# Patient Record
Sex: FEMALE | Race: WHITE | Employment: OTHER | URBAN - METROPOLITAN AREA
[De-identification: names, ages, dates, MRNs, and addresses within clinical notes are randomized per-mention and may not be internally consistent; named-entity substitution may affect disease eponyms.]

---

## 2017-01-01 ENCOUNTER — TELEPHONE (OUTPATIENT)
Dept: FAMILY MEDICINE CLINIC | Facility: CLINIC | Age: 67
End: 2017-01-01

## 2017-01-01 ENCOUNTER — APPOINTMENT (OUTPATIENT)
Dept: CT IMAGING | Facility: HOSPITAL | Age: 67
DRG: 271 | End: 2017-01-01
Attending: CLINICAL NURSE SPECIALIST
Payer: MEDICARE

## 2017-01-01 ENCOUNTER — OFFICE VISIT (OUTPATIENT)
Dept: PODIATRY CLINIC | Facility: CLINIC | Age: 67
End: 2017-01-01

## 2017-01-01 ENCOUNTER — ANTI-COAG VISIT (OUTPATIENT)
Dept: INTERNAL MEDICINE CLINIC | Facility: CLINIC | Age: 67
End: 2017-01-01

## 2017-01-01 ENCOUNTER — HOSPITAL ENCOUNTER (EMERGENCY)
Facility: HOSPITAL | Age: 67
Discharge: HOME | End: 2017-01-01
Attending: EMERGENCY MEDICINE
Payer: MEDICARE

## 2017-01-01 ENCOUNTER — PATIENT OUTREACH (OUTPATIENT)
Dept: FAMILY MEDICINE CLINIC | Facility: CLINIC | Age: 67
End: 2017-01-01

## 2017-01-01 ENCOUNTER — TELEPHONE (OUTPATIENT)
Dept: OTHER | Age: 67
End: 2017-01-01

## 2017-01-01 ENCOUNTER — APPOINTMENT (OUTPATIENT)
Dept: INTERVENTIONAL RADIOLOGY/VASCULAR | Facility: HOSPITAL | Age: 67
DRG: 271 | End: 2017-01-01
Attending: RADIOLOGY
Payer: MEDICARE

## 2017-01-01 ENCOUNTER — TELEPHONE (OUTPATIENT)
Dept: PODIATRY CLINIC | Facility: CLINIC | Age: 67
End: 2017-01-01

## 2017-01-01 ENCOUNTER — OPTICAL REFILL REQUEST (OUTPATIENT)
Dept: OPHTHALMOLOGY | Facility: CLINIC | Age: 67
End: 2017-01-01

## 2017-01-01 ENCOUNTER — TELEPHONE (OUTPATIENT)
Dept: CARDIOLOGY UNIT | Facility: HOSPITAL | Age: 67
End: 2017-01-01

## 2017-01-01 ENCOUNTER — NURSE ONLY (OUTPATIENT)
Dept: FAMILY MEDICINE CLINIC | Facility: CLINIC | Age: 67
End: 2017-01-01

## 2017-01-01 ENCOUNTER — TELEPHONE (OUTPATIENT)
Dept: SURGERY | Facility: CLINIC | Age: 67
End: 2017-01-01

## 2017-01-01 ENCOUNTER — APPOINTMENT (OUTPATIENT)
Dept: ULTRASOUND IMAGING | Facility: HOSPITAL | Age: 67
DRG: 271 | End: 2017-01-01
Attending: EMERGENCY MEDICINE
Payer: MEDICARE

## 2017-01-01 ENCOUNTER — OFFICE VISIT (OUTPATIENT)
Dept: OPHTHALMOLOGY | Facility: CLINIC | Age: 67
End: 2017-01-01

## 2017-01-01 ENCOUNTER — OFFICE VISIT (OUTPATIENT)
Dept: FAMILY MEDICINE CLINIC | Facility: CLINIC | Age: 67
End: 2017-01-01

## 2017-01-01 ENCOUNTER — HOSPITAL ENCOUNTER (INPATIENT)
Facility: HOSPITAL | Age: 67
LOS: 4 days | Discharge: HOME | DRG: 271 | End: 2017-01-01
Attending: EMERGENCY MEDICINE | Admitting: HOSPITALIST
Payer: MEDICARE

## 2017-01-01 ENCOUNTER — HOSPITAL ENCOUNTER (OUTPATIENT)
Dept: GENERAL RADIOLOGY | Age: 67
Discharge: HOME | End: 2017-01-01
Attending: FAMILY MEDICINE | Admitting: FAMILY MEDICINE
Payer: MEDICARE

## 2017-01-01 ENCOUNTER — PATIENT OUTREACH (OUTPATIENT)
Dept: CASE MANAGEMENT | Age: 67
End: 2017-01-01

## 2017-01-01 ENCOUNTER — TELEPHONE (OUTPATIENT)
Dept: OPHTHALMOLOGY | Facility: CLINIC | Age: 67
End: 2017-01-01

## 2017-01-01 ENCOUNTER — TELEPHONE (OUTPATIENT)
Dept: INTERNAL MEDICINE UNIT | Facility: HOSPITAL | Age: 67
End: 2017-01-01

## 2017-01-01 ENCOUNTER — APPOINTMENT (OUTPATIENT)
Dept: ULTRASOUND IMAGING | Facility: HOSPITAL | Age: 67
End: 2017-01-01
Attending: EMERGENCY MEDICINE
Payer: MEDICARE

## 2017-01-01 VITALS
BODY MASS INDEX: 31 KG/M2 | SYSTOLIC BLOOD PRESSURE: 130 MMHG | TEMPERATURE: 98 F | HEART RATE: 73 BPM | WEIGHT: 137 LBS | DIASTOLIC BLOOD PRESSURE: 79 MMHG

## 2017-01-01 VITALS
HEART RATE: 82 BPM | BODY MASS INDEX: 30.82 KG/M2 | SYSTOLIC BLOOD PRESSURE: 123 MMHG | WEIGHT: 137 LBS | DIASTOLIC BLOOD PRESSURE: 70 MMHG | OXYGEN SATURATION: 99 % | HEIGHT: 56 IN | TEMPERATURE: 97 F | RESPIRATION RATE: 18 BRPM

## 2017-01-01 VITALS
WEIGHT: 137.13 LBS | TEMPERATURE: 98 F | BODY MASS INDEX: 30 KG/M2 | RESPIRATION RATE: 18 BRPM | HEART RATE: 79 BPM | SYSTOLIC BLOOD PRESSURE: 119 MMHG | DIASTOLIC BLOOD PRESSURE: 64 MMHG | OXYGEN SATURATION: 97 %

## 2017-01-01 VITALS
DIASTOLIC BLOOD PRESSURE: 65 MMHG | BODY MASS INDEX: 29 KG/M2 | HEART RATE: 76 BPM | SYSTOLIC BLOOD PRESSURE: 129 MMHG | OXYGEN SATURATION: 98 % | WEIGHT: 132 LBS | RESPIRATION RATE: 18 BRPM | TEMPERATURE: 98 F

## 2017-01-01 VITALS
SYSTOLIC BLOOD PRESSURE: 97 MMHG | WEIGHT: 136 LBS | BODY MASS INDEX: 30 KG/M2 | DIASTOLIC BLOOD PRESSURE: 65 MMHG | HEART RATE: 76 BPM

## 2017-01-01 VITALS
DIASTOLIC BLOOD PRESSURE: 76 MMHG | HEIGHT: 60.5 IN | HEART RATE: 73 BPM | SYSTOLIC BLOOD PRESSURE: 119 MMHG | BODY MASS INDEX: 26.01 KG/M2 | WEIGHT: 136 LBS

## 2017-01-01 VITALS
SYSTOLIC BLOOD PRESSURE: 124 MMHG | BODY MASS INDEX: 26 KG/M2 | TEMPERATURE: 98 F | HEART RATE: 86 BPM | WEIGHT: 136 LBS | DIASTOLIC BLOOD PRESSURE: 77 MMHG

## 2017-01-01 VITALS
HEIGHT: 56.5 IN | DIASTOLIC BLOOD PRESSURE: 59 MMHG | RESPIRATION RATE: 16 BRPM | HEART RATE: 82 BPM | BODY MASS INDEX: 30.19 KG/M2 | TEMPERATURE: 97 F | SYSTOLIC BLOOD PRESSURE: 113 MMHG | OXYGEN SATURATION: 97 % | WEIGHT: 138 LBS

## 2017-01-01 VITALS
HEART RATE: 85 BPM | SYSTOLIC BLOOD PRESSURE: 143 MMHG | DIASTOLIC BLOOD PRESSURE: 73 MMHG | BODY MASS INDEX: 30 KG/M2 | WEIGHT: 136 LBS | TEMPERATURE: 98 F

## 2017-01-01 VITALS
WEIGHT: 169 LBS | HEART RATE: 90 BPM | TEMPERATURE: 98 F | DIASTOLIC BLOOD PRESSURE: 78 MMHG | SYSTOLIC BLOOD PRESSURE: 145 MMHG | OXYGEN SATURATION: 98 % | BODY MASS INDEX: 37 KG/M2

## 2017-01-01 DIAGNOSIS — Z79.4 TYPE 2 DIABETES MELLITUS WITH HYPERGLYCEMIA, WITH LONG-TERM CURRENT USE OF INSULIN (HCC): Primary | ICD-10-CM

## 2017-01-01 DIAGNOSIS — I73.9 PERIPHERAL VASCULAR DISEASE, UNSPECIFIED (HCC): ICD-10-CM

## 2017-01-01 DIAGNOSIS — Z86.711 HISTORY OF PULMONARY EMBOLISM: ICD-10-CM

## 2017-01-01 DIAGNOSIS — I73.9 PVD (PERIPHERAL VASCULAR DISEASE) (HCC): ICD-10-CM

## 2017-01-01 DIAGNOSIS — E55.9 VITAMIN D DEFICIENCY: ICD-10-CM

## 2017-01-01 DIAGNOSIS — M79.605 LEFT LEG PAIN: Primary | ICD-10-CM

## 2017-01-01 DIAGNOSIS — N39.0 RECURRENT UTI: Primary | ICD-10-CM

## 2017-01-01 DIAGNOSIS — I73.9 PVD (PERIPHERAL VASCULAR DISEASE) (HCC): Primary | ICD-10-CM

## 2017-01-01 DIAGNOSIS — Z79.01 LONG TERM (CURRENT) USE OF ANTICOAGULANTS: ICD-10-CM

## 2017-01-01 DIAGNOSIS — Z79.4 UNCONTROLLED TYPE 2 DIABETES MELLITUS WITH HYPERGLYCEMIA, WITH LONG-TERM CURRENT USE OF INSULIN (HCC): ICD-10-CM

## 2017-01-01 DIAGNOSIS — R05.9 COUGH: ICD-10-CM

## 2017-01-01 DIAGNOSIS — S90.112A CONTUSION OF LEFT GREAT TOE WITHOUT DAMAGE TO NAIL, INITIAL ENCOUNTER: ICD-10-CM

## 2017-01-01 DIAGNOSIS — S91.115A LACERATION OF LESSER TOE OF LEFT FOOT WITHOUT FOREIGN BODY PRESENT OR DAMAGE TO NAIL, INITIAL ENCOUNTER: Primary | ICD-10-CM

## 2017-01-01 DIAGNOSIS — I73.9 PERIPHERAL VASCULAR DISEASE (HCC): ICD-10-CM

## 2017-01-01 DIAGNOSIS — M62.81 POST-POLIO LIMB MUSCLE WEAKNESS: ICD-10-CM

## 2017-01-01 DIAGNOSIS — E83.42 HYPOMAGNESEMIA: ICD-10-CM

## 2017-01-01 DIAGNOSIS — Z86.718 HISTORY OF DVT (DEEP VEIN THROMBOSIS): ICD-10-CM

## 2017-01-01 DIAGNOSIS — E11.65 TYPE 2 DIABETES MELLITUS WITH HYPERGLYCEMIA, WITH LONG-TERM CURRENT USE OF INSULIN (HCC): Primary | ICD-10-CM

## 2017-01-01 DIAGNOSIS — M79.675 PAIN OF TOE OF LEFT FOOT: Primary | ICD-10-CM

## 2017-01-01 DIAGNOSIS — E11.621 TYPE 2 DIABETES MELLITUS WITH FOOT ULCER, WITH LONG-TERM CURRENT USE OF INSULIN (HCC): ICD-10-CM

## 2017-01-01 DIAGNOSIS — R05.9 COUGH: Primary | ICD-10-CM

## 2017-01-01 DIAGNOSIS — L03.032 CELLULITIS OF TOE OF LEFT FOOT: Primary | ICD-10-CM

## 2017-01-01 DIAGNOSIS — I73.9 ARTERIAL INSUFFICIENCY OF LOWER EXTREMITY (HCC): ICD-10-CM

## 2017-01-01 DIAGNOSIS — F33.41 RECURRENT MAJOR DEPRESSIVE DISORDER, IN PARTIAL REMISSION (HCC): ICD-10-CM

## 2017-01-01 DIAGNOSIS — F41.0 PANIC ATTACK: ICD-10-CM

## 2017-01-01 DIAGNOSIS — I73.9 ARTERIAL INSUFFICIENCY OF LOWER EXTREMITY (HCC): Primary | ICD-10-CM

## 2017-01-01 DIAGNOSIS — E78.2 MIXED HYPERLIPIDEMIA: ICD-10-CM

## 2017-01-01 DIAGNOSIS — L03.116 CELLULITIS OF LEFT FOOT: ICD-10-CM

## 2017-01-01 DIAGNOSIS — R13.12 OROPHARYNGEAL DYSPHAGIA: Primary | ICD-10-CM

## 2017-01-01 DIAGNOSIS — L97.509 TYPE 2 DIABETES MELLITUS WITH FOOT ULCER, WITH LONG-TERM CURRENT USE OF INSULIN (HCC): ICD-10-CM

## 2017-01-01 DIAGNOSIS — E11.3299 BDR (BACKGROUND DIABETIC RETINOPATHY) (HCC): ICD-10-CM

## 2017-01-01 DIAGNOSIS — Z96.1 PSEUDOPHAKIA OF BOTH EYES: ICD-10-CM

## 2017-01-01 DIAGNOSIS — J45.41 MODERATE PERSISTENT ASTHMA WITH ACUTE EXACERBATION: ICD-10-CM

## 2017-01-01 DIAGNOSIS — J45.21 MILD INTERMITTENT ASTHMA WITH ACUTE EXACERBATION: ICD-10-CM

## 2017-01-01 DIAGNOSIS — E10.3293 TYPE 1 DIABETES MELLITUS WITH MILD NONPROLIFERATIVE RETINOPATHY OF BOTH EYES WITHOUT MACULAR EDEMA (HCC): Primary | ICD-10-CM

## 2017-01-01 DIAGNOSIS — L03.032 CELLULITIS OF TOE OF LEFT FOOT: ICD-10-CM

## 2017-01-01 DIAGNOSIS — H43.392 FLOATER, VITREOUS, LEFT: ICD-10-CM

## 2017-01-01 DIAGNOSIS — Z79.01 LONG TERM (CURRENT) USE OF ANTICOAGULANTS: Primary | ICD-10-CM

## 2017-01-01 DIAGNOSIS — R35.0 FREQUENT URINATION: Primary | ICD-10-CM

## 2017-01-01 DIAGNOSIS — E11.65 UNCONTROLLED TYPE 2 DIABETES MELLITUS WITH HYPERGLYCEMIA, WITH LONG-TERM CURRENT USE OF INSULIN (HCC): ICD-10-CM

## 2017-01-01 DIAGNOSIS — I73.9 CLAUDICATION (HCC): ICD-10-CM

## 2017-01-01 DIAGNOSIS — I50.9 ACUTE ON CHRONIC CONGESTIVE HEART FAILURE, UNSPECIFIED CONGESTIVE HEART FAILURE TYPE: ICD-10-CM

## 2017-01-01 DIAGNOSIS — R13.10 DYSPHAGIA, UNSPECIFIED TYPE: ICD-10-CM

## 2017-01-01 DIAGNOSIS — R79.1 SUBTHERAPEUTIC INTERNATIONAL NORMALIZED RATIO (INR): ICD-10-CM

## 2017-01-01 DIAGNOSIS — L03.032 PARONYCHIA OF GREAT TOE OF LEFT FOOT: Primary | ICD-10-CM

## 2017-01-01 DIAGNOSIS — B91 POST-POLIO LIMB MUSCLE WEAKNESS: ICD-10-CM

## 2017-01-01 DIAGNOSIS — R73.9 HYPERGLYCEMIA: ICD-10-CM

## 2017-01-01 DIAGNOSIS — Z79.4 TYPE 2 DIABETES MELLITUS WITH FOOT ULCER, WITH LONG-TERM CURRENT USE OF INSULIN (HCC): ICD-10-CM

## 2017-01-01 PROCEDURE — 99232 SBSQ HOSP IP/OBS MODERATE 35: CPT | Performed by: HOSPITALIST

## 2017-01-01 PROCEDURE — 36415 COLL VENOUS BLD VENIPUNCTURE: CPT

## 2017-01-01 PROCEDURE — L3260 AMBULATORY SURGICAL BOOT EAC: HCPCS | Performed by: FAMILY MEDICINE

## 2017-01-01 PROCEDURE — 85610 PROTHROMBIN TIME: CPT | Performed by: EMERGENCY MEDICINE

## 2017-01-01 PROCEDURE — G0463 HOSPITAL OUTPT CLINIC VISIT: HCPCS | Performed by: FAMILY MEDICINE

## 2017-01-01 PROCEDURE — 85025 COMPLETE CBC W/AUTO DIFF WBC: CPT | Performed by: EMERGENCY MEDICINE

## 2017-01-01 PROCEDURE — 73706 CT ANGIO LWR EXTR W/O&W/DYE: CPT | Performed by: CLINICAL NURSE SPECIALIST

## 2017-01-01 PROCEDURE — 80048 BASIC METABOLIC PNL TOTAL CA: CPT | Performed by: EMERGENCY MEDICINE

## 2017-01-01 PROCEDURE — G0463 HOSPITAL OUTPT CLINIC VISIT: HCPCS | Performed by: PODIATRIST

## 2017-01-01 PROCEDURE — 93926 LOWER EXTREMITY STUDY: CPT | Performed by: EMERGENCY MEDICINE

## 2017-01-01 PROCEDURE — 99213 OFFICE O/P EST LOW 20 MIN: CPT | Performed by: FAMILY MEDICINE

## 2017-01-01 PROCEDURE — 99490 CHRNC CARE MGMT STAFF 1ST 20: CPT | Performed by: FAMILY MEDICINE

## 2017-01-01 PROCEDURE — 73660 X-RAY EXAM OF TOE(S): CPT | Performed by: FAMILY MEDICINE

## 2017-01-01 PROCEDURE — 04CL3ZZ EXTIRPATION OF MATTER FROM LEFT FEMORAL ARTERY, PERCUTANEOUS APPROACH: ICD-10-PCS | Performed by: RADIOLOGY

## 2017-01-01 PROCEDURE — 81002 URINALYSIS NONAUTO W/O SCOPE: CPT | Performed by: FAMILY MEDICINE

## 2017-01-01 PROCEDURE — 92014 COMPRE OPH EXAM EST PT 1/>: CPT | Performed by: OPHTHALMOLOGY

## 2017-01-01 PROCEDURE — 99212 OFFICE O/P EST SF 10 MIN: CPT | Performed by: PODIATRIST

## 2017-01-01 PROCEDURE — 99239 HOSP IP/OBS DSCHRG MGMT >30: CPT | Performed by: HOSPITALIST

## 2017-01-01 PROCEDURE — B41G1ZZ FLUOROSCOPY OF LEFT LOWER EXTREMITY ARTERIES USING LOW OSMOLAR CONTRAST: ICD-10-PCS | Performed by: RADIOLOGY

## 2017-01-01 PROCEDURE — 99212 OFFICE O/P EST SF 10 MIN: CPT | Performed by: FAMILY MEDICINE

## 2017-01-01 PROCEDURE — 99495 TRANSJ CARE MGMT MOD F2F 14D: CPT | Performed by: FAMILY MEDICINE

## 2017-01-01 PROCEDURE — 99233 SBSQ HOSP IP/OBS HIGH 50: CPT | Performed by: HOSPITALIST

## 2017-01-01 PROCEDURE — 047L3Z1 DILATION OF LEFT FEMORAL ARTERY USING DRUG-COATED BALLOON, PERCUTANEOUS APPROACH: ICD-10-PCS | Performed by: RADIOLOGY

## 2017-01-01 PROCEDURE — 99284 EMERGENCY DEPT VISIT MOD MDM: CPT

## 2017-01-01 PROCEDURE — 047Q3ZZ DILATION OF LEFT ANTERIOR TIBIAL ARTERY, PERCUTANEOUS APPROACH: ICD-10-PCS | Performed by: RADIOLOGY

## 2017-01-01 PROCEDURE — 99283 EMERGENCY DEPT VISIT LOW MDM: CPT

## 2017-01-01 PROCEDURE — 99223 1ST HOSP IP/OBS HIGH 75: CPT | Performed by: HOSPITALIST

## 2017-01-01 PROCEDURE — 92015 DETERMINE REFRACTIVE STATE: CPT | Performed by: OPHTHALMOLOGY

## 2017-01-01 PROCEDURE — 99214 OFFICE O/P EST MOD 30 MIN: CPT | Performed by: FAMILY MEDICINE

## 2017-01-01 PROCEDURE — 85730 THROMBOPLASTIN TIME PARTIAL: CPT | Performed by: EMERGENCY MEDICINE

## 2017-01-01 RX ORDER — PREDNISONE 20 MG/1
20 TABLET ORAL 2 TIMES DAILY
Qty: 10 TABLET | Refills: 0 | Status: SHIPPED | OUTPATIENT
Start: 2017-01-01 | End: 2017-01-01

## 2017-01-01 RX ORDER — B-COMPLEX WITH VITAMIN C
1 TABLET ORAL DAILY
Status: DISCONTINUED | OUTPATIENT
Start: 2017-01-01 | End: 2017-01-01 | Stop reason: RX

## 2017-01-01 RX ORDER — CLOPIDOGREL BISULFATE 75 MG/1
TABLET ORAL
Qty: 90 TABLET | Refills: 3 | Status: ON HOLD | OUTPATIENT
Start: 2017-01-01 | End: 2018-01-01

## 2017-01-01 RX ORDER — SACCHAROMYCES BOULARDII 250 MG
250 CAPSULE ORAL 2 TIMES DAILY
Status: DISCONTINUED | OUTPATIENT
Start: 2017-01-01 | End: 2017-01-01

## 2017-01-01 RX ORDER — CLOTRIMAZOLE AND BETAMETHASONE DIPROPIONATE 10; .64 MG/G; MG/G
1 CREAM TOPICAL 2 TIMES DAILY
Status: DISCONTINUED | OUTPATIENT
Start: 2017-01-01 | End: 2017-01-01

## 2017-01-01 RX ORDER — KETOTIFEN FUMARATE 0.35 MG/ML
1 SOLUTION/ DROPS OPHTHALMIC 2 TIMES DAILY
Status: DISCONTINUED | OUTPATIENT
Start: 2017-01-01 | End: 2017-01-01

## 2017-01-01 RX ORDER — MORPHINE SULFATE 2 MG/ML
1 INJECTION, SOLUTION INTRAMUSCULAR; INTRAVENOUS EVERY 2 HOUR PRN
Status: DISCONTINUED | OUTPATIENT
Start: 2017-01-01 | End: 2017-01-01

## 2017-01-01 RX ORDER — CIPROFLOXACIN 500 MG/1
500 TABLET, FILM COATED ORAL 2 TIMES DAILY
Qty: 20 TABLET | Refills: 0 | Status: SHIPPED | OUTPATIENT
Start: 2017-01-01 | End: 2018-01-01

## 2017-01-01 RX ORDER — ASCORBIC ACID 500 MG
1000 TABLET ORAL DAILY
Status: DISCONTINUED | OUTPATIENT
Start: 2017-01-01 | End: 2017-01-01

## 2017-01-01 RX ORDER — NITROGLYCERIN 20 MG/100ML
INJECTION INTRAVENOUS
Status: COMPLETED
Start: 2017-01-01 | End: 2017-01-01

## 2017-01-01 RX ORDER — BISACODYL 10 MG
10 SUPPOSITORY, RECTAL RECTAL
Status: DISCONTINUED | OUTPATIENT
Start: 2017-01-01 | End: 2017-01-01

## 2017-01-01 RX ORDER — DOCUSATE SODIUM 100 MG/1
100 CAPSULE, LIQUID FILLED ORAL 2 TIMES DAILY
Status: DISCONTINUED | OUTPATIENT
Start: 2017-01-01 | End: 2017-01-01

## 2017-01-01 RX ORDER — MIDAZOLAM HYDROCHLORIDE 1 MG/ML
INJECTION INTRAMUSCULAR; INTRAVENOUS
Status: COMPLETED
Start: 2017-01-01 | End: 2017-01-01

## 2017-01-01 RX ORDER — SODIUM CHLORIDE 0.9 % (FLUSH) 0.9 %
3 SYRINGE (ML) INJECTION AS NEEDED
Status: DISCONTINUED | OUTPATIENT
Start: 2017-01-01 | End: 2017-01-01

## 2017-01-01 RX ORDER — CETIRIZINE HYDROCHLORIDE 10 MG/1
10 TABLET ORAL DAILY
Status: DISCONTINUED | OUTPATIENT
Start: 2017-01-01 | End: 2017-01-01

## 2017-01-01 RX ORDER — ROSUVASTATIN CALCIUM 20 MG/1
20 TABLET, COATED ORAL NIGHTLY
Status: DISCONTINUED | OUTPATIENT
Start: 2017-01-01 | End: 2017-01-01

## 2017-01-01 RX ORDER — HYDROCODONE BITARTRATE AND ACETAMINOPHEN 5; 325 MG/1; MG/1
2 TABLET ORAL EVERY 4 HOURS PRN
Status: DISCONTINUED | OUTPATIENT
Start: 2017-01-01 | End: 2017-01-01

## 2017-01-01 RX ORDER — AMPICILLIN TRIHYDRATE 250 MG
1000 CAPSULE ORAL DAILY
Status: DISCONTINUED | OUTPATIENT
Start: 2017-01-01 | End: 2017-01-01 | Stop reason: RX

## 2017-01-01 RX ORDER — WARFARIN SODIUM 7.5 MG/1
7.5 TABLET ORAL
Status: DISCONTINUED | OUTPATIENT
Start: 2017-01-01 | End: 2017-01-01

## 2017-01-01 RX ORDER — ONDANSETRON 2 MG/ML
4 INJECTION INTRAMUSCULAR; INTRAVENOUS ONCE
Status: COMPLETED | OUTPATIENT
Start: 2017-01-01 | End: 2017-01-01

## 2017-01-01 RX ORDER — MAGNESIUM OXIDE 400 MG (241.3 MG MAGNESIUM) TABLET
400 TABLET ONCE
Status: COMPLETED | OUTPATIENT
Start: 2017-01-01 | End: 2017-01-01

## 2017-01-01 RX ORDER — ACETAMINOPHEN 325 MG/1
650 TABLET ORAL EVERY 6 HOURS PRN
Status: DISCONTINUED | OUTPATIENT
Start: 2017-01-01 | End: 2017-01-01

## 2017-01-01 RX ORDER — UBIDECARENONE/VITAMIN E MIXED 100 MG-100
1 CAPSULE ORAL DAILY
Status: DISCONTINUED | OUTPATIENT
Start: 2017-01-01 | End: 2017-01-01 | Stop reason: RX

## 2017-01-01 RX ORDER — METFORMIN HYDROCHLORIDE 500 MG/1
500 TABLET, EXTENDED RELEASE ORAL 2 TIMES DAILY WITH MEALS
Qty: 180 TABLET | Refills: 3 | Status: SHIPPED | OUTPATIENT
Start: 2017-01-01 | End: 2017-01-01

## 2017-01-01 RX ORDER — 0.9 % SODIUM CHLORIDE 0.9 %
VIAL (ML) INJECTION
Status: COMPLETED
Start: 2017-01-01 | End: 2017-01-01

## 2017-01-01 RX ORDER — LISINOPRIL 5 MG/1
2.5 TABLET ORAL DAILY
Qty: 45 TABLET | Refills: 3 | Status: SHIPPED | OUTPATIENT
Start: 2017-01-01 | End: 2018-01-01

## 2017-01-01 RX ORDER — POTASSIUM CHLORIDE 20 MEQ/1
40 TABLET, EXTENDED RELEASE ORAL ONCE
Status: COMPLETED | OUTPATIENT
Start: 2017-01-01 | End: 2017-01-01

## 2017-01-01 RX ORDER — SODIUM CHLORIDE 9 MG/ML
INJECTION, SOLUTION INTRAVENOUS CONTINUOUS
Status: DISCONTINUED | OUTPATIENT
Start: 2017-01-01 | End: 2017-01-01

## 2017-01-01 RX ORDER — MULTIVITAMIN WITH IRON
500 TABLET ORAL DAILY
Status: ON HOLD | COMMUNITY
End: 2018-01-01

## 2017-01-01 RX ORDER — PROMETHAZINE HYDROCHLORIDE 25 MG/1
3 TABLET ORAL AS NEEDED
Status: DISCONTINUED | OUTPATIENT
Start: 2017-01-01 | End: 2017-01-01

## 2017-01-01 RX ORDER — CEPHALEXIN 500 MG/1
500 CAPSULE ORAL 2 TIMES DAILY
Qty: 10 CAPSULE | Refills: 0 | Status: SHIPPED | OUTPATIENT
Start: 2017-01-01 | End: 2017-01-01

## 2017-01-01 RX ORDER — MAGNESIUM OXIDE 400 MG (241.3 MG MAGNESIUM) TABLET
400 TABLET ONCE
Status: DISCONTINUED | OUTPATIENT
Start: 2017-01-01 | End: 2017-01-01

## 2017-01-01 RX ORDER — 0.9 % SODIUM CHLORIDE 0.9 %
VIAL (ML) INJECTION
Status: DISPENSED
Start: 2017-01-01 | End: 2017-01-01

## 2017-01-01 RX ORDER — ALPRAZOLAM 1 MG/1
TABLET ORAL
Qty: 30 TABLET | Refills: 2 | OUTPATIENT
Start: 2017-01-01 | End: 2018-01-01 | Stop reason: ALTCHOICE

## 2017-01-01 RX ORDER — WARFARIN SODIUM 5 MG/1
TABLET ORAL
Qty: 90 TABLET | Refills: 3 | Status: SHIPPED | OUTPATIENT
Start: 2017-01-01 | End: 2018-01-01

## 2017-01-01 RX ORDER — ACETAMINOPHEN AND CODEINE PHOSPHATE 300; 30 MG/1; MG/1
1 TABLET ORAL EVERY 4 HOURS PRN
Status: DISCONTINUED | OUTPATIENT
Start: 2017-01-01 | End: 2017-01-01

## 2017-01-01 RX ORDER — PEN NEEDLE, DIABETIC 31 G X1/4"
NEEDLE, DISPOSABLE MISCELLANEOUS
Qty: 400 EACH | Refills: 3 | Status: SHIPPED | OUTPATIENT
Start: 2017-01-01

## 2017-01-01 RX ORDER — CARVEDILOL 3.12 MG/1
3.12 TABLET ORAL 2 TIMES DAILY WITH MEALS
Status: DISCONTINUED | OUTPATIENT
Start: 2017-01-01 | End: 2017-01-01

## 2017-01-01 RX ORDER — LISINOPRIL 2.5 MG/1
2.5 TABLET ORAL DAILY
Status: DISCONTINUED | OUTPATIENT
Start: 2017-01-01 | End: 2017-01-01

## 2017-01-01 RX ORDER — ECHINACEA PURPUREA EXTRACT 125 MG
3 TABLET ORAL NIGHTLY PRN
Status: DISCONTINUED | OUTPATIENT
Start: 2017-01-01 | End: 2017-01-01

## 2017-01-01 RX ORDER — MECLIZINE HYDROCHLORIDE 25 MG/1
25 TABLET ORAL DAILY PRN
Status: DISCONTINUED | OUTPATIENT
Start: 2017-01-01 | End: 2017-01-01

## 2017-01-01 RX ORDER — CLOPIDOGREL BISULFATE 75 MG/1
75 TABLET ORAL NIGHTLY
Status: DISCONTINUED | OUTPATIENT
Start: 2017-01-01 | End: 2017-01-01

## 2017-01-01 RX ORDER — CLOPIDOGREL BISULFATE 75 MG/1
TABLET ORAL
Qty: 90 TABLET | Refills: 0 | Status: CANCELLED | OUTPATIENT
Start: 2017-01-01

## 2017-01-01 RX ORDER — CLOPIDOGREL BISULFATE 75 MG/1
TABLET ORAL
Qty: 90 TABLET | Refills: 0 | OUTPATIENT
Start: 2017-01-01

## 2017-01-01 RX ORDER — MAGNESIUM GLUCONATE 30 MG(550)
50 TABLET ORAL DAILY
Status: DISCONTINUED | OUTPATIENT
Start: 2017-01-01 | End: 2017-01-01 | Stop reason: RX

## 2017-01-01 RX ORDER — ALPRAZOLAM 1 MG/1
1 TABLET ORAL DAILY PRN
Status: DISCONTINUED | OUTPATIENT
Start: 2017-01-01 | End: 2017-01-01

## 2017-01-01 RX ORDER — SODIUM PHOSPHATE, DIBASIC AND SODIUM PHOSPHATE, MONOBASIC 7; 19 G/133ML; G/133ML
1 ENEMA RECTAL ONCE AS NEEDED
Status: DISCONTINUED | OUTPATIENT
Start: 2017-01-01 | End: 2017-01-01

## 2017-01-01 RX ORDER — WARFARIN SODIUM 5 MG/1
5 TABLET ORAL
Status: DISCONTINUED | OUTPATIENT
Start: 2017-01-01 | End: 2017-01-01

## 2017-01-01 RX ORDER — NITROGLYCERIN 0.4 MG/1
0.4 TABLET SUBLINGUAL EVERY 5 MIN PRN
Status: DISCONTINUED | OUTPATIENT
Start: 2017-01-01 | End: 2017-01-01

## 2017-01-01 RX ORDER — POLYETHYLENE GLYCOL 3350 17 G/17G
17 POWDER, FOR SOLUTION ORAL DAILY PRN
Status: DISCONTINUED | OUTPATIENT
Start: 2017-01-01 | End: 2017-01-01

## 2017-01-01 RX ORDER — MAGNESIUM OXIDE 400 MG (241.3 MG MAGNESIUM) TABLET
800 TABLET ONCE
Status: COMPLETED | OUTPATIENT
Start: 2017-01-01 | End: 2017-01-01

## 2017-01-01 RX ORDER — CARVEDILOL 3.12 MG/1
3.12 TABLET ORAL 2 TIMES DAILY WITH MEALS
Qty: 180 TABLET | Refills: 3 | Status: ON HOLD | OUTPATIENT
Start: 2017-01-01 | End: 2018-01-01

## 2017-01-01 RX ORDER — HEPARIN SODIUM 1000 [USP'U]/ML
INJECTION, SOLUTION INTRAVENOUS; SUBCUTANEOUS
Status: COMPLETED
Start: 2017-01-01 | End: 2017-01-01

## 2017-01-01 RX ORDER — TRAZODONE HYDROCHLORIDE 50 MG/1
100 TABLET ORAL NIGHTLY
Qty: 180 TABLET | Refills: 3 | Status: ON HOLD | OUTPATIENT
Start: 2017-01-01 | End: 2018-01-01

## 2017-01-01 RX ORDER — ACETAMINOPHEN 325 MG/1
650 TABLET ORAL EVERY 4 HOURS PRN
Status: DISCONTINUED | OUTPATIENT
Start: 2017-01-01 | End: 2017-01-01

## 2017-01-01 RX ORDER — PROTAMINE SULFATE 10 MG/ML
INJECTION, SOLUTION INTRAVENOUS
Status: COMPLETED
Start: 2017-01-01 | End: 2017-01-01

## 2017-01-01 RX ORDER — CEPHALEXIN 500 MG/1
500 CAPSULE ORAL 4 TIMES DAILY
Qty: 12 CAPSULE | Refills: 0 | Status: SHIPPED | OUTPATIENT
Start: 2017-01-01 | End: 2017-01-01

## 2017-01-01 RX ORDER — ASCORBIC ACID/MULTIVIT-MIN 1000 MG
1 EFFERVESCENT POWDER IN PACKET ORAL DAILY
Status: DISCONTINUED | OUTPATIENT
Start: 2017-01-01 | End: 2017-01-01 | Stop reason: RX

## 2017-01-01 RX ORDER — ACETAMINOPHEN AND CODEINE PHOSPHATE 300; 30 MG/1; MG/1
1 TABLET ORAL EVERY 4 HOURS PRN
Qty: 12 TABLET | Refills: 0 | Status: SHIPPED | OUTPATIENT
Start: 2017-01-01 | End: 2018-01-01 | Stop reason: ALTCHOICE

## 2017-01-01 RX ORDER — MORPHINE SULFATE 2 MG/ML
2 INJECTION, SOLUTION INTRAMUSCULAR; INTRAVENOUS EVERY 2 HOUR PRN
Status: DISCONTINUED | OUTPATIENT
Start: 2017-01-01 | End: 2017-01-01

## 2017-01-01 RX ORDER — FUROSEMIDE 20 MG/1
20 TABLET ORAL DAILY
Qty: 90 TABLET | Refills: 3 | Status: SHIPPED | OUTPATIENT
Start: 2017-01-01 | End: 2018-01-01

## 2017-01-01 RX ORDER — SODIUM CHLORIDE 9 MG/ML
INJECTION, SOLUTION INTRAVENOUS
Status: COMPLETED
Start: 2017-01-01 | End: 2017-01-01

## 2017-01-01 RX ORDER — LIDOCAINE HYDROCHLORIDE 20 MG/ML
INJECTION, SOLUTION EPIDURAL; INFILTRATION; INTRACAUDAL; PERINEURAL
Status: COMPLETED
Start: 2017-01-01 | End: 2017-01-01

## 2017-01-01 RX ORDER — CETIRIZINE HYDROCHLORIDE 5 MG/1
5 TABLET ORAL DAILY
Status: DISCONTINUED | OUTPATIENT
Start: 2017-01-01 | End: 2017-01-01 | Stop reason: DRUGHIGH

## 2017-01-01 RX ORDER — VERAPAMIL HYDROCHLORIDE 2.5 MG/ML
INJECTION, SOLUTION INTRAVENOUS
Status: COMPLETED
Start: 2017-01-01 | End: 2017-01-01

## 2017-01-01 RX ORDER — CIPROFLOXACIN 500 MG/1
500 TABLET, FILM COATED ORAL 2 TIMES DAILY
Qty: 20 TABLET | Refills: 0 | Status: SHIPPED | OUTPATIENT
Start: 2017-01-01 | End: 2017-01-01 | Stop reason: ALTCHOICE

## 2017-01-01 RX ORDER — ECHINACEA PURPUREA EXTRACT 125 MG
3 TABLET ORAL NIGHTLY
Status: ON HOLD | COMMUNITY
End: 2018-01-01

## 2017-01-01 RX ORDER — ONDANSETRON 2 MG/ML
4 INJECTION INTRAMUSCULAR; INTRAVENOUS EVERY 6 HOURS PRN
Status: DISCONTINUED | OUTPATIENT
Start: 2017-01-01 | End: 2017-01-01

## 2017-01-01 RX ORDER — TRAZODONE HYDROCHLORIDE 50 MG/1
50 TABLET ORAL NIGHTLY
Qty: 90 TABLET | Refills: 1 | Status: SHIPPED | OUTPATIENT
Start: 2017-01-01 | End: 2017-01-01

## 2017-01-01 RX ORDER — ALBUTEROL SULFATE 90 UG/1
2 AEROSOL, METERED RESPIRATORY (INHALATION) 2 TIMES DAILY
Status: DISCONTINUED | OUTPATIENT
Start: 2017-01-01 | End: 2017-01-01

## 2017-01-01 RX ORDER — FUROSEMIDE 20 MG/1
TABLET ORAL
Qty: 90 TABLET | Refills: 0 | Status: CANCELLED | OUTPATIENT
Start: 2017-01-01

## 2017-01-01 RX ORDER — WARFARIN SODIUM 7.5 MG/1
TABLET ORAL
Qty: 36 TABLET | Refills: 3 | Status: SHIPPED | OUTPATIENT
Start: 2017-01-01 | End: 2017-01-01

## 2017-01-01 RX ORDER — DEXTROSE MONOHYDRATE 25 G/50ML
50 INJECTION, SOLUTION INTRAVENOUS AS NEEDED
Status: DISCONTINUED | OUTPATIENT
Start: 2017-01-01 | End: 2017-01-01

## 2017-01-01 RX ORDER — FUROSEMIDE 20 MG/1
20 TABLET ORAL DAILY
Status: DISCONTINUED | OUTPATIENT
Start: 2017-01-01 | End: 2017-01-01

## 2017-01-01 RX ORDER — RANOLAZINE 500 MG/1
500 TABLET, EXTENDED RELEASE ORAL 2 TIMES DAILY
Status: DISCONTINUED | OUTPATIENT
Start: 2017-01-01 | End: 2017-01-01

## 2017-01-01 RX ORDER — MORPHINE SULFATE 4 MG/ML
4 INJECTION, SOLUTION INTRAMUSCULAR; INTRAVENOUS EVERY 2 HOUR PRN
Status: DISCONTINUED | OUTPATIENT
Start: 2017-01-01 | End: 2017-01-01

## 2017-01-01 RX ORDER — MORPHINE SULFATE 4 MG/ML
4 INJECTION, SOLUTION INTRAMUSCULAR; INTRAVENOUS EVERY 30 MIN PRN
Status: DISCONTINUED | OUTPATIENT
Start: 2017-01-01 | End: 2017-01-01

## 2017-01-01 RX ORDER — ASPIRIN 81 MG/1
81 TABLET, CHEWABLE ORAL DAILY
Status: DISCONTINUED | OUTPATIENT
Start: 2017-01-01 | End: 2017-01-01

## 2017-01-01 RX ORDER — TRAZODONE HYDROCHLORIDE 50 MG/1
50 TABLET ORAL NIGHTLY
Status: DISCONTINUED | OUTPATIENT
Start: 2017-01-01 | End: 2017-01-01

## 2017-01-01 RX ORDER — VITAMIN E 268 MG
400 CAPSULE ORAL DAILY
Status: DISCONTINUED | OUTPATIENT
Start: 2017-01-01 | End: 2017-01-01

## 2017-01-01 RX ORDER — FUROSEMIDE 20 MG/1
TABLET ORAL
Qty: 90 TABLET | Refills: 0 | OUTPATIENT
Start: 2017-01-01

## 2017-01-01 RX ORDER — MAGNESIUM SULFATE HEPTAHYDRATE 40 MG/ML
2 INJECTION, SOLUTION INTRAVENOUS ONCE
Status: DISCONTINUED | OUTPATIENT
Start: 2017-01-01 | End: 2017-01-01

## 2017-01-01 RX ORDER — HYDROCODONE BITARTRATE AND ACETAMINOPHEN 5; 325 MG/1; MG/1
1 TABLET ORAL EVERY 4 HOURS PRN
Status: DISCONTINUED | OUTPATIENT
Start: 2017-01-01 | End: 2017-01-01

## 2017-01-01 RX ORDER — WARFARIN SODIUM 7.5 MG/1
7.5 TABLET ORAL NIGHTLY
Status: DISCONTINUED | OUTPATIENT
Start: 2017-01-01 | End: 2017-01-01

## 2017-01-05 ENCOUNTER — TELEPHONE (OUTPATIENT)
Dept: FAMILY MEDICINE CLINIC | Facility: CLINIC | Age: 67
End: 2017-01-05

## 2017-01-05 NOTE — TELEPHONE ENCOUNTER
[facility] MEDICARE RX FAX RECEIVED : NOTE . Chucky Willis THANK U FOR BEING A [facility] MEDICARE RX PLUS PDP MEMBER . WE ARE WRITING TO LET U KNOW THT YOUR PLAN HAS PROVIDED YOU WITH A TEMPORARY TRANSITION SUPPLY OF THE FOLLOWING DRUG ALPRAZOLAM  1 MG .   NOT INCLUDED IN DRUG LI

## 2017-01-05 NOTE — TELEPHONE ENCOUNTER
Spoke to pt, she states that Dr. Montez Hester had filled out a form that states she needs the alprazolam 1mg. A letter was sent by Halina Belle on November 30th with the statement below. Dr. [facility], do you want the pt to continue on this medication?  The pt says that sh

## 2017-01-09 RX ORDER — ALPRAZOLAM 1 MG/1
TABLET ORAL
Qty: 30 TABLET | Refills: 4 | OUTPATIENT
Start: 2017-01-09 | End: 2017-06-15

## 2017-01-11 ENCOUNTER — TELEPHONE (OUTPATIENT)
Dept: FAMILY MEDICINE CLINIC | Facility: CLINIC | Age: 67
End: 2017-01-11

## 2017-01-11 DIAGNOSIS — Z79.4 TYPE 2 DIABETES MELLITUS WITH FOOT ULCER, WITH LONG-TERM CURRENT USE OF INSULIN (HCC): Primary | ICD-10-CM

## 2017-01-11 DIAGNOSIS — L97.509 TYPE 2 DIABETES MELLITUS WITH FOOT ULCER, WITH LONG-TERM CURRENT USE OF INSULIN (HCC): Primary | ICD-10-CM

## 2017-01-11 DIAGNOSIS — E11.621 TYPE 2 DIABETES MELLITUS WITH FOOT ULCER, WITH LONG-TERM CURRENT USE OF INSULIN (HCC): Primary | ICD-10-CM

## 2017-01-13 ENCOUNTER — TELEPHONE (OUTPATIENT)
Dept: FAMILY MEDICINE CLINIC | Facility: CLINIC | Age: 67
End: 2017-01-13

## 2017-01-13 NOTE — TELEPHONE ENCOUNTER
Rolo Hirsch from [facility] has faxed over an attestation form and this is pending signature from the doctor and to be returned via fax. This was just refaxed to us at time call was made. Please call [phone].  If form received, please fax back to

## 2017-01-17 NOTE — TELEPHONE ENCOUNTER
Chart reviewed. Refills sent per Triage Dept protocol.      Diabetes Medications  Protocol Criteria:  · Appointment scheduled in the past 6 months or the next 3 months  · A1C < 7.5 in the past 6 months  · Creatinine in the past 12 months  · Creatinine resul

## 2017-01-23 ENCOUNTER — PRIOR ORIGINAL RECORDS (OUTPATIENT)
Dept: OTHER | Age: 67
End: 2017-01-23

## 2017-01-23 ENCOUNTER — OFFICE VISIT (OUTPATIENT)
Dept: FAMILY MEDICINE CLINIC | Facility: CLINIC | Age: 67
End: 2017-01-23

## 2017-01-23 VITALS — DIASTOLIC BLOOD PRESSURE: 70 MMHG | SYSTOLIC BLOOD PRESSURE: 114 MMHG | TEMPERATURE: 98 F | HEART RATE: 97 BPM

## 2017-01-23 DIAGNOSIS — E11.621 TYPE 2 DIABETES MELLITUS WITH FOOT ULCER, WITH LONG-TERM CURRENT USE OF INSULIN (HCC): Primary | ICD-10-CM

## 2017-01-23 DIAGNOSIS — I25.110 CORONARY ARTERY DISEASE INVOLVING NATIVE CORONARY ARTERY OF NATIVE HEART WITH UNSTABLE ANGINA PECTORIS (HCC): ICD-10-CM

## 2017-01-23 DIAGNOSIS — I34.1 MITRAL VALVE PROLAPSE: ICD-10-CM

## 2017-01-23 DIAGNOSIS — Z79.4 TYPE 2 DIABETES MELLITUS WITH FOOT ULCER, WITH LONG-TERM CURRENT USE OF INSULIN (HCC): Primary | ICD-10-CM

## 2017-01-23 DIAGNOSIS — L97.509 TYPE 2 DIABETES MELLITUS WITH FOOT ULCER, WITH LONG-TERM CURRENT USE OF INSULIN (HCC): Primary | ICD-10-CM

## 2017-01-23 PROCEDURE — G0463 HOSPITAL OUTPT CLINIC VISIT: HCPCS | Performed by: FAMILY MEDICINE

## 2017-01-23 PROCEDURE — 99214 OFFICE O/P EST MOD 30 MIN: CPT | Performed by: FAMILY MEDICINE

## 2017-01-23 RX ORDER — WARFARIN SODIUM 5 MG/1
TABLET ORAL
Refills: 3 | COMMUNITY
Start: 2016-12-31 | End: 2017-03-03

## 2017-01-23 NOTE — PROGRESS NOTES
Has pos nuc stress  41 % ej fracture  Has mitral valve issues  Has 2 vessel severe disease. Maybe kendall clip and stents   Order of procedure is still in question. Breathing has been ok. Pt feels fatigued. Here with her ex  Kellee .     Sugars a

## 2017-01-24 ENCOUNTER — ANTI-COAG VISIT (OUTPATIENT)
Dept: INTERNAL MEDICINE CLINIC | Facility: CLINIC | Age: 67
End: 2017-01-24

## 2017-01-24 ENCOUNTER — TELEPHONE (OUTPATIENT)
Dept: FAMILY MEDICINE CLINIC | Facility: CLINIC | Age: 67
End: 2017-01-24

## 2017-01-24 DIAGNOSIS — I73.9 PVD (PERIPHERAL VASCULAR DISEASE) (HCC): Primary | ICD-10-CM

## 2017-01-24 DIAGNOSIS — I73.9 PERIPHERAL VASCULAR DISEASE (HCC): ICD-10-CM

## 2017-01-24 LAB — INR: 2.1 (ref 2–3)

## 2017-01-24 NOTE — TELEPHONE ENCOUNTER
Per pt, her INR is 2.1 and pt took her Warfarin Sunday late, and would like to talk to Laurel/RN due to pt is going to have a procedure.

## 2017-01-25 ENCOUNTER — HOSPITAL ENCOUNTER (OUTPATIENT)
Dept: ULTRASOUND IMAGING | Facility: HOSPITAL | Age: 67
Discharge: HOME | End: 2017-01-25
Attending: INTERNAL MEDICINE
Payer: MEDICARE

## 2017-01-25 ENCOUNTER — HOSPITAL ENCOUNTER (OUTPATIENT)
Age: 67
Discharge: HOME | End: 2017-01-25
Attending: EMERGENCY MEDICINE
Payer: MEDICARE

## 2017-01-25 VITALS
SYSTOLIC BLOOD PRESSURE: 114 MMHG | HEART RATE: 75 BPM | WEIGHT: 130 LBS | RESPIRATION RATE: 16 BRPM | DIASTOLIC BLOOD PRESSURE: 57 MMHG | HEIGHT: 57 IN | TEMPERATURE: 97 F | BODY MASS INDEX: 28.05 KG/M2 | OXYGEN SATURATION: 100 %

## 2017-01-25 DIAGNOSIS — N30.01 ACUTE CYSTITIS WITH HEMATURIA: Primary | ICD-10-CM

## 2017-01-25 DIAGNOSIS — I70.0 CALCIFICATION OF AORTA (HCC): ICD-10-CM

## 2017-01-25 DIAGNOSIS — I70.0 AORTIC CALCIFICATION (HCC): ICD-10-CM

## 2017-01-25 PROCEDURE — 93925 LOWER EXTREMITY STUDY: CPT

## 2017-01-25 PROCEDURE — 87077 CULTURE AEROBIC IDENTIFY: CPT | Performed by: EMERGENCY MEDICINE

## 2017-01-25 PROCEDURE — 93978 VASCULAR STUDY: CPT

## 2017-01-25 PROCEDURE — 99214 OFFICE O/P EST MOD 30 MIN: CPT

## 2017-01-25 PROCEDURE — 87086 URINE CULTURE/COLONY COUNT: CPT | Performed by: EMERGENCY MEDICINE

## 2017-01-25 PROCEDURE — 87186 SC STD MICRODIL/AGAR DIL: CPT | Performed by: EMERGENCY MEDICINE

## 2017-01-25 RX ORDER — CEPHALEXIN 500 MG/1
500 CAPSULE ORAL 3 TIMES DAILY
Qty: 21 CAPSULE | Refills: 0 | Status: SHIPPED | OUTPATIENT
Start: 2017-01-25 | End: 2017-02-01

## 2017-01-25 NOTE — ED PROVIDER NOTES
Jessika Perry is a 73year old female who presents for evaluation of pain and burning with urination  HPI:   Pt complains of pain and burning with urination for the past day. Patient denies fever back pain abdominal pain or vomiting.   She had diarrhea 2 day 0.5 tablets (2.5 mg total) by mouth once daily.  Disp: 90 tablet Rfl: 0   Alcohol Swabs ([employer] PREP) 70 % Does not apply Pads  Disp:  Rfl:    GLUCAGON EMERGENCY 1 MG Injection Kit  Disp:  Rfl:    CLOPIDOGREL BISULFATE 75 MG Oral Tab TAKE 1 TABLET BY MOUTH LINDY Oral Tab TAKE 1 TABLET BY MOUTH EVERY DAY AS NEEDED Disp: 90 tablet Rfl: 11   Albuterol Sulfate HFA (VENTOLIN HFA) 108 (90 BASE) MCG/ACT Inhalation Aero Soln Inhale 2 puffs into the lungs every 4 (four) hours as needed.  For wheezing (Patient taking differe of right female breast ([facility] [address] 10/23/2010   • Blepharitis, both eyes 1/14/2016   • Type 1 diabetes mellitus with mild nonproliferative diabetic retinopathy and without macular edema 1/14/2016   • BDR (background diabetic retinopathy) 1/14/2016   • Myocardial not tender  EXTREMITIES: no cyanosis, clubbing or edema  NEURO: Oriented times three,cranial nerves are intact,motor exam of arms is  intact    ASSESSMENT AND PLAN:   Patient presents for evaluation of dysuria.   The patient brought a sample of her urine fr

## 2017-01-25 NOTE — ED INITIAL ASSESSMENT (HPI)
Pt reports urinary urgency frequency and dysuria. Symptoms started this morning. Denies nause/vomiting.  Denies abdominal pain or flank pain

## 2017-01-26 ENCOUNTER — PRIOR ORIGINAL RECORDS (OUTPATIENT)
Dept: OTHER | Age: 67
End: 2017-01-26

## 2017-01-30 ENCOUNTER — PRIOR ORIGINAL RECORDS (OUTPATIENT)
Dept: OTHER | Age: 67
End: 2017-01-30

## 2017-01-30 ENCOUNTER — LAB ENCOUNTER (OUTPATIENT)
Dept: LAB | Age: 67
End: 2017-01-30
Attending: INTERNAL MEDICINE
Payer: MEDICARE

## 2017-01-30 ENCOUNTER — HOSPITAL ENCOUNTER (OUTPATIENT)
Dept: GENERAL RADIOLOGY | Age: 67
Discharge: HOME | End: 2017-01-30
Attending: INTERNAL MEDICINE
Payer: MEDICARE

## 2017-01-30 DIAGNOSIS — I10 ESSENTIAL HYPERTENSION, MALIGNANT: ICD-10-CM

## 2017-01-30 DIAGNOSIS — E10.9 DIABETES MELLITUS TYPE I (HCC): ICD-10-CM

## 2017-01-30 DIAGNOSIS — I25.10 CORONARY ATHEROSCLEROSIS OF NATIVE CORONARY ARTERY: ICD-10-CM

## 2017-01-30 DIAGNOSIS — I20.0 INTERMEDIATE CORONARY SYNDROME (HCC): ICD-10-CM

## 2017-01-30 DIAGNOSIS — R06.02 BREATH SHORTNESS: ICD-10-CM

## 2017-01-30 DIAGNOSIS — I25.10 CORONARY ATHEROSCLEROSIS OF NATIVE CORONARY ARTERY: Primary | ICD-10-CM

## 2017-01-30 LAB
ANION GAP SERPL CALC-SCNC: 7 MMOL/L (ref 0–18)
BASOPHILS # BLD: 0 K/UL (ref 0–0.2)
BASOPHILS NFR BLD: 1 %
BUN SERPL-MCNC: 17 MG/DL (ref 8–20)
BUN/CREAT SERPL: 31.5 (ref 10–20)
CALCIUM SERPL-MCNC: 8.9 MG/DL (ref 8.5–10.5)
CHLORIDE SERPL-SCNC: 102 MMOL/L (ref 95–110)
CO2 SERPL-SCNC: 29 MMOL/L (ref 22–32)
CREAT SERPL-MCNC: 0.54 MG/DL (ref 0.5–1.5)
EOSINOPHIL # BLD: 0.2 K/UL (ref 0–0.7)
EOSINOPHIL NFR BLD: 4 %
ERYTHROCYTE [DISTWIDTH] IN BLOOD BY AUTOMATED COUNT: 12.8 % (ref 11–15)
GLUCOSE SERPL-MCNC: 128 MG/DL (ref 70–99)
HCT VFR BLD AUTO: 41.3 % (ref 35–48)
HGB BLD-MCNC: 13.7 G/DL (ref 12–16)
LYMPHOCYTES # BLD: 1.6 K/UL (ref 1–4)
LYMPHOCYTES NFR BLD: 30 %
MCH RBC QN AUTO: 30.6 PG (ref 27–32)
MCHC RBC AUTO-ENTMCNC: 33.3 G/DL (ref 32–37)
MCV RBC AUTO: 91.9 FL (ref 80–100)
MONOCYTES # BLD: 0.6 K/UL (ref 0–1)
MONOCYTES NFR BLD: 11 %
NEUTROPHILS # BLD AUTO: 2.9 K/UL (ref 1.8–7.7)
NEUTROPHILS NFR BLD: 55 %
OSMOLALITY UR CALC.SUM OF ELEC: 289 MOSM/KG (ref 275–295)
PLATELET # BLD AUTO: 178 K/UL (ref 140–400)
PMV BLD AUTO: 10.3 FL (ref 7.4–10.3)
POTASSIUM SERPL-SCNC: 3.8 MMOL/L (ref 3.3–5.1)
RBC # BLD AUTO: 4.49 M/UL (ref 3.7–5.4)
SODIUM SERPL-SCNC: 138 MMOL/L (ref 136–144)
WBC # BLD AUTO: 5.3 K/UL (ref 4–11)

## 2017-01-30 PROCEDURE — 85025 COMPLETE CBC W/AUTO DIFF WBC: CPT

## 2017-01-30 PROCEDURE — 36415 COLL VENOUS BLD VENIPUNCTURE: CPT

## 2017-01-30 PROCEDURE — 80048 BASIC METABOLIC PNL TOTAL CA: CPT

## 2017-01-30 PROCEDURE — 71020 XR CHEST PA + LAT CHEST (CPT=71020): CPT

## 2017-01-31 ENCOUNTER — ANTI-COAG VISIT (OUTPATIENT)
Dept: INTERNAL MEDICINE CLINIC | Facility: CLINIC | Age: 67
End: 2017-01-31

## 2017-01-31 ENCOUNTER — TELEPHONE (OUTPATIENT)
Dept: FAMILY MEDICINE CLINIC | Facility: CLINIC | Age: 67
End: 2017-01-31

## 2017-01-31 DIAGNOSIS — I73.9 PERIPHERAL VASCULAR DISEASE (HCC): ICD-10-CM

## 2017-01-31 DIAGNOSIS — I73.9 PVD (PERIPHERAL VASCULAR DISEASE) (HCC): Primary | ICD-10-CM

## 2017-01-31 LAB
BUN: 17 MG/DL
CALCIUM: 8.9 MG/DL
CHLORIDE: 102 MEQ/L
CREATININE, SERUM: 0.54 MG/DL
GLUCOSE: 128 MG/DL
HEMATOCRIT: 41.3 %
HEMOGLOBIN: 13.7 G/DL
INR: 1 (ref 2–3)
PLATELETS: 178 K/UL
POTASSIUM, SERUM: 3.8 MEQ/L
RED BLOOD COUNT: 4.49 X 10-6/U
SODIUM: 138 MEQ/L
WHITE BLOOD COUNT: 5.3 X 10-3/U

## 2017-01-31 NOTE — TELEPHONE ENCOUNTER
Pt is calling to report her Numbers   Pt stts it is very low as of today and she has been holding off her coumadin since Friday   Pt stts her INR is very low   Pt stts her INR=1

## 2017-02-01 ENCOUNTER — TELEPHONE (OUTPATIENT)
Dept: INTERVENTIONAL RADIOLOGY/VASCULAR | Facility: HOSPITAL | Age: 67
End: 2017-02-01

## 2017-02-03 ENCOUNTER — TELEPHONE (OUTPATIENT)
Dept: INTERNAL MEDICINE CLINIC | Facility: CLINIC | Age: 67
End: 2017-02-03

## 2017-02-03 NOTE — TELEPHONE ENCOUNTER
Dr. [facility] - see notes below from [facility] 1/25/17. Pt completed keflex x 7 days. Her urinary symptoms improved by 2/1/17. She states that today the symptoms are starting again. She has bladder pressure and urinary frequency.  Denies fever, flank pain, hematuria, n/

## 2017-02-03 NOTE — TELEPHONE ENCOUNTER
Pt was just seen at the [facility] for UTI   Pt was prescribed mediation and still has the UTI   Pt would like to have a call back   Please advise

## 2017-02-03 NOTE — TELEPHONE ENCOUNTER
Blank Galdamez [id number]  Upon chart review:  1/25/17  Notes Recorded by Jake Seals MD on 1/27/2017 at 9:03 AM  Treatment  (cephalexin)  appropriate  ------    Notes Recorded by Summer Pugh RN on 1/27/2017 at 8:23 AM  Please review result       Result I

## 2017-02-04 ENCOUNTER — OFFICE VISIT (OUTPATIENT)
Dept: FAMILY MEDICINE CLINIC | Facility: CLINIC | Age: 67
End: 2017-02-04

## 2017-02-04 VITALS
RESPIRATION RATE: 18 BRPM | SYSTOLIC BLOOD PRESSURE: 120 MMHG | DIASTOLIC BLOOD PRESSURE: 79 MMHG | TEMPERATURE: 98 F | HEART RATE: 92 BPM

## 2017-02-04 DIAGNOSIS — N30.00 ACUTE CYSTITIS WITHOUT HEMATURIA: Primary | ICD-10-CM

## 2017-02-04 LAB
APPEARANCE: CLEAR
MULTISTIX LOT#: NORMAL NUMERIC
OCCULT BLOOD: POSITIVE
PH, URINE: 6 (ref 4.5–8)
SPECIFIC GRAVITY: 1 (ref 1–1.03)
URINE-COLOR: YELLOW

## 2017-02-04 PROCEDURE — 99213 OFFICE O/P EST LOW 20 MIN: CPT | Performed by: FAMILY MEDICINE

## 2017-02-04 PROCEDURE — 81002 URINALYSIS NONAUTO W/O SCOPE: CPT | Performed by: FAMILY MEDICINE

## 2017-02-04 PROCEDURE — G0463 HOSPITAL OUTPT CLINIC VISIT: HCPCS | Performed by: FAMILY MEDICINE

## 2017-02-04 RX ORDER — AZITHROMYCIN 250 MG/1
TABLET, FILM COATED ORAL
Qty: 6 TABLET | Refills: 0 | Status: SHIPPED | OUTPATIENT
Start: 2017-02-04 | End: 2017-03-03

## 2017-02-04 NOTE — TELEPHONE ENCOUNTER
Patient reports that her symptoms are worse this morning and would like to drop off a urine specimen. ODILIA scheduled appt with [facility] at request of patient.

## 2017-02-04 NOTE — PROGRESS NOTES
Stopped abx Tuesday Thursday urgency  Friday pressure  Sat \"It was burning and frequent. \"  Was on keflex.     Going to do jenny and then possible mitraclip    Exam  Soft abd  Ht rrr no m  Lungs clear   Ext no edema    A/p  uti  zithromax  Watch inr   Has h

## 2017-02-08 ENCOUNTER — ANTI-COAG VISIT (OUTPATIENT)
Dept: INTERNAL MEDICINE CLINIC | Facility: CLINIC | Age: 67
End: 2017-02-08

## 2017-02-08 ENCOUNTER — TELEPHONE (OUTPATIENT)
Dept: INTERNAL MEDICINE CLINIC | Facility: CLINIC | Age: 67
End: 2017-02-08

## 2017-02-08 ENCOUNTER — TELEPHONE (OUTPATIENT)
Dept: FAMILY MEDICINE CLINIC | Facility: CLINIC | Age: 67
End: 2017-02-08

## 2017-02-08 DIAGNOSIS — I73.9 PERIPHERAL VASCULAR DISEASE (HCC): ICD-10-CM

## 2017-02-08 DIAGNOSIS — I73.9 PVD (PERIPHERAL VASCULAR DISEASE) (HCC): Primary | ICD-10-CM

## 2017-02-08 LAB — INR: 1.4 (ref 2–3)

## 2017-02-13 ENCOUNTER — PRIOR ORIGINAL RECORDS (OUTPATIENT)
Dept: OTHER | Age: 67
End: 2017-02-13

## 2017-02-15 ENCOUNTER — PRIOR ORIGINAL RECORDS (OUTPATIENT)
Dept: OTHER | Age: 67
End: 2017-02-15

## 2017-02-15 ENCOUNTER — APPOINTMENT (OUTPATIENT)
Dept: GENERAL RADIOLOGY | Facility: HOSPITAL | Age: 67
End: 2017-02-15
Attending: PHYSICIAN ASSISTANT
Payer: MEDICARE

## 2017-02-15 ENCOUNTER — HOSPITAL ENCOUNTER (EMERGENCY)
Facility: HOSPITAL | Age: 67
Discharge: HOME | End: 2017-02-15
Attending: EMERGENCY MEDICINE
Payer: MEDICARE

## 2017-02-15 VITALS
WEIGHT: 130 LBS | BODY MASS INDEX: 29.25 KG/M2 | OXYGEN SATURATION: 98 % | HEIGHT: 56 IN | SYSTOLIC BLOOD PRESSURE: 125 MMHG | DIASTOLIC BLOOD PRESSURE: 69 MMHG | HEART RATE: 64 BPM | TEMPERATURE: 99 F | RESPIRATION RATE: 18 BRPM

## 2017-02-15 DIAGNOSIS — M54.50 ACUTE MIDLINE LOW BACK PAIN WITHOUT SCIATICA: Primary | ICD-10-CM

## 2017-02-15 LAB
ALBUMIN SERPL BCP-MCNC: 3.8 G/DL (ref 3.5–4.8)
ALP SERPL-CCNC: 43 U/L (ref 32–100)
ALT SERPL-CCNC: 51 U/L (ref 14–54)
ANION GAP SERPL CALC-SCNC: 9 MMOL/L (ref 0–18)
AST SERPL-CCNC: 41 U/L (ref 15–41)
BACTERIA UR QL AUTO: NEGATIVE /HPF
BASOPHILS # BLD: 0 K/UL (ref 0–0.2)
BASOPHILS NFR BLD: 0 %
BILIRUB DIRECT SERPL-MCNC: 0.2 MG/DL (ref 0–0.2)
BILIRUB SERPL-MCNC: 0.7 MG/DL (ref 0.3–1.2)
BILIRUB UR QL: NEGATIVE
BNP SERPL-MCNC: 406 PG/ML (ref 0–100)
BUN SERPL-MCNC: 18 MG/DL (ref 8–20)
BUN/CREAT SERPL: 36.7 (ref 10–20)
CALCIUM SERPL-MCNC: 9.1 MG/DL (ref 8.5–10.5)
CHLORIDE SERPL-SCNC: 98 MMOL/L (ref 95–110)
CO2 SERPL-SCNC: 30 MMOL/L (ref 22–32)
COLOR UR: YELLOW
CREAT SERPL-MCNC: 0.49 MG/DL (ref 0.5–1.5)
EOSINOPHIL # BLD: 0.2 K/UL (ref 0–0.7)
EOSINOPHIL NFR BLD: 2 %
ERYTHROCYTE [DISTWIDTH] IN BLOOD BY AUTOMATED COUNT: 13 % (ref 11–15)
GLUCOSE SERPL-MCNC: 122 MG/DL (ref 70–99)
GLUCOSE UR-MCNC: NEGATIVE MG/DL
HCT VFR BLD AUTO: 43.9 % (ref 35–48)
HGB BLD-MCNC: 14.5 G/DL (ref 12–16)
HGB UR QL STRIP.AUTO: NEGATIVE
KETONES UR-MCNC: NEGATIVE MG/DL
LEUKOCYTE ESTERASE UR QL STRIP.AUTO: NEGATIVE
LYMPHOCYTES # BLD: 1.7 K/UL (ref 1–4)
LYMPHOCYTES NFR BLD: 23 %
MCH RBC QN AUTO: 30.3 PG (ref 27–32)
MCHC RBC AUTO-ENTMCNC: 33.1 G/DL (ref 32–37)
MCV RBC AUTO: 91.5 FL (ref 80–100)
MONOCYTES # BLD: 0.7 K/UL (ref 0–1)
MONOCYTES NFR BLD: 9 %
NEUTROPHILS # BLD AUTO: 4.6 K/UL (ref 1.8–7.7)
NEUTROPHILS NFR BLD: 65 %
NITRITE UR QL STRIP.AUTO: NEGATIVE
OSMOLALITY UR CALC.SUM OF ELEC: 287 MOSM/KG (ref 275–295)
PH UR: 7 [PH] (ref 5–8)
PLATELET # BLD AUTO: 191 K/UL (ref 140–400)
PMV BLD AUTO: 9.9 FL (ref 7.4–10.3)
POTASSIUM SERPL-SCNC: 4.1 MMOL/L (ref 3.3–5.1)
PROT SERPL-MCNC: 6.9 G/DL (ref 5.9–8.4)
PROT UR-MCNC: NEGATIVE MG/DL
RBC # BLD AUTO: 4.8 M/UL (ref 3.7–5.4)
RBC #/AREA URNS AUTO: <1 /HPF
SODIUM SERPL-SCNC: 137 MMOL/L (ref 136–144)
SP GR UR STRIP: 1 (ref 1–1.03)
UROBILINOGEN UR STRIP-ACNC: <2
VIT C UR-MCNC: 20 MG/DL
WBC # BLD AUTO: 7.1 K/UL (ref 4–11)
WBC #/AREA URNS AUTO: 1 /HPF

## 2017-02-15 PROCEDURE — 72100 X-RAY EXAM L-S SPINE 2/3 VWS: CPT

## 2017-02-15 PROCEDURE — 85025 COMPLETE CBC W/AUTO DIFF WBC: CPT | Performed by: EMERGENCY MEDICINE

## 2017-02-15 PROCEDURE — 36415 COLL VENOUS BLD VENIPUNCTURE: CPT

## 2017-02-15 PROCEDURE — 81003 URINALYSIS AUTO W/O SCOPE: CPT

## 2017-02-15 PROCEDURE — 83880 ASSAY OF NATRIURETIC PEPTIDE: CPT | Performed by: PHYSICIAN ASSISTANT

## 2017-02-15 PROCEDURE — 80076 HEPATIC FUNCTION PANEL: CPT | Performed by: PHYSICIAN ASSISTANT

## 2017-02-15 PROCEDURE — 99284 EMERGENCY DEPT VISIT MOD MDM: CPT

## 2017-02-15 PROCEDURE — 80048 BASIC METABOLIC PNL TOTAL CA: CPT | Performed by: EMERGENCY MEDICINE

## 2017-02-15 PROCEDURE — 71010 XR CHEST AP PORTABLE  (CPT=71010): CPT

## 2017-02-16 NOTE — ED PROVIDER NOTES
Patient Seen in: [facility] Emergency Department    History   Patient presents with:  Female  Problem    Stated Complaint:     The history is provided by the patient.      73 yof with pmhx of asymptomatic CHF, DM2, asthma, and UTI's c/o onset of heart disease ([facility] [address]    • Asthma    • High blood pressure    • High cholesterol            Past Surgical History    MASTECTOMY RIGHT  2004    OTHER SURGICAL HISTORY      Comment Orthopedic surgeries X8    COLONOSCOPY & POLYPECTOMY  2006    OTHER SURGICAL HI Monday AND FRIDAY   albuterol Sulfate (5 MG/ML) 0.5% Inhalation Nebu Soln,  TAKE 0.5 ML BY NEBULIZATION EVERY 6 (SIX) HOURS AS NEEDED FOR WHEEZING. Insulin Pen Needle ([employer] PEN NEEDLES) 31G X 8 MM Does not apply Misc,  Use as directed.    lisinopril 5 M Hypertension Mother    • Thyroid disease Mother    • Diabetes Father    • Other[other] [OTHER] Father    • Glaucoma Father    • Colon Cancer Brother 48   • Colonic Polyps[other] [OTHER] Brother    • Macular degeneration Neg    • Cancer Sister    • Breast C Normocephalic. Eyes: Conjunctivae are normal.   Neck: Normal range of motion. No spinous process tenderness and no muscular tenderness present. Cardiovascular: Normal rate and regular rhythm.     Pulmonary/Chest: Effort normal and breath sounds normal. 4. 0-11.0 K/UL   RBC 4.80 3.70-5.40 M/UL   HGB 14.5 12.0-16.0 g/dL   HCT 43.9 35.0-48.0 %   MCV 91.5 80.0-100.0 fL   MCH 30.3 27.0-32.0 pg   MCHC 33.1 32.0-37.0 g/dl   RDW 13.0 11.0-15.0 %    140-400 K/UL   MPV 9.9 7.4-10.3 fL   Neutrophil % 65 %   L evaluated by Dr. Morales Larson      Disposition and Plan     Clinical Impression:  Acute midline low back pain without sciatica  (primary encounter diagnosis)    Disposition:  Discharge    Follow-up:  Cathleen Balderas DO  [address]

## 2017-02-17 ENCOUNTER — PRIOR ORIGINAL RECORDS (OUTPATIENT)
Dept: OTHER | Age: 67
End: 2017-02-17

## 2017-02-20 ENCOUNTER — PRIOR ORIGINAL RECORDS (OUTPATIENT)
Dept: OTHER | Age: 67
End: 2017-02-20

## 2017-02-24 LAB
BNP: 406 PMOL/L
BUN: 18 MG/DL
CALCIUM: 9.1 MG/DL
CHLORIDE: 98 MEQ/L
CREATININE, SERUM: 0.49 MG/DL
GLUCOSE: 122 MG/DL
HEMATOCRIT: 43.9 %
HEMOGLOBIN: 14.5 G/DL
PLATELETS: 191 K/UL
POTASSIUM, SERUM: 4.1 MEQ/L
RED BLOOD COUNT: 4.8 X 10-6/U
SODIUM: 137 MEQ/L
WHITE BLOOD COUNT: 7.1 X 10-3/U

## 2017-02-26 ENCOUNTER — PATIENT MESSAGE (OUTPATIENT)
Dept: FAMILY MEDICINE CLINIC | Facility: CLINIC | Age: 67
End: 2017-02-26

## 2017-02-27 ENCOUNTER — TELEPHONE (OUTPATIENT)
Dept: FAMILY MEDICINE CLINIC | Facility: CLINIC | Age: 67
End: 2017-02-27

## 2017-02-27 DIAGNOSIS — R19.7 DIARRHEA, UNSPECIFIED TYPE: Primary | ICD-10-CM

## 2017-02-27 NOTE — TELEPHONE ENCOUNTER
Dr. [facility] - do you want to see pt sooner? See triage notes below. Pt refuses ER.

## 2017-02-27 NOTE — TELEPHONE ENCOUNTER
Reason for Call/Chief Complaint: diarrhea mixed with tar like stool  Onset: 1-2 weeks  Nursing Assessment/Associated Symptoms: Pt was taking keflex at the end of January and then she developed diarrhea.  She started taking probiotics and now she has diarrhe

## 2017-02-27 NOTE — TELEPHONE ENCOUNTER
Pt scheduled appointment via [employer], please advise. Appointment For: Pamela Adair ([id number])  Visit Type: MYCHART EXAM ([id number])    3/2/2017    12:40 PM  10 mins. Michelle Lowe, [facility] MED    Patient Comments:   Follow-up Visit  [facility]

## 2017-02-28 NOTE — TELEPHONE ENCOUNTER
From: Tate Hinojosa  To: Diana Celeste DO  Sent: 2/26/2017 3:06 PM CST  Subject: Visit Follow-up Question    I am scheduled for 3/2 but I would appreciate an suggestion on what to take in the meantime.  PLEASE    I was on a 2nd antibiotic for an UTI on

## 2017-02-28 NOTE — TELEPHONE ENCOUNTER
I'm concerned about the tar like stool. Refer gi  See stool testing orders. Start probiotic saccharomyces boulardii. Available at [employer].

## 2017-03-01 ENCOUNTER — TELEPHONE (OUTPATIENT)
Dept: FAMILY MEDICINE CLINIC | Facility: CLINIC | Age: 67
End: 2017-03-01

## 2017-03-01 ENCOUNTER — ANTI-COAG VISIT (OUTPATIENT)
Dept: INTERNAL MEDICINE CLINIC | Facility: CLINIC | Age: 67
End: 2017-03-01

## 2017-03-01 DIAGNOSIS — I73.9 PVD (PERIPHERAL VASCULAR DISEASE) (HCC): Primary | ICD-10-CM

## 2017-03-01 DIAGNOSIS — I73.9 PERIPHERAL VASCULAR DISEASE (HCC): ICD-10-CM

## 2017-03-01 LAB — INR: 2 (ref 2–3)

## 2017-03-01 NOTE — TELEPHONE ENCOUNTER
Pt calling to give INR results. Pt INR 2.0 please call pt back with instruction on coumadin. .. please advise

## 2017-03-02 ENCOUNTER — OFFICE VISIT (OUTPATIENT)
Dept: FAMILY MEDICINE CLINIC | Facility: CLINIC | Age: 67
End: 2017-03-02

## 2017-03-02 ENCOUNTER — LAB ENCOUNTER (OUTPATIENT)
Dept: LAB | Age: 67
End: 2017-03-02
Attending: FAMILY MEDICINE
Payer: MEDICARE

## 2017-03-02 VITALS
DIASTOLIC BLOOD PRESSURE: 68 MMHG | BODY MASS INDEX: 29 KG/M2 | TEMPERATURE: 98 F | WEIGHT: 130 LBS | HEART RATE: 73 BPM | SYSTOLIC BLOOD PRESSURE: 105 MMHG

## 2017-03-02 DIAGNOSIS — I34.1 MITRAL VALVE PROLAPSE: ICD-10-CM

## 2017-03-02 DIAGNOSIS — E11.621 TYPE 2 DIABETES MELLITUS WITH FOOT ULCER, WITH LONG-TERM CURRENT USE OF INSULIN (HCC): ICD-10-CM

## 2017-03-02 DIAGNOSIS — R19.7 DIARRHEA, UNSPECIFIED TYPE: ICD-10-CM

## 2017-03-02 DIAGNOSIS — R19.7 DIARRHEA, UNSPECIFIED TYPE: Primary | ICD-10-CM

## 2017-03-02 DIAGNOSIS — L97.509 TYPE 2 DIABETES MELLITUS WITH FOOT ULCER, WITH LONG-TERM CURRENT USE OF INSULIN (HCC): ICD-10-CM

## 2017-03-02 DIAGNOSIS — Z79.4 TYPE 2 DIABETES MELLITUS WITH FOOT ULCER, WITH LONG-TERM CURRENT USE OF INSULIN (HCC): ICD-10-CM

## 2017-03-02 LAB
C DIFF TOX B STL QL: NEGATIVE
HEMOCCULT STL QL: NEGATIVE

## 2017-03-02 PROCEDURE — 82274 ASSAY TEST FOR BLOOD FECAL: CPT

## 2017-03-02 PROCEDURE — 87335 E COLI 0157 AG IA: CPT

## 2017-03-02 PROCEDURE — 87493 C DIFF AMPLIFIED PROBE: CPT

## 2017-03-02 PROCEDURE — G0463 HOSPITAL OUTPT CLINIC VISIT: HCPCS | Performed by: FAMILY MEDICINE

## 2017-03-02 PROCEDURE — 89055 LEUKOCYTE ASSESSMENT FECAL: CPT

## 2017-03-02 PROCEDURE — 87045 FECES CULTURE AEROBIC BACT: CPT

## 2017-03-02 PROCEDURE — 87046 STOOL CULTR AEROBIC BACT EA: CPT

## 2017-03-02 PROCEDURE — 87077 CULTURE AEROBIC IDENTIFY: CPT

## 2017-03-02 PROCEDURE — 99214 OFFICE O/P EST MOD 30 MIN: CPT | Performed by: FAMILY MEDICINE

## 2017-03-02 NOTE — H&P
Erendira Antoine  : 10/13/1950  ACCOUNT:  [id number]  [phone]  PCP: Dr. Lennette Bosworth    TODAY'S DATE: 2017  DICTATED BY:  Lalit Maciel M.D.]    CHIEF COMPLAINT: [Followup of .  CAD, established.]    HPI:  [On 2017, Lauri Brittle, a 18-year-old fema AAA.  SOCIAL HISTORY: SMOKING: Never used tobacco. denies smoking. CAFFEINE: none. ALCOHOL: denies drinking. EXERCISE: no regular exercise. DIET: diabetic. MARITAL STATUS: . LIFESTYLE: moderate stress lifestyle and sedentary lifestyle.  OCCUPATION: established  2. Heart failure, systolic, chronic  3. Atherosclerosis, extremity  4. CVA  2005  5. History of Pulmonary embolus  6. Abnormal EKG  7. Abnormal stress test  8. Angina, unstable  9. Cardiomyopathy, ischemic  10. COPD  6. DM, Type I  12.  DM, Ty Patanol               0.1%      qtts  03/07/14 Plavix                75MG      1 daily  03/07/14 Ventolin HFA          108 (90   as directed  03/07/14 Warfarin Sodium       2.5MG     as directed per PCP        Herminia Alfonso M.D.    Heidy Regan - DD: 02/17/2017 -

## 2017-03-02 NOTE — PROGRESS NOTES
No stools yesterday. Diarrhea is less. Had diarrhea for 10 days. No fever no blood in stool. Stool is firming up had bm today \"It was a lot more formed. \"    Breathing has been ok.   Has trans esophageal     A1c 12/6/2016 5.7    Patient's past medical

## 2017-03-03 ENCOUNTER — TELEPHONE (OUTPATIENT)
Dept: FAMILY MEDICINE CLINIC | Facility: CLINIC | Age: 67
End: 2017-03-03

## 2017-03-03 RX ORDER — MECLIZINE HYDROCHLORIDE 25 MG/1
25 TABLET ORAL 3 TIMES DAILY PRN
COMMUNITY
End: 2017-05-22

## 2017-03-03 RX ORDER — MULTIVIT WITH MINERALS/LUTEIN
1000 TABLET ORAL DAILY
Status: ON HOLD | COMMUNITY
End: 2017-03-21

## 2017-03-03 RX ORDER — ASCORBIC ACID/MULTIVIT-MIN 1000 MG
1 EFFERVESCENT POWDER IN PACKET ORAL DAILY
COMMUNITY
End: 2017-01-01 | Stop reason: CLARIF

## 2017-03-03 RX ORDER — B-COMPLEX WITH VITAMIN C
1 TABLET ORAL DAILY
Status: ON HOLD | COMMUNITY
End: 2018-01-01

## 2017-03-03 RX ORDER — WARFARIN SODIUM 2.5 MG/1
2.5 TABLET ORAL NIGHTLY
Status: ON HOLD | COMMUNITY
End: 2017-03-21

## 2017-03-03 RX ORDER — WARFARIN SODIUM 5 MG/1
5 TABLET ORAL NIGHTLY
Status: ON HOLD | COMMUNITY
End: 2017-03-21

## 2017-03-03 RX ORDER — ACETAMINOPHEN AND CODEINE PHOSPHATE 300; 30 MG/1; MG/1
1 TABLET ORAL EVERY 4 HOURS PRN
COMMUNITY
End: 2017-05-22

## 2017-03-03 RX ORDER — CHOLECALCIFEROL (VITAMIN D3) 125 MCG
2000 CAPSULE ORAL DAILY
COMMUNITY
End: 2017-05-22 | Stop reason: ALTCHOICE

## 2017-03-03 RX ORDER — RANOLAZINE 500 MG/1
1000 TABLET, EXTENDED RELEASE ORAL EVERY EVENING
COMMUNITY
End: 2017-05-22 | Stop reason: ALTCHOICE

## 2017-03-03 RX ORDER — CHOLECALCIFEROL (VITAMIN D3) 125 MCG
4000 CAPSULE ORAL DAILY
Status: ON HOLD | COMMUNITY
End: 2018-01-01

## 2017-03-04 NOTE — TELEPHONE ENCOUNTER
Stool tests are negative so far. (still one in process)  But importantly the c. Diff test was negative. Continue on the new probiotic.

## 2017-03-07 ENCOUNTER — HOSPITAL ENCOUNTER (OUTPATIENT)
Dept: CV DIAGNOSTICS | Facility: HOSPITAL | Age: 67
Discharge: HOME | End: 2017-03-07
Attending: INTERNAL MEDICINE
Payer: MEDICARE

## 2017-03-07 ENCOUNTER — HOSPITAL ENCOUNTER (OUTPATIENT)
Dept: INTERVENTIONAL RADIOLOGY/VASCULAR | Facility: HOSPITAL | Age: 67
Discharge: HOME | End: 2017-03-07
Attending: INTERNAL MEDICINE | Admitting: INTERNAL MEDICINE
Payer: MEDICARE

## 2017-03-07 VITALS
DIASTOLIC BLOOD PRESSURE: 56 MMHG | WEIGHT: 130 LBS | SYSTOLIC BLOOD PRESSURE: 127 MMHG | HEIGHT: 56.5 IN | HEART RATE: 61 BPM | OXYGEN SATURATION: 100 % | BODY MASS INDEX: 28.44 KG/M2 | RESPIRATION RATE: 17 BRPM

## 2017-03-07 DIAGNOSIS — I48.91 ATRIAL FIBRILLATION (HCC): ICD-10-CM

## 2017-03-07 PROBLEM — I34.0 MITRAL REGURGITATION: Chronic | Status: ACTIVE | Noted: 2017-03-07

## 2017-03-07 PROBLEM — I25.10 CAD (CORONARY ARTERY DISEASE), NATIVE CORONARY ARTERY: Chronic | Status: ACTIVE | Noted: 2017-03-07

## 2017-03-07 LAB — GLUCOSE BLD-MCNC: 80 MG/DL (ref 65–99)

## 2017-03-07 PROCEDURE — 93325 DOPPLER ECHO COLOR FLOW MAPG: CPT

## 2017-03-07 PROCEDURE — 93320 DOPPLER ECHO COMPLETE: CPT

## 2017-03-07 PROCEDURE — B245ZZ4 ULTRASONOGRAPHY OF LEFT HEART, TRANSESOPHAGEAL: ICD-10-PCS | Performed by: INTERNAL MEDICINE

## 2017-03-07 PROCEDURE — 93312 ECHO TRANSESOPHAGEAL: CPT

## 2017-03-07 PROCEDURE — 82962 GLUCOSE BLOOD TEST: CPT

## 2017-03-07 RX ORDER — MIDAZOLAM HYDROCHLORIDE 1 MG/ML
1 INJECTION INTRAMUSCULAR; INTRAVENOUS ONCE AS NEEDED
Status: DISCONTINUED | OUTPATIENT
Start: 2017-03-07 | End: 2017-03-07

## 2017-03-07 RX ORDER — MIDAZOLAM HYDROCHLORIDE 1 MG/ML
INJECTION INTRAMUSCULAR; INTRAVENOUS
Status: COMPLETED
Start: 2017-03-07 | End: 2017-03-07

## 2017-03-07 RX ORDER — SODIUM CHLORIDE 9 MG/ML
INJECTION, SOLUTION INTRAVENOUS CONTINUOUS
Status: DISCONTINUED | OUTPATIENT
Start: 2017-03-07 | End: 2017-03-07

## 2017-03-07 NOTE — PROGRESS NOTES
Harshal is scheduled for Wednesday, March 15, 2017  · Arrive Tuesday, March 14, 2017 at 11:00 am  · Enter through the [address] [place] [address] Avenue by the Emergency Room  · Go to Outpatient Registration --> tell them you are a direct admission  · Bring the following it

## 2017-03-07 NOTE — PROGRESS NOTES
[facility] C[facility]  PENELOPE Note    Stuart Zhang Location:      MRN [id number]   Admission Date 3/7/2017 Operation Date 3/7/2017   Attending Physician Arnulfo Valdez MD Operating Physician Wilfredo Avendaño MD     Pre-Operative Diagnosis: Mitral regurgitation.

## 2017-03-13 NOTE — H&P
Lynn Patient Status:  Inpatient    10/13/1950 MRN [id number]   [facility] 8NE-A Attending Ferny Calles MD   Hosp Day # 0 PCP Khushi Espino. DO Chris     History of Present Illness:  Patricia Never is a pleasant 73 yea 1/14/2016   • BDR (background diabetic retinopathy) 1/14/2016   • Myocardial infarction [facility]) 2015     x2   • Diabetes ([facility] [address]    • Congestive heart disease (HCC)    • Asthma    • High blood pressure    • High cholesterol    • COPD (chronic obstructive pulmo Brother 48   • Colonic Polyps[other] [OTHER] Brother    • Macular degeneration Neg    • Cancer Sister    • Breast Cancer Sister    • Skin cancer Brother    • Crohn's Disease Brother           Allergies:  Augmentin; Clindamycin; Dilaudid; Levofloxacin;  Pota anterolateral     myocardium; moderate hypokinesis of the basal-mid inferolateral and apical     lateral myocardium. 3. Mitral valve: Moderate to severe regurgitation. 4. Left atrium: The atrium was mildly to moderately dilated.   Compared to the prior st Mitraclip    - Replete lytes per protocol    GIN Esparza  3/13/2017    ==============================================================  History reviewed and up to date. No changes to H&P.  The patient is scheduled for [id number] mitral clip proc

## 2017-03-13 NOTE — HISTORICAL OFFICE NOTE
Gustavo Rosalina  : 10/13/1950  ACCOUNT:  [id number]  [phone]  PCP: Dr. Alicia Zamudio     TODAY'S DATE: 2017  DICTATED BY:  Diego Cali M.D. ]    HPI:    [employer] 2017, Claudine Ríos, a 73year old female, presented with no interim cardiac complaints. well nourished. WEIGHT: BMI parameters reviewed and discussed. HEAD/FACE: no trauma and normocephalic. EYES: conjunctivae not injected and no xanthelasma. ENT: mucosa pink and moist. NECK: jugular venous pressure not elevated.  RESP: respirations with benja

## 2017-03-14 ENCOUNTER — ANESTHESIA EVENT (OUTPATIENT)
Dept: CARDIAC SURGERY | Facility: HOSPITAL | Age: 67
End: 2017-03-14

## 2017-03-14 ENCOUNTER — APPOINTMENT (OUTPATIENT)
Dept: GENERAL RADIOLOGY | Facility: HOSPITAL | Age: 67
DRG: 228 | End: 2017-03-14
Attending: NURSE PRACTITIONER
Payer: MEDICARE

## 2017-03-14 ENCOUNTER — HOSPITAL ENCOUNTER (INPATIENT)
Facility: HOSPITAL | Age: 67
LOS: 7 days | Discharge: NURSING, REHAB OR LONG-TERM CARE | DRG: 228 | End: 2017-03-21
Attending: INTERNAL MEDICINE | Admitting: INTERNAL MEDICINE
Payer: MEDICARE

## 2017-03-14 LAB
ALBUMIN SERPL-MCNC: 3.4 G/DL (ref 3.5–4.8)
ALP LIVER SERPL-CCNC: 54 U/L (ref 50–130)
ALT SERPL-CCNC: 51 U/L (ref 14–54)
ANTIBODY SCREEN: NEGATIVE
AST SERPL-CCNC: 38 U/L (ref 15–41)
ATRIAL RATE: 88 BPM
BASOPHILS # BLD AUTO: 0.04 X10(3) UL (ref 0–0.1)
BASOPHILS NFR BLD AUTO: 0.7 %
BILIRUB SERPL-MCNC: 0.4 MG/DL (ref 0.1–2)
BUN BLD-MCNC: 24 MG/DL (ref 8–20)
CALCIUM BLD-MCNC: 9.5 MG/DL (ref 8.3–10.3)
CHLORIDE: 102 MMOL/L (ref 101–111)
CO2: 28 MMOL/L (ref 22–32)
CREAT BLD-MCNC: 0.83 MG/DL (ref 0.55–1.02)
EOSINOPHIL # BLD AUTO: 0.19 X10(3) UL (ref 0–0.3)
EOSINOPHIL NFR BLD AUTO: 3.2 %
ERYTHROCYTE [DISTWIDTH] IN BLOOD BY AUTOMATED COUNT: 13.1 % (ref 11.5–16)
EST. AVERAGE GLUCOSE BLD GHB EST-MCNC: 114 MG/DL (ref 68–126)
GLUCOSE BLD-MCNC: 152 MG/DL (ref 65–99)
GLUCOSE BLD-MCNC: 164 MG/DL (ref 65–99)
GLUCOSE BLD-MCNC: 206 MG/DL (ref 70–99)
GLUCOSE BLD-MCNC: 80 MG/DL (ref 65–99)
HAV IGM SER QL: 1.8 MG/DL (ref 1.7–3)
HBA1C MFR BLD HPLC: 5.6 % (ref ?–5.7)
HCT VFR BLD AUTO: 42 % (ref 34–50)
HGB BLD-MCNC: 14 G/DL (ref 12–16)
IMMATURE GRANULOCYTE COUNT: 0.02 X10(3) UL (ref 0–1)
IMMATURE GRANULOCYTE RATIO %: 0.3 %
INR BLD: 1.25 (ref 0.89–1.11)
LYMPHOCYTES # BLD AUTO: 1.28 X10(3) UL (ref 0.9–4)
LYMPHOCYTES NFR BLD AUTO: 21.2 %
M PROTEIN MFR SERPL ELPH: 7.1 G/DL (ref 6.1–8.3)
MCH RBC QN AUTO: 30.9 PG (ref 27–33.2)
MCHC RBC AUTO-ENTMCNC: 33.3 G/DL (ref 31–37)
MCV RBC AUTO: 92.7 FL (ref 81–100)
MONOCYTES # BLD AUTO: 0.59 X10(3) UL (ref 0.1–0.6)
MONOCYTES NFR BLD AUTO: 9.8 %
NEUTROPHIL ABS PRELIM: 3.91 X10 (3) UL (ref 1.3–6.7)
NEUTROPHILS # BLD AUTO: 3.91 X10(3) UL (ref 1.3–6.7)
NEUTROPHILS NFR BLD AUTO: 64.8 %
P AXIS: 49 DEGREES
P-R INTERVAL: 186 MS
PLATELET # BLD AUTO: 190 10(3)UL (ref 150–450)
POTASSIUM SERPL-SCNC: 3.2 MMOL/L (ref 3.6–5.1)
PRO-BETA NATRIURETIC PEPTIDE: 2990 PG/ML (ref ?–125)
PSA SERPL DL<=0.01 NG/ML-MCNC: 15.8 SECONDS (ref 12–14.3)
Q-T INTERVAL: 414 MS
QRS DURATION: 118 MS
QTC CALCULATION (BEZET): 500 MS
R AXIS: 56 DEGREES
RBC # BLD AUTO: 4.53 X10(6)UL (ref 3.8–5.1)
RED CELL DISTRIBUTION WIDTH-SD: 44.7 FL (ref 35.1–46.3)
RH BLOOD TYPE: POSITIVE
SODIUM SERPL-SCNC: 140 MMOL/L (ref 136–144)
T AXIS: 44 DEGREES
VENTRICULAR RATE: 88 BPM
WBC # BLD AUTO: 6 X10(3) UL (ref 4–13)

## 2017-03-14 PROCEDURE — 85025 COMPLETE CBC W/AUTO DIFF WBC: CPT | Performed by: NURSE PRACTITIONER

## 2017-03-14 PROCEDURE — 86920 COMPATIBILITY TEST SPIN: CPT

## 2017-03-14 PROCEDURE — 86850 RBC ANTIBODY SCREEN: CPT | Performed by: NURSE PRACTITIONER

## 2017-03-14 PROCEDURE — 83036 HEMOGLOBIN GLYCOSYLATED A1C: CPT | Performed by: NURSE PRACTITIONER

## 2017-03-14 PROCEDURE — 97161 PT EVAL LOW COMPLEX 20 MIN: CPT

## 2017-03-14 PROCEDURE — 80053 COMPREHEN METABOLIC PANEL: CPT | Performed by: NURSE PRACTITIONER

## 2017-03-14 PROCEDURE — 93005 ELECTROCARDIOGRAM TRACING: CPT

## 2017-03-14 PROCEDURE — 85610 PROTHROMBIN TIME: CPT | Performed by: NURSE PRACTITIONER

## 2017-03-14 PROCEDURE — 86901 BLOOD TYPING SEROLOGIC RH(D): CPT | Performed by: NURSE PRACTITIONER

## 2017-03-14 PROCEDURE — 97530 THERAPEUTIC ACTIVITIES: CPT

## 2017-03-14 PROCEDURE — 83880 ASSAY OF NATRIURETIC PEPTIDE: CPT | Performed by: NURSE PRACTITIONER

## 2017-03-14 PROCEDURE — 83735 ASSAY OF MAGNESIUM: CPT | Performed by: NURSE PRACTITIONER

## 2017-03-14 PROCEDURE — 86900 BLOOD TYPING SEROLOGIC ABO: CPT | Performed by: NURSE PRACTITIONER

## 2017-03-14 PROCEDURE — 87081 CULTURE SCREEN ONLY: CPT | Performed by: NURSE PRACTITIONER

## 2017-03-14 PROCEDURE — 71010 XR CHEST AP PORTABLE  (CPT=71010): CPT

## 2017-03-14 PROCEDURE — 93010 ELECTROCARDIOGRAM REPORT: CPT | Performed by: INTERNAL MEDICINE

## 2017-03-14 PROCEDURE — 82962 GLUCOSE BLOOD TEST: CPT

## 2017-03-14 RX ORDER — ALBUTEROL SULFATE 90 UG/1
2 AEROSOL, METERED RESPIRATORY (INHALATION) 2 TIMES DAILY
Status: DISCONTINUED | OUTPATIENT
Start: 2017-03-14 | End: 2017-03-15

## 2017-03-14 RX ORDER — DEXTROSE MONOHYDRATE 25 G/50ML
50 INJECTION, SOLUTION INTRAVENOUS
Status: DISCONTINUED | OUTPATIENT
Start: 2017-03-14 | End: 2017-03-15

## 2017-03-14 RX ORDER — CHOLECALCIFEROL (VITAMIN D3) 125 MCG
2000 CAPSULE ORAL DAILY
Status: DISCONTINUED | OUTPATIENT
Start: 2017-03-14 | End: 2017-03-14

## 2017-03-14 RX ORDER — FOLIC ACID/VIT B COMPLEX AND C 400 MCG
1 TABLET ORAL DAILY
Status: DISCONTINUED | OUTPATIENT
Start: 2017-03-14 | End: 2017-03-14

## 2017-03-14 RX ORDER — POTASSIUM CHLORIDE 20 MEQ/1
40 TABLET, EXTENDED RELEASE ORAL EVERY 4 HOURS
Status: DISCONTINUED | OUTPATIENT
Start: 2017-03-14 | End: 2017-03-14

## 2017-03-14 RX ORDER — ATORVASTATIN CALCIUM 10 MG/1
10 TABLET, FILM COATED ORAL NIGHTLY
Status: DISCONTINUED | OUTPATIENT
Start: 2017-03-14 | End: 2017-03-15

## 2017-03-14 RX ORDER — MECLIZINE HYDROCHLORIDE 25 MG/1
25 TABLET ORAL 3 TIMES DAILY PRN
Status: DISCONTINUED | OUTPATIENT
Start: 2017-03-14 | End: 2017-03-15

## 2017-03-14 RX ORDER — POTASSIUM CHLORIDE 20 MEQ/1
40 TABLET, EXTENDED RELEASE ORAL EVERY 4 HOURS
Status: DISCONTINUED | OUTPATIENT
Start: 2017-03-14 | End: 2017-03-15

## 2017-03-14 RX ORDER — ACETAMINOPHEN AND CODEINE PHOSPHATE 300; 30 MG/1; MG/1
1 TABLET ORAL EVERY 4 HOURS PRN
Status: DISCONTINUED | OUTPATIENT
Start: 2017-03-14 | End: 2017-03-16

## 2017-03-14 RX ORDER — ALBUTEROL SULFATE 2.5 MG/3ML
2.5 SOLUTION RESPIRATORY (INHALATION) EVERY 6 HOURS PRN
Status: DISCONTINUED | OUTPATIENT
Start: 2017-03-14 | End: 2017-03-15

## 2017-03-14 RX ORDER — CHLORHEXIDINE GLUCONATE 4 G/100ML
30 SOLUTION TOPICAL ONCE
Status: COMPLETED | OUTPATIENT
Start: 2017-03-14 | End: 2017-03-14

## 2017-03-14 RX ORDER — FUROSEMIDE 40 MG/1
40 TABLET ORAL
Status: DISCONTINUED | OUTPATIENT
Start: 2017-03-15 | End: 2017-03-15

## 2017-03-14 RX ORDER — ALPRAZOLAM 0.5 MG/1
0.5 TABLET ORAL NIGHTLY
Status: DISCONTINUED | OUTPATIENT
Start: 2017-03-14 | End: 2017-03-15

## 2017-03-14 RX ORDER — CHOLECALCIFEROL (VITAMIN D3) 125 MCG
4000 CAPSULE ORAL DAILY
Status: DISCONTINUED | OUTPATIENT
Start: 2017-03-14 | End: 2017-03-14

## 2017-03-14 RX ORDER — CARVEDILOL 3.12 MG/1
3.12 TABLET ORAL 2 TIMES DAILY WITH MEALS
Status: DISCONTINUED | OUTPATIENT
Start: 2017-03-14 | End: 2017-03-15

## 2017-03-14 RX ORDER — FAMOTIDINE 20 MG/1
20 TABLET ORAL DAILY
Status: DISCONTINUED | OUTPATIENT
Start: 2017-03-14 | End: 2017-03-15

## 2017-03-14 RX ORDER — SODIUM CHLORIDE 9 MG/ML
INJECTION, SOLUTION INTRAVENOUS CONTINUOUS
Status: DISCONTINUED | OUTPATIENT
Start: 2017-03-14 | End: 2017-03-15

## 2017-03-14 NOTE — CM/SW NOTE
Met with patient prior to mitral clip scheduled for tomorrow to discuss discharge planning.   Patient shared that she has been disabled from work since 2010--as a child she had polio and used braces but with her 'heart condition' she has been wheel chair de

## 2017-03-14 NOTE — PHYSICAL THERAPY NOTE
PHYSICAL THERAPY QUICK EVALUATION - INPATIENT    Room Number: 6977/6054-F  Evaluation Date: 3/14/2017    Presenting Problem: mitrial regurgitation, scheduled for kendall-clip on 3/15/17     Signif PMH = polio, hip adductor releases B'ly, L hip pain at IT ban Myocardial infarction [facility]) 2015     x2   • Diabetes ([facility] [address]    • Congestive heart disease ([facility] [address]    • Asthma    • High blood pressure    • High cholesterol    • COPD (chronic obstructive pulmonary disease) (HCC)    • Pneumonia due to organism    • Coronary a bedrail. Pt states that she had been walking short distances with BLE braces and \"Mexican\" crutches up until November, but had to stop due to heart function/work load.       SUBJECTIVE  Pt is concerned about deconditioning while hospitalized and is very railing?: Total (unable at baseline. )       AM-PAC Score:  Raw Score: 18   PT Approx Degree of Impairment Score: 46.58%   Standardized Score (AM-PAC Scale): 43.63   CMS Modifier (G-Code): CK      FUNCTIONAL ABILITY STATUS  Gait Assessment  Gait Assistance change based on function after surgery)    PLAN  Patient has been evaluated and presents with no skilled Physical Therapy needs at this time. Patient discharged from Physical Therapy services.   Please re-order if a new functional limitation presents neisha

## 2017-03-14 NOTE — PROGRESS NOTES
03/14/17 1709   Clinical Encounter Type   Visited With Patient   Routine Visit Introduction   Continue Visiting Yes   Patient's Supportive Strategies/Resources  provided emotional support for patient   Alevism Encounters   Spiritual Requests D

## 2017-03-15 ENCOUNTER — ANESTHESIA (OUTPATIENT)
Dept: CARDIAC SURGERY | Facility: HOSPITAL | Age: 67
DRG: 228 | End: 2017-03-15
Payer: MEDICARE

## 2017-03-15 ENCOUNTER — APPOINTMENT (OUTPATIENT)
Dept: GENERAL RADIOLOGY | Facility: HOSPITAL | Age: 67
DRG: 228 | End: 2017-03-15
Attending: PHYSICIAN ASSISTANT
Payer: MEDICARE

## 2017-03-15 ENCOUNTER — SURGERY (OUTPATIENT)
Age: 67
End: 2017-03-15

## 2017-03-15 ENCOUNTER — ANESTHESIA (OUTPATIENT)
Dept: CARDIAC SURGERY | Facility: HOSPITAL | Age: 67
End: 2017-03-15

## 2017-03-15 ENCOUNTER — ANESTHESIA EVENT (OUTPATIENT)
Dept: CARDIAC SURGERY | Facility: HOSPITAL | Age: 67
DRG: 228 | End: 2017-03-15
Payer: MEDICARE

## 2017-03-15 ENCOUNTER — APPOINTMENT (OUTPATIENT)
Dept: CV DIAGNOSTICS | Facility: HOSPITAL | Age: 67
DRG: 228 | End: 2017-03-15
Attending: NURSE PRACTITIONER
Payer: MEDICARE

## 2017-03-15 PROBLEM — I34.0 MITRAL INSUFFICIENCY: Status: ACTIVE | Noted: 2017-03-15

## 2017-03-15 LAB
ALBUMIN SERPL-MCNC: 2.5 G/DL (ref 3.5–4.8)
ALP LIVER SERPL-CCNC: 37 U/L (ref 50–130)
ALT SERPL-CCNC: 41 U/L (ref 14–54)
APTT PPP: 33.2 SECONDS (ref 25–34)
APTT PPP: 34.2 SECONDS (ref 25–34)
ARTERIAL BLD GAS O2 SATURATION: 98 % (ref 92–100)
ARTERIAL BLOOD GAS BASE EXCESS: -1.3
ARTERIAL BLOOD GAS HCO3: 22.7 MEQ/L (ref 22–26)
ARTERIAL BLOOD GAS PCO2: 32 MM HG (ref 35–45)
ARTERIAL BLOOD GAS PH: 7.46 (ref 7.35–7.45)
ARTERIAL BLOOD GAS PO2: 216 MM HG (ref 80–105)
AST SERPL-CCNC: 26 U/L (ref 15–41)
BILIRUB SERPL-MCNC: 0.4 MG/DL (ref 0.1–2)
BUN BLD-MCNC: 11 MG/DL (ref 8–20)
BUN BLD-MCNC: 11 MG/DL (ref 8–20)
CALCIUM BLD-MCNC: 7.7 MG/DL (ref 8.3–10.3)
CALCIUM BLD-MCNC: 8.6 MG/DL (ref 8.3–10.3)
CALCULATED O2 SATURATION: 100 % (ref 92–100)
CARBOXYHEMOGLOBIN: 1.1 % SAT (ref 0–3)
CHLORIDE: 115 MMOL/L (ref 101–111)
CHLORIDE: 116 MMOL/L (ref 101–111)
CO2: 24 MMOL/L (ref 22–32)
CO2: 24 MMOL/L (ref 22–32)
CREAT BLD-MCNC: 0.32 MG/DL (ref 0.55–1.02)
CREAT BLD-MCNC: 0.34 MG/DL (ref 0.55–1.02)
ERYTHROCYTE [DISTWIDTH] IN BLOOD BY AUTOMATED COUNT: 13.2 % (ref 11.5–16)
ERYTHROCYTE [DISTWIDTH] IN BLOOD BY AUTOMATED COUNT: 14.1 % (ref 11.5–16)
FIBRINOGEN: 201 MG/DL (ref 200–446)
FIBRINOGEN: 219 MG/DL (ref 200–446)
FIO2: 50 %
GLUCOSE BLD-MCNC: 109 MG/DL (ref 65–99)
GLUCOSE BLD-MCNC: 124 MG/DL (ref 65–99)
GLUCOSE BLD-MCNC: 126 MG/DL (ref 65–99)
GLUCOSE BLD-MCNC: 127 MG/DL (ref 65–99)
GLUCOSE BLD-MCNC: 140 MG/DL (ref 65–99)
GLUCOSE BLD-MCNC: 141 MG/DL (ref 65–99)
GLUCOSE BLD-MCNC: 149 MG/DL (ref 70–99)
GLUCOSE BLD-MCNC: 152 MG/DL (ref 70–99)
GLUCOSE BLD-MCNC: 153 MG/DL (ref 65–99)
GLUCOSE BLD-MCNC: 210 MG/DL (ref 65–99)
HAV IGM SER QL: 1.3 MG/DL (ref 1.7–3)
HAV IGM SER QL: 1.5 MG/DL (ref 1.7–3)
HCT VFR BLD AUTO: 30.2 % (ref 34–50)
HCT VFR BLD AUTO: 32.6 % (ref 34–50)
HGB BLD-MCNC: 10.3 G/DL (ref 12–16)
HGB BLD-MCNC: 10.9 G/DL (ref 12–16)
INR BLD: 1.27 (ref 0.89–1.11)
INR BLD: 1.37 (ref 0.89–1.11)
ISTAT ACTIVATED CLOTTING TIME: 136 SECONDS (ref 74–137)
ISTAT ACTIVATED CLOTTING TIME: 136 SECONDS (ref 74–137)
ISTAT BLOOD GAS BASE DEFICIT: -5 MMOL/L
ISTAT BLOOD GAS BASE DEFICIT: -5 MMOL/L
ISTAT BLOOD GAS FIO2: 50 %
ISTAT BLOOD GAS FIO2: 50 %
ISTAT BLOOD GAS HCO3: 20.5 MEQ/L (ref 22–26)
ISTAT BLOOD GAS HCO3: 21.3 MEQ/L (ref 22–26)
ISTAT BLOOD GAS O2 SATURATION: 100 % (ref 92–100)
ISTAT BLOOD GAS O2 SATURATION: 99 % (ref 92–100)
ISTAT BLOOD GAS PCO2: 32 MMHG (ref 35–45)
ISTAT BLOOD GAS PCO2: 39 MMHG (ref 35–45)
ISTAT BLOOD GAS PH: 7.34 (ref 7.35–7.45)
ISTAT BLOOD GAS PH: 7.41 (ref 7.35–7.45)
ISTAT BLOOD GAS PO2: 161 MMHG (ref 80–105)
ISTAT BLOOD GAS PO2: 165 MMHG (ref 80–105)
ISTAT BLOOD GAS TCO2: 22 MMOL/L (ref 22–32)
ISTAT BLOOD GAS TCO2: 22 MMOL/L (ref 22–32)
ISTAT HEMATOCRIT: 30 % (ref 37–54)
ISTAT HEMATOCRIT: 31 % (ref 37–54)
ISTAT IONIZED CALCIUM: 1.18 MMOL/L (ref 1.12–1.32)
ISTAT IONIZED CALCIUM: 1.31 MMOL/L (ref 1.12–1.32)
ISTAT PATIENT TEMPERATURE: 95.5 DEGREE
ISTAT PATIENT TEMPERATURE: 96.4 DEGREE
ISTAT POTASSIUM: 3.4 MMOL/L (ref 3.6–5.1)
ISTAT POTASSIUM: 4 MMOL/L (ref 3.6–5.1)
ISTAT SODIUM: 143 MMOL/L (ref 136–144)
ISTAT SODIUM: 145 MMOL/L (ref 136–144)
M PROTEIN MFR SERPL ELPH: 5 G/DL (ref 6.1–8.3)
MCH RBC QN AUTO: 30.6 PG (ref 27–33.2)
MCH RBC QN AUTO: 31.1 PG (ref 27–33.2)
MCHC RBC AUTO-ENTMCNC: 33.4 G/DL (ref 31–37)
MCHC RBC AUTO-ENTMCNC: 34.1 G/DL (ref 31–37)
MCV RBC AUTO: 89.6 FL (ref 81–100)
MCV RBC AUTO: 92.9 FL (ref 81–100)
METHEMOGLOBIN: 0.4 % SAT (ref 0.4–1.5)
PATIENT TEMPERATURE: 95.1 F
PEEP: 5 CM H2O
PHOSPHATE SERPL-MCNC: 2.1 MG/DL (ref 2.5–4.9)
PLATELET # BLD AUTO: 149 10(3)UL (ref 150–450)
PLATELET # BLD AUTO: 179 10(3)UL (ref 150–450)
PLATELET # BLD AUTO: 225 10(3)UL (ref 150–450)
POTASSIUM SERPL-SCNC: 3.7 MMOL/L (ref 3.6–5.1)
POTASSIUM SERPL-SCNC: 4 MMOL/L (ref 3.6–5.1)
POTASSIUM SERPL-SCNC: 4 MMOL/L (ref 3.6–5.1)
PSA SERPL DL<=0.01 NG/ML-MCNC: 16 SECONDS (ref 12–14.3)
PSA SERPL DL<=0.01 NG/ML-MCNC: 17 SECONDS (ref 12–14.3)
RBC # BLD AUTO: 3.37 X10(6)UL (ref 3.8–5.1)
RBC # BLD AUTO: 3.51 X10(6)UL (ref 3.8–5.1)
RED CELL DISTRIBUTION WIDTH-SD: 44.8 FL (ref 35.1–46.3)
RED CELL DISTRIBUTION WIDTH-SD: 45.2 FL (ref 35.1–46.3)
SODIUM SERPL-SCNC: 147 MMOL/L (ref 136–144)
SODIUM SERPL-SCNC: 148 MMOL/L (ref 136–144)
TIDAL VOLUME: 500 ML
TOTAL HEMOGLOBIN: 8 G/DL (ref 11.7–16)
VENT RATE: 10 /MIN
WBC # BLD AUTO: 10.9 X10(3) UL (ref 4–13)
WBC # BLD AUTO: 9.6 X10(3) UL (ref 4–13)

## 2017-03-15 PROCEDURE — P9045 ALBUMIN (HUMAN), 5%, 250 ML: HCPCS | Performed by: PHYSICIAN ASSISTANT

## 2017-03-15 PROCEDURE — B245ZZ4 ULTRASONOGRAPHY OF LEFT HEART, TRANSESOPHAGEAL: ICD-10-PCS | Performed by: INTERNAL MEDICINE

## 2017-03-15 PROCEDURE — 85049 AUTOMATED PLATELET COUNT: CPT | Performed by: THORACIC SURGERY (CARDIOTHORACIC VASCULAR SURGERY)

## 2017-03-15 PROCEDURE — 93005 ELECTROCARDIOGRAM TRACING: CPT

## 2017-03-15 PROCEDURE — 93306 TTE W/DOPPLER COMPLETE: CPT | Performed by: NURSE PRACTITIONER

## 2017-03-15 PROCEDURE — 85018 HEMOGLOBIN: CPT | Performed by: NURSE PRACTITIONER

## 2017-03-15 PROCEDURE — 84132 ASSAY OF SERUM POTASSIUM: CPT

## 2017-03-15 PROCEDURE — 82330 ASSAY OF CALCIUM: CPT

## 2017-03-15 PROCEDURE — 85347 COAGULATION TIME ACTIVATED: CPT

## 2017-03-15 PROCEDURE — 87081 CULTURE SCREEN ONLY: CPT | Performed by: INTERNAL MEDICINE

## 2017-03-15 PROCEDURE — 83735 ASSAY OF MAGNESIUM: CPT | Performed by: NURSE PRACTITIONER

## 2017-03-15 PROCEDURE — 80053 COMPREHEN METABOLIC PANEL: CPT | Performed by: NURSE PRACTITIONER

## 2017-03-15 PROCEDURE — 84132 ASSAY OF SERUM POTASSIUM: CPT | Performed by: INTERNAL MEDICINE

## 2017-03-15 PROCEDURE — S0028 INJECTION, FAMOTIDINE, 20 MG: HCPCS | Performed by: PHYSICIAN ASSISTANT

## 2017-03-15 PROCEDURE — 82962 GLUCOSE BLOOD TEST: CPT

## 2017-03-15 PROCEDURE — 85027 COMPLETE CBC AUTOMATED: CPT | Performed by: PHYSICIAN ASSISTANT

## 2017-03-15 PROCEDURE — 82803 BLOOD GASES ANY COMBINATION: CPT

## 2017-03-15 PROCEDURE — 82375 ASSAY CARBOXYHB QUANT: CPT | Performed by: NURSE PRACTITIONER

## 2017-03-15 PROCEDURE — 36430 TRANSFUSION BLD/BLD COMPNT: CPT

## 2017-03-15 PROCEDURE — 85384 FIBRINOGEN ACTIVITY: CPT | Performed by: NURSE PRACTITIONER

## 2017-03-15 PROCEDURE — 93325 DOPPLER ECHO COLOR FLOW MAPG: CPT

## 2017-03-15 PROCEDURE — 84295 ASSAY OF SERUM SODIUM: CPT

## 2017-03-15 PROCEDURE — 85014 HEMATOCRIT: CPT

## 2017-03-15 PROCEDURE — 83735 ASSAY OF MAGNESIUM: CPT | Performed by: PHYSICIAN ASSISTANT

## 2017-03-15 PROCEDURE — 02UG3JZ SUPPLEMENT MITRAL VALVE WITH SYNTHETIC SUBSTITUTE, PERCUTANEOUS APPROACH: ICD-10-PCS | Performed by: INTERNAL MEDICINE

## 2017-03-15 PROCEDURE — 93010 ELECTROCARDIOGRAM REPORT: CPT | Performed by: INTERNAL MEDICINE

## 2017-03-15 PROCEDURE — 30233R1 TRANSFUSION OF NONAUTOLOGOUS PLATELETS INTO PERIPHERAL VEIN, PERCUTANEOUS APPROACH: ICD-10-PCS | Performed by: INTERNAL MEDICINE

## 2017-03-15 PROCEDURE — 85730 THROMBOPLASTIN TIME PARTIAL: CPT | Performed by: NURSE PRACTITIONER

## 2017-03-15 PROCEDURE — 85610 PROTHROMBIN TIME: CPT | Performed by: THORACIC SURGERY (CARDIOTHORACIC VASCULAR SURGERY)

## 2017-03-15 PROCEDURE — 94002 VENT MGMT INPAT INIT DAY: CPT

## 2017-03-15 PROCEDURE — 93306 TTE W/DOPPLER COMPLETE: CPT

## 2017-03-15 PROCEDURE — 83050 HGB METHEMOGLOBIN QUAN: CPT | Performed by: NURSE PRACTITIONER

## 2017-03-15 PROCEDURE — 85610 PROTHROMBIN TIME: CPT | Performed by: NURSE PRACTITIONER

## 2017-03-15 PROCEDURE — 94640 AIRWAY INHALATION TREATMENT: CPT

## 2017-03-15 PROCEDURE — 80048 BASIC METABOLIC PNL TOTAL CA: CPT | Performed by: PHYSICIAN ASSISTANT

## 2017-03-15 PROCEDURE — P9045 ALBUMIN (HUMAN), 5%, 250 ML: HCPCS

## 2017-03-15 PROCEDURE — 82803 BLOOD GASES ANY COMBINATION: CPT | Performed by: NURSE PRACTITIONER

## 2017-03-15 PROCEDURE — 30233N1 TRANSFUSION OF NONAUTOLOGOUS RED BLOOD CELLS INTO PERIPHERAL VEIN, PERCUTANEOUS APPROACH: ICD-10-PCS | Performed by: INTERNAL MEDICINE

## 2017-03-15 PROCEDURE — 0W9D30Z DRAINAGE OF PERICARDIAL CAVITY WITH DRAINAGE DEVICE, PERCUTANEOUS APPROACH: ICD-10-PCS | Performed by: INTERNAL MEDICINE

## 2017-03-15 PROCEDURE — 93355 ECHO TRANSESOPHAGEAL (TEE): CPT

## 2017-03-15 PROCEDURE — S0028 INJECTION, FAMOTIDINE, 20 MG: HCPCS

## 2017-03-15 PROCEDURE — 0W3C0ZZ CONTROL BLEEDING IN MEDIASTINUM, OPEN APPROACH: ICD-10-PCS | Performed by: THORACIC SURGERY (CARDIOTHORACIC VASCULAR SURGERY)

## 2017-03-15 PROCEDURE — 85730 THROMBOPLASTIN TIME PARTIAL: CPT | Performed by: THORACIC SURGERY (CARDIOTHORACIC VASCULAR SURGERY)

## 2017-03-15 PROCEDURE — 71010 XR CHEST AP PORTABLE  (CPT=71010): CPT

## 2017-03-15 PROCEDURE — 85027 COMPLETE CBC AUTOMATED: CPT | Performed by: NURSE PRACTITIONER

## 2017-03-15 PROCEDURE — 85384 FIBRINOGEN ACTIVITY: CPT | Performed by: THORACIC SURGERY (CARDIOTHORACIC VASCULAR SURGERY)

## 2017-03-15 PROCEDURE — 84100 ASSAY OF PHOSPHORUS: CPT | Performed by: NURSE PRACTITIONER

## 2017-03-15 PROCEDURE — 93320 DOPPLER ECHO COMPLETE: CPT

## 2017-03-15 DEVICE — MITRACLIP NT CLIP DELIVERY SYSTEM
Type: IMPLANTABLE DEVICE | Status: FUNCTIONAL
Brand: MITRACLIP

## 2017-03-15 RX ORDER — POLYETHYLENE GLYCOL 3350 17 G/17G
1 POWDER, FOR SOLUTION ORAL DAILY PRN
Status: DISCONTINUED | OUTPATIENT
Start: 2017-03-15 | End: 2017-03-15

## 2017-03-15 RX ORDER — FAMOTIDINE 10 MG/ML
20 INJECTION, SOLUTION INTRAVENOUS 2 TIMES DAILY
Status: DISCONTINUED | OUTPATIENT
Start: 2017-03-15 | End: 2017-03-16

## 2017-03-15 RX ORDER — FAMOTIDINE 20 MG/1
20 TABLET ORAL 2 TIMES DAILY
Status: DISCONTINUED | OUTPATIENT
Start: 2017-03-15 | End: 2017-03-15

## 2017-03-15 RX ORDER — SODIUM CHLORIDE 9 MG/ML
INJECTION, SOLUTION INTRAVENOUS ONCE
Status: COMPLETED | OUTPATIENT
Start: 2017-03-15 | End: 2017-03-15

## 2017-03-15 RX ORDER — BISACODYL 10 MG
10 SUPPOSITORY, RECTAL RECTAL
Status: DISCONTINUED | OUTPATIENT
Start: 2017-03-15 | End: 2017-03-21

## 2017-03-15 RX ORDER — POTASSIUM CHLORIDE 29.8 MG/ML
40 INJECTION INTRAVENOUS AS NEEDED
Status: DISCONTINUED | OUTPATIENT
Start: 2017-03-15 | End: 2017-03-21

## 2017-03-15 RX ORDER — ALBUMIN, HUMAN INJ 5% 5 %
250 SOLUTION INTRAVENOUS ONCE AS NEEDED
Status: DISCONTINUED | OUTPATIENT
Start: 2017-03-15 | End: 2017-03-15

## 2017-03-15 RX ORDER — POTASSIUM CHLORIDE 14.9 MG/ML
20 INJECTION INTRAVENOUS AS NEEDED
Status: DISCONTINUED | OUTPATIENT
Start: 2017-03-15 | End: 2017-03-21

## 2017-03-15 RX ORDER — DEXTROSE AND SODIUM CHLORIDE 5; .45 G/100ML; G/100ML
INJECTION, SOLUTION INTRAVENOUS CONTINUOUS
Status: DISCONTINUED | OUTPATIENT
Start: 2017-03-15 | End: 2017-03-15

## 2017-03-15 RX ORDER — DOCUSATE SODIUM 100 MG/1
100 CAPSULE, LIQUID FILLED ORAL 2 TIMES DAILY
Status: DISCONTINUED | OUTPATIENT
Start: 2017-03-15 | End: 2017-03-15

## 2017-03-15 RX ORDER — POLYETHYLENE GLYCOL 3350 17 G/17G
1 POWDER, FOR SOLUTION ORAL DAILY PRN
Status: DISCONTINUED | OUTPATIENT
Start: 2017-03-15 | End: 2017-03-21

## 2017-03-15 RX ORDER — FAMOTIDINE 20 MG/1
20 TABLET ORAL 2 TIMES DAILY
Status: DISCONTINUED | OUTPATIENT
Start: 2017-03-15 | End: 2017-03-21

## 2017-03-15 RX ORDER — DOBUTAMINE HYDROCHLORIDE 100 MG/100ML
INJECTION INTRAVENOUS CONTINUOUS PRN
Status: DISCONTINUED | OUTPATIENT
Start: 2017-03-15 | End: 2017-03-15

## 2017-03-15 RX ORDER — SODIUM CHLORIDE 9 MG/ML
INJECTION, SOLUTION INTRAVENOUS CONTINUOUS
Status: DISCONTINUED | OUTPATIENT
Start: 2017-03-15 | End: 2017-03-15

## 2017-03-15 RX ORDER — NITROGLYCERIN 20 MG/100ML
INJECTION INTRAVENOUS CONTINUOUS PRN
Status: DISCONTINUED | OUTPATIENT
Start: 2017-03-15 | End: 2017-03-17

## 2017-03-15 RX ORDER — DOBUTAMINE HYDROCHLORIDE 100 MG/100ML
INJECTION INTRAVENOUS CONTINUOUS PRN
Status: DISCONTINUED | OUTPATIENT
Start: 2017-03-15 | End: 2017-03-16

## 2017-03-15 RX ORDER — ACETAMINOPHEN 325 MG/1
650 TABLET ORAL EVERY 4 HOURS PRN
Status: DISCONTINUED | OUTPATIENT
Start: 2017-03-15 | End: 2017-03-15

## 2017-03-15 RX ORDER — DEXTROSE AND SODIUM CHLORIDE 5; .45 G/100ML; G/100ML
INJECTION, SOLUTION INTRAVENOUS CONTINUOUS
Status: DISCONTINUED | OUTPATIENT
Start: 2017-03-16 | End: 2017-03-16

## 2017-03-15 RX ORDER — SODIUM CHLORIDE 9 MG/ML
INJECTION, SOLUTION INTRAVENOUS CONTINUOUS
Status: DISCONTINUED | OUTPATIENT
Start: 2017-03-15 | End: 2017-03-16

## 2017-03-15 RX ORDER — BACITRACIN 50000 [USP'U]/1
INJECTION, POWDER, LYOPHILIZED, FOR SOLUTION INTRAMUSCULAR AS NEEDED
Status: DISCONTINUED | OUTPATIENT
Start: 2017-03-15 | End: 2017-03-15 | Stop reason: HOSPADM

## 2017-03-15 RX ORDER — ONDANSETRON 2 MG/ML
4 INJECTION INTRAMUSCULAR; INTRAVENOUS EVERY 6 HOURS PRN
Status: DISCONTINUED | OUTPATIENT
Start: 2017-03-15 | End: 2017-03-15

## 2017-03-15 RX ORDER — DOCUSATE SODIUM 100 MG/1
100 CAPSULE, LIQUID FILLED ORAL 2 TIMES DAILY
Status: DISCONTINUED | OUTPATIENT
Start: 2017-03-15 | End: 2017-03-21

## 2017-03-15 RX ORDER — DEXTROSE AND SODIUM CHLORIDE 5; .45 G/100ML; G/100ML
INJECTION, SOLUTION INTRAVENOUS CONTINUOUS
Status: DISCONTINUED | OUTPATIENT
Start: 2017-03-15 | End: 2017-03-16

## 2017-03-15 RX ORDER — SODIUM CHLORIDE 9 MG/ML
INJECTION, SOLUTION INTRAVENOUS ONCE
Status: DISCONTINUED | OUTPATIENT
Start: 2017-03-15 | End: 2017-03-15

## 2017-03-15 RX ORDER — DEXTROSE MONOHYDRATE 25 G/50ML
50 INJECTION, SOLUTION INTRAVENOUS
Status: DISCONTINUED | OUTPATIENT
Start: 2017-03-15 | End: 2017-03-21

## 2017-03-15 RX ORDER — ONDANSETRON 2 MG/ML
4 INJECTION INTRAMUSCULAR; INTRAVENOUS EVERY 6 HOURS PRN
Status: DISCONTINUED | OUTPATIENT
Start: 2017-03-15 | End: 2017-03-21

## 2017-03-15 RX ORDER — MIDAZOLAM HYDROCHLORIDE 1 MG/ML
1 INJECTION INTRAMUSCULAR; INTRAVENOUS EVERY 30 MIN PRN
Status: DISCONTINUED | OUTPATIENT
Start: 2017-03-15 | End: 2017-03-17

## 2017-03-15 RX ORDER — ALBUMIN, HUMAN INJ 5% 5 %
250 SOLUTION INTRAVENOUS ONCE AS NEEDED
Status: COMPLETED | OUTPATIENT
Start: 2017-03-15 | End: 2017-03-15

## 2017-03-15 RX ORDER — CLOPIDOGREL BISULFATE 75 MG/1
600 TABLET ORAL ONCE
Status: DISCONTINUED | OUTPATIENT
Start: 2017-03-15 | End: 2017-03-15

## 2017-03-15 RX ORDER — TEMAZEPAM 15 MG/1
15 CAPSULE ORAL NIGHTLY PRN
Status: DISCONTINUED | OUTPATIENT
Start: 2017-03-15 | End: 2017-03-17

## 2017-03-15 RX ORDER — MIDAZOLAM HYDROCHLORIDE 1 MG/ML
INJECTION INTRAMUSCULAR; INTRAVENOUS
Status: DISCONTINUED
Start: 2017-03-15 | End: 2017-03-16

## 2017-03-15 RX ORDER — NITROGLYCERIN 20 MG/100ML
INJECTION INTRAVENOUS CONTINUOUS PRN
Status: DISCONTINUED | OUTPATIENT
Start: 2017-03-15 | End: 2017-03-15

## 2017-03-15 RX ORDER — DEXMEDETOMIDINE HYDROCHLORIDE 4 UG/ML
INJECTION, SOLUTION INTRAVENOUS CONTINUOUS
Status: DISCONTINUED | OUTPATIENT
Start: 2017-03-15 | End: 2017-03-16

## 2017-03-15 RX ORDER — FAMOTIDINE 10 MG/ML
20 INJECTION, SOLUTION INTRAVENOUS 2 TIMES DAILY
Status: DISCONTINUED | OUTPATIENT
Start: 2017-03-15 | End: 2017-03-15

## 2017-03-15 RX ORDER — CHLORHEXIDINE GLUCONATE 0.12 MG/ML
15 RINSE ORAL
Status: DISCONTINUED | OUTPATIENT
Start: 2017-03-15 | End: 2017-03-16

## 2017-03-15 NOTE — PROGRESS NOTES
I was called to the ICU to evaluate patient who is s/p mitral clip placement. She had intrapericardial bleeding and pericardiocentesis performed with catheter placement.  She continues to drain from the catheter and is having intermittent hypotension respon

## 2017-03-15 NOTE — PLAN OF CARE
Received pt from CCL at 1400 post Mitral clip procedure. PT accompanied by Dr rGecia Amaya, Myranda Todd APN and CCL staff.  PT received with pericardial drain in place, being manually  measured and drained by Myranda Todd, with syringe attached t

## 2017-03-15 NOTE — ANESTHESIA POSTPROCEDURE EVALUATION
Lynn Patient Status:  Inpatient   Age/Gender 73year old female MRN [id number]   [facility] 6NE-A Attending Myranda Marte MD   Hosp Day # 1 PCP Nelly Guy.  DO Chris       Anesthesia Post-op Note    Procedure(s):  m

## 2017-03-15 NOTE — CONSULTS
ENDOCRINOLOGY CONSULTATION    Attending physician:  Scar Conner MD  Consulting physican:  Carlitos Tasng MD    Admission Date:  3/14/2017  Consultation Date:  3/14/2017      Reason for consultation: Management of Type 2 DM    Chief Complaint:   Ad • Greater trochanteric bursitis    • IDDM (insulin dependent diabetes mellitus) ([facility] [address] 2011     W/early BDR   • Femoral artery stenosis (HCC)      Right Superficial   • PE (pulmonary embolism)    • Other ill-defined conditions(799.89) 2010     Post Polio mouth daily. Takes on Tues, Thurs, Sat, Sun Disp:  Rfl:    Cholecalciferol (VITAMIN D3) 2000 units Oral Tab Take 4,000 Units by mouth daily.  Takes on Monday & Friday Disp:  Rfl:    Ranolazine ER (RANEXA) 500 MG Oral Tablet 12 Hr Take 1,000 mg by mouth ever Insulin Lispro, Human, (HUMALOG KWIKPEN) 100 UNIT/ML Subcutaneous Solution Pen-injector 5-10 u as instructed tid (Patient taking differently: 4-6 units at night ) Disp: 3 pen Rfl: 11   Albuterol Sulfate HFA (VENTOLIN HFA) 108 (90 BASE) MCG/ACT Inhalation Insulin Pen Needle (TRACEY PEN NEEDLES) 31G X 8 MM Does not apply Misc Use as directed.  Disp: 300 each Rfl: 1   Alcohol Swabs ([employer] PREP) 70 % Does not apply Pads  Disp:  Rfl:    GLUCAGON EMERGENCY 1 MG Injection Kit  Disp:  Rfl:    Insulin Syringe 29G X UNIT/ML flextouch 6 Units 6 Units Subcutaneous Nightly   insulin aspart (NOVOLOG) 100 UNIT/ML flexpen 1-68 Units 1-68 Units Subcutaneous TID CC   insulin aspart (NOVOLOG) 100 UNIT/ML flexpen 1-50 Units 1-50 Units Subcutaneous TID CC and HS   [DISCONTINUED] Thyroid disease Mother    • Diabetes Father    • Other[Other] [OTHER] Father    • Glaucoma Father    • Colon Cancer Brother 48   • Colonic Polyps[Other] [OTHER] Brother    • Macular degeneration Neg    • Cancer Sister      Thyroid cancer   • Breast Cancer mmol/L 28.0           Component      Latest Ref Rng 3/14/2017   WBC      4.0-13.0 x10(3) uL 6.0   RBC      3.80-5.10 x10(6)uL 4.53   Hemoglobin      12.0-16.0 g/dL 14.0   Hematocrit      34.0-50.0 % 42.0   Platelet Count      140.5-926.7 10(3)uL 190.0

## 2017-03-15 NOTE — PROGRESS NOTES
74 y/o female with severe MR,  RCA,EF 25%, ICD,DM,afib, Breast CA and hx polio    RFV crossed to left atrium. Twice crossed on its own. Eventually crossed high enough to place guide.  Clip delivered and MR went from 4+ to 1-2+  Mean gradient 2    Small

## 2017-03-15 NOTE — BRIEF OP NOTE
[facility] CARDIOVASCULAR SURGERY  Brief Op Note     Charla Green Location: OR   CSN [id number] N [id number]   Admission Date 3/14/2017 Operation Date 3/15/2017   Attending Physician Eileen Lopez MD Operating Physician Olesya Knight MD       P

## 2017-03-15 NOTE — PROCEDURES
Cardiology Transesophageal Echo Note    PRE and POST PROCEDURE DIAGNOSIS:   1.  Mitral regurgitation    PROCEDURE: Intra-operative transesophageal echocardiogram (PENELOPE) for [id number] mitral clip procedure    The patient was already intubated and sedated b effusion.     Mitral clip deployment:  - Reduction of mitral insufficiency from severe to mild-moderate  - Mean gradient across mitral valve was 2 mmHg  - Blunted but no systolic flow reversal noted on interrogation of all the pulmonary veins  - Pericardial

## 2017-03-15 NOTE — ANESTHESIA PREPROCEDURE EVALUATION
PRE-OP EVALUATION    Patient Name: Amber Headlye    Pre-op Diagnosis: pericardial window    Procedure(s):  mediastinal exploration    Surgeon(s) and Role:     Randa Moore MD - Primary    Pre-op vitals reviewed.   Temp: 97.8 °F (36.6 °C)  Pulse: 69 (DIPRIVAN) 10 MG/ML injection      propofol (DIPRIVAN) infusion 5-100 mcg/kg/min (Dosing Weight) Intravenous Continuous   Midazolam HCl (VERSED) 2 MG/2ML injection      0.9%  NaCl infusion  Intravenous Continuous   0.9%  NaCl infusion  Intravenous Once   b 4-0.006 g chewable tab 4 tablet 4 tablet Oral Q15 Min PRN   [DISCONTINUED] dextrose injection 50 mL 50 mL Intravenous Q15 Min PRN   [DISCONTINUED] glucose (DEX4) oral liquid 30 g 30 g Oral Q15 Min PRN   [DISCONTINUED] Glucose-Vitamin C (DEX-4) 4-0.006 g ch Units by mouth daily. Disp:  Rfl:    CINNAMON OR Take 1 Dose by mouth daily. Disp:  Rfl:    PANAX GINSENG OR Take 1 tablet by mouth daily. Disp:  Rfl:    SACCHAROMYCES BOULARDII OR Take 1 capsule by mouth daily.  Probiotic Disp:  Rfl:    Cranberry 500 MG Or BY NEBULIZATION EVERY 6 (SIX) HOURS AS NEEDED FOR WHEEZING. Disp: 100 mL Rfl: 2   lisinopril 5 MG Oral Tab Take 0.5 tablets (2.5 mg total) by mouth once daily.  Disp: 90 tablet Rfl: 0   CLOPIDOGREL BISULFATE 75 MG Oral Tab TAKE 1 TABLET BY MOUTH EVERY DAY ( CATARACT EXTRACTION W/  INTRAOCULAR LENS IMPLANT Left 2/27/06    Comment RJM    AUTOMATIC EXTERNAL DEFIBRILLATOR, WITH INTEGRATED ELECTROCARDIOGRAM ANALYSIS, Left 3/4/16    CARDIAC DEFIBRILLATOR PLACEMENT      CATH DRUG ELUTING STENT      CATARACT      COL

## 2017-03-15 NOTE — PLAN OF CARE
CARDIOVASCULAR - ADULT    • Maintains optimal cardiac output and hemodynamic stability  Alert and oriented times 4. Skin pale warm and dry. VSS. Afebrile. Tele shows SR - hr - 60 - 70's. Denies complaints of chest pain, sob or dizziness.  Npo after MN f

## 2017-03-15 NOTE — ANESTHESIA POSTPROCEDURE EVALUATION
Lynn Patient Status:  Inpatient   Age/Gender 73year old female MRN [id number]   [facility] 6NE-A Attending Aminata Majano MD   Hosp Day # 1 PCP Phu Berger.  DO Chris       Anesthesia Post-op Note    Procedure(s):  m

## 2017-03-15 NOTE — ANESTHESIA PREPROCEDURE EVALUATION
PRE-OP EVALUATION    Patient Name: Peter Enciso    Pre-op Diagnosis: mitral regurgitation    Procedure(s):  mitraclip    Surgeon(s) and Role:     Eric Henry MD - Primary     * Lyric Silverman MD     * Alva Ingram MD    Pre-op vitals reviewe flextouch 6 Units 6 Units Subcutaneous Nightly   insulin aspart (NOVOLOG) 100 UNIT/ML flexpen 1-68 Units 1-68 Units Subcutaneous TID CC   insulin aspart (NOVOLOG) 100 UNIT/ML flexpen 1-50 Units 1-50 Units Subcutaneous TID CC and HS   [DISCONTINUED] Susan Friday Disp:  Rfl:    Warfarin Sodium 5 MG Oral Tab Take 5 mg by mouth nightly.  Takes Sun, Tues, Wed, Thurs, Sat Disp:  Rfl:    Acetaminophen-Codeine (TYLENOL WITH CODEINE #3) 300-30 MG Oral Tab Take 1 tablet by mouth every 4 (four) hours as needed for [employer] MOUTH NIGHTLY. Disp: 90 tablet Rfl: 11   Atorvastatin Calcium 10 MG Oral Tab Take 1 tablet (10 mg total) by mouth nightly. Disp: 90 tablet Rfl: 3   carvedilol (COREG) 3.125 MG Oral Tab Take 1 tablet (3.125 mg total) by mouth 2 (two) times daily with meals. cancer      Hemorrhoids Diverticulosis      History of colon polyps Post-polio limb muscle weakness      Pseudophakia of both eyes Blepharitis, both eyes      Type 1 diabetes mellitus with mild nonproliferative diabetic retinopathy and without macular kade sinus rhythm  Inferior infarct , age undetermined  Abnormal ECG  No previous ECGs available  Confirmed by Jaleesa Gallegos M.D., Isela Dawson ([id number]) on 3/14/2017 4:16:38 PM    Echo:    FINDINGS:  The rhythm is normal sinus.  The left atrium is markedly enlarged.  The left echocardiography with color flow mapping reveals a very large medial and smaller lateral mitral regurgitation jets, resulting in severe (4+) mitral valve regurgitation.  The regurgitant jet is directed posteriorly and laterally.  Systolic flow reversal is CATARACT EXTRACTION W/  INTRAOCULAR LENS IMPLANT Left 2/27/06    Comment RJM    AUTOMATIC EXTERNAL DEFIBRILLATOR, WITH INTEGRATED ELECTROCARDIOGRAM ANALYSIS, Left 3/4/16    CARDIAC DEFIBRILLATOR PLACEMENT      CATH DRUG ELUTING STENT      CATARACT trauma, pain management modalities, transfusion if needed, etc. Questions answered. Accepts. The consent was signed without further questions.       Plan/risks discussed with: patient                Present on Admission:   • Mitral regurgitation

## 2017-03-15 NOTE — PROGRESS NOTES
ENDOCRINOLOGY PROGRESS NOTE    Typed by Moraima Olivo MD on 3/15/2017      Pt was NOT seen by me today as first, she had the Mitraclip procedure and then had to return to the operating room for pericardial window procedure.     Therefore, she was n

## 2017-03-16 ENCOUNTER — APPOINTMENT (OUTPATIENT)
Dept: GENERAL RADIOLOGY | Facility: HOSPITAL | Age: 67
DRG: 228 | End: 2017-03-16
Attending: PHYSICIAN ASSISTANT
Payer: MEDICARE

## 2017-03-16 ENCOUNTER — APPOINTMENT (OUTPATIENT)
Dept: GENERAL RADIOLOGY | Facility: HOSPITAL | Age: 67
DRG: 228 | End: 2017-03-16
Attending: THORACIC SURGERY (CARDIOTHORACIC VASCULAR SURGERY)
Payer: MEDICARE

## 2017-03-16 ENCOUNTER — APPOINTMENT (OUTPATIENT)
Dept: CV DIAGNOSTICS | Facility: HOSPITAL | Age: 67
DRG: 228 | End: 2017-03-16
Attending: PHYSICIAN ASSISTANT
Payer: MEDICARE

## 2017-03-16 LAB
ARTERIAL BLD GAS O2 SATURATION: 98 % (ref 92–100)
ARTERIAL BLOOD GAS BASE EXCESS: -0.8
ARTERIAL BLOOD GAS HCO3: 23.5 MEQ/L (ref 22–26)
ARTERIAL BLOOD GAS PCO2: 37 MM HG (ref 35–45)
ARTERIAL BLOOD GAS PH: 7.42 (ref 7.35–7.45)
ARTERIAL BLOOD GAS PO2: 173 MM HG (ref 80–105)
ATRIAL RATE: 82 BPM
BASOPHILS # BLD AUTO: 0.02 X10(3) UL (ref 0–0.1)
BASOPHILS NFR BLD AUTO: 0.3 %
BLOOD TYPE BARCODE: 5100
BUN BLD-MCNC: 9 MG/DL (ref 8–20)
BUN BLD-MCNC: 9 MG/DL (ref 8–20)
CALCIUM BLD-MCNC: 8.1 MG/DL (ref 8.3–10.3)
CALCIUM BLD-MCNC: 8.1 MG/DL (ref 8.3–10.3)
CALCULATED O2 SATURATION: 100 % (ref 92–100)
CARBOXYHEMOGLOBIN: 1.3 % SAT (ref 0–3)
CHLORIDE: 113 MMOL/L (ref 101–111)
CHLORIDE: 113 MMOL/L (ref 101–111)
CO2: 25 MMOL/L (ref 22–32)
CO2: 25 MMOL/L (ref 22–32)
CREAT BLD-MCNC: 0.34 MG/DL (ref 0.55–1.02)
CREAT BLD-MCNC: 0.34 MG/DL (ref 0.55–1.02)
EOSINOPHIL # BLD AUTO: 0.07 X10(3) UL (ref 0–0.3)
EOSINOPHIL NFR BLD AUTO: 1.1 %
ERYTHROCYTE [DISTWIDTH] IN BLOOD BY AUTOMATED COUNT: 14.3 % (ref 11.5–16)
FIO2: 40 %
GLUCOSE BLD-MCNC: 106 MG/DL (ref 65–99)
GLUCOSE BLD-MCNC: 106 MG/DL (ref 65–99)
GLUCOSE BLD-MCNC: 120 MG/DL (ref 65–99)
GLUCOSE BLD-MCNC: 120 MG/DL (ref 65–99)
GLUCOSE BLD-MCNC: 124 MG/DL (ref 65–99)
GLUCOSE BLD-MCNC: 131 MG/DL (ref 70–99)
GLUCOSE BLD-MCNC: 131 MG/DL (ref 70–99)
GLUCOSE BLD-MCNC: 132 MG/DL (ref 65–99)
GLUCOSE BLD-MCNC: 136 MG/DL (ref 65–99)
GLUCOSE BLD-MCNC: 139 MG/DL (ref 65–99)
GLUCOSE BLD-MCNC: 140 MG/DL (ref 65–99)
GLUCOSE BLD-MCNC: 141 MG/DL (ref 65–99)
GLUCOSE BLD-MCNC: 145 MG/DL (ref 65–99)
GLUCOSE BLD-MCNC: 155 MG/DL (ref 65–99)
GLUCOSE BLD-MCNC: 173 MG/DL (ref 65–99)
GLUCOSE BLD-MCNC: 193 MG/DL (ref 65–99)
GLUCOSE BLD-MCNC: 194 MG/DL (ref 65–99)
GLUCOSE BLD-MCNC: 196 MG/DL (ref 65–99)
GLUCOSE BLD-MCNC: 232 MG/DL (ref 65–99)
GLUCOSE BLD-MCNC: 91 MG/DL (ref 65–99)
HAV IGM SER QL: 1.8 MG/DL (ref 1.7–3)
HAV IGM SER QL: 1.8 MG/DL (ref 1.7–3)
HCT VFR BLD AUTO: 22.3 % (ref 34–50)
HGB BLD-MCNC: 7.4 G/DL (ref 12–16)
IMMATURE GRANULOCYTE COUNT: 0.02 X10(3) UL (ref 0–1)
IMMATURE GRANULOCYTE RATIO %: 0.3 %
INR BLD: 0.92 (ref 0.89–1.11)
ISTAT ACTIVATED CLOTTING TIME: 147 SECONDS (ref 74–137)
ISTAT BLOOD GAS BASE DEFICIT: -4 MMOL/L
ISTAT BLOOD GAS BASE DEFICIT: -5 MMOL/L
ISTAT BLOOD GAS BASE DEFICIT: -5 MMOL/L
ISTAT BLOOD GAS HCO3: 20.3 MEQ/L (ref 22–26)
ISTAT BLOOD GAS HCO3: 20.9 MEQ/L (ref 22–26)
ISTAT BLOOD GAS HCO3: 21.7 MEQ/L (ref 22–26)
ISTAT BLOOD GAS O2 SATURATION: 100 % (ref 92–100)
ISTAT BLOOD GAS PCO2: 33 MMHG (ref 35–45)
ISTAT BLOOD GAS PCO2: 34 MMHG (ref 35–45)
ISTAT BLOOD GAS PCO2: 45 MMHG (ref 35–45)
ISTAT BLOOD GAS PH: 7.29 (ref 7.35–7.45)
ISTAT BLOOD GAS PH: 7.39 (ref 7.35–7.45)
ISTAT BLOOD GAS PH: 7.41 (ref 7.35–7.45)
ISTAT BLOOD GAS PO2: 416 MMHG (ref 80–105)
ISTAT BLOOD GAS PO2: 508 MMHG (ref 80–105)
ISTAT BLOOD GAS PO2: 528 MMHG (ref 80–105)
ISTAT BLOOD GAS TCO2: 21 MMOL/L (ref 22–32)
ISTAT BLOOD GAS TCO2: 22 MMOL/L (ref 22–32)
ISTAT BLOOD GAS TCO2: 23 MMOL/L (ref 22–32)
ISTAT HEMATOCRIT: 28 % (ref 37–54)
ISTAT HEMATOCRIT: 31 % (ref 37–54)
ISTAT HEMATOCRIT: 33 % (ref 37–54)
ISTAT IONIZED CALCIUM: 0.9 MMOL/L (ref 1.12–1.32)
ISTAT IONIZED CALCIUM: 0.99 MMOL/L (ref 1.12–1.32)
ISTAT IONIZED CALCIUM: 1.13 MMOL/L (ref 1.12–1.32)
ISTAT POTASSIUM: 3.7 MMOL/L (ref 3.6–5.1)
ISTAT POTASSIUM: 3.9 MMOL/L (ref 3.6–5.1)
ISTAT POTASSIUM: 3.9 MMOL/L (ref 3.6–5.1)
ISTAT SODIUM: 142 MMOL/L (ref 136–144)
LYMPHOCYTES # BLD AUTO: 0.94 X10(3) UL (ref 0.9–4)
LYMPHOCYTES NFR BLD AUTO: 14.9 %
MCH RBC QN AUTO: 30.3 PG (ref 27–33.2)
MCHC RBC AUTO-ENTMCNC: 33.2 G/DL (ref 31–37)
MCV RBC AUTO: 91.4 FL (ref 81–100)
METHEMOGLOBIN: 0.4 % SAT (ref 0.4–1.5)
MONOCYTES # BLD AUTO: 0.89 X10(3) UL (ref 0.1–0.6)
MONOCYTES NFR BLD AUTO: 14.1 %
NEUTROPHIL ABS PRELIM: 4.36 X10 (3) UL (ref 1.3–6.7)
NEUTROPHILS # BLD AUTO: 4.36 X10(3) UL (ref 1.3–6.7)
NEUTROPHILS NFR BLD AUTO: 69.3 %
P AXIS: 63 DEGREES
P-R INTERVAL: 166 MS
PATIENT TEMPERATURE: 98.6 F
PEEP: 5 CM H2O
PLATELET # BLD AUTO: 253 10(3)UL (ref 150–450)
POTASSIUM SERPL-SCNC: 4.1 MMOL/L (ref 3.6–5.1)
POTASSIUM SERPL-SCNC: 4.1 MMOL/L (ref 3.6–5.1)
PRESSURE SUPPORT: 5 CM H2O
PSA SERPL DL<=0.01 NG/ML-MCNC: 12.3 SECONDS (ref 12–14.3)
Q-T INTERVAL: 458 MS
QRS DURATION: 106 MS
QTC CALCULATION (BEZET): 535 MS
R AXIS: 33 DEGREES
RBC # BLD AUTO: 2.44 X10(6)UL (ref 3.8–5.1)
RED CELL DISTRIBUTION WIDTH-SD: 47.6 FL (ref 35.1–46.3)
SODIUM SERPL-SCNC: 146 MMOL/L (ref 136–144)
SODIUM SERPL-SCNC: 146 MMOL/L (ref 136–144)
T AXIS: 44 DEGREES
TOTAL HEMOGLOBIN: 7.5 G/DL (ref 11.7–16)
VENTRICULAR RATE: 82 BPM
WBC # BLD AUTO: 6.3 X10(3) UL (ref 4–13)

## 2017-03-16 PROCEDURE — 97167 OT EVAL HIGH COMPLEX 60 MIN: CPT

## 2017-03-16 PROCEDURE — 85018 HEMOGLOBIN: CPT | Performed by: ANESTHESIOLOGY

## 2017-03-16 PROCEDURE — 94150 VITAL CAPACITY TEST: CPT

## 2017-03-16 PROCEDURE — 83050 HGB METHEMOGLOBIN QUAN: CPT | Performed by: ANESTHESIOLOGY

## 2017-03-16 PROCEDURE — 97164 PT RE-EVAL EST PLAN CARE: CPT

## 2017-03-16 PROCEDURE — 36430 TRANSFUSION BLD/BLD COMPNT: CPT

## 2017-03-16 PROCEDURE — S0028 INJECTION, FAMOTIDINE, 20 MG: HCPCS | Performed by: PHYSICIAN ASSISTANT

## 2017-03-16 PROCEDURE — 85025 COMPLETE CBC W/AUTO DIFF WBC: CPT | Performed by: PHYSICIAN ASSISTANT

## 2017-03-16 PROCEDURE — 94003 VENT MGMT INPAT SUBQ DAY: CPT

## 2017-03-16 PROCEDURE — 85610 PROTHROMBIN TIME: CPT | Performed by: NURSE PRACTITIONER

## 2017-03-16 PROCEDURE — 83735 ASSAY OF MAGNESIUM: CPT | Performed by: NURSE PRACTITIONER

## 2017-03-16 PROCEDURE — 80048 BASIC METABOLIC PNL TOTAL CA: CPT | Performed by: PHYSICIAN ASSISTANT

## 2017-03-16 PROCEDURE — 71010 XR CHEST AP PORTABLE  (CPT=71010): CPT

## 2017-03-16 PROCEDURE — 82803 BLOOD GASES ANY COMBINATION: CPT | Performed by: ANESTHESIOLOGY

## 2017-03-16 PROCEDURE — 93005 ELECTROCARDIOGRAM TRACING: CPT

## 2017-03-16 PROCEDURE — 97530 THERAPEUTIC ACTIVITIES: CPT

## 2017-03-16 PROCEDURE — 97535 SELF CARE MNGMENT TRAINING: CPT

## 2017-03-16 PROCEDURE — 82375 ASSAY CARBOXYHB QUANT: CPT | Performed by: ANESTHESIOLOGY

## 2017-03-16 PROCEDURE — 83735 ASSAY OF MAGNESIUM: CPT | Performed by: PHYSICIAN ASSISTANT

## 2017-03-16 PROCEDURE — 93010 ELECTROCARDIOGRAM REPORT: CPT | Performed by: INTERNAL MEDICINE

## 2017-03-16 PROCEDURE — 80048 BASIC METABOLIC PNL TOTAL CA: CPT | Performed by: NURSE PRACTITIONER

## 2017-03-16 PROCEDURE — 93306 TTE W/DOPPLER COMPLETE: CPT | Performed by: PHYSICIAN ASSISTANT

## 2017-03-16 PROCEDURE — 82962 GLUCOSE BLOOD TEST: CPT

## 2017-03-16 RX ORDER — MORPHINE SULFATE 2 MG/ML
2 INJECTION, SOLUTION INTRAMUSCULAR; INTRAVENOUS
Status: DISCONTINUED | OUTPATIENT
Start: 2017-03-16 | End: 2017-03-21

## 2017-03-16 RX ORDER — FUROSEMIDE 10 MG/ML
40 INJECTION INTRAMUSCULAR; INTRAVENOUS ONCE
Status: COMPLETED | OUTPATIENT
Start: 2017-03-16 | End: 2017-03-16

## 2017-03-16 RX ORDER — FUROSEMIDE 10 MG/ML
20 INJECTION INTRAMUSCULAR; INTRAVENOUS ONCE
Status: COMPLETED | OUTPATIENT
Start: 2017-03-16 | End: 2017-03-16

## 2017-03-16 RX ORDER — SODIUM CHLORIDE 9 MG/ML
INJECTION, SOLUTION INTRAVENOUS ONCE
Status: COMPLETED | OUTPATIENT
Start: 2017-03-16 | End: 2017-03-16

## 2017-03-16 RX ORDER — MORPHINE SULFATE 4 MG/ML
8 INJECTION, SOLUTION INTRAMUSCULAR; INTRAVENOUS
Status: DISCONTINUED | OUTPATIENT
Start: 2017-03-16 | End: 2017-03-16

## 2017-03-16 RX ORDER — MORPHINE SULFATE 4 MG/ML
4 INJECTION, SOLUTION INTRAMUSCULAR; INTRAVENOUS
Status: DISCONTINUED | OUTPATIENT
Start: 2017-03-16 | End: 2017-03-21

## 2017-03-16 RX ORDER — RANOLAZINE 500 MG/1
500 TABLET, EXTENDED RELEASE ORAL DAILY
Status: DISCONTINUED | OUTPATIENT
Start: 2017-03-16 | End: 2017-03-21

## 2017-03-16 RX ORDER — ATORVASTATIN CALCIUM 10 MG/1
10 TABLET, FILM COATED ORAL NIGHTLY
Status: DISCONTINUED | OUTPATIENT
Start: 2017-03-16 | End: 2017-03-21

## 2017-03-16 RX ORDER — ACETAMINOPHEN AND CODEINE PHOSPHATE 300; 30 MG/1; MG/1
2 TABLET ORAL EVERY 4 HOURS PRN
Status: DISCONTINUED | OUTPATIENT
Start: 2017-03-16 | End: 2017-03-21

## 2017-03-16 NOTE — PROGRESS NOTES
ENDOCRINOLOGY PROGRESS NOTE    Typed by Octaviano Monroy MD on 3/16/2017      S:  Pt resting in bed - sleepy but arousable. Extubated today.  Yesterday's events noted - pt had Mitraclip but then had pericardial effusion and needed to go back to OR f insulin use       · Restart regimen as follows:        Levemir 6 Units at bedtime     Novolog 1 Unit for every 20 grams of carbs with meals     Novolog correction of 1 Unit for every 40 above 140 with meals and bedtime        Discontinue IV insulin      ·

## 2017-03-16 NOTE — PROGRESS NOTES
[facility]  CV Surgery Progress Note    [facility] Officer Patient Status:  Inpatient    10/13/1950 MRN [id number]   [facility] 6NE-A Attending Simba Bryan MD   Hosp Day # 2 PCP Jayson Partida. DO Chris     Subjective:  Recently extubated.  A 3)Neuro Fun Intact   4)Renal Fun Intact   5)CT Management - leave in place, follow drainage   6)Pain Management   7)Encourage IS/Ambulate when able       Dunia Barth PA-C/Tristan  3/16/2017  8:14 AM

## 2017-03-16 NOTE — PROGRESS NOTES
Anesthesia Post-Op Follow Up- Late Entry    Anesthesia Post Op Check    Patient is s/p mitraclip  Procedure, then return to OR for emergent pericardial window.   POD# 1  Was extubated earlier this morning after successful CPAP trial.  Now on  NC- SpO2= 100%

## 2017-03-16 NOTE — OPERATIVE REPORT
[facility]    PATIENT'S NAME: Inis Call   ATTENDING PHYSICIAN: Saniya Jacobs M.D. OPERATING PHYSICIAN: Antonio Mariee M.D.    PATIENT ACCOUNT#:   [de-identified]    LOCATION:  [address] [place] [address]  MEDICAL RECORD #:   [id number]       DATE OF BIRTH: patient's family member that was deemed an emergency. PROCEDURE:  Patient brought to operating room, placed supine on operating table. She was properly identified, a time-out was called.   Under satisfactory general endotracheal anesthesia, after suzie Ardeen Fabry, M.D. Vel Coronado M.D.    [address] [facility],  [address] [facility]

## 2017-03-16 NOTE — PHYSICAL THERAPY NOTE
PHYSICAL THERAPY EVALUATION - INPATIENT     Room Number: 4920/3564-X  Evaluation Date: 3/16/2017  Type of Evaluation: Initial  Physician Order: PT Eval and Treat    Presenting Problem: s/p TMVR and mediastinal exploration 3/15/17  Reason for Therapy: M 10/23/2010   • Cancer of overlapping sites of right female breast ([facility] [address] 10/23/2010   • Blepharitis, both eyes 1/14/2016   • Type 1 diabetes mellitus with mild nonproliferative diabetic retinopathy and without macular edema 1/14/2016   • BDR (background parveen Werneke's encephalopathy.  Pt states that he helps her physically and she helps him cognitively.  Pt reports that she performs ADL's, cooking, and gardening from her wheelchair.  States that she is mod indep with pivot transfers if surfaces are even (so co currently have. ..  -   Turning over in bed (including adjusting bedclothes, sheets and blankets)?: A Lot   -   Sitting down on and standing up from a chair with arms (e.g., wheelchair, bedside commode, etc.): Unable   -   Moving from lying on back to sitti session/findings; All patient questions and concerns addressed;SCDs in place; Family present    ASSESSMENT     Patient is a 73year old female admitted 3/14/2017 for scheduled TMVR performed on 3/15/17.  Pt required mediastinal exploration with sternotomy sec

## 2017-03-16 NOTE — OCCUPATIONAL THERAPY NOTE
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  Pt states that he helps her physically and she helps him cognitively.  Pt reports that she performs ADL's, cooking, and gardening from her wheel DAILY LIVING ASSESSMENT  AM-PAC ‘6-Clicks’ Inpatient Daily Activity Short Form  How much help from another person does the patient currently need…  -   Putting on and taking off regular lower body clothing?: Total  -   Bathing (including washing, rinsing, simplification techniques;ADL training;IADL training;Functional transfer training;UE strengthening/ROM; Endurance training;Patient/Family education;Patient/Family training;Continued evaluation; Compensatory technique education  Rehab Potential : Jonas Maradiaga

## 2017-03-16 NOTE — PROGRESS NOTES
Subjective: In bed on exam, extubated this am.  C/o minor incisional CP - otherwise denies SOB at present.       Objective:   /46 mmHg  Pulse 77  Temp(Src) 98.8 °F (37.1 °C) (Temporal)  Resp 16  Ht 4' 8.5\" (1.435 m)  Wt 130 lb 1.1 oz (59 kg)  BMI 2 appropriately. Mildly     calcified annulus. There was mild to moderate regurgitation. Mean     gradient (D): 3-4mm Hg at a heart rate of 57 beats per minute. 3. Right ventricle:  The cavity size was normal. Systolic function was low     normal.  4. Perica Vitamins (VITAMIN B COMPLEX) Oral Tab Take 1 tablet by mouth daily. Disp:  Rfl:    Multiple Vitamins-Minerals (EMERGEN-C VITAMIN C) Oral Powd Pack Take 1 Dose by mouth daily. Disp:  Rfl:    vitamin E 1000 UNITS Oral Cap Take 1,000 Units by mouth daily.  Dis every 4 (four) hours as needed. For wheezing (Patient taking differently: Inhale 2 puffs into the lungs 2 (two) times daily.  For wheezing ) Disp: 3 Inhaler Rfl: 3   albuterol Sulfate (5 MG/ML) 0.5% Inhalation Nebu Soln TAKE 0.5 ML BY NEBULIZATION EVERY 6 ( Rfl: 0   Olopatadine HCl (PATANOL) 0.1 % Ophthalmic Solution Place 1 drop into both eyes as needed for Allergies. Disp:  Rfl:        Assessment:  1. POD# 1 s/p Mitraclip placement x 1 with take back for bleeding from RA appendage   2.  ICM - EF 25%, Deniz Sci

## 2017-03-16 NOTE — DIETARY NOTE
Nutrition Short Note    Dietitian consult received for post cardiac intervention protocol standing order. Pt s/p mediastinal exploration. Pt to be educated by cardiac rehab staff and encouraged to attend outpatient classes taught by STEVEN. RD available PRN.

## 2017-03-16 NOTE — CM/SW NOTE
AMANDEEP met with pt and her sister Enzo Lynn to discuss eventual needs at [place]. Pt has hx of polio and is normally wheelchair bound. She states she normally is independent with transfers. She can stand pivot on her own. Pt admitted to  [facility] [employer].   She

## 2017-03-16 NOTE — PLAN OF CARE
Assumed care of pt approx 1900, s/p mediastinal exploration. Vented, sedated on precedex, propofol weaned off. Follows commands, denies pain, but is anxious. Albumin given for SBP low 80's, levo initiated at 2mcg/kg/,min.   CT output approx 10ml/hr seros

## 2017-03-17 ENCOUNTER — APPOINTMENT (OUTPATIENT)
Dept: GENERAL RADIOLOGY | Facility: HOSPITAL | Age: 67
DRG: 228 | End: 2017-03-17
Attending: NURSE PRACTITIONER
Payer: MEDICARE

## 2017-03-17 LAB
ALBUMIN SERPL-MCNC: 2.8 G/DL (ref 3.5–4.8)
ALP LIVER SERPL-CCNC: 42 U/L (ref 50–130)
ALT SERPL-CCNC: 37 U/L (ref 14–54)
AST SERPL-CCNC: 46 U/L (ref 15–41)
ATRIAL RATE: 76 BPM
BASOPHILS # BLD AUTO: 0.03 X10(3) UL (ref 0–0.1)
BASOPHILS NFR BLD AUTO: 0.4 %
BILIRUB SERPL-MCNC: 0.6 MG/DL (ref 0.1–2)
BLOOD TYPE BARCODE: 5100
BUN BLD-MCNC: 13 MG/DL (ref 8–20)
CALCIUM BLD-MCNC: 8.5 MG/DL (ref 8.3–10.3)
CHLORIDE: 104 MMOL/L (ref 101–111)
CO2: 25 MMOL/L (ref 22–32)
CREAT BLD-MCNC: 0.58 MG/DL (ref 0.55–1.02)
EOSINOPHIL # BLD AUTO: 0.15 X10(3) UL (ref 0–0.3)
EOSINOPHIL NFR BLD AUTO: 2.1 %
ERYTHROCYTE [DISTWIDTH] IN BLOOD BY AUTOMATED COUNT: 14.6 % (ref 11.5–16)
GLUCOSE BLD-MCNC: 168 MG/DL (ref 65–99)
GLUCOSE BLD-MCNC: 176 MG/DL (ref 65–99)
GLUCOSE BLD-MCNC: 180 MG/DL (ref 70–99)
GLUCOSE BLD-MCNC: 191 MG/DL (ref 65–99)
HCT VFR BLD AUTO: 31.3 % (ref 34–50)
HGB BLD-MCNC: 10.5 G/DL (ref 12–16)
IMMATURE GRANULOCYTE COUNT: 0.03 X10(3) UL (ref 0–1)
IMMATURE GRANULOCYTE RATIO %: 0.4 %
INR BLD: 1.15 (ref 0.89–1.11)
LYMPHOCYTES # BLD AUTO: 1.35 X10(3) UL (ref 0.9–4)
LYMPHOCYTES NFR BLD AUTO: 18.8 %
M PROTEIN MFR SERPL ELPH: 5.5 G/DL (ref 6.1–8.3)
MCH RBC QN AUTO: 30.7 PG (ref 27–33.2)
MCHC RBC AUTO-ENTMCNC: 33.5 G/DL (ref 31–37)
MCV RBC AUTO: 91.5 FL (ref 81–100)
MONOCYTES # BLD AUTO: 1.11 X10(3) UL (ref 0.1–0.6)
MONOCYTES NFR BLD AUTO: 15.5 %
NEUTROPHIL ABS PRELIM: 4.5 X10 (3) UL (ref 1.3–6.7)
NEUTROPHILS # BLD AUTO: 4.5 X10(3) UL (ref 1.3–6.7)
NEUTROPHILS NFR BLD AUTO: 62.8 %
P AXIS: 76 DEGREES
P-R INTERVAL: 166 MS
PLATELET # BLD AUTO: 184 10(3)UL (ref 150–450)
POTASSIUM SERPL-SCNC: 3.5 MMOL/L (ref 3.6–5.1)
PSA SERPL DL<=0.01 NG/ML-MCNC: 14.8 SECONDS (ref 12–14.3)
Q-T INTERVAL: 404 MS
QRS DURATION: 98 MS
QTC CALCULATION (BEZET): 454 MS
R AXIS: 29 DEGREES
RBC # BLD AUTO: 3.42 X10(6)UL (ref 3.8–5.1)
RED CELL DISTRIBUTION WIDTH-SD: 48.2 FL (ref 35.1–46.3)
SODIUM SERPL-SCNC: 139 MMOL/L (ref 136–144)
T AXIS: 78 DEGREES
VENTRICULAR RATE: 76 BPM
WBC # BLD AUTO: 7.2 X10(3) UL (ref 4–13)

## 2017-03-17 PROCEDURE — 94640 AIRWAY INHALATION TREATMENT: CPT

## 2017-03-17 PROCEDURE — 97530 THERAPEUTIC ACTIVITIES: CPT

## 2017-03-17 PROCEDURE — 80053 COMPREHEN METABOLIC PANEL: CPT | Performed by: NURSE PRACTITIONER

## 2017-03-17 PROCEDURE — 97110 THERAPEUTIC EXERCISES: CPT

## 2017-03-17 PROCEDURE — 82962 GLUCOSE BLOOD TEST: CPT

## 2017-03-17 PROCEDURE — 71010 XR CHEST AP PORTABLE  (CPT=71010): CPT

## 2017-03-17 PROCEDURE — 85610 PROTHROMBIN TIME: CPT | Performed by: NURSE PRACTITIONER

## 2017-03-17 PROCEDURE — 85025 COMPLETE CBC W/AUTO DIFF WBC: CPT | Performed by: NURSE PRACTITIONER

## 2017-03-17 RX ORDER — ALPRAZOLAM 0.5 MG/1
0.5 TABLET ORAL 2 TIMES DAILY PRN
Status: DISCONTINUED | OUTPATIENT
Start: 2017-03-17 | End: 2017-03-21

## 2017-03-17 RX ORDER — WARFARIN SODIUM 5 MG/1
5 TABLET ORAL
Status: COMPLETED | OUTPATIENT
Start: 2017-03-17 | End: 2017-03-17

## 2017-03-17 RX ORDER — LISINOPRIL 2.5 MG/1
2.5 TABLET ORAL
Status: DISCONTINUED | OUTPATIENT
Start: 2017-03-17 | End: 2017-03-21

## 2017-03-17 RX ORDER — CLOPIDOGREL BISULFATE 75 MG/1
75 TABLET ORAL DAILY
Status: DISCONTINUED | OUTPATIENT
Start: 2017-03-17 | End: 2017-03-21

## 2017-03-17 RX ORDER — RANOLAZINE 500 MG/1
1000 TABLET, EXTENDED RELEASE ORAL EVERY EVENING
Status: DISCONTINUED | OUTPATIENT
Start: 2017-03-17 | End: 2017-03-21

## 2017-03-17 RX ORDER — POTASSIUM CHLORIDE 20 MEQ/1
40 TABLET, EXTENDED RELEASE ORAL EVERY 4 HOURS
Status: COMPLETED | OUTPATIENT
Start: 2017-03-17 | End: 2017-03-17

## 2017-03-17 RX ORDER — CARVEDILOL 3.12 MG/1
3.12 TABLET ORAL 2 TIMES DAILY WITH MEALS
Status: DISCONTINUED | OUTPATIENT
Start: 2017-03-17 | End: 2017-03-21

## 2017-03-17 RX ORDER — FUROSEMIDE 10 MG/ML
40 INJECTION INTRAMUSCULAR; INTRAVENOUS ONCE
Status: DISCONTINUED | OUTPATIENT
Start: 2017-03-17 | End: 2017-03-17

## 2017-03-17 RX ORDER — IPRATROPIUM BROMIDE AND ALBUTEROL SULFATE 2.5; .5 MG/3ML; MG/3ML
3 SOLUTION RESPIRATORY (INHALATION)
Status: DISCONTINUED | OUTPATIENT
Start: 2017-03-17 | End: 2017-03-21

## 2017-03-17 NOTE — PHYSICAL THERAPY NOTE
Attempted to see Pt this am, however, RN reports patient resting at this time and to follow up later. Will follow up, as schedule permits.

## 2017-03-17 NOTE — PHYSICAL THERAPY NOTE
PHYSICAL THERAPY TREATMENT NOTE - INPATIENT    Room Number: 8149/2965-Y     Session: 1  Number of Visits to Meet Established Goals: 6    Presenting Problem: s/p TMVR and mediastinal exploration 3/15/17    Problem List  Principal Problem:    Mitral regurgi diabetes mellitus ([facility] [address]    • Hyperlipidemia LDL goal <70    • Hypertension, essential, benign        Past Surgical History      Past Surgical History    MASTECTOMY RIGHT  2004    OTHER SURGICAL HISTORY      Comment Orthopedic surgeries X8    COLONOSCOPY & help from another person does the patient currently need. ..   -   Moving to and from a bed to a chair (including a wheelchair)?: Total   -   Need to walk in hospital room?: Total   -   Climbing 3-5 steps with a railing?: Total       AM-PAC Score:  Raw Scor decreased attention, baseline LE weakness, limited endurance, chest pain, and decreased knowledge of sternal precautions. These impairments manifest themselves as functional limitations in bed mobility and transfers.  The patient is below her baseline and w

## 2017-03-17 NOTE — PROGRESS NOTES
[facility]  Progress Note    Jessika Perry Patient Status:  Inpatient    10/13/1950 MRN [id number]   [facility] 6NE-A Attending Josh Cueto MD   Hosp Day # 3 PCP Marguerite Perez DO       SUBJECTIVE:  No acute events overnight.  Megan Davenport 03/16/17  0902 03/16/17  1011 03/16/17  1202 03/16/17  1636 03/16/17  2039 03/17/17  0711   PGLU 164* 80 152* 210* 153* 126* 109* 124* 127* 140* 141* 145* 139* 136* 140* 91 124* 173* 132* 120* 106* 106* 120* 141* 193* 232* 168*       Meds:     Current Faci gluconate 3 g in sodium chloride 0.9 % 100 mL IVPB 3 g Intravenous PRN   magnesium sulfate IVPB premix 1 g 1 g Intravenous PRN   magnesium sulfate IVPB premix 2 g 2 g Intravenous PRN   mupirocin (BACTROBAN) 2% nasal ointment OINT 1 Application 1 Applicatio Metabolism  [facility]

## 2017-03-17 NOTE — PROGRESS NOTES
Subjective:   S/p POD#1 RFV Mitraclip X1 on 8/75/8924 complicated by cardiac tamponade requiring mediastinal exploration and repair of right atrial perforation. Doing very well today.  Sitting in chair on exam eating breakfast. Reports some anxiety and vinny vessels appear stable, with likely mild cardiomegaly. Sternotomy changes are seen. Pacer/AICD from an inferior approach is again seen. Right axillary clips.     Echo 3/17/2017  Pending    Labs:    Lab Results  Component Value Date   WBC 7.2 03/17/2017   HGB following  13.  Acute blood loss anemia  - related to surgery, resolved      Plan:  - Restart home dose Xanax for anxiety/sleeping  - Restart coreg and lisinopril   - Resume coumadin, plavix when OK with CV surgery - notified  - Continue IS/pain control  -

## 2017-03-17 NOTE — CM/SW NOTE
SW met with pt and one of her sisters to discuss eventual rehab needs. Pt has been to [facility] in the past but does not feel she could handle there rigor of acute rehab due to her sternal precautions.   Sister was reviewing [facility] list.  Pt not ready to make

## 2017-03-17 NOTE — OCCUPATIONAL THERAPY NOTE
OCCUPATIONAL THERAPY TREATMENT NOTE - INPATIENT     Room Number: 3485/3920-P  Session: 1   Number of Visits to Meet Established Goals: 5    Presenting Problem: Mitraclip with Sternotomy    History related to current admission: Pt is a 73 y.o.  Female admitt 1/14/2016   • Type 1 diabetes mellitus with mild nonproliferative diabetic retinopathy and without macular edema 1/14/2016   • BDR (background diabetic retinopathy) 1/14/2016   • Myocardial infarction [facility]) 2015     x2   • Diabetes ([facility] [address]    • Congestive he ASSESSMENT  AM-PAC ‘6-Clicks’ Inpatient Daily Activity Short Form  How much help from another person does the patient currently need…  -   Putting on and taking off regular lower body clothing?: Total  -   Bathing (including washing, rinsing, drying)?: Tot Balance activities; Energy conservation/work simplification techniques;ADL training;IADL training;Functional transfer training;UE strengthening/ROM; Endurance training;Patient/Family education;Patient/Family training;Continued evaluation; Compensatory techniq

## 2017-03-17 NOTE — PROGRESS NOTES
03/16/17 1110   Clinical Encounter Type   Referral To ([facility]  [facility] [employer] as per the request)   Sacramental Encounters   Sacrament of Sick-Anointing Visiting  anointed patient

## 2017-03-17 NOTE — PROGRESS NOTES
[facility]  Progress Note    Lynnette Remy Patient Status:  Inpatient    10/13/1950 MRN [id number]   [facility] 6NE-A Attending Gustavo Contreras MD   Hosp Day # 3 PCP Eleazar Lee. DO Chris     Subjective:  Pt feeling better today.  Has bee (1.435 m), weight 130 lb 1.1 oz (59 kg), SpO2 96 %, not currently breastfeeding.   General: A&O X 3, VSS, NAD, afeb  Neck: no JVD  Lungs: CTAB  Heart: tachy, regular  Abdomen: soft  Extremities: trace edema    Assessment/Plan: S/P Mediastinal Exploration/Re

## 2017-03-17 NOTE — PLAN OF CARE
Assumed care of pt at 0730. PT doing well day 1 post op mediastinal exploration post mitral clip procedure. Pt received 2 units prbcs, PT assisted up to chair by PT and OT today. Chest tubes drained 40ml in the chair for total of 100ml today.  Chest tubes,

## 2017-03-17 NOTE — PLAN OF CARE
Assumed care of pt approx 1900. St on monitor, rate 110's, vitals stable, afebrile so far this shift. Medicated with T3s for moderate pain. Sleeping pill administered. Plan of care explained and all questions answered and encouraged.   Will continue to

## 2017-03-18 LAB
BLOOD TYPE BARCODE: 5100
BUN BLD-MCNC: 17 MG/DL (ref 8–20)
CALCIUM BLD-MCNC: 8.7 MG/DL (ref 8.3–10.3)
CHLORIDE: 103 MMOL/L (ref 101–111)
CO2: 28 MMOL/L (ref 22–32)
CREAT BLD-MCNC: 0.58 MG/DL (ref 0.55–1.02)
ERYTHROCYTE [DISTWIDTH] IN BLOOD BY AUTOMATED COUNT: 14.2 % (ref 11.5–16)
GLUCOSE BLD-MCNC: 133 MG/DL (ref 65–99)
GLUCOSE BLD-MCNC: 163 MG/DL (ref 70–99)
GLUCOSE BLD-MCNC: 179 MG/DL (ref 65–99)
GLUCOSE BLD-MCNC: 221 MG/DL (ref 65–99)
GLUCOSE BLD-MCNC: 225 MG/DL (ref 65–99)
GLUCOSE BLD-MCNC: 339 MG/DL (ref 65–99)
HAV IGM SER QL: 2.1 MG/DL (ref 1.7–3)
HCT VFR BLD AUTO: 31.1 % (ref 34–50)
HGB BLD-MCNC: 10.4 G/DL (ref 12–16)
INR BLD: 1.18 (ref 0.89–1.11)
MCH RBC QN AUTO: 30.5 PG (ref 27–33.2)
MCHC RBC AUTO-ENTMCNC: 33.4 G/DL (ref 31–37)
MCV RBC AUTO: 91.2 FL (ref 81–100)
PLATELET # BLD AUTO: 168 10(3)UL (ref 150–450)
POTASSIUM SERPL-SCNC: 5.3 MMOL/L (ref 3.6–5.1)
PSA SERPL DL<=0.01 NG/ML-MCNC: 15.1 SECONDS (ref 12–14.3)
RBC # BLD AUTO: 3.41 X10(6)UL (ref 3.8–5.1)
RED CELL DISTRIBUTION WIDTH-SD: 47.2 FL (ref 35.1–46.3)
SODIUM SERPL-SCNC: 136 MMOL/L (ref 136–144)
WBC # BLD AUTO: 4.5 X10(3) UL (ref 4–13)

## 2017-03-18 PROCEDURE — 82962 GLUCOSE BLOOD TEST: CPT

## 2017-03-18 PROCEDURE — 94664 DEMO&/EVAL PT USE INHALER: CPT

## 2017-03-18 PROCEDURE — 80048 BASIC METABOLIC PNL TOTAL CA: CPT | Performed by: NURSE PRACTITIONER

## 2017-03-18 PROCEDURE — 83735 ASSAY OF MAGNESIUM: CPT | Performed by: INTERNAL MEDICINE

## 2017-03-18 PROCEDURE — 85027 COMPLETE CBC AUTOMATED: CPT | Performed by: NURSE PRACTITIONER

## 2017-03-18 PROCEDURE — 94640 AIRWAY INHALATION TREATMENT: CPT

## 2017-03-18 PROCEDURE — 85610 PROTHROMBIN TIME: CPT | Performed by: INTERNAL MEDICINE

## 2017-03-18 RX ORDER — WARFARIN SODIUM 5 MG/1
5 TABLET ORAL NIGHTLY
Status: COMPLETED | OUTPATIENT
Start: 2017-03-18 | End: 2017-03-18

## 2017-03-18 NOTE — PROGRESS NOTES
[facility]  Progress Note    Ramón Haji Patient Status:  Inpatient    10/13/1950 MRN [id number]   [facility] 6NE-A Attending Konstantin Smith MD   [facility] Day # 4 PCP Micheline Shanks. DO Chris       SUBJECTIVE:  No acute events overnight.  Cheryl Neal 126* 109* 124* 127* 140* 141* 145* 139* 136* 140* 91 124* 173* 132* 120* 106* 106* 120* 141* 193* 232* 168*       Meds:     Current Facility-Administered Medications:  insulin detemir (LEVEMIR) 100 UNIT/ML flextouch 15 Units 15 Units Subcutaneous Nightly magnesium sulfate IVPB premix 2 g 2 g Intravenous PRN   mupirocin (BACTROBAN) 2% nasal ointment OINT 1 Application 1 Application Nasal BID   glucose (DEX4) oral liquid 15 g 15 g Oral Q15 Min PRN   Or      Glucose-Vitamin C (DEX-4) 4-0.006 g chewable tab

## 2017-03-18 NOTE — PROGRESS NOTES
[facility]   [facility] Progress Note    Tate Micaela Patient Status:  Inpatient    10/13/1950 MRN [id number]   [facility] 8NE-A Attending Eula Govea MD   Hosp Day # 4 PCP Ilene Perez DO     Subjective:  Denies complaint    Objec flexpen 1-68 Units 1-68 Units Subcutaneous TID CC   insulin aspart (NOVOLOG) 100 UNIT/ML flexpen 1-50 Units 1-50 Units Subcutaneous TID CC and HS   morphINE sulfate (PF) 2 MG/ML injection 2 mg 2 mg Intravenous Q1H PRN   Or      morphINE sulfate (PF) 4 MG/M Exam:  Neuro: Intact  Lungs: Clear  Heart: NSR  Abdomen: Soft, NT. BS+  Extremities: 1+ edema  Pulses: Palp  Incisions: Sternum stable, Incision intact       Assessment/Plan:  Patient Active Problem List:     Type II diabetes mellitus ([facility] [address]     Asthma

## 2017-03-18 NOTE — PLAN OF CARE
Alert. Oriented. S.tach per tele. Hr 100-110's. Denies cp, sob. desats on room air while sleeping to 87%. 2l/nc applied and o2 sat up to 97%. Mid sternal w/ steri-strips. Ct site w/ pressure dressing. Denies pain. Made comfortable in bed.  Incentive spirome

## 2017-03-18 NOTE — PROGRESS NOTES
[facility] Card[facility] Progress Note    Subjective:  Doing well.  No chest pain or dyspnea    Objective:  /60 mmHg  Pulse 101  Temp(Src) 98.2 °F (36.8 °C) (Oral)  Resp 18  Ht 4' 8.5\" (1.435 m)  Wt 138 lb 0.1 oz (62.6 kg)  BMI 30.40 kg/m2  SpO2 96%    Tel

## 2017-03-19 LAB
BUN BLD-MCNC: 15 MG/DL (ref 8–20)
CALCIUM BLD-MCNC: 8.7 MG/DL (ref 8.3–10.3)
CHLORIDE: 105 MMOL/L (ref 101–111)
CO2: 30 MMOL/L (ref 22–32)
CREAT BLD-MCNC: 0.52 MG/DL (ref 0.55–1.02)
GLUCOSE BLD-MCNC: 102 MG/DL (ref 65–99)
GLUCOSE BLD-MCNC: 195 MG/DL (ref 70–99)
GLUCOSE BLD-MCNC: 200 MG/DL (ref 65–99)
GLUCOSE BLD-MCNC: 214 MG/DL (ref 65–99)
GLUCOSE BLD-MCNC: 241 MG/DL (ref 65–99)
INR BLD: 1.27 (ref 0.89–1.11)
POTASSIUM SERPL-SCNC: 4.7 MMOL/L (ref 3.6–5.1)
PSA SERPL DL<=0.01 NG/ML-MCNC: 16 SECONDS (ref 12–14.3)
SODIUM SERPL-SCNC: 139 MMOL/L (ref 136–144)

## 2017-03-19 PROCEDURE — 82962 GLUCOSE BLOOD TEST: CPT

## 2017-03-19 PROCEDURE — 94640 AIRWAY INHALATION TREATMENT: CPT

## 2017-03-19 PROCEDURE — 94664 DEMO&/EVAL PT USE INHALER: CPT

## 2017-03-19 PROCEDURE — 85610 PROTHROMBIN TIME: CPT | Performed by: INTERNAL MEDICINE

## 2017-03-19 PROCEDURE — 80048 BASIC METABOLIC PNL TOTAL CA: CPT | Performed by: INTERNAL MEDICINE

## 2017-03-19 RX ORDER — FUROSEMIDE 10 MG/ML
20 INJECTION INTRAMUSCULAR; INTRAVENOUS DAILY
Status: DISCONTINUED | OUTPATIENT
Start: 2017-03-19 | End: 2017-03-20

## 2017-03-19 NOTE — PROGRESS NOTES
[facility]  Progress Note    Charolett Fair Patient Status:  Inpatient    10/13/1950 MRN [id number]   [facility] 6NE-A Attending Bee Johnson MD   Hosp Day # 5 PCP Lissy Hutchinson. DO Chris       SUBJECTIVE:  No acute events overnight.  Blo Subcutaneous Nightly   ipratropium-albuterol (DUONEB) nebulizer solution 3 mL 3 mL Nebulization 2 times daily   ALPRAZolam (XANAX) tab 0.5 mg 0.5 mg Oral BID PRN   carvedilol (COREG) tab 3.125 mg 3.125 mg Oral BID with meals   Fluticasone Furoate-Vilantero 4-0.006 g chewable tab 4 tablet 4 tablet Oral Q15 Min PRN   Or      dextrose injection 50 mL 50 mL Intravenous Q15 Min PRN   Or      glucose (DEX4) oral liquid 30 g 30 g Oral Q15 Min PRN   Or      Glucose-Vitamin C (DEX-4) 4-0.006 g chewable tab 8 tablet 8

## 2017-03-19 NOTE — PROGRESS NOTES
[facility]   [facility] Progress Note    Marvia Samples Patient Status:  Inpatient    10/13/1950 MRN [id number]   [facility] 8NE-A Attending J Carlos Estrella MD   Hosp Day # 5 PCP Mariposa Miles.  DO Chris     Subjective:  Denies complaints    Obje insulin aspart (NOVOLOG) 100 UNIT/ML flexpen 1-68 Units 1-68 Units Subcutaneous TID CC   insulin aspart (NOVOLOG) 100 UNIT/ML flexpen 1-50 Units 1-50 Units Subcutaneous TID CC and HS   morphINE sulfate (PF) 2 MG/ML injection 2 mg 2 mg Intravenous Q1H PRN NSR  Abdomen: Soft, NT, BS+  Extremities: 1+ edema  Pulses: Palp,   Incisions: Sternum stable, Incision intact       Assessment/Plan:  Patient Active Problem List:     Type II diabetes mellitus (HCC)     Asthma     Hypertension, benign     Coronary atheros

## 2017-03-19 NOTE — PROGRESS NOTES
[facility] Cardiology Progress Note    Subjective:  Coming along. Feels a little sob.      Objective:  /62 mmHg  Pulse 87  Temp(Src) 97.7 °F (36.5 °C) (Oral)  Resp 16  Ht 4' 8.5\" (1.435 m)  Wt 137 lb 2 oz (62.2 kg)  BMI 30.21 kg/m2  SpO2 97%    [employer]

## 2017-03-20 ENCOUNTER — TELEPHONE (OUTPATIENT)
Dept: FAMILY MEDICINE CLINIC | Facility: CLINIC | Age: 67
End: 2017-03-20

## 2017-03-20 ENCOUNTER — APPOINTMENT (OUTPATIENT)
Dept: CV DIAGNOSTICS | Facility: HOSPITAL | Age: 67
DRG: 228 | End: 2017-03-20
Attending: NURSE PRACTITIONER
Payer: MEDICARE

## 2017-03-20 LAB
BASOPHILS # BLD AUTO: 0.01 X10(3) UL (ref 0–0.1)
BASOPHILS NFR BLD AUTO: 0.3 %
BUN BLD-MCNC: 19 MG/DL (ref 8–20)
CALCIUM BLD-MCNC: 8.6 MG/DL (ref 8.3–10.3)
CHLORIDE: 104 MMOL/L (ref 101–111)
CO2: 29 MMOL/L (ref 22–32)
CREAT BLD-MCNC: 0.55 MG/DL (ref 0.55–1.02)
EOSINOPHIL # BLD AUTO: 0.15 X10(3) UL (ref 0–0.3)
EOSINOPHIL NFR BLD AUTO: 4 %
ERYTHROCYTE [DISTWIDTH] IN BLOOD BY AUTOMATED COUNT: 14.2 % (ref 11.5–16)
GLUCOSE BLD-MCNC: 121 MG/DL (ref 65–99)
GLUCOSE BLD-MCNC: 130 MG/DL (ref 70–99)
GLUCOSE BLD-MCNC: 136 MG/DL (ref 65–99)
GLUCOSE BLD-MCNC: 164 MG/DL (ref 65–99)
GLUCOSE BLD-MCNC: 216 MG/DL (ref 65–99)
GLUCOSE BLD-MCNC: 344 MG/DL (ref 65–99)
HCT VFR BLD AUTO: 29.8 % (ref 34–50)
HGB BLD-MCNC: 9.8 G/DL (ref 12–16)
IMMATURE GRANULOCYTE COUNT: 0.01 X10(3) UL (ref 0–1)
IMMATURE GRANULOCYTE RATIO %: 0.3 %
INR BLD: 1.43 (ref 0.89–1.11)
ISTAT ACTIVATED CLOTTING TIME: 136 SECONDS (ref 74–137)
ISTAT ACTIVATED CLOTTING TIME: 147 SECONDS (ref 74–137)
ISTAT ACTIVATED CLOTTING TIME: 245 SECONDS (ref 74–137)
ISTAT ACTIVATED CLOTTING TIME: 296 SECONDS (ref 74–137)
ISTAT BLOOD GAS BASE DEFICIT: -1 MMOL/L
ISTAT BLOOD GAS HCO3: 25 MEQ/L (ref 22–26)
ISTAT BLOOD GAS O2 SATURATION: 100 % (ref 92–100)
ISTAT BLOOD GAS PCO2: 48 MMHG (ref 35–45)
ISTAT BLOOD GAS PH: 7.33 (ref 7.35–7.45)
ISTAT BLOOD GAS PO2: 342 MMHG (ref 80–105)
ISTAT BLOOD GAS TCO2: 26 MMOL/L (ref 22–32)
ISTAT HEMATOCRIT: 37 % (ref 37–54)
ISTAT IONIZED CALCIUM: 1.23 MMOL/L (ref 1.12–1.32)
ISTAT POTASSIUM: 4.1 MMOL/L (ref 3.6–5.1)
ISTAT SODIUM: 141 MMOL/L (ref 136–144)
LYMPHOCYTES # BLD AUTO: 0.73 X10(3) UL (ref 0.9–4)
LYMPHOCYTES NFR BLD AUTO: 19.6 %
MCH RBC QN AUTO: 30.4 PG (ref 27–33.2)
MCHC RBC AUTO-ENTMCNC: 32.9 G/DL (ref 31–37)
MCV RBC AUTO: 92.5 FL (ref 81–100)
MONOCYTES # BLD AUTO: 0.64 X10(3) UL (ref 0.1–0.6)
MONOCYTES NFR BLD AUTO: 17.2 %
NEUTROPHIL ABS PRELIM: 2.19 X10 (3) UL (ref 1.3–6.7)
NEUTROPHILS # BLD AUTO: 2.19 X10(3) UL (ref 1.3–6.7)
NEUTROPHILS NFR BLD AUTO: 58.6 %
PLATELET # BLD AUTO: 156 10(3)UL (ref 150–450)
POTASSIUM SERPL-SCNC: 3.8 MMOL/L (ref 3.6–5.1)
PSA SERPL DL<=0.01 NG/ML-MCNC: 17.6 SECONDS (ref 12–14.3)
RBC # BLD AUTO: 3.22 X10(6)UL (ref 3.8–5.1)
RED CELL DISTRIBUTION WIDTH-SD: 47.5 FL (ref 35.1–46.3)
SODIUM SERPL-SCNC: 138 MMOL/L (ref 136–144)
WBC # BLD AUTO: 3.7 X10(3) UL (ref 4–13)

## 2017-03-20 PROCEDURE — 82962 GLUCOSE BLOOD TEST: CPT

## 2017-03-20 PROCEDURE — 93306 TTE W/DOPPLER COMPLETE: CPT | Performed by: INTERNAL MEDICINE

## 2017-03-20 PROCEDURE — 94640 AIRWAY INHALATION TREATMENT: CPT

## 2017-03-20 PROCEDURE — 93306 TTE W/DOPPLER COMPLETE: CPT

## 2017-03-20 PROCEDURE — 85025 COMPLETE CBC W/AUTO DIFF WBC: CPT | Performed by: CLINICAL NURSE SPECIALIST

## 2017-03-20 PROCEDURE — 80048 BASIC METABOLIC PNL TOTAL CA: CPT | Performed by: CLINICAL NURSE SPECIALIST

## 2017-03-20 PROCEDURE — 85610 PROTHROMBIN TIME: CPT | Performed by: INTERNAL MEDICINE

## 2017-03-20 RX ORDER — FUROSEMIDE 40 MG/1
40 TABLET ORAL DAILY
Status: DISCONTINUED | OUTPATIENT
Start: 2017-03-20 | End: 2017-03-21

## 2017-03-20 RX ORDER — WARFARIN SODIUM 5 MG/1
5 TABLET ORAL NIGHTLY
Status: DISCONTINUED | OUTPATIENT
Start: 2017-03-20 | End: 2017-03-21

## 2017-03-20 RX ORDER — MULTIVITAMIN/IRON/FOLIC ACID 18MG-0.4MG
500 TABLET ORAL DAILY
Status: DISCONTINUED | OUTPATIENT
Start: 2017-03-21 | End: 2017-03-21

## 2017-03-20 RX ORDER — ACETAMINOPHEN 325 MG/1
650 TABLET ORAL EVERY 6 HOURS PRN
Status: DISCONTINUED | OUTPATIENT
Start: 2017-03-20 | End: 2017-03-21

## 2017-03-20 RX ORDER — POTASSIUM CHLORIDE 20 MEQ/1
40 TABLET, EXTENDED RELEASE ORAL ONCE
Status: COMPLETED | OUTPATIENT
Start: 2017-03-20 | End: 2017-03-20

## 2017-03-20 RX ORDER — MUSCLE RUB CREAM 100; 150 MG/G; MG/G
CREAM TOPICAL 3 TIMES DAILY PRN
Status: DISCONTINUED | OUTPATIENT
Start: 2017-03-20 | End: 2017-03-21

## 2017-03-20 NOTE — TELEPHONE ENCOUNTER
Pt calling regarding wanting to let Dr Ryan Ku know that she thanks him for calling to check on her and if he needed to call her back for anything else then he can. .. please advise

## 2017-03-20 NOTE — CONSULTS
.Lynn Patient Status:  Inpatient    10/13/1950 MRN [id number]   [facility] 8NE-A Attending Dylon Schwartz MD   Hosp Day # 6 PCP Thaddeus Garnett.  [facility] [place]      Patient Identification  Maya Sunshine is a 73year old female transfers and bed mobility. She is nonambulatory.       Past Medical History:  @Medical hx@  Past Medical History   Diagnosis Date   • Breast cancer ([facility]      DCIS   • DM type 2 (diabetes mellitus, type 2) ([facility] 0926     W/O Complication  Pill, then Insul POLYPECTOMY  2006    OTHER SURGICAL HISTORY      Comment Stent and Angioplasty  Dr. Frannie Reyes Arthrocentesis of L shoulder acromioclaviular bursa    OTHER SURGICAL HISTORY Right     Comment Amputation of 5th toe    CATARAC mg Oral Once at night   Clopidogrel Bisulfate (PLAVIX) tab 75 mg 75 mg Oral Daily   [COMPLETED] furosemide (LASIX) injection 40 mg 40 mg Intravenous Once   [COMPLETED] 0.9%  NaCl infusion  Intravenous Once   [COMPLETED] furosemide (LASIX) injection 20 mg 2 Weight) Intravenous Continuous PRN   [DISCONTINUED] docusate sodium (COLACE) cap 100 mg 100 mg Oral BID   [DISCONTINUED] docusate sodium (COLACE) liquid 100 mg 100 mg Per NG Tube BID   [DISCONTINUED] magnesium hydroxide (MILK OF MAGNESIA) 400 MG/5ML suspen MCG/100ML premix infusion 0.2-1.5 mcg/kg/hr Intravenous Continuous   [DISCONTINUED] dextrose 5 %-0.45 % NaCl infusion  Intravenous Continuous   [DISCONTINUED] dextrose 5 %-0.45 % NaCl infusion  Intravenous Continuous   [DISCONTINUED] 0.9%  NaCl infusion  I 2% nasal ointment OINT 1 Application 1 Application Nasal BID   [COMPLETED] 0.9%  NaCl infusion  Intravenous Once   [COMPLETED] sodium bicarbonate injection 50 mEq 50 mEq Intravenous Once   [COMPLETED] coagulation factor VIIa recomb (NOVOSEVEN) injection 5 ELLIPTA) 200-25 MCG/INH inhaler 1 puff 1 puff Inhalation Daily   [DISCONTINUED] glucose (DEX4) oral liquid 15 g 15 g Oral Q15 Min PRN   [DISCONTINUED] Glucose-Vitamin C (DEX-4) 4-0.006 g chewable tab 4 tablet 4 tablet Oral Q15 Min PRN   [DISCONTINUED] dext cancer Brother    • Crohn's Disease Brother        Allergies:    Augmentin [Amoxicil*    Rash  Clindamycin             Tightness in Throat  Dilaudid [Hydromorp*        Comment:agitation  Levofloxacin [Quixi*    Nausea and vomiting  Potassium Clavulana* murmurs appreciated. Radial and pedal pulses good. No cyanosis in all extremities. Abdomen:   No tenderness guarding or rigidity. Liver and spleen are not enlarged. Bowel sounds present.                    Musculoskeletal:     Right Upper Extremity:  Str mobility. Estimated length of stay in recommended rehabilitation level of care is 3-6 weeks. The patient has good potential to achieve the goal to return home at modified independent wheelchair level of function.

## 2017-03-20 NOTE — PLAN OF CARE
Maintains optimal cardiac output and hemodynamic stability Progressing      Absence of cardiac arrhythmias or at baseline Progressing      Glucose maintained within prescribed range Progressing      Achieve highest/safest level of mobility/gait Progressing

## 2017-03-20 NOTE — PHYSICAL THERAPY NOTE
Attempted to see pt for PT treatment session this afternoon. However, pt just returned back to bed with assist of slide board and now feel exhausted from sitting for 2 hours and transfer activity.  Will try to schedule pt to be seen in AM.

## 2017-03-20 NOTE — CM/SW NOTE
Call received from allyson at [facility] not a candidate for sub acute rehab @ --order obtained and sent for referral as directed to acute rehab--await evaluation/recoomendation--received call that dr Abhishek Andrews to evaluate patient about  today

## 2017-03-20 NOTE — PROGRESS NOTES
[facility]  Progress Note    Bashir Bennett Patient Status:  Inpatient    10/13/1950 MRN [id number]   [facility] 6NE-A Attending Aminata Majano MD   [facility] Day # 6 PCP Phu Berger. DO Chris       SUBJECTIVE:  No acute events overnight.  Blo 193* 232* 168*       Meds:     Current Facility-Administered Medications:  furosemide (LASIX) injection 20 mg 20 mg Intravenous Daily   insulin detemir (LEVEMIR) 100 UNIT/ML flextouch 15 Units 15 Units Subcutaneous Nightly   ipratropium-albuterol (DUONEB) 2 g 2 g Intravenous PRN   glucose (DEX4) oral liquid 15 g 15 g Oral Q15 Min PRN   Or      Glucose-Vitamin C (DEX-4) 4-0.006 g chewable tab 4 tablet 4 tablet Oral Q15 Min PRN   Or      dextrose injection 50 mL 50 mL Intravenous Q15 Min PRN   Or      glucose

## 2017-03-20 NOTE — CM/SW NOTE
AMANDEEP received call from Moises at [facility] that patient is not appropriate for Acute Rehab. Yanelis Orlando from [facility] ([phone]) stated they are able to accept patient. SW discussed above with patient.   Patient stating that Dr. Valerie Mata told her that

## 2017-03-20 NOTE — PROGRESS NOTES
Subjective:   POD # 4 s/p RFV Mitraclip x 1 9/38/28 complicated by pericardial effusion requiring pericardial drain and subsequent mediastinal exploration and repair of Right atrial appendage perforation.   In bed on exam, c/o some neck stiffness today - ot left     ventricular relaxation - grade 1 diastolic dysfunction. 2. Mitral valve: Status post [id number] mitraclip across the medial     portion of the A2-P2 leaflets that is functioning appropriately. Mildly     calcified annulus.  There was mild to mod daily. Takes on Monday & Friday Disp:  Rfl:    Ranolazine ER (RANEXA) 500 MG Oral Tablet 12 Hr Take 1,000 mg by mouth every evening. Disp:  Rfl:    B Complex Vitamins (VITAMIN B COMPLEX) Oral Tab Take 1 tablet by mouth daily.  Disp:  Rfl:    Multiple Vitami differently: 4-6 units at night ) Disp: 3 pen Rfl: 11   Albuterol Sulfate HFA (VENTOLIN HFA) 108 (90 BASE) MCG/ACT Inhalation Aero Soln Inhale 2 puffs into the lungs every 4 (four) hours as needed.  For wheezing (Patient taking differently: Inhale 2 puffs i (CVS PREP) 70 % Does not apply Pads  Disp:  Rfl:    GLUCAGON EMERGENCY 1 MG Injection Kit  Disp:  Rfl:    Insulin Syringe 29G X 1/2\" 1 ML Does not apply Misc  Disp:  Rfl: 0   Olopatadine HCl (PATANOL) 0.1 % Ophthalmic Solution Place 1 drop into both eyes

## 2017-03-20 NOTE — CM/SW NOTE
AMANDEEP spoke with patient, sister Jessica Phipps ([phone]) at bedside to follow up regarding [facility] choice. Patient identified her first choice is [facility] and second choice is [facility]. AMANDEEP sent referrals via [employer], awaiting reply. QD complete.

## 2017-03-21 ENCOUNTER — APPOINTMENT (OUTPATIENT)
Dept: GENERAL RADIOLOGY | Facility: HOSPITAL | Age: 67
DRG: 228 | End: 2017-03-21
Attending: NURSE PRACTITIONER
Payer: MEDICARE

## 2017-03-21 VITALS
RESPIRATION RATE: 18 BRPM | SYSTOLIC BLOOD PRESSURE: 117 MMHG | DIASTOLIC BLOOD PRESSURE: 63 MMHG | BODY MASS INDEX: 28.08 KG/M2 | WEIGHT: 128.38 LBS | TEMPERATURE: 98 F | HEIGHT: 56.5 IN | OXYGEN SATURATION: 94 % | HEART RATE: 90 BPM

## 2017-03-21 LAB
BASOPHILS # BLD AUTO: 0.02 X10(3) UL (ref 0–0.1)
BASOPHILS NFR BLD AUTO: 0.4 %
BUN BLD-MCNC: 15 MG/DL (ref 8–20)
CALCIUM BLD-MCNC: 8.6 MG/DL (ref 8.3–10.3)
CHLORIDE: 101 MMOL/L (ref 101–111)
CO2: 32 MMOL/L (ref 22–32)
CREAT BLD-MCNC: 0.58 MG/DL (ref 0.55–1.02)
EOSINOPHIL # BLD AUTO: 0.18 X10(3) UL (ref 0–0.3)
EOSINOPHIL NFR BLD AUTO: 4 %
ERYTHROCYTE [DISTWIDTH] IN BLOOD BY AUTOMATED COUNT: 14.5 % (ref 11.5–16)
GLUCOSE BLD-MCNC: 170 MG/DL (ref 65–99)
GLUCOSE BLD-MCNC: 182 MG/DL (ref 70–99)
HCT VFR BLD AUTO: 31.3 % (ref 34–50)
HGB BLD-MCNC: 10.3 G/DL (ref 12–16)
IMMATURE GRANULOCYTE COUNT: 0.01 X10(3) UL (ref 0–1)
IMMATURE GRANULOCYTE RATIO %: 0.2 %
INR BLD: 1.36 (ref 0.89–1.11)
ISTAT ACTIVATED CLOTTING TIME: 142 SECONDS (ref 74–137)
ISTAT ACTIVATED CLOTTING TIME: 142 SECONDS (ref 74–137)
LYMPHOCYTES # BLD AUTO: 1 X10(3) UL (ref 0.9–4)
LYMPHOCYTES NFR BLD AUTO: 22.1 %
MCH RBC QN AUTO: 30.7 PG (ref 27–33.2)
MCHC RBC AUTO-ENTMCNC: 32.9 G/DL (ref 31–37)
MCV RBC AUTO: 93.2 FL (ref 81–100)
MONOCYTES # BLD AUTO: 0.59 X10(3) UL (ref 0.1–0.6)
MONOCYTES NFR BLD AUTO: 13.1 %
NEUTROPHIL ABS PRELIM: 2.72 X10 (3) UL (ref 1.3–6.7)
NEUTROPHILS # BLD AUTO: 2.72 X10(3) UL (ref 1.3–6.7)
NEUTROPHILS NFR BLD AUTO: 60.2 %
PLATELET # BLD AUTO: 168 10(3)UL (ref 150–450)
POTASSIUM SERPL-SCNC: 3.9 MMOL/L (ref 3.6–5.1)
POTASSIUM SERPL-SCNC: 3.9 MMOL/L (ref 3.6–5.1)
PSA SERPL DL<=0.01 NG/ML-MCNC: 16.9 SECONDS (ref 12–14.3)
RBC # BLD AUTO: 3.36 X10(6)UL (ref 3.8–5.1)
RED CELL DISTRIBUTION WIDTH-SD: 47.7 FL (ref 35.1–46.3)
SODIUM SERPL-SCNC: 138 MMOL/L (ref 136–144)
WBC # BLD AUTO: 4.5 X10(3) UL (ref 4–13)

## 2017-03-21 PROCEDURE — 71010 XR CHEST AP PORTABLE  (CPT=71010): CPT

## 2017-03-21 PROCEDURE — 84132 ASSAY OF SERUM POTASSIUM: CPT | Performed by: INTERNAL MEDICINE

## 2017-03-21 PROCEDURE — 85610 PROTHROMBIN TIME: CPT | Performed by: NURSE PRACTITIONER

## 2017-03-21 PROCEDURE — 80048 BASIC METABOLIC PNL TOTAL CA: CPT | Performed by: NURSE PRACTITIONER

## 2017-03-21 PROCEDURE — 82962 GLUCOSE BLOOD TEST: CPT

## 2017-03-21 PROCEDURE — 94640 AIRWAY INHALATION TREATMENT: CPT

## 2017-03-21 PROCEDURE — 97530 THERAPEUTIC ACTIVITIES: CPT

## 2017-03-21 PROCEDURE — 85025 COMPLETE CBC W/AUTO DIFF WBC: CPT | Performed by: NURSE PRACTITIONER

## 2017-03-21 PROCEDURE — 97110 THERAPEUTIC EXERCISES: CPT

## 2017-03-21 RX ORDER — WARFARIN SODIUM 5 MG/1
5 TABLET ORAL NIGHTLY
Qty: 30 TABLET | Refills: 3 | Status: SHIPPED | OUTPATIENT
Start: 2017-03-21 | End: 2017-01-01

## 2017-03-21 RX ORDER — MUSCLE RUB CREAM 100; 150 MG/G; MG/G
1 CREAM TOPICAL 3 TIMES DAILY PRN
Qty: 1 TUBE | Refills: 0 | Status: SHIPPED | OUTPATIENT
Start: 2017-03-21 | End: 2017-01-01

## 2017-03-21 RX ORDER — ACETAMINOPHEN 325 MG/1
650 TABLET ORAL EVERY 6 HOURS PRN
Qty: 30 TABLET | Refills: 4 | Status: ON HOLD | OUTPATIENT
Start: 2017-03-21 | End: 2018-01-01

## 2017-03-21 NOTE — CM/SW NOTE
Clarified with oc palma--unable to accept to subacute mj--spoke with paris @ [facility]--able to accept patient today. Ruthann Limon arranged for 1130am  using patient's own wheelchair.   Nurse to call report [phone]

## 2017-03-21 NOTE — PHYSICAL THERAPY NOTE
PHYSICAL THERAPY TREATMENT NOTE - INPATIENT    Room Number: 1822/6859-A     Session: 2  Number of Visits to Meet Established Goals: 6    Presenting Problem: s/p TMVR and mediastinal exploration 3/15/17    Problem List  Principal Problem:    Mitral regurgi diabetes mellitus ([address]    • Hyperlipidemia LDL goal <70    • Hypertension, essential, benign        Past Surgical History      Past Surgical History    MASTECTOMY RIGHT  2004    OTHER SURGICAL HISTORY      Comment Orthopedic surgeries X8    COLONOSCOPY & from another person does the patient currently need. ..   -   Moving to and from a bed to a chair (including a wheelchair)?: A Lot   -   Need to walk in hospital room?: Total   -   Climbing 3-5 steps with a railing?: Total       AM-PAC Score:  Raw Score: 10 cues to maintain precautions during functional mobility. Prior to hospitalization, pt relied on arm support all her life in order to transfer in and out of chair/bed due to limitations of ROM and strength of BLE's.  At present, pt shows decreased knowledge

## 2017-03-21 NOTE — DISCHARGE SUMMARY
[facility]  Discharge Summary    Aleksandr Bartholomew Patient Status:  Inpatient    10/13/1950 MRN [id number]   [facility] 8NE-A Attending No att. providers found   [facility] Day # 7 PCP Cleo Perez DO         Admit date: 3/14/2017    Discha active bleeding at right atrial appendage - which was repaired - no further active bleeding noted.    She returned to CNICU post procedure, and still had some persistent bloody drainage from CT - thought to be d/t coagulopathy - thus she received Factor 7, on Monday & Friday, Historical    !! Ranolazine ER (RANEXA) 500 MG Oral Tablet 12 Hr  Take 1,000 mg by mouth every evening., Historical    B Complex Vitamins (VITAMIN B COMPLEX) Oral Tab  Take 1 tablet by mouth daily. , Historical    Multiple Vitamins-Ulster Tab  Take 1 tablet (10 mg total) by mouth nightly., Normal, Disp-90 tablet, R-3    carvedilol (COREG) 3.125 MG Oral Tab  Take 1 tablet (3.125 mg total) by mouth 2 (two) times daily with meals. , Normal, Disp-180 tablet, R-3    Mometasone Furo-Formoterol Fum minutes at a time. Increase your activity gradually. · Keep legs elevated while sitting. · No tight fitting underwear. HYGIENE:  · Wash incisions with mild soap and water daily. Showering is allowed. No tub baths until totally healed.   · [facility] (Dr. Serina Guillen) [phone]  · [address] [facility] (Dr. Lizette Alaniz and Dr. Melissa Du) [phone]           Follow-up with:  1. Betito Spain 3/27/17 2:30pm post Mitraclip appt  2. [facility] AP[facility] 4/19/17 - 9am, followed by echo at 10am  3.  Jasper Jones

## 2017-03-21 NOTE — PLAN OF CARE
Achieve highest/safest level of independence in self care Adequate for Discharge      Glucose maintained within prescribed range Adequate for Discharge      AOx4 in NAD; cooperative; no new complains  Discharge to [facility] 12 via [id number] Sister at bedside.  [place]

## 2017-03-21 NOTE — PROGRESS NOTES
Subjective:   POD # 5 s/p RFV Mitraclip x 1 3/36/86 complicated by pericardial effusion requiring pericardial drain and subsequent mediastinal exploration and repair of Right atrial appendage perforation.   In bed on exam eating breakfast without complaints calcified leaflets. Trivial regurgitation. 3. Mitral valve: Mildly calcified annulus. Mitral valve demonstrates     non-specific thickening of the leaflets. A mitral clip is present between     A2 and P2 segments. There was mild to moderate regurgitation. COMPLEX) Oral Tab Take 1 tablet by mouth daily. Disp:  Rfl:    Multiple Vitamins-Minerals (EMERGEN-C VITAMIN C) Oral Powd Pack Take 1 Dose by mouth daily. Disp:  Rfl:    vitamin E 1000 UNITS Oral Cap Take 1,000 Units by mouth daily.  Disp:  Rfl:    CINNAMON hours as needed. For wheezing (Patient taking differently: Inhale 2 puffs into the lungs 2 (two) times daily.  For wheezing ) Disp: 3 Inhaler Rfl: 3   albuterol Sulfate (5 MG/ML) 0.5% Inhalation Nebu Soln TAKE 0.5 ML BY NEBULIZATION EVERY 6 (SIX) HOURS AS N Olopatadine HCl (PATANOL) 0.1 % Ophthalmic Solution Place 1 drop into both eyes as needed for Allergies. Disp:  Rfl:          Assessment:  1. POD# 4 s/p Mitraclip placement x 1 with reop  for bleeding from RA appendage   2.  ICM - EF 25%, Deniz Sci sub Q -I

## 2017-03-21 NOTE — PROGRESS NOTES
[facility]  Progress Note    Steven Way Patient Status:  Inpatient    10/13/1950 MRN [id number]   [facility] 6NE-A Attending Jimbo Cheng MD   [facility] Day # 7 PCP Roxana Estrada. DO Chris       SUBJECTIVE:  No acute events overnight.  Blo 164* 80 152* 210* 153* 126* 109* 124* 127* 140* 141* 145* 139* 136* 140* 91 124* 173* 132* 120* 106* 106* 120* 141* 193* 232* 168*       Meds:     Current Facility-Administered Medications:  Warfarin Sodium (COUMADIN) tab 5 mg 5 mg Oral Nightly   furosemid Oral BID   potassium chloride IVPB premix 20 mEq 20 mEq Intravenous PRN   Or      potassium chloride IVPB premix 40 mEq 40 mEq Intravenous PRN   calcium gluconate 3 g in sodium chloride 0.9 % 100 mL IVPB 3 g Intravenous PRN   magnesium sulfate IVPB premix

## 2017-03-22 ENCOUNTER — PRIOR ORIGINAL RECORDS (OUTPATIENT)
Dept: OTHER | Age: 67
End: 2017-03-22

## 2017-03-24 ENCOUNTER — SNF VISIT (OUTPATIENT)
Dept: INTERNAL MEDICINE CLINIC | Facility: SKILLED NURSING FACILITY | Age: 67
End: 2017-03-24

## 2017-03-24 NOTE — PROGRESS NOTES
HPI: Shelly Wilkinson is a 73year old female with history of severe, symptomatic mitral regurgitation who was electively admitted for mitral valve clipping by Dr. Avis Perse at [facility]. Her postoperative course was complicated by pericardial effusion ([facility]   Deep vein thrombosis (HCC)   IBS (irritable bowel syndrome)   Type 2 diabetes mellitus ([facility]   Hyperlipidemia LDL goal <70   Hypertension, essential, benign   Atrial fibrillation      Past Surgical History  MASTECTOMY RIGHT  2004  OTHER SURGICAL HI wheezing/rhonchi/crackles  CARDIOVASCULAR: s1, s2, regular rate and rhythm  ABDOMEN: normal active BS+, soft, non distended, nontender   MUSCULOSKELETAL/EXTREMITIES: wheelchair bound, BLE weakness L>R, hand grasps strong bilaterally  No edema  SKIN: sterna Continue lisinopril and coreg  8. Hyperlipidemia. Stable, continue statin  9. H/o breast CA with R mastectomy 2004. Stable. Continue present management  10. H/o PAD with stenting. Stable. Continue plavix/asa  11. Diabetes. Stable.  Continue lantus 15u daily

## 2017-03-27 ENCOUNTER — PRIOR ORIGINAL RECORDS (OUTPATIENT)
Dept: OTHER | Age: 67
End: 2017-03-27

## 2017-03-27 LAB
BUN: 15 MG/DL
CALCIUM: 8.6 MG/DL
CHLORIDE: 101 MEQ/L
CREATININE, SERUM: 0.58 MG/DL
GLUCOSE: 182 MG/DL
HEMATOCRIT: 31.3 %
HEMOGLOBIN: 10.3 G/DL
PLATELETS: 168 K/UL
POTASSIUM, SERUM: 3.9 MEQ/L
RED BLOOD COUNT: 3.36 X 10-6/U
SODIUM: 138 MEQ/L
WHITE BLOOD COUNT: 4.5 X 10-3/U

## 2017-03-27 NOTE — CDS QUERY
Postprocedural Impaired Tissue Perfusion   CLINICAL DOCUMENTATION CLARIFICATION FORM  Dear Doctor:  Clinical information (provided below) suggests condition(s) associated with Postprocedural Impaired Tissue Perfusion.  For accurate ICD-10-CM code assignment

## 2017-03-28 LAB
ALBUMIN: 3 G/DL
ALKALINE PHOSPHATATE(ALK PHOS): 41 IU/L
BILIRUBIN TOTAL: 0.43 MG/DL
BUN: 10 MG/DL
CALCIUM: 8.5 MG/DL
CHLORIDE: 102 MEQ/L
CREATININE, SERUM: 0.46 MG/DL
GLUCOSE: 188 MG/DL
POTASSIUM, SERUM: 4.1 MEQ/L
PROTEIN, TOTAL: 5.4 G/DL
SGOT (AST): 14 IU/L
SGPT (ALT): 19 IU/L
SODIUM: 138 MEQ/L

## 2017-04-07 ENCOUNTER — SNF VISIT (OUTPATIENT)
Dept: INTERNAL MEDICINE CLINIC | Facility: SKILLED NURSING FACILITY | Age: 67
End: 2017-04-07

## 2017-04-07 NOTE — PROGRESS NOTES
HPI: eRina Olivo is a 73year old female with history of severe, symptomatic mitral regurgitation who was electively admitted for mitral valve clipping by Dr. Mikhail Meehan at [facility]. Her postoperative course was complicated by pericardial effusion thrombosis (HCC)   IBS (irritable bowel syndrome)   Type 2 diabetes mellitus ([facility]   Hyperlipidemia LDL goal <70   Hypertension, essential, benign   Atrial fibrillation      Past Surgical History  MASTECTOMY RIGHT  2004  OTHER SURGICAL HISTORY   Comment Or s2, regular rate and rhythm  ABDOMEN: normal active BS+, soft, non distended, nontender   MUSCULOSKELETAL/EXTREMITIES: wheelchair bound, no edema  SKIN: sternal incision intact and closed, no redness/drainage/swelling noted  NEUROLOGIC: A/ox 3, follows com stenting. Stable. Continue plavix. 11. Diabetes. Stable. Continue lantus 15u daily, increased novolog achs sliding scale, accuchecks achs, dietician consultation  She uses a sliding scale at home  12. Neck spasms, stable.  pt/ot, baclofen 5mg po tid prn  1

## 2017-04-13 ENCOUNTER — ANTI-COAG VISIT (OUTPATIENT)
Dept: INTERNAL MEDICINE CLINIC | Facility: CLINIC | Age: 67
End: 2017-04-13

## 2017-04-13 DIAGNOSIS — I73.9 PVD (PERIPHERAL VASCULAR DISEASE) (HCC): Primary | ICD-10-CM

## 2017-04-13 DIAGNOSIS — I73.9 PERIPHERAL VASCULAR DISEASE (HCC): ICD-10-CM

## 2017-04-18 ENCOUNTER — TELEPHONE (OUTPATIENT)
Dept: HEMATOLOGY/ONCOLOGY | Facility: HOSPITAL | Age: 67
End: 2017-04-18

## 2017-04-18 ENCOUNTER — TELEPHONE (OUTPATIENT)
Dept: FAMILY MEDICINE CLINIC | Facility: CLINIC | Age: 67
End: 2017-04-18

## 2017-04-18 DIAGNOSIS — C50.811 CANCER OF OVERLAPPING SITES OF RIGHT FEMALE BREAST (HCC): Primary | ICD-10-CM

## 2017-04-18 NOTE — TELEPHONE ENCOUNTER
Home health is letting you know they just started with the pt.  They al;so want to be sure you sign the orders ,

## 2017-04-18 NOTE — TELEPHONE ENCOUNTER
[facility] is waiting on a response. Will need to have orders signed asap. Orders are holding the pt up.

## 2017-04-18 NOTE — TELEPHONE ENCOUNTER
Can you please add order for mammogram to the system . The patient has a annual visit scheduled for June. She would also like a copy faxed to her home at [phone] once the new order is placed.  Thanks

## 2017-04-19 ENCOUNTER — HOSPITAL ENCOUNTER (OUTPATIENT)
Dept: CARDIOLOGY CLINIC | Facility: HOSPITAL | Age: 67
Discharge: HOME | End: 2017-04-19
Attending: NURSE PRACTITIONER
Payer: MEDICARE

## 2017-04-19 ENCOUNTER — HOSPITAL ENCOUNTER (OUTPATIENT)
Dept: CV DIAGNOSTICS | Facility: HOSPITAL | Age: 67
Discharge: HOME | End: 2017-04-19
Attending: THORACIC SURGERY (CARDIOTHORACIC VASCULAR SURGERY)
Payer: MEDICARE

## 2017-04-19 ENCOUNTER — TELEPHONE (OUTPATIENT)
Dept: FAMILY MEDICINE CLINIC | Facility: CLINIC | Age: 67
End: 2017-04-19

## 2017-04-19 VITALS
SYSTOLIC BLOOD PRESSURE: 110 MMHG | OXYGEN SATURATION: 95 % | DIASTOLIC BLOOD PRESSURE: 72 MMHG | RESPIRATION RATE: 18 BRPM | HEART RATE: 66 BPM | TEMPERATURE: 98 F

## 2017-04-19 DIAGNOSIS — I35.0 AORTIC STENOSIS: ICD-10-CM

## 2017-04-19 PROCEDURE — 99214 OFFICE O/P EST MOD 30 MIN: CPT

## 2017-04-19 PROCEDURE — 93306 TTE W/DOPPLER COMPLETE: CPT | Performed by: INTERNAL MEDICINE

## 2017-04-19 PROCEDURE — 93306 TTE W/DOPPLER COMPLETE: CPT

## 2017-04-19 NOTE — PROGRESS NOTES
[facility] Note    Subjective:   Patient presents for routine 1 month postop TMVR visit. She underwent Mitraclip placement x 1 with Dr. Declan Spain, with emergent reop for bleeding from RA appendage 3/15/17.   She was discharged to [facility]  This study is compared with previous dated 03/15/2017:  Compared to the prior study, there has been no significant interval change    Labs:           Lab Results  Component Value Date   PT 18.7* 09/19/2016   PT 36.9* 05/05/2016   PT 14.9* 04/22/2016   INR differently: Take 0.5 mg by mouth nightly.  TAKE 1 TABLET BY MOUTH NIGHTLY AS NEEDED FOR SLEEP ) Disp: 30 tablet Rfl: 4   FUROSEMIDE 40 MG Oral Tab TAKE 1 TABLET BY MOUTH EVERY DAY Disp: 30 tablet Rfl: 11   DESLORATADINE 5 MG Oral Tab TAKE 1 TABLET BY MOUTH NITROSTAT 0.4 MG Sublingual SL Tab Place 0.4 mg under the tongue every 5 (five) minutes as needed. Disp:  Rfl: 5   ONETOUCH ULTRA BLUE In Vitro Strip USE AS DIRECTED 2 TIMES EVERY DAY AND AS NEEDED.  Disp: 200 strip Rfl: 3   Insulin Syringe 29G X 1/2\"

## 2017-04-19 NOTE — TELEPHONE ENCOUNTER
Rosalio from [facility] health calling regarding drug interaction between CIMETIDINE 400 MG Oral Tab and CLOPIDOGREL BISULFATE 75 MG Oral Tab and also CIMETIDINE and Warfarin Sodium 5 MG Oral Tab as well as Warfarin Sodium 5 MG Oral Tab and vitamin E. Melisa Wilson

## 2017-04-19 NOTE — TELEPHONE ENCOUNTER
LM on dedicated   at [facility]  Send orders to Dr Aubrey Kauffman  Dr Melba Maldonado is out

## 2017-04-19 NOTE — TELEPHONE ENCOUNTER
[facility], please see med questions from [facility] RN below. Please advise.

## 2017-04-19 NOTE — TELEPHONE ENCOUNTER
Called Rosalio from [facility], left detailed message regarding Dr. thapa to hold Cimetidine for now, informed him also that message was sent to Aia 16 for further review. TCB, please transfer to [id number] until 5pm today, otherwise [id number] anytime.

## 2017-04-19 NOTE — TELEPHONE ENCOUNTER
OK TO HOLD CIMETIDINE FOR NOW. MESSAGE TO [facility].

## 2017-04-20 ENCOUNTER — TELEPHONE (OUTPATIENT)
Dept: CARDIOLOGY CLINIC | Facility: HOSPITAL | Age: 67
End: 2017-04-20

## 2017-04-20 NOTE — PROGRESS NOTES
Called patient with results of echo yesterday. Her EF remains at 25-30%, clip present with mild regurgitations. Overall, there has been no significant interval changes compared to previous echo on 3/20/17.  She has an appointment in the Valve Clinic in two

## 2017-04-24 ENCOUNTER — ANTI-COAG VISIT (OUTPATIENT)
Dept: INTERNAL MEDICINE CLINIC | Facility: CLINIC | Age: 67
End: 2017-04-24

## 2017-04-24 DIAGNOSIS — I73.9 PERIPHERAL VASCULAR DISEASE (HCC): ICD-10-CM

## 2017-04-24 DIAGNOSIS — I73.9 PVD (PERIPHERAL VASCULAR DISEASE) (HCC): Primary | ICD-10-CM

## 2017-04-25 NOTE — TELEPHONE ENCOUNTER
Informed Rosalio ZAMBRANO per Dr. Hopkins Gearing message to F/u next week. Verbalized understanding.

## 2017-04-29 ENCOUNTER — PRIOR ORIGINAL RECORDS (OUTPATIENT)
Dept: OTHER | Age: 67
End: 2017-04-29

## 2017-05-01 ENCOUNTER — ANTI-COAG VISIT (OUTPATIENT)
Dept: INTERNAL MEDICINE CLINIC | Facility: CLINIC | Age: 67
End: 2017-05-01

## 2017-05-01 ENCOUNTER — TELEPHONE (OUTPATIENT)
Dept: FAMILY MEDICINE CLINIC | Facility: CLINIC | Age: 67
End: 2017-05-01

## 2017-05-01 DIAGNOSIS — I73.9 PVD (PERIPHERAL VASCULAR DISEASE) (HCC): Primary | ICD-10-CM

## 2017-05-01 DIAGNOSIS — I73.9 PERIPHERAL VASCULAR DISEASE (HCC): ICD-10-CM

## 2017-05-01 NOTE — TELEPHONE ENCOUNTER
Pt calling to report INR 1.8 as of early Saturday morning and would like to know how to readjust her dosage. Pt requesting to speak with a nurse. Please advise.

## 2017-05-04 ENCOUNTER — OFFICE VISIT (OUTPATIENT)
Dept: FAMILY MEDICINE CLINIC | Facility: CLINIC | Age: 67
End: 2017-05-04

## 2017-05-04 VITALS
DIASTOLIC BLOOD PRESSURE: 72 MMHG | BODY MASS INDEX: 38 KG/M2 | HEART RATE: 81 BPM | SYSTOLIC BLOOD PRESSURE: 133 MMHG | WEIGHT: 173 LBS | TEMPERATURE: 98 F

## 2017-05-04 DIAGNOSIS — Z79.4 TYPE 2 DIABETES MELLITUS WITH BOTH EYES AFFECTED BY RETINOPATHY WITHOUT MACULAR EDEMA, WITH LONG-TERM CURRENT USE OF INSULIN, UNSPECIFIED RETINOPATHY SEVERITY (HCC): Primary | ICD-10-CM

## 2017-05-04 DIAGNOSIS — I73.9 PVD (PERIPHERAL VASCULAR DISEASE) (HCC): ICD-10-CM

## 2017-05-04 DIAGNOSIS — I25.110 CORONARY ARTERY DISEASE INVOLVING NATIVE CORONARY ARTERY OF NATIVE HEART WITH UNSTABLE ANGINA PECTORIS (HCC): ICD-10-CM

## 2017-05-04 DIAGNOSIS — D50.9 IRON DEFICIENCY ANEMIA, UNSPECIFIED IRON DEFICIENCY ANEMIA TYPE: ICD-10-CM

## 2017-05-04 DIAGNOSIS — I34.1 MITRAL VALVE PROLAPSE: ICD-10-CM

## 2017-05-04 DIAGNOSIS — M79.642 PAIN OF LEFT HAND: ICD-10-CM

## 2017-05-04 DIAGNOSIS — E11.319 TYPE 2 DIABETES MELLITUS WITH BOTH EYES AFFECTED BY RETINOPATHY WITHOUT MACULAR EDEMA, WITH LONG-TERM CURRENT USE OF INSULIN, UNSPECIFIED RETINOPATHY SEVERITY (HCC): Primary | ICD-10-CM

## 2017-05-04 LAB — INR: 1.8

## 2017-05-04 PROCEDURE — 99214 OFFICE O/P EST MOD 30 MIN: CPT | Performed by: FAMILY MEDICINE

## 2017-05-04 PROCEDURE — G0463 HOSPITAL OUTPT CLINIC VISIT: HCPCS | Performed by: FAMILY MEDICINE

## 2017-05-04 RX ORDER — BACLOFEN 10 MG/1
TABLET ORAL
Refills: 0 | COMMUNITY
Start: 2017-04-11 | End: 2017-05-22

## 2017-05-04 RX ORDER — PNV NO.95/FERROUS FUM/FOLIC AC 28MG-0.8MG
TABLET ORAL
Qty: 30 TABLET | Refills: 1 | Status: SHIPPED | OUTPATIENT
Start: 2017-05-04 | End: 2017-05-22

## 2017-05-04 NOTE — PROGRESS NOTES
Has been in subacute rehab. Just got out  Had kendall clip with with sternotomy from cardiac tamponade due to right atrial appendage bleeding. .     Pt now is fine. Lost more weight. No sob  No n/v  Energy level is good. Had significant anemia.     Unable the carotid pulses were all normal.  Carotid pulse was  without bruit  Skin was intact. There were no exceptional nevi. Assessment/ Plan  1.  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

## 2017-05-05 ENCOUNTER — PRIOR ORIGINAL RECORDS (OUTPATIENT)
Dept: OTHER | Age: 67
End: 2017-05-05

## 2017-05-08 ENCOUNTER — ANTI-COAG VISIT (OUTPATIENT)
Dept: INTERNAL MEDICINE CLINIC | Facility: CLINIC | Age: 67
End: 2017-05-08

## 2017-05-08 DIAGNOSIS — I73.9 PERIPHERAL VASCULAR DISEASE (HCC): ICD-10-CM

## 2017-05-08 DIAGNOSIS — I73.9 PVD (PERIPHERAL VASCULAR DISEASE) (HCC): Primary | ICD-10-CM

## 2017-05-09 ENCOUNTER — LAB REQUISITION (OUTPATIENT)
Dept: LAB | Facility: HOSPITAL | Age: 67
End: 2017-05-09
Payer: MEDICARE

## 2017-05-09 DIAGNOSIS — E11.319 TYPE 2 DIABETES MELLITUS WITH RETINOPATHY WITHOUT MACULAR EDEMA (HCC): ICD-10-CM

## 2017-05-09 DIAGNOSIS — Z79.4 LONG TERM CURRENT USE OF INSULIN (HCC): ICD-10-CM

## 2017-05-09 DIAGNOSIS — D50.9 IRON DEFICIENCY ANEMIA: ICD-10-CM

## 2017-05-09 PROCEDURE — 80053 COMPREHEN METABOLIC PANEL: CPT | Performed by: FAMILY MEDICINE

## 2017-05-09 PROCEDURE — 85025 COMPLETE CBC W/AUTO DIFF WBC: CPT | Performed by: FAMILY MEDICINE

## 2017-05-10 ENCOUNTER — TELEPHONE (OUTPATIENT)
Dept: FAMILY MEDICINE CLINIC | Facility: CLINIC | Age: 67
End: 2017-05-10

## 2017-05-10 NOTE — TELEPHONE ENCOUNTER
Per [facility]/PT,  Would like to have a verbal Order for additional PT for one time a wk for 4 wks until sternal precaution lifted.

## 2017-05-11 NOTE — TELEPHONE ENCOUNTER
Left detailed message on [facility]'s ([facility]) confidential VM informing Dr Mcdonald 16 approves PT extension as requested below. Advised to call back if any thing further is needed. LMTCB. May transfer to [id number] until 12pm today, and [id number] any time.

## 2017-05-15 ENCOUNTER — ANTI-COAG VISIT (OUTPATIENT)
Dept: INTERNAL MEDICINE CLINIC | Facility: CLINIC | Age: 67
End: 2017-05-15

## 2017-05-15 DIAGNOSIS — I73.9 PERIPHERAL VASCULAR DISEASE (HCC): ICD-10-CM

## 2017-05-15 DIAGNOSIS — I73.9 PVD (PERIPHERAL VASCULAR DISEASE) (HCC): Primary | ICD-10-CM

## 2017-05-17 ENCOUNTER — TELEPHONE (OUTPATIENT)
Dept: FAMILY MEDICINE CLINIC | Facility: CLINIC | Age: 67
End: 2017-05-17

## 2017-05-17 ENCOUNTER — ANTI-COAG VISIT (OUTPATIENT)
Dept: INTERNAL MEDICINE CLINIC | Facility: CLINIC | Age: 67
End: 2017-05-17

## 2017-05-17 DIAGNOSIS — I73.9 PVD (PERIPHERAL VASCULAR DISEASE) (HCC): Primary | ICD-10-CM

## 2017-05-17 DIAGNOSIS — I73.9 PERIPHERAL VASCULAR DISEASE (HCC): ICD-10-CM

## 2017-05-17 NOTE — TELEPHONE ENCOUNTER
Pt is calling wanting to speak to Coumadin clinic, states she has not heard back from anyone, in regards what dosage to take? Pt states she's going by 5 MG, for now.  States she would like to speak to nurse, please advise

## 2017-05-22 ENCOUNTER — OFFICE VISIT (OUTPATIENT)
Dept: FAMILY MEDICINE CLINIC | Facility: CLINIC | Age: 67
End: 2017-05-22

## 2017-05-22 ENCOUNTER — ANTI-COAG VISIT (OUTPATIENT)
Dept: INTERNAL MEDICINE CLINIC | Facility: CLINIC | Age: 67
End: 2017-05-22

## 2017-05-22 VITALS
WEIGHT: 136 LBS | SYSTOLIC BLOOD PRESSURE: 109 MMHG | TEMPERATURE: 98 F | BODY MASS INDEX: 29.75 KG/M2 | HEIGHT: 56.5 IN | HEART RATE: 75 BPM | DIASTOLIC BLOOD PRESSURE: 69 MMHG

## 2017-05-22 DIAGNOSIS — Z79.4 TYPE 2 DIABETES MELLITUS WITH BOTH EYES AFFECTED BY RETINOPATHY WITHOUT MACULAR EDEMA, WITH LONG-TERM CURRENT USE OF INSULIN, UNSPECIFIED RETINOPATHY SEVERITY (HCC): Primary | ICD-10-CM

## 2017-05-22 DIAGNOSIS — D50.9 IRON DEFICIENCY ANEMIA, UNSPECIFIED IRON DEFICIENCY ANEMIA TYPE: ICD-10-CM

## 2017-05-22 DIAGNOSIS — I73.9 PVD (PERIPHERAL VASCULAR DISEASE) (HCC): Primary | ICD-10-CM

## 2017-05-22 DIAGNOSIS — I73.9 PVD (PERIPHERAL VASCULAR DISEASE) (HCC): ICD-10-CM

## 2017-05-22 DIAGNOSIS — E11.319 TYPE 2 DIABETES MELLITUS WITH BOTH EYES AFFECTED BY RETINOPATHY WITHOUT MACULAR EDEMA, WITH LONG-TERM CURRENT USE OF INSULIN, UNSPECIFIED RETINOPATHY SEVERITY (HCC): Primary | ICD-10-CM

## 2017-05-22 DIAGNOSIS — I73.9 PERIPHERAL VASCULAR DISEASE (HCC): ICD-10-CM

## 2017-05-22 PROCEDURE — G0463 HOSPITAL OUTPT CLINIC VISIT: HCPCS | Performed by: FAMILY MEDICINE

## 2017-05-22 PROCEDURE — 99214 OFFICE O/P EST MOD 30 MIN: CPT | Performed by: FAMILY MEDICINE

## 2017-05-22 RX ORDER — LISINOPRIL 5 MG/1
2.5 TABLET ORAL DAILY
Qty: 45 TABLET | Refills: 3 | Status: SHIPPED | OUTPATIENT
Start: 2017-05-22 | End: 2017-09-05

## 2017-05-24 ENCOUNTER — PRIOR ORIGINAL RECORDS (OUTPATIENT)
Dept: OTHER | Age: 67
End: 2017-05-24

## 2017-05-24 ENCOUNTER — ANTI-COAG VISIT (OUTPATIENT)
Dept: INTERNAL MEDICINE CLINIC | Facility: CLINIC | Age: 67
End: 2017-05-24

## 2017-05-24 DIAGNOSIS — I73.9 PVD (PERIPHERAL VASCULAR DISEASE) (HCC): Primary | ICD-10-CM

## 2017-05-24 DIAGNOSIS — I73.9 PERIPHERAL VASCULAR DISEASE (HCC): ICD-10-CM

## 2017-05-24 NOTE — OPERATIVE REPORT
[facility]    PATIENT'S NAME: Ita Eric   ATTENDING PHYSICIAN: Jaya Rm M.D. OPERATING PHYSICIAN: Jaya Rm M.D.    PATIENT ACCOUNT#:   [de-identified]    LOCATION:  [address] [place] [address]  MEDICAL RECORD #:   [id number]       DATE OF BIRTH:  [birth year]

## 2017-05-24 NOTE — TELEPHONE ENCOUNTER
Pt calling back regarding checking her INR sat night may 20, 2017 and her INR was 2.1. Pt wants to know what dosage she is supposed to take she has not received a call. .. please advise

## 2017-05-30 ENCOUNTER — TELEPHONE (OUTPATIENT)
Dept: INTERVENTIONAL RADIOLOGY/VASCULAR | Facility: HOSPITAL | Age: 67
End: 2017-05-30

## 2017-06-02 ENCOUNTER — PRIOR ORIGINAL RECORDS (OUTPATIENT)
Dept: OTHER | Age: 67
End: 2017-06-02

## 2017-06-05 ENCOUNTER — TELEPHONE (OUTPATIENT)
Dept: INTERVENTIONAL RADIOLOGY/VASCULAR | Facility: HOSPITAL | Age: 67
End: 2017-06-05

## 2017-06-06 ENCOUNTER — ANTI-COAG VISIT (OUTPATIENT)
Dept: CARDIOLOGY CLINIC | Facility: CLINIC | Age: 67
End: 2017-06-06

## 2017-06-06 ENCOUNTER — TELEPHONE (OUTPATIENT)
Dept: FAMILY MEDICINE CLINIC | Facility: CLINIC | Age: 67
End: 2017-06-06

## 2017-06-06 DIAGNOSIS — I73.9 PERIPHERAL VASCULAR DISEASE (HCC): ICD-10-CM

## 2017-06-06 DIAGNOSIS — I73.9 PVD (PERIPHERAL VASCULAR DISEASE) (HCC): Primary | ICD-10-CM

## 2017-06-06 NOTE — PROGRESS NOTES
Pt presents post hospital f/u  hosptial records reviewed. Sugars good. Feet warm  No chest pain  Complications from her valve procedure reviewed. Post hosptialization f/us reviewed. Patient's past medical surgical family social history was reviewed. (peripheral vascular disease) ([facility]  Stable. Majority of time was spent in discussion with 25 minutes spent discussing these issues. More than 50% of our time was spent in discussion.

## 2017-06-08 NOTE — PROGRESS NOTES
Instructed to hold warfarin 3 days prior to  procedure on 7/19. Pt verbalized understanding and wrote it down.

## 2017-06-15 ENCOUNTER — OFFICE VISIT (OUTPATIENT)
Dept: FAMILY MEDICINE CLINIC | Facility: CLINIC | Age: 67
End: 2017-06-15

## 2017-06-15 ENCOUNTER — TELEPHONE (OUTPATIENT)
Dept: FAMILY MEDICINE CLINIC | Facility: CLINIC | Age: 67
End: 2017-06-15

## 2017-06-15 VITALS
SYSTOLIC BLOOD PRESSURE: 114 MMHG | DIASTOLIC BLOOD PRESSURE: 73 MMHG | HEART RATE: 75 BPM | WEIGHT: 136 LBS | BODY MASS INDEX: 30 KG/M2

## 2017-06-15 DIAGNOSIS — Z79.4 TYPE 2 DIABETES MELLITUS WITH BOTH EYES AFFECTED BY RETINOPATHY WITHOUT MACULAR EDEMA, WITH LONG-TERM CURRENT USE OF INSULIN, UNSPECIFIED RETINOPATHY SEVERITY (HCC): Primary | ICD-10-CM

## 2017-06-15 DIAGNOSIS — I73.9 PVD (PERIPHERAL VASCULAR DISEASE) (HCC): ICD-10-CM

## 2017-06-15 DIAGNOSIS — E11.319 TYPE 2 DIABETES MELLITUS WITH BOTH EYES AFFECTED BY RETINOPATHY WITHOUT MACULAR EDEMA, WITH LONG-TERM CURRENT USE OF INSULIN, UNSPECIFIED RETINOPATHY SEVERITY (HCC): Primary | ICD-10-CM

## 2017-06-15 PROCEDURE — G0463 HOSPITAL OUTPT CLINIC VISIT: HCPCS | Performed by: FAMILY MEDICINE

## 2017-06-15 PROCEDURE — 99214 OFFICE O/P EST MOD 30 MIN: CPT | Performed by: FAMILY MEDICINE

## 2017-06-15 RX ORDER — METFORMIN HYDROCHLORIDE 500 MG/1
500 TABLET, EXTENDED RELEASE ORAL
Qty: 90 TABLET | Refills: 0 | Status: SHIPPED | OUTPATIENT
Start: 2017-06-15 | End: 2017-06-15

## 2017-06-15 RX ORDER — METFORMIN HYDROCHLORIDE 500 MG/1
500 TABLET, EXTENDED RELEASE ORAL
Qty: 90 TABLET | Refills: 1 | Status: SHIPPED | OUTPATIENT
Start: 2017-06-15 | End: 2017-07-17

## 2017-06-15 NOTE — TELEPHONE ENCOUNTER
Per pharmacy on file,  Need the ICD 10 code for pt diabetic supply so that they can bill pt insurance. Per Yuriy Ibrahim she faxed the form several times in [facility] and told her to refax it again in [facility] today,  Pls advise.

## 2017-06-15 NOTE — PROGRESS NOTES
Sugars can go up to 300 and go down to 50-60 in early morning. Running low at night. 50's 60's  \"I have to get up and take glucose tablets. \"  lantus 11 u ( down from 14)  humalog sliding scale. 1-5 u at night.   Usually about 17 or so units of insulin edema, with long-term current use of insulin, unspecified retinopathy severity ([address]  Switch to meds. 2. PVD (peripheral vascular disease) (HCC)  renewal.    3.trigger fingers. #4 on the right  #2 on the left.

## 2017-06-15 NOTE — TELEPHONE ENCOUNTER
Form completed by [facility] and faxed to [phone]

## 2017-06-16 ENCOUNTER — HOSPITAL ENCOUNTER (OUTPATIENT)
Dept: MAMMOGRAPHY | Facility: HOSPITAL | Age: 67
Discharge: HOME | End: 2017-06-16
Attending: INTERNAL MEDICINE
Payer: MEDICARE

## 2017-06-16 DIAGNOSIS — C50.811 CANCER OF OVERLAPPING SITES OF RIGHT FEMALE BREAST (HCC): ICD-10-CM

## 2017-06-16 PROCEDURE — 77065 DX MAMMO INCL CAD UNI: CPT | Performed by: INTERNAL MEDICINE

## 2017-06-19 ENCOUNTER — ANTI-COAG VISIT (OUTPATIENT)
Dept: INTERNAL MEDICINE CLINIC | Facility: CLINIC | Age: 67
End: 2017-06-19

## 2017-06-19 ENCOUNTER — OFFICE VISIT (OUTPATIENT)
Dept: HEMATOLOGY/ONCOLOGY | Facility: HOSPITAL | Age: 67
End: 2017-06-19
Attending: INTERNAL MEDICINE
Payer: MEDICARE

## 2017-06-19 VITALS — RESPIRATION RATE: 18 BRPM | HEART RATE: 73 BPM | SYSTOLIC BLOOD PRESSURE: 118 MMHG | DIASTOLIC BLOOD PRESSURE: 54 MMHG

## 2017-06-19 DIAGNOSIS — I73.9 PERIPHERAL VASCULAR DISEASE (HCC): ICD-10-CM

## 2017-06-19 DIAGNOSIS — I73.9 PVD (PERIPHERAL VASCULAR DISEASE) (HCC): Primary | ICD-10-CM

## 2017-06-19 DIAGNOSIS — Z17.0 MALIGNANT NEOPLASM OF OVERLAPPING SITES OF RIGHT BREAST IN FEMALE, ESTROGEN RECEPTOR POSITIVE (HCC): ICD-10-CM

## 2017-06-19 DIAGNOSIS — Z90.11 HX OF RIGHT MASTECTOMY: Primary | ICD-10-CM

## 2017-06-19 DIAGNOSIS — C50.811 MALIGNANT NEOPLASM OF OVERLAPPING SITES OF RIGHT BREAST IN FEMALE, ESTROGEN RECEPTOR POSITIVE (HCC): ICD-10-CM

## 2017-06-19 PROCEDURE — 99213 OFFICE O/P EST LOW 20 MIN: CPT | Performed by: INTERNAL MEDICINE

## 2017-06-19 PROCEDURE — G0463 HOSPITAL OUTPT CLINIC VISIT: HCPCS | Performed by: INTERNAL MEDICINE

## 2017-06-20 ENCOUNTER — PRIOR ORIGINAL RECORDS (OUTPATIENT)
Dept: OTHER | Age: 67
End: 2017-06-20

## 2017-06-21 ENCOUNTER — HOSPITAL ENCOUNTER (OUTPATIENT)
Dept: CV DIAGNOSTICS | Facility: HOSPITAL | Age: 67
Discharge: HOME | End: 2017-06-21
Attending: THORACIC SURGERY (CARDIOTHORACIC VASCULAR SURGERY)
Payer: MEDICARE

## 2017-06-21 ENCOUNTER — HOSPITAL ENCOUNTER (OUTPATIENT)
Dept: CARDIOLOGY CLINIC | Facility: HOSPITAL | Age: 67
Discharge: HOME | End: 2017-06-21
Attending: NURSE PRACTITIONER
Payer: MEDICARE

## 2017-06-21 ENCOUNTER — PRIOR ORIGINAL RECORDS (OUTPATIENT)
Dept: OTHER | Age: 67
End: 2017-06-21

## 2017-06-21 VITALS — HEART RATE: 83 BPM | SYSTOLIC BLOOD PRESSURE: 110 MMHG | DIASTOLIC BLOOD PRESSURE: 58 MMHG | TEMPERATURE: 98 F

## 2017-06-21 DIAGNOSIS — I35.0 AORTIC STENOSIS: ICD-10-CM

## 2017-06-21 PROCEDURE — 93306 TTE W/DOPPLER COMPLETE: CPT | Performed by: THORACIC SURGERY (CARDIOTHORACIC VASCULAR SURGERY)

## 2017-06-21 PROCEDURE — 99214 OFFICE O/P EST MOD 30 MIN: CPT

## 2017-06-21 RX ORDER — FUROSEMIDE 20 MG/1
20 TABLET ORAL DAILY
COMMUNITY
End: 2017-09-05

## 2017-06-21 NOTE — PROGRESS NOTES
Kin Mallory Note    Subjective:   Patient presents for routine 3 month postop TMVR visit.  She underwent Mitraclip placement x 1 with Dr. Rommel Vera, with emergent reop for bleeding from RA appendage 3/15/17.  Since our last visit in April, jame non-specific thickening of     the leaflets. Leaflet separation was reduced. Transvalvular velocity was     within the normal range. There was no evidence for stenosis. There was     mild regurgitation.  At a heart rate of 65 beats per minute, the mean     Cholecalciferol (VITAMIN D3) 2000 units Oral Tab Take 4,000 Units by mouth daily. Take 4000 mg T/W/TH/S/S. Take 2000 mg on M/F. Disp:  Rfl:    B Complex Vitamins (VITAMIN B COMPLEX) Oral Tab Take 1 tablet by mouth daily.  Disp:  Rfl:    Multiple Vitamin Rfl:    Multiple Vitamin (MULTI-VITAMINS) Oral Tab Take by mouth. Centrum Gummy  Disp:  Rfl:    aspirin (ASPIRIN CHILDRENS) 81 MG Oral Chew Tab Chew 81 mg by mouth daily.    Disp:  Rfl:      No current facility-administered medications on file prior to enco

## 2017-06-22 ENCOUNTER — TELEPHONE (OUTPATIENT)
Dept: CARDIOLOGY CLINIC | Facility: HOSPITAL | Age: 67
End: 2017-06-22

## 2017-06-22 NOTE — PROGRESS NOTES
Called patient with results of echo yesterday. Her EF increased from 25-30% to 35-40%, clip present with mild regurgitation. Compared to previous echo on 4/19/17, the mitral clip is stable, the degree of mitral regurgitation is unchanged.  LV function has i

## 2017-06-23 ENCOUNTER — PRIOR ORIGINAL RECORDS (OUTPATIENT)
Dept: OTHER | Age: 67
End: 2017-06-23

## 2017-06-26 ENCOUNTER — PATIENT MESSAGE (OUTPATIENT)
Dept: FAMILY MEDICINE CLINIC | Facility: CLINIC | Age: 67
End: 2017-06-26

## 2017-06-26 ENCOUNTER — TELEPHONE (OUTPATIENT)
Dept: FAMILY MEDICINE CLINIC | Facility: CLINIC | Age: 67
End: 2017-06-26

## 2017-06-26 RX ORDER — ZOLPIDEM TARTRATE 5 MG/1
5 TABLET ORAL NIGHTLY PRN
Qty: 20 TABLET | Refills: 0 | Status: SHIPPED | OUTPATIENT
Start: 2017-06-26 | End: 2017-07-11

## 2017-06-27 NOTE — TELEPHONE ENCOUNTER
From: Lyla Jones  To: Michelle Lowe DO  Sent: 6/26/2017 6:01 PM CDT  Subject: Other    Thank you very much!  ????? See you 6/29? Unless you want to delay it 2 weeks.   Angela Allen

## 2017-07-01 LAB — INR: 1.4 (ref 2–3)

## 2017-07-03 ENCOUNTER — ANTI-COAG VISIT (OUTPATIENT)
Dept: INTERNAL MEDICINE CLINIC | Facility: CLINIC | Age: 67
End: 2017-07-03

## 2017-07-03 DIAGNOSIS — I73.9 PERIPHERAL VASCULAR DISEASE (HCC): ICD-10-CM

## 2017-07-03 DIAGNOSIS — I73.9 PVD (PERIPHERAL VASCULAR DISEASE) (HCC): ICD-10-CM

## 2017-07-06 ENCOUNTER — PRIOR ORIGINAL RECORDS (OUTPATIENT)
Dept: OTHER | Age: 67
End: 2017-07-06

## 2017-07-07 ENCOUNTER — TELEPHONE (OUTPATIENT)
Dept: PODIATRY CLINIC | Facility: CLINIC | Age: 67
End: 2017-07-07

## 2017-07-07 ENCOUNTER — PRIOR ORIGINAL RECORDS (OUTPATIENT)
Dept: OTHER | Age: 67
End: 2017-07-07

## 2017-07-07 NOTE — TELEPHONE ENCOUNTER
Pt calling and needs a refill on med below. Pt also stts that she has been taking 10mg instead of 5mg. If a new script for 10mg could be sent to the pharmacy, before the dr is out of the office next week.   Zolpidem Tartrate (AMBIEN) 5 MG Oral Tab 20 tabl

## 2017-07-08 NOTE — PROGRESS NOTES
OUMOU Brasher [facility] returns for follow-up of her breast cancer. She had her left mammogram 6/16/17 which revealed category c heterogeneously dense tissue.   There were micro-calcifications noted in the upper quadrant which appeared stable since Hematological: Negative for adenopathy. Bruises/bleeds easily. On plavix   Psychiatric/Behavioral: Negative for dysphoric mood. The patient is not nervous/anxious. Current Outpatient Prescriptions:   SITagliptin Phosphate (JANUVIA) 100 MG Or albuterol Sulfate (5 MG/ML) 0.5% Inhalation Nebu Soln TAKE 0.5 ML BY NEBULIZATION EVERY 6 (SIX) HOURS AS NEEDED FOR WHEEZING.  Disp: 100 mL Rfl: 2   Alcohol Swabs ([facility] PREP) 70 % Does not apply Pads  Disp:  Rfl:    GLUCAGON EMERGENCY 1 MG Injection Kit  Dis Sulfanilamide           Rash    Comment:Other reaction(s): Hives    Past Medical History:   Diagnosis Date   • Asthma    • Asthma, moderate persistent     Last hosp '96   • BDR (background diabetic retinopathy) ([facility] 1/14/2016   • Blepharitis, both eyes 1/ 7/13/09: CATARACT EXTRACTION W/  INTRAOCULAR LENS IMPLA* Right      Comment: JOSE  2/27/06: CATARACT EXTRACTION W/  INTRAOCULAR LENS IMPLA* Left      Comment: JOSE  No date: CATH DRUG ELUTING STENT  No date: COLONOSCOPY  2006: COLONOSCOPY & POLYPECTOMY  No d Eyes: Conjunctivae and EOM are normal. Pupils are equal, round, and reactive to light. No scleral icterus. Neck: Normal range of motion. Neck supple. Cardiovascular: Normal rate and regular rhythm. Murmur heard.   I/VI rosalinda holosystolic   Pulmonary/Ch COMPARISON:           [facility], [facility], [facility] DGTL UNI W CAD LT, 12/05/2011, 14:14. [facility], [facility] PF DGTL UNI W CAD LT, 12/26/2012, 14:18.   [facility], [facility] PF DGTL UNI W CAD LT, 10/02 Total Bilirubin      0.3 - 1.2 mg/dL   0.7   TOTAL PROTEIN      5.9 - 8.4 g/dL   6.3   Albumin      3.5 - 4.8 g/dL   3.5   GLOBULIN, TOTAL      2.5 - 3.7 g/dL   2.8   A/G RATIO      1.0 - 2.0   1.3   ANION GAP      0 - 18   9   BUN/CREA RATIO      10.0 - 2

## 2017-07-10 LAB — INR: 1.6 (ref 2–3)

## 2017-07-10 RX ORDER — ZOLPIDEM TARTRATE 5 MG/1
10 TABLET ORAL NIGHTLY PRN
Qty: 40 TABLET | Refills: 0 | Status: CANCELLED | OUTPATIENT
Start: 2017-07-10

## 2017-07-10 NOTE — TELEPHONE ENCOUNTER
Called pt LMTCB- if pt CB there is 11:40am green slot today w/ SCR you can offer if not taken by the time she CB.

## 2017-07-10 NOTE — TELEPHONE ENCOUNTER
Dr. CMC/DBC, please see pended medication and advise. LOV: 6/15/17  Last prescribed: 6/26/17 for 20 tabs/0 refills. Please note, per pt, she has been taking 10mg nightly instead of the prescribed 5 mg.  Prescription is pended as pt requests with [place]

## 2017-07-10 NOTE — TELEPHONE ENCOUNTER
Sorry. I have no idea where to put this pt. Please do your best to accommodate her as we can.  During this week would be best. scr

## 2017-07-10 NOTE — TELEPHONE ENCOUNTER
Pt is calling for status of her medication refill request. Pt is out of medication and can be reached at [phone].   Pt would like the script to be sent to [facility] (listed)

## 2017-07-11 ENCOUNTER — PRIOR ORIGINAL RECORDS (OUTPATIENT)
Dept: OTHER | Age: 67
End: 2017-07-11

## 2017-07-11 ENCOUNTER — APPOINTMENT (OUTPATIENT)
Dept: LAB | Age: 67
End: 2017-07-11
Attending: FAMILY MEDICINE
Payer: MEDICARE

## 2017-07-11 ENCOUNTER — OFFICE VISIT (OUTPATIENT)
Dept: FAMILY MEDICINE CLINIC | Facility: CLINIC | Age: 67
End: 2017-07-11

## 2017-07-11 VITALS
HEIGHT: 56.5 IN | SYSTOLIC BLOOD PRESSURE: 113 MMHG | DIASTOLIC BLOOD PRESSURE: 64 MMHG | HEART RATE: 72 BPM | TEMPERATURE: 98 F

## 2017-07-11 DIAGNOSIS — E78.2 MIXED HYPERLIPIDEMIA: ICD-10-CM

## 2017-07-11 DIAGNOSIS — Z79.4 UNCONTROLLED TYPE 2 DIABETES MELLITUS WITH HYPERGLYCEMIA, WITH LONG-TERM CURRENT USE OF INSULIN (HCC): ICD-10-CM

## 2017-07-11 DIAGNOSIS — E11.65 UNCONTROLLED TYPE 2 DIABETES MELLITUS WITH HYPERGLYCEMIA, WITH LONG-TERM CURRENT USE OF INSULIN (HCC): ICD-10-CM

## 2017-07-11 DIAGNOSIS — Z79.4 UNCONTROLLED TYPE 2 DIABETES MELLITUS WITH HYPERGLYCEMIA, WITH LONG-TERM CURRENT USE OF INSULIN (HCC): Primary | ICD-10-CM

## 2017-07-11 DIAGNOSIS — E11.65 UNCONTROLLED TYPE 2 DIABETES MELLITUS WITH HYPERGLYCEMIA, WITH LONG-TERM CURRENT USE OF INSULIN (HCC): Primary | ICD-10-CM

## 2017-07-11 DIAGNOSIS — I25.10 CORONARY ARTERY DISEASE INVOLVING NATIVE CORONARY ARTERY OF NATIVE HEART WITHOUT ANGINA PECTORIS: Chronic | ICD-10-CM

## 2017-07-11 DIAGNOSIS — I10 HYPERTENSION, BENIGN: ICD-10-CM

## 2017-07-11 LAB
CHOLEST SERPL-MCNC: 172 MG/DL (ref 110–200)
CREAT UR-MCNC: 15.5 MG/DL
HDLC SERPL-MCNC: 66 MG/DL
LDLC SERPL CALC-MCNC: 91 MG/DL (ref 0–99)
MICROALBUMIN UR-MCNC: 0 MG/DL (ref 0–1.8)
MICROALBUMIN/CREAT UR: 0 MG/G{CREAT} (ref 0–20)
NONHDLC SERPL-MCNC: 106 MG/DL
TRIGL SERPL-MCNC: 74 MG/DL (ref 1–149)
TSH SERPL-ACNC: 1.69 UIU/ML (ref 0.45–5.33)

## 2017-07-11 PROCEDURE — 36415 COLL VENOUS BLD VENIPUNCTURE: CPT

## 2017-07-11 PROCEDURE — 82043 UR ALBUMIN QUANTITATIVE: CPT

## 2017-07-11 PROCEDURE — 84443 ASSAY THYROID STIM HORMONE: CPT

## 2017-07-11 PROCEDURE — G0463 HOSPITAL OUTPT CLINIC VISIT: HCPCS | Performed by: FAMILY MEDICINE

## 2017-07-11 PROCEDURE — 99214 OFFICE O/P EST MOD 30 MIN: CPT | Performed by: FAMILY MEDICINE

## 2017-07-11 PROCEDURE — 83036 HEMOGLOBIN GLYCOSYLATED A1C: CPT

## 2017-07-11 PROCEDURE — 80061 LIPID PANEL: CPT

## 2017-07-11 PROCEDURE — 82570 ASSAY OF URINE CREATININE: CPT

## 2017-07-11 RX ORDER — ATORVASTATIN CALCIUM 20 MG/1
20 TABLET, FILM COATED ORAL NIGHTLY
Qty: 90 TABLET | Refills: 1 | Status: ON HOLD | OUTPATIENT
Start: 2017-07-11 | End: 2017-08-13

## 2017-07-11 RX ORDER — INSULIN GLARGINE 100 [IU]/ML
INJECTION, SOLUTION SUBCUTANEOUS
Refills: 2 | COMMUNITY
Start: 2017-04-13 | End: 2017-07-17 | Stop reason: ALTCHOICE

## 2017-07-11 RX ORDER — ZOLPIDEM TARTRATE 10 MG/1
10 TABLET ORAL NIGHTLY PRN
Qty: 20 TABLET | Refills: 0 | Status: SHIPPED | OUTPATIENT
Start: 2017-07-11 | End: 2017-07-17

## 2017-07-11 NOTE — PROGRESS NOTES
Patient ID: Bashir Bennett is a 73year old female. HPI  Patient presents with:  Diabetes  Sleep Problem     Not had a hemoglobin A1c in some time. She usually sees Dr. Pilar Tesfaye.   She is on metformin and Januvia and they took her off of insulin un 05/09/2017   BUN 25 (H) 05/09/2017   CREATSERUM 0.52 05/09/2017   BUNCREA 48.1 (H) 05/09/2017   ANIONGAP 9 05/09/2017   GFRAA >60 05/09/2017   GFRNAA >60 05/09/2017   CA 8.9 05/09/2017    05/09/2017   K 3.9 05/09/2017    05/09/2017   CO2 28 05/ kg)  03/02/17 : 130 lb (59 kg)      There is no height or weight on file to calculate BMI.     BP Readings from Last 6 Encounters:  07/11/17 : 113/64  06/21/17 : 110/58  06/19/17 : 118/54  06/15/17 : 114/73  05/22/17 : 109/69  05/04/17 : 133/72        An 1/14/2016   • Type 2 diabetes mellitus ([facility] [address]        Past Surgical History:  3/4/16: AUTOMATIC EXTERNAL DEFIBRILLATOR, WITH INTEGRA* Left  No date: CARDIAC DEFIBRILLATOR PLACEMENT  No date: CATARACT  7/13/09: CATARACT EXTRACTION W/  INTRAOCULAR LENS IMPLA* every 6 (six) hours as needed. Disp: 30 tablet Rfl: 4   Cholecalciferol (VITAMIN D3) 2000 units Oral Tab Take 4,000 Units by mouth daily. Take 4000 mg T/W/TH/S/S. Take 2000 mg on M/F.   Disp:  Rfl:    B Complex Vitamins (VITAMIN B COMPLEX) Oral Tab Take 1 1 drop into both eyes as needed for Allergies. Disp:  Rfl:    Multiple Vitamin (MULTI-VITAMINS) Oral Tab Take by mouth. Centrum Gummy  Disp:  Rfl:    aspirin (ASPIRIN CHILDRENS) 81 MG Oral Chew Tab Chew 81 mg by mouth daily.    Disp:  Rfl:      Allergies: SITagliptin Phosphate (JANUVIA) 100 MG Oral Tab; Take 1 tablet (100 mg total) by mouth daily.  -     OPHTHALMOLOGY - INTERNAL  She is fasting today and we will do labs. She is on a very small dose of metformin. We may need to increase this.   She needs to

## 2017-07-12 ENCOUNTER — ANTI-COAG VISIT (OUTPATIENT)
Dept: INTERNAL MEDICINE CLINIC | Facility: CLINIC | Age: 67
End: 2017-07-12

## 2017-07-12 DIAGNOSIS — I73.9 PERIPHERAL VASCULAR DISEASE (HCC): ICD-10-CM

## 2017-07-12 DIAGNOSIS — I73.9 PVD (PERIPHERAL VASCULAR DISEASE) (HCC): ICD-10-CM

## 2017-07-12 LAB — HBA1C MFR BLD: 7 % (ref 4–6)

## 2017-07-13 ENCOUNTER — OFFICE VISIT (OUTPATIENT)
Dept: PODIATRY CLINIC | Facility: CLINIC | Age: 67
End: 2017-07-13

## 2017-07-13 DIAGNOSIS — L97.519 DIABETIC ULCER OF BOTH FEET ASSOCIATED WITH TYPE 2 DIABETES MELLITUS (HCC): ICD-10-CM

## 2017-07-13 DIAGNOSIS — E11.621 DIABETIC ULCER OF BOTH FEET ASSOCIATED WITH TYPE 2 DIABETES MELLITUS (HCC): ICD-10-CM

## 2017-07-13 DIAGNOSIS — E11.42 DIABETIC POLYNEUROPATHY ASSOCIATED WITH TYPE 2 DIABETES MELLITUS (HCC): Primary | ICD-10-CM

## 2017-07-13 DIAGNOSIS — L97.529 DIABETIC ULCER OF BOTH FEET ASSOCIATED WITH TYPE 2 DIABETES MELLITUS (HCC): ICD-10-CM

## 2017-07-13 PROCEDURE — 97597 DBRDMT OPN WND 1ST 20 CM/<: CPT | Performed by: PODIATRIST

## 2017-07-13 PROCEDURE — 99212 OFFICE O/P EST SF 10 MIN: CPT | Performed by: PODIATRIST

## 2017-07-13 NOTE — PROGRESS NOTES
HPI:    Patient ID: Kitty Lopes is a 73year old female. HPI  This 81-year-old female presents today with concerns associated with her right foot. She describes a pinhole ulceration has been present for a couple of days.   She had some soreness in the a Vitamins (VITAMIN B COMPLEX) Oral Tab Take 1 tablet by mouth daily. Disp:  Rfl:    Multiple Vitamins-Minerals (EMERGEN-C VITAMIN C) Oral Powd Pack Take 1 Dose by mouth daily. Disp:  Rfl:    SACCHAROMYCES BOULARDII OR Take 1 capsule by mouth daily.  Mahi Caruso Rash  Clindamycin             Tightness in Throat  Dilaudid [Hydromorp*        Comment:agitation  Levofloxacin [Quixi*    Nausea and vomiting  Potassium Clavulana*    Unknown    Comment:Other reaction(s): POTASSIUM CLAVULANATE  Scopolamine             Dizz

## 2017-07-17 ENCOUNTER — OFFICE VISIT (OUTPATIENT)
Dept: FAMILY MEDICINE CLINIC | Facility: CLINIC | Age: 67
End: 2017-07-17

## 2017-07-17 ENCOUNTER — ANTI-COAG VISIT (OUTPATIENT)
Dept: INTERNAL MEDICINE CLINIC | Facility: CLINIC | Age: 67
End: 2017-07-17

## 2017-07-17 VITALS
BODY MASS INDEX: 30 KG/M2 | HEART RATE: 82 BPM | DIASTOLIC BLOOD PRESSURE: 79 MMHG | SYSTOLIC BLOOD PRESSURE: 131 MMHG | WEIGHT: 134 LBS

## 2017-07-17 DIAGNOSIS — E11.8 CONTROLLED TYPE 2 DIABETES MELLITUS WITH COMPLICATION, WITHOUT LONG-TERM CURRENT USE OF INSULIN (HCC): ICD-10-CM

## 2017-07-17 DIAGNOSIS — I73.9 PERIPHERAL VASCULAR DISEASE (HCC): ICD-10-CM

## 2017-07-17 DIAGNOSIS — I73.9 PVD (PERIPHERAL VASCULAR DISEASE) (HCC): ICD-10-CM

## 2017-07-17 DIAGNOSIS — G47.00 INSOMNIA, UNSPECIFIED TYPE: Primary | ICD-10-CM

## 2017-07-17 LAB — INR: 2.2 (ref 2–3)

## 2017-07-17 PROCEDURE — G0463 HOSPITAL OUTPT CLINIC VISIT: HCPCS | Performed by: FAMILY MEDICINE

## 2017-07-17 PROCEDURE — 99214 OFFICE O/P EST MOD 30 MIN: CPT | Performed by: FAMILY MEDICINE

## 2017-07-17 RX ORDER — INSULIN GLARGINE 100 [IU]/ML
INJECTION, SOLUTION SUBCUTANEOUS
Qty: 5 PEN | Refills: 3 | Status: SHIPPED | OUTPATIENT
Start: 2017-07-17 | End: 2018-01-01

## 2017-07-17 RX ORDER — METFORMIN HYDROCHLORIDE 500 MG/1
1000 TABLET, EXTENDED RELEASE ORAL 2 TIMES DAILY WITH MEALS
Qty: 180 TABLET | Refills: 3 | Status: SHIPPED | OUTPATIENT
Start: 2017-07-17 | End: 2017-01-01

## 2017-07-17 RX ORDER — TRAZODONE HYDROCHLORIDE 50 MG/1
50 TABLET ORAL NIGHTLY
Qty: 90 TABLET | Refills: 1 | Status: SHIPPED | OUTPATIENT
Start: 2017-07-17 | End: 2017-09-11 | Stop reason: ALTCHOICE

## 2017-07-17 NOTE — H&P
Faulkner Lawrence  : 10/13/1950  ACCOUNT:  [id number]  [phone]  PCP: Dr. Zheng Held     TODAY'S DATE: 2017  DICTATED BY:  Katja Stacy M.D.]    CHIEF COMPLAINT: [Followup of .  CAD, established.]    HPI: [On 2017, Venita Shetty, a 73year old fema embolus, diabetes, postpolio myelitis and right mastectomy 2005    PAST CV HISTORY: 3/2017, CAD, cardiac catheterization, drug-eluting stents 3/15 11/15, dyslipidemia, hypertension, ischemic CM, Mitral Clip 3/2017 followed by tamponade, sternotomy and evac ABD AORTA: abdominal aorta not enlarged. FEM: deferred. PEDAL: pedal pulses diminished. EXT: no peripheral edema. DECISION MAKING: Severe mitral insufficiency, significant improvement in valve status and symptoms status post mitral clip.  Clip procedure 5MG       1 tablet daily                           07/07/17 Furosemide            20MG      1 tablet daily                           07/07/17 Januvia               100MG     1 tablet at pm                           07/07/17 MetFORMIN HCl ER      50 03/07/14 Plavix                75MG      1 daily                                  03/07/14 Ventolin HFA          108 (90   as directed                              03/07/14 Warfarin Sodium       2.5MG     as directed per PCP                        Elisabeth Jiang

## 2017-07-17 NOTE — PROGRESS NOTES
Sugars from 120-150   Sometimes will get to 200  \"I don't think it's working off the insulin. \"    No sob no n/v    Exam  Overweight  nad  Ht rrr no m   Lungs clear  Ext. Feet are warm  No pulses  No ulcers. A/p  1.  Insomnia, unspecified type  trazadon

## 2017-07-18 ENCOUNTER — PATIENT MESSAGE (OUTPATIENT)
Dept: FAMILY MEDICINE CLINIC | Facility: CLINIC | Age: 67
End: 2017-07-18

## 2017-07-18 VITALS — HEIGHT: 57 IN | WEIGHT: 134 LBS | BODY MASS INDEX: 28.91 KG/M2

## 2017-07-18 RX ORDER — BACLOFEN 10 MG/1
5 TABLET ORAL DAILY PRN
COMMUNITY
End: 2017-01-01

## 2017-07-18 RX ORDER — VITAMIN E 268 MG
400 CAPSULE ORAL DAILY
Status: ON HOLD | COMMUNITY
End: 2018-01-01

## 2017-07-18 RX ORDER — MECLIZINE HYDROCHLORIDE CHEWABLE TABLETS 25 MG/1
1 TABLET, CHEWABLE ORAL DAILY PRN
COMMUNITY
End: 2017-01-01

## 2017-07-18 RX ORDER — UBIDECARENONE/VITAMIN E MIXED 100 MG-100
1 CAPSULE ORAL DAILY
Status: ON HOLD | COMMUNITY
End: 2018-01-01

## 2017-07-18 RX ORDER — AMPICILLIN TRIHYDRATE 250 MG
1000 CAPSULE ORAL DAILY
COMMUNITY
End: 2018-01-01

## 2017-07-19 ENCOUNTER — HOSPITAL ENCOUNTER (OUTPATIENT)
Dept: INTERVENTIONAL RADIOLOGY/VASCULAR | Facility: HOSPITAL | Age: 67
Discharge: HOME | End: 2017-07-19
Attending: INTERNAL MEDICINE
Payer: MEDICARE

## 2017-07-20 NOTE — TELEPHONE ENCOUNTER
From: Kenrick Clements  To: Nikhil Amanda DO  Sent: 7/18/2017 11:12 AM CDT  Subject: Other    PROCEDURE CANCELLED. Dr Ulysses Malay wants to see me first.  Too long since he saw me. (1/25)   Talk about last minute. See him 7/25th. Love ya!

## 2017-07-25 ENCOUNTER — PATIENT MESSAGE (OUTPATIENT)
Dept: FAMILY MEDICINE CLINIC | Facility: CLINIC | Age: 67
End: 2017-07-25

## 2017-07-25 ENCOUNTER — PRIOR ORIGINAL RECORDS (OUTPATIENT)
Dept: OTHER | Age: 67
End: 2017-07-25

## 2017-07-28 ENCOUNTER — TELEPHONE (OUTPATIENT)
Dept: INTERNAL MEDICINE CLINIC | Facility: CLINIC | Age: 67
End: 2017-07-28

## 2017-07-28 NOTE — TELEPHONE ENCOUNTER
From: Bashir Bennett  To: Alejandra Mckeon DO  Sent: 7/25/2017 8:03 AM CDT  Subject: Prescription Question    Dear Dr Khari Kang,    Update. .. I am now at Metformin 500 mg 2Xs a day. I started at 10 u Lantus and decreasing.    I now today I will go to 6u Lantus

## 2017-07-28 NOTE — TELEPHONE ENCOUNTER
Dr. Elsa Zuñiga. Chris/Dr. Alicia Mcintosh was 110 this am at 6 am, can't sleep well. Sugars have been alot lower, Lantus down to 5 units, states  is aware. Patient states  [facility] says to Eladio him about what she's doing.    before bed, st

## 2017-07-28 NOTE — TELEPHONE ENCOUNTER
Questions regarding Trazodone - should she eat before taking the medication, or not? Advised her to take with small snack before bed.

## 2017-08-01 ENCOUNTER — PRIOR ORIGINAL RECORDS (OUTPATIENT)
Dept: OTHER | Age: 67
End: 2017-08-01

## 2017-08-02 NOTE — TELEPHONE ENCOUNTER
See below from 7/25/17 patient email encounter.      July 28, 2017   4:55 PM   Brad Christianson DO routed this conversation to DO Malina Ferreira RN   to Bashir Bennett        11:23 AM   Diamond Luong message has been forwarded to  I am now at Metformin 500 mg 2Xs a day. I started at 10 u Lantus and decreasing.   Hiro Pike now today I will go to 6u Lantus  -because night time now has been too low.  80 Sunday. 80 yesterday.  Yes 90 is too low for me so I took Glucose tab and found it went u

## 2017-08-03 ENCOUNTER — ANTI-COAG VISIT (OUTPATIENT)
Dept: INTERNAL MEDICINE CLINIC | Facility: CLINIC | Age: 67
End: 2017-08-03

## 2017-08-03 DIAGNOSIS — I73.9 PVD (PERIPHERAL VASCULAR DISEASE) (HCC): ICD-10-CM

## 2017-08-03 DIAGNOSIS — I73.9 PERIPHERAL VASCULAR DISEASE (HCC): ICD-10-CM

## 2017-08-03 LAB — INR: 2.1 (ref 2–3)

## 2017-08-04 ENCOUNTER — TELEPHONE (OUTPATIENT)
Dept: FAMILY MEDICINE CLINIC | Facility: CLINIC | Age: 67
End: 2017-08-04

## 2017-08-04 RX ORDER — PROMETHAZINE HYDROCHLORIDE 25 MG/1
3 TABLET ORAL AS NEEDED
Qty: 100 VIAL | Refills: 11 | Status: SHIPPED | OUTPATIENT
Start: 2017-08-04 | End: 2018-01-01

## 2017-08-04 NOTE — TELEPHONE ENCOUNTER
Need New Rx - Pt. requesting to get Saline Vials for Nebululizer and Albuteral Vials for Nebulizer, as pt is having a prob w/albuteral concentrated.

## 2017-08-04 NOTE — TELEPHONE ENCOUNTER
Pharmacy calling regarding albuterol Sulfate (5 MG/ML) 0.5% Inhalation Nebu Soln and sodium Chloride 0.9 % Inhalation Nebu Soln. Pharmacy wants to know if pt can get medication that is pre mixed already. .. please advise

## 2017-08-05 NOTE — TELEPHONE ENCOUNTER
Called to inform pt but states already received it from pharmacy. Denies further questions/concerns at this time.

## 2017-08-09 ENCOUNTER — HOSPITAL ENCOUNTER (OUTPATIENT)
Age: 67
Discharge: HOME | DRG: 603 | End: 2017-08-09
Attending: FAMILY MEDICINE
Payer: MEDICARE

## 2017-08-09 VITALS
WEIGHT: 134 LBS | DIASTOLIC BLOOD PRESSURE: 53 MMHG | RESPIRATION RATE: 16 BRPM | TEMPERATURE: 98 F | BODY MASS INDEX: 29 KG/M2 | OXYGEN SATURATION: 96 % | SYSTOLIC BLOOD PRESSURE: 115 MMHG | HEART RATE: 77 BPM

## 2017-08-09 DIAGNOSIS — L01.03 BULLOUS IMPETIGO: Primary | ICD-10-CM

## 2017-08-09 DIAGNOSIS — L03.032 CELLULITIS OF TOE OF LEFT FOOT: ICD-10-CM

## 2017-08-09 PROCEDURE — 99213 OFFICE O/P EST LOW 20 MIN: CPT

## 2017-08-09 RX ORDER — DOXYCYCLINE HYCLATE 100 MG/1
100 CAPSULE ORAL 2 TIMES DAILY
Qty: 20 CAPSULE | Refills: 0 | Status: ON HOLD | OUTPATIENT
Start: 2017-08-09 | End: 2017-08-15

## 2017-08-09 RX ORDER — GINSENG 100 MG
CAPSULE ORAL ONCE
Status: COMPLETED | OUTPATIENT
Start: 2017-08-09 | End: 2017-08-09

## 2017-08-09 NOTE — TELEPHONE ENCOUNTER
Spoke to [facility] -stated Pt p/u saline  Advised they sent message about Premixed Pt was already on separate meds but OK with  either way

## 2017-08-10 LAB
CHOLESTEROL, TOTAL: 172 MG/DL
HDL CHOLESTEROL: 66 MG/DL
HEMOGLOBIN A1C: 7 %
LDL CHOLESTEROL: 91 MG/DL
THYROID STIMULATING HORMONE: 1.69 MLU/L
TRIGLYCERIDES: 74 MG/DL

## 2017-08-10 NOTE — ED INITIAL ASSESSMENT (HPI)
H/o polio, wheelchair bound, here for blister check to left 3td toe, states she hit foot against a door 1 week ago

## 2017-08-10 NOTE — ED PROVIDER NOTES
Patient Seen in: [facility]    History   Patient presents with:  Musculoskeletal Problem    Stated Complaint: toe injury           Per Rn:  h/o polio, wheelchair bound, here for blister check to left 3td toe, states she h without macular edema ([facility] 1/14/2016   • Type 2 diabetes mellitus ([facility]        Past Surgical History:  No date: ANGIOGRAM  No date: ANGIOPLASTY (CORONARY)  3/4/16: AUTOMATIC EXTERNAL DEFIBRILLATOR, WITH INTEGRA* Left  No date: CARDIAC DEFIBRILLATOR PLACEM capsule by mouth daily as needed. TraZODone HCl 50 MG Oral Tab,  Take 1 tablet (50 mg total) by mouth nightly.    LANTUS SOLOSTAR 100 UNIT/ML Subcutaneous Solution Pen-injector,  10 u sq daily   MetFORMIN HCl  MG Oral Tablet 24 Hr,  Take 2 tablets ( DAY  Patient taking differently: TAKE 1 TABLET BY MOUTH EVERY NIGHT   carvedilol (COREG) 3.125 MG Oral Tab,  Take 1 tablet (3.125 mg total) by mouth 2 (two) times daily with meals.    Mometasone Furo-Formoterol Fum (DULERA) 200-5 MCG/ACT Inhalation Aerosol, HPI.    Physical Exam   ED Triage Vitals [08/09/17 1905]  BP: 115/53  Pulse: 77  Resp: 16  Temp: 98.2 °F (36.8 °C)  Temp src: Oral  SpO2: 96 %  O2 Device: None (Room air)    Current:/53   Pulse 77   Temp 98.2 °F (36.8 °C) (Oral)   Resp 16   Wt 60.8 k

## 2017-08-12 ENCOUNTER — HOSPITAL ENCOUNTER (OUTPATIENT)
Age: 67
Discharge: OTHER INSTITUTION | DRG: 603 | End: 2017-08-12
Attending: EMERGENCY MEDICINE
Payer: MEDICARE

## 2017-08-12 ENCOUNTER — APPOINTMENT (OUTPATIENT)
Dept: GENERAL RADIOLOGY | Facility: HOSPITAL | Age: 67
DRG: 603 | End: 2017-08-12
Attending: EMERGENCY MEDICINE
Payer: MEDICARE

## 2017-08-12 ENCOUNTER — HOSPITAL ENCOUNTER (INPATIENT)
Facility: HOSPITAL | Age: 67
LOS: 3 days | Discharge: HOME | DRG: 603 | End: 2017-08-15
Attending: EMERGENCY MEDICINE | Admitting: HOSPITALIST
Payer: MEDICARE

## 2017-08-12 VITALS
TEMPERATURE: 98 F | OXYGEN SATURATION: 100 % | HEART RATE: 76 BPM | BODY MASS INDEX: 29 KG/M2 | WEIGHT: 134 LBS | SYSTOLIC BLOOD PRESSURE: 124 MMHG | DIASTOLIC BLOOD PRESSURE: 61 MMHG | RESPIRATION RATE: 18 BRPM

## 2017-08-12 DIAGNOSIS — L03.119 CELLULITIS OF FOOT: Primary | ICD-10-CM

## 2017-08-12 DIAGNOSIS — R73.9 HYPERGLYCEMIA: ICD-10-CM

## 2017-08-12 DIAGNOSIS — L03.116 CELLULITIS OF LEFT LOWER EXTREMITY: Primary | ICD-10-CM

## 2017-08-12 PROBLEM — L02.619 CELLULITIS AND ABSCESS OF FOOT: Status: ACTIVE | Noted: 2017-08-12

## 2017-08-12 LAB
ANION GAP SERPL CALC-SCNC: 9 MMOL/L (ref 0–18)
BASOPHILS # BLD: 0 K/UL (ref 0–0.2)
BASOPHILS NFR BLD: 1 %
BUN SERPL-MCNC: 12 MG/DL (ref 8–20)
BUN/CREAT SERPL: 22.6 (ref 10–20)
CALCIUM SERPL-MCNC: 9.2 MG/DL (ref 8.5–10.5)
CHLORIDE SERPL-SCNC: 101 MMOL/L (ref 95–110)
CO2 SERPL-SCNC: 28 MMOL/L (ref 22–32)
CREAT SERPL-MCNC: 0.53 MG/DL (ref 0.5–1.5)
EOSINOPHIL # BLD: 0.3 K/UL (ref 0–0.7)
EOSINOPHIL NFR BLD: 4 %
ERYTHROCYTE [DISTWIDTH] IN BLOOD BY AUTOMATED COUNT: 14.2 % (ref 11–15)
GLUCOSE SERPL-MCNC: 259 MG/DL (ref 70–99)
HCT VFR BLD AUTO: 41 % (ref 35–48)
HGB BLD-MCNC: 13.6 G/DL (ref 12–16)
INR BLD: 2.1 (ref 0.9–1.2)
LACTATE SERPL-SCNC: 1.9 MMOL/L (ref 0.5–2.2)
LYMPHOCYTES # BLD: 1.4 K/UL (ref 1–4)
LYMPHOCYTES NFR BLD: 21 %
MCH RBC QN AUTO: 30.9 PG (ref 27–32)
MCHC RBC AUTO-ENTMCNC: 33.3 G/DL (ref 32–37)
MCV RBC AUTO: 92.8 FL (ref 80–100)
MONOCYTES # BLD: 0.7 K/UL (ref 0–1)
MONOCYTES NFR BLD: 10 %
NEUTROPHILS # BLD AUTO: 4.1 K/UL (ref 1.8–7.7)
NEUTROPHILS NFR BLD: 64 %
OSMOLALITY UR CALC.SUM OF ELEC: 295 MOSM/KG (ref 275–295)
PLATELET # BLD AUTO: 168 K/UL (ref 140–400)
PMV BLD AUTO: 10.6 FL (ref 7.4–10.3)
POTASSIUM SERPL-SCNC: 3.4 MMOL/L (ref 3.3–5.1)
PROTHROMBIN TIME: 22.9 SECONDS (ref 11.8–14.5)
RBC # BLD AUTO: 4.42 M/UL (ref 3.7–5.4)
SODIUM SERPL-SCNC: 138 MMOL/L (ref 136–144)
WBC # BLD AUTO: 6.4 K/UL (ref 4–11)

## 2017-08-12 PROCEDURE — 73630 X-RAY EXAM OF FOOT: CPT | Performed by: EMERGENCY MEDICINE

## 2017-08-12 PROCEDURE — 99213 OFFICE O/P EST LOW 20 MIN: CPT

## 2017-08-12 RX ORDER — SODIUM CHLORIDE 9 MG/ML
INJECTION, SOLUTION INTRAVENOUS CONTINUOUS
Status: DISCONTINUED | OUTPATIENT
Start: 2017-08-12 | End: 2017-08-15

## 2017-08-12 RX ORDER — ONDANSETRON 2 MG/ML
4 INJECTION INTRAMUSCULAR; INTRAVENOUS EVERY 6 HOURS PRN
Status: DISCONTINUED | OUTPATIENT
Start: 2017-08-12 | End: 2017-08-15

## 2017-08-12 RX ORDER — HEPARIN SODIUM 5000 [USP'U]/ML
5000 INJECTION, SOLUTION INTRAVENOUS; SUBCUTANEOUS EVERY 12 HOURS SCHEDULED
Status: DISCONTINUED | OUTPATIENT
Start: 2017-08-12 | End: 2017-08-13

## 2017-08-12 RX ORDER — SODIUM CHLORIDE 0.9 % (FLUSH) 0.9 %
3 SYRINGE (ML) INJECTION AS NEEDED
Status: DISCONTINUED | OUTPATIENT
Start: 2017-08-12 | End: 2017-08-15

## 2017-08-12 RX ORDER — LORATADINE 10 MG/1
5 TABLET ORAL AS NEEDED
Status: ON HOLD | COMMUNITY
End: 2017-08-13 | Stop reason: CLARIF

## 2017-08-12 RX ORDER — DEXTROSE MONOHYDRATE 25 G/50ML
50 INJECTION, SOLUTION INTRAVENOUS AS NEEDED
Status: DISCONTINUED | OUTPATIENT
Start: 2017-08-12 | End: 2017-08-15

## 2017-08-12 NOTE — ED PROVIDER NOTES
Patient Seen in: [facility]    History   Patient presents with:  Abscess (integumentary)    Stated Complaint: Revisit, Abcess Removal    HPI    46-year-old female with multiple medical problems and allergies to multiple a conditions(799.89) 2010    Post Polio   • PE (pulmonary embolism)    • Peripheral vascular disease ([facility] 2008   • Pneumonia due to organism    • Polio 1950    As Infant   • Pulmonary embolism ([facility]    • Sacroiliitis ([facility]    • Stroke (cerebrum) ([facility] 2005 Oral Cap,  Take 1 capsule by mouth daily as needed. Cranberry 125 MG Oral Tab,  Take 1 capsule by mouth daily as needed. Desloratadine-Pseudoephed ER 5-240 MG Oral Tablet 24 Hr,  Take 1 tablet by mouth daily as needed.    vitamin E 400 UNITS Oral Cap, puffs into the lungs every 4 (four) hours as needed. For wheezing  Patient taking differently: Inhale 2 puffs into the lungs 2 (two) times daily.  For wheezing    Alcohol Swabs ([facility] PREP) 70 % Does not apply Pads,     GLUCAGON EMERGENCY 1 MG Injection Kit, reviewed from today and agreed except as otherwise stated in HPI.     Physical Exam   ED Triage Vitals [08/12/17 1751]  BP: 124/61  Pulse: 76  Resp: 18  Temp: 98.2 °F (36.8 °C)  Temp src: Oral  SpO2: 100 %  O2 Device: None (Room air)    Current:/61

## 2017-08-12 NOTE — ED INITIAL ASSESSMENT (HPI)
Pt her to follow up with area of irritation to 3rd toe left foot. States she is currently taking antibiotics but feels like area has increased redness and was more painful. Denies fever.  Pt also concerned she may be getting sick and feels like she may be w

## 2017-08-13 LAB
ANION GAP SERPL CALC-SCNC: 5 MMOL/L (ref 0–18)
BASOPHILS # BLD: 0 K/UL (ref 0–0.2)
BASOPHILS NFR BLD: 1 %
BUN SERPL-MCNC: 12 MG/DL (ref 8–20)
BUN/CREAT SERPL: 26.7 (ref 10–20)
CALCIUM SERPL-MCNC: 8.3 MG/DL (ref 8.5–10.5)
CHLORIDE SERPL-SCNC: 109 MMOL/L (ref 95–110)
CO2 SERPL-SCNC: 27 MMOL/L (ref 22–32)
CREAT SERPL-MCNC: 0.45 MG/DL (ref 0.5–1.5)
EOSINOPHIL # BLD: 0.2 K/UL (ref 0–0.7)
EOSINOPHIL NFR BLD: 4 %
ERYTHROCYTE [DISTWIDTH] IN BLOOD BY AUTOMATED COUNT: 13.8 % (ref 11–15)
GLUCOSE BLDC GLUCOMTR-MCNC: 118 MG/DL (ref 70–99)
GLUCOSE BLDC GLUCOMTR-MCNC: 145 MG/DL (ref 70–99)
GLUCOSE BLDC GLUCOMTR-MCNC: 156 MG/DL (ref 70–99)
GLUCOSE BLDC GLUCOMTR-MCNC: 176 MG/DL (ref 70–99)
GLUCOSE BLDC GLUCOMTR-MCNC: 195 MG/DL (ref 70–99)
GLUCOSE SERPL-MCNC: 158 MG/DL (ref 70–99)
HBA1C MFR BLD: 6.1 % (ref 4–6)
HCT VFR BLD AUTO: 35.6 % (ref 35–48)
HGB BLD-MCNC: 12 G/DL (ref 12–16)
LYMPHOCYTES # BLD: 1.6 K/UL (ref 1–4)
LYMPHOCYTES NFR BLD: 29 %
MCH RBC QN AUTO: 31.3 PG (ref 27–32)
MCHC RBC AUTO-ENTMCNC: 33.6 G/DL (ref 32–37)
MCV RBC AUTO: 92.9 FL (ref 80–100)
MONOCYTES # BLD: 0.5 K/UL (ref 0–1)
MONOCYTES NFR BLD: 9 %
NEUTROPHILS # BLD AUTO: 3.1 K/UL (ref 1.8–7.7)
NEUTROPHILS NFR BLD: 56 %
OSMOLALITY UR CALC.SUM OF ELEC: 295 MOSM/KG (ref 275–295)
PLATELET # BLD AUTO: 148 K/UL (ref 140–400)
PMV BLD AUTO: 10 FL (ref 7.4–10.3)
POTASSIUM SERPL-SCNC: 3.9 MMOL/L (ref 3.3–5.1)
RBC # BLD AUTO: 3.83 M/UL (ref 3.7–5.4)
SODIUM SERPL-SCNC: 141 MMOL/L (ref 136–144)
WBC # BLD AUTO: 5.4 K/UL (ref 4–11)

## 2017-08-13 PROCEDURE — 99223 1ST HOSP IP/OBS HIGH 75: CPT | Performed by: HOSPITALIST

## 2017-08-13 RX ORDER — FUROSEMIDE 20 MG/1
20 TABLET ORAL DAILY
Status: DISCONTINUED | OUTPATIENT
Start: 2017-08-13 | End: 2017-08-15

## 2017-08-13 RX ORDER — ROSUVASTATIN CALCIUM 20 MG/1
20 TABLET, COATED ORAL NIGHTLY
Status: ON HOLD | COMMUNITY
End: 2018-01-01

## 2017-08-13 RX ORDER — LISINOPRIL 2.5 MG/1
2.5 TABLET ORAL DAILY
Status: DISCONTINUED | OUTPATIENT
Start: 2017-08-13 | End: 2017-08-15

## 2017-08-13 RX ORDER — NITROGLYCERIN 0.4 MG/1
0.4 TABLET SUBLINGUAL EVERY 5 MIN PRN
Status: DISCONTINUED | OUTPATIENT
Start: 2017-08-13 | End: 2017-08-15

## 2017-08-13 RX ORDER — ROSUVASTATIN CALCIUM 20 MG/1
20 TABLET, COATED ORAL NIGHTLY
Status: DISCONTINUED | OUTPATIENT
Start: 2017-08-13 | End: 2017-08-15

## 2017-08-13 RX ORDER — BACLOFEN 10 MG/1
5 TABLET ORAL 3 TIMES DAILY PRN
Status: DISCONTINUED | OUTPATIENT
Start: 2017-08-13 | End: 2017-08-15

## 2017-08-13 RX ORDER — WARFARIN SODIUM 7.5 MG/1
7.5 TABLET ORAL SEE ADMIN INSTRUCTIONS
COMMUNITY
End: 2017-01-01

## 2017-08-13 RX ORDER — CARVEDILOL 3.12 MG/1
3.12 TABLET ORAL 2 TIMES DAILY WITH MEALS
Status: DISCONTINUED | OUTPATIENT
Start: 2017-08-13 | End: 2017-08-15

## 2017-08-13 RX ORDER — KETOTIFEN FUMARATE 0.35 MG/ML
1 SOLUTION/ DROPS OPHTHALMIC 2 TIMES DAILY
Status: DISCONTINUED | OUTPATIENT
Start: 2017-08-13 | End: 2017-08-15

## 2017-08-13 RX ORDER — TRAZODONE HYDROCHLORIDE 50 MG/1
50 TABLET ORAL NIGHTLY
Status: DISCONTINUED | OUTPATIENT
Start: 2017-08-13 | End: 2017-08-15

## 2017-08-13 RX ORDER — WARFARIN SODIUM 5 MG/1
5 TABLET ORAL
Status: DISCONTINUED | OUTPATIENT
Start: 2017-08-13 | End: 2017-08-14

## 2017-08-13 RX ORDER — CETIRIZINE HYDROCHLORIDE 10 MG/1
10 TABLET ORAL DAILY
Status: DISCONTINUED | OUTPATIENT
Start: 2017-08-13 | End: 2017-08-15

## 2017-08-13 RX ORDER — SACCHAROMYCES BOULARDII 250 MG
250 CAPSULE ORAL 2 TIMES DAILY
Status: DISCONTINUED | OUTPATIENT
Start: 2017-08-13 | End: 2017-08-15

## 2017-08-13 RX ORDER — MULTIPLE VITAMINS W/ MINERALS TAB 9MG-400MCG
1 TAB ORAL DAILY
Status: DISCONTINUED | OUTPATIENT
Start: 2017-08-13 | End: 2017-08-15

## 2017-08-13 RX ORDER — WARFARIN SODIUM 7.5 MG/1
7.5 TABLET ORAL
Status: DISCONTINUED | OUTPATIENT
Start: 2017-08-16 | End: 2017-08-14

## 2017-08-13 RX ORDER — VITAMIN C
1 TAB ORAL DAILY
Status: DISCONTINUED | OUTPATIENT
Start: 2017-08-13 | End: 2017-08-15

## 2017-08-13 RX ORDER — ASCORBIC ACID 500 MG
1000 TABLET ORAL DAILY
Status: DISCONTINUED | OUTPATIENT
Start: 2017-08-13 | End: 2017-08-15

## 2017-08-13 RX ORDER — MECLIZINE HYDROCHLORIDE 25 MG/1
25 TABLET ORAL DAILY PRN
Status: DISCONTINUED | OUTPATIENT
Start: 2017-08-13 | End: 2017-08-15

## 2017-08-13 RX ORDER — POTASSIUM CHLORIDE 20 MEQ/1
40 TABLET, EXTENDED RELEASE ORAL EVERY 4 HOURS
Status: COMPLETED | OUTPATIENT
Start: 2017-08-13 | End: 2017-08-13

## 2017-08-13 RX ORDER — VITAMIN E 268 MG
400 CAPSULE ORAL DAILY
Status: DISCONTINUED | OUTPATIENT
Start: 2017-08-13 | End: 2017-08-15

## 2017-08-13 RX ORDER — ASPIRIN 81 MG/1
81 TABLET, CHEWABLE ORAL DAILY
Status: DISCONTINUED | OUTPATIENT
Start: 2017-08-13 | End: 2017-08-15

## 2017-08-13 RX ORDER — RANOLAZINE 500 MG/1
500 TABLET, EXTENDED RELEASE ORAL 2 TIMES DAILY
Status: DISCONTINUED | OUTPATIENT
Start: 2017-08-13 | End: 2017-08-15

## 2017-08-13 RX ORDER — CLOPIDOGREL BISULFATE 75 MG/1
75 TABLET ORAL
Status: DISCONTINUED | OUTPATIENT
Start: 2017-08-13 | End: 2017-08-15

## 2017-08-13 NOTE — H&P
[facility]    PATIENT'S NAME: Darby Seannakia   ATTENDING PHYSICIAN: Carson Maciel MD   PATIENT ACCOUNT#:   [id number]    LOCATION:  [address] [place] [address] Road #:   [id number]       YOB: 1950  ADMISSION DATE:       08/12 triple arthrodesis on both of her ankles. She no longer ambulates and is primarily wheelchair bound.   2.   She has an extremely complicated cardiac history and was admitted to [facility] in March of this year for mitral clip placement because of arlyn daily, aspirin 81 mg daily, vitamin B complex 1 tablet daily, baclofen 5 mg daily, carvedilol 3.125 mg twice a day as needed with meals, vitamin D 4000 units (Tuesday, Wednesday, Thursday, Saturday) and 2000 units on Monday and Friday, clopidogrel 75 mg ni is , but lives with her ex- who is an alcoholic. He has apparently been working on this and has been remaining sober most of the time. He does have a history of traumatic brain injury as well.     REVIEW OF SYSTEMS:  Twelve systems were rev was previously mentioned. ASSESSMENT AND PLAN:    1. Cellulitis and bullous lesion of the left third toe and forefoot. We will continue antibiotics as recommended by Infectious Disease with meropenem and vancomycin.   MRI of the foot is ordered and bound status. The patient states she is doing well at home with the assistance of her ex-. The patient's current clinical status and proposed treatment plan were discussed with her. All of her questions were answered.   She agreed with the plan

## 2017-08-13 NOTE — PROGRESS NOTES
[facility] dosing service    Initial Pharmacokinetic Consult for Vancomycin Dosing     Lynnette Remy is a 73year old female who is being treated for cellulitis.   Pharmacy has been asked to dose Vancomycin by Dr. Annel Gannon    She is allergic to aminoglycos daily while on Vancomycin to assess renal function. 4.  Pharmacy will follow and monitor renal function changes, toxicity and efficacy. Pharmacy will continue to follow her. We appreciate the opportunity to assist in her care.     Ricardo Akbar,

## 2017-08-13 NOTE — ED NOTES
Patient stubbed her left third toe two weeks ago, did not seek treatment, but that night, developed a blister. Last week, she went to immediate , and was given doxycycline.  Since then the toe has been getting redder, and went back to immediate care today a

## 2017-08-13 NOTE — ED PROVIDER NOTES
Patient Seen in: [facility] AND [facility] Emergency Department    History   Patient presents with:  Cellulitis (integumentary, infectious): r/o cellulitis    Stated Complaint:     HPI    Patient is 43-year-old female who presents with left foot infection ×2-3 w disease ([facility] 2008   • Pneumonia due to organism    • Polio 1950    As Infant   • Pulmonary embolism ([facility]    • Sacroiliitis ([facility]    • Stroke (cerebrum) ([facility] 2005   • Type 1 diabetes mellitus with mild nonproliferative diabetic retinopathy and without ma capsule by mouth daily as needed. Desloratadine-Pseudoephed ER 5-240 MG Oral Tablet 24 Hr,  Take 1 tablet by mouth daily as needed. vitamin E 400 UNITS Oral Cap,  Take 1,000 Units by mouth daily.    Meclizine HCl 25 MG Oral Chew Tab,  Chew 1 capsule by differently: Inhale 2 puffs into the lungs 2 (two) times daily.  For wheezing    Alcohol Swabs ([facility] PREP) 70 % Does not apply Pads,     GLUCAGON EMERGENCY 1 MG Injection Kit,     CLOPIDOGREL BISULFATE 75 MG Oral Tab,  TAKE 1 TABLET BY MOUTH EVERY DAY  Patie 1955]  BP: 133/56  Pulse: 79  Resp: 18  Temp: (!) 97.2 °F (36.2 °C)  Temp src: Tympanic  SpO2: 98 %  O2 Device: None (Room air)    Current:/57   Pulse 77   Temp (!) 97.2 °F (36.2 °C) (Tympanic)   Resp 18   Ht 142.2 cm (4' 8\")   Wt 61.2 kg   SpO2 98% vanco/merropenem. bcx pending. D/w patient need for admission and amenable to plan.      Left foot 3 views      IMPRESSION:    Generalized osteopenia  Second hammertoe deformity  Hindfoot-midfoot ankylosis  Vascular calcifications  No convincing fracture

## 2017-08-13 NOTE — CONSULTS
[address] [place] [address]  TEL: [phone]  FAX: [phone]    Elisa Dent Patient Status:  Inpatient    10/13/1950 MRN [id number]   Location [facility] 5SW/SE Attending Red Cannon MD   Hosp Day # 1 PCP Power Contreras bursitis    • High blood pressure    • High cholesterol    • History of blood transfusion    • Hyperlipidemia LDL goal <70    • Hypertension, essential, benign    • IBS (irritable bowel syndrome)    • IDDM (insulin dependent diabetes mellitus) ([facility] 2011 Colonic Polyps [OTHER] Brother    • Breast Cancer Sister 61   • Skin cancer Brother    • Crohn's Disease Brother    • Breast Cancer Self 46   • Macular degeneration Neg       reports that she has never smoked.  She has never used smokeless tobacco. She repo 200-25 MCG/INH inhaler 1 puff, 1 puff, Inhalation, Daily  •  nitroGLYCERIN (NITROSTAT) SL tab 0.4 mg, 0.4 mg, Sublingual, Q5 Min PRN  •  Ketotifen Fumarate (ZADITOR) 0.025 % ophthalmic solution 1 drop, 1 drop, Both Eyes, BID  •  Potassium Bicarbonate CAPS S1, S2.  Regular rate and rhythm. Subcutaneous defibrillator  Abdomen: Soft, nontender, nondistended. Positive bowel sounds. Musculoskeletal: Full range of motion of all extremities. No swelling noted. A small blister on her third toe.   Overall, swell PVD (peripheral vascular disease) (HCC)     Mitral regurgitation     CAD (coronary artery disease), native coronary artery     Mitral insufficiency     Cellulitis and abscess of foot     Cellulitis of foot     Hyperglycemia      Left foot cellulitis.   Prob

## 2017-08-13 NOTE — ED INITIAL ASSESSMENT (HPI)
Pt sent from immediate care for further eval of left great toe blister. Pt has been on antibiotics for 1 week.  Blister has been there for 2 weeks

## 2017-08-14 LAB
GLUCOSE BLDC GLUCOMTR-MCNC: 143 MG/DL (ref 70–99)
GLUCOSE BLDC GLUCOMTR-MCNC: 144 MG/DL (ref 70–99)
GLUCOSE BLDC GLUCOMTR-MCNC: 176 MG/DL (ref 70–99)
GLUCOSE BLDC GLUCOMTR-MCNC: 203 MG/DL (ref 70–99)
INR BLD: 1.5 (ref 0.9–1.2)
PROTHROMBIN TIME: 17.6 SECONDS (ref 11.8–14.5)
VANCOMYCIN TROUGH SERPL-MCNC: 12.1 MCG/ML (ref 10–20)

## 2017-08-14 PROCEDURE — 99233 SBSQ HOSP IP/OBS HIGH 50: CPT | Performed by: HOSPITALIST

## 2017-08-14 RX ORDER — WARFARIN SODIUM 7.5 MG/1
7.5 TABLET ORAL NIGHTLY
Status: DISCONTINUED | OUTPATIENT
Start: 2017-08-14 | End: 2017-08-15

## 2017-08-14 NOTE — CM/SW NOTE
Met with patient at bedside to explain the BPCI/Medicare program. Patient agreed with phone follow up for 3 months from [address] [place] [address] after discharge from [address] [place] [address]. Patient was enrolled under DRG    603   . BPCI/Medicare letter and brochure provided.

## 2017-08-14 NOTE — PLAN OF CARE
Problem: PAIN - ADULT  Goal: Verbalizes/displays adequate comfort level or patient's stated pain goal  INTERVENTIONS:  - Encourage pt to monitor pain and request assistance  - Assess pain using appropriate pain scale  - Administer analgesics based on type appropriate  - Identify discharge learning needs (meds, wound care, etc)  - Arrange for interpreters to assist at discharge as needed  - Consider post-discharge preferences of patient/family/discharge partner  - Complete POLST form as appropriate  - Assess for areas of redness and/or skin breakdown  - Initiate interventions, skin care algorithm/standards of care as needed  Outcome: Progressing    Goal: Incision(s), wounds(s) or drain site(s) healing without S/S of infection  INTERVENTIONS:  - Assess and docu

## 2017-08-14 NOTE — PROGRESS NOTES
[facility]    Progress Note    Steven Quintanillatricia Patient Status:  Inpatient    10/13/1950 MRN [id number]   Location [facility] 5SW/SE Attending Jake Negro MD   Hosp Day # 2 PCP Roxana Estrada. DO Chris       Subjective:    [address] Inhalation Daily   • Ketotifen Fumarate  1 drop Both Eyes BID   • Ranolazine ER  500 mg Oral BID   • Rosuvastatin Calcium  20 mg Oral Nightly   • saccharomyces boulardii  250 mg Oral BID   • TraZODone HCl  50 mg Oral Nightly   • vitamin E  400 Units Oral D pending  -wound care to see    Recent mitral clip procedure with postoperative complications of pericardial tamponade and median sternotomy to repair right appendage bleeding. -doing well post-operatively    Diabetes mellitus type 2  -accu-Cheks before corin

## 2017-08-14 NOTE — WOUND PROGRESS NOTE
WOUND CARE NOTE      PLAN   Recommendations:  Dr. Ian Duke follows her as an outpatient   Turn schedules  Heels elevated using pillows, heel wedge or heel boots to offload heels  Use of lift equipment  To prevent sliding: decrease head of bed and elevate f ALT 47 05/09/2017   MG 2.1 03/18/2017   PHOS 2.1 (L) 03/15/2017

## 2017-08-14 NOTE — PROGRESS NOTES
INFECTIOUS DISEASE PROGRESS NOTE    Lorean Williams Patient Status:  Inpatient    10/13/1950 MRN [id number]   Location [facility] 5SW/SE Attending Elena Artis MD   Hosp Day # 2 PCP Thaddeus Garnett.  DO Chris     Subjective:  Patient seen and examine Mitral regurgitation     CAD (coronary artery disease), native coronary artery     Mitral insufficiency     Cellulitis and abscess of foot     Cellulitis of foot     Hyperglycemia      ASSESSMENT/PLAN:    Antibiotics:  Vancomycin, meropenem    # Left foot

## 2017-08-14 NOTE — PROGRESS NOTES
[facility] dosing service    Follow-up Pharmacokinetic Consult for Vancomycin Dosing     Terrell Jones is a 73year old female who is being treated for cellulitis. Patient is on day 3 of Vancomycin 1 gm IV Q 12 hours. Goal trough is 10-15 ug/mL.     She Pharmacy will re-check Vancomycin trough levels prior to 3rd dose. Goal trough level 10-15 ug/mL. 3.  Pharmacy will need BUN/Scr daily while on Vancomycin to assess renal function.     4.  Pharmacy will follow and monitor renal function changes, toxicit

## 2017-08-15 ENCOUNTER — APPOINTMENT (OUTPATIENT)
Dept: MRI IMAGING | Facility: HOSPITAL | Age: 67
DRG: 603 | End: 2017-08-15
Attending: HOSPITALIST
Payer: MEDICARE

## 2017-08-15 ENCOUNTER — APPOINTMENT (OUTPATIENT)
Dept: GENERAL RADIOLOGY | Facility: HOSPITAL | Age: 67
DRG: 603 | End: 2017-08-15
Attending: HOSPITALIST
Payer: MEDICARE

## 2017-08-15 VITALS
SYSTOLIC BLOOD PRESSURE: 119 MMHG | HEART RATE: 83 BPM | BODY MASS INDEX: 30.37 KG/M2 | OXYGEN SATURATION: 96 % | DIASTOLIC BLOOD PRESSURE: 68 MMHG | HEIGHT: 56 IN | TEMPERATURE: 98 F | RESPIRATION RATE: 18 BRPM | WEIGHT: 135 LBS

## 2017-08-15 LAB
ANION GAP SERPL CALC-SCNC: 3 MMOL/L (ref 0–18)
BASOPHILS # BLD: 0 K/UL (ref 0–0.2)
BASOPHILS NFR BLD: 1 %
BUN SERPL-MCNC: 12 MG/DL (ref 8–20)
BUN/CREAT SERPL: 32.4 (ref 10–20)
CALCIUM SERPL-MCNC: 8.5 MG/DL (ref 8.5–10.5)
CHLORIDE SERPL-SCNC: 111 MMOL/L (ref 95–110)
CO2 SERPL-SCNC: 27 MMOL/L (ref 22–32)
CREAT SERPL-MCNC: 0.37 MG/DL (ref 0.5–1.5)
EOSINOPHIL # BLD: 0.3 K/UL (ref 0–0.7)
EOSINOPHIL NFR BLD: 4 %
ERYTHROCYTE [DISTWIDTH] IN BLOOD BY AUTOMATED COUNT: 13.9 % (ref 11–15)
GLUCOSE BLDC GLUCOMTR-MCNC: 137 MG/DL (ref 70–99)
GLUCOSE BLDC GLUCOMTR-MCNC: 174 MG/DL (ref 70–99)
GLUCOSE SERPL-MCNC: 124 MG/DL (ref 70–99)
HCT VFR BLD AUTO: 37.2 % (ref 35–48)
HGB BLD-MCNC: 12.3 G/DL (ref 12–16)
INR BLD: 1.5 (ref 0.9–1.2)
LYMPHOCYTES # BLD: 1.3 K/UL (ref 1–4)
LYMPHOCYTES NFR BLD: 23 %
MAGNESIUM SERPL-MCNC: 1.7 MG/DL (ref 1.8–2.5)
MCH RBC QN AUTO: 30.9 PG (ref 27–32)
MCHC RBC AUTO-ENTMCNC: 33.1 G/DL (ref 32–37)
MCV RBC AUTO: 93.4 FL (ref 80–100)
MONOCYTES # BLD: 0.6 K/UL (ref 0–1)
MONOCYTES NFR BLD: 11 %
NEUTROPHILS # BLD AUTO: 3.7 K/UL (ref 1.8–7.7)
NEUTROPHILS NFR BLD: 62 %
OSMOLALITY UR CALC.SUM OF ELEC: 293 MOSM/KG (ref 275–295)
PLATELET # BLD AUTO: 147 K/UL (ref 140–400)
PMV BLD AUTO: 10.4 FL (ref 7.4–10.3)
POTASSIUM SERPL-SCNC: 3.7 MMOL/L (ref 3.3–5.1)
PROTHROMBIN TIME: 17.4 SECONDS (ref 11.8–14.5)
RBC # BLD AUTO: 3.98 M/UL (ref 3.7–5.4)
SODIUM SERPL-SCNC: 141 MMOL/L (ref 136–144)
WBC # BLD AUTO: 5.9 K/UL (ref 4–11)

## 2017-08-15 PROCEDURE — 73718 MRI LOWER EXTREMITY W/O DYE: CPT | Performed by: HOSPITALIST

## 2017-08-15 PROCEDURE — 99239 HOSP IP/OBS DSCHRG MGMT >30: CPT | Performed by: HOSPITALIST

## 2017-08-15 PROCEDURE — 73630 X-RAY EXAM OF FOOT: CPT | Performed by: HOSPITALIST

## 2017-08-15 RX ORDER — POTASSIUM CHLORIDE 20 MEQ/1
40 TABLET, EXTENDED RELEASE ORAL ONCE
Status: COMPLETED | OUTPATIENT
Start: 2017-08-15 | End: 2017-08-15

## 2017-08-15 RX ORDER — MAGNESIUM OXIDE 400 MG (241.3 MG MAGNESIUM) TABLET
400 TABLET ONCE
Status: COMPLETED | OUTPATIENT
Start: 2017-08-15 | End: 2017-08-15

## 2017-08-15 NOTE — DISCHARGE SUMMARY
[facility]    Discharge Summary    Tony Schulte Patient Status:  Inpatient    10/13/1950 MRN [id number]   Location [facility] 5SW/SE Attending Luz Hill MD   Hosp Day # 3 PCP Amaya Rose.  DO Chris     Date of Admission (coronary artery disease), native coronary artery     Mitral insufficiency     Cellulitis and abscess of foot     Cellulitis of foot     Hyperglycemia      Reason for Admission: Redness and pain in the left foot and left third toe     Physical Exam:   08/1 of a diabetic ulcer that did not heal.  Dr. Hipolito Caruso is her podiatrist.  She says that she had chills with severe sweats that can occur throughout the day, but sometimes occur at night as well.   Evaluation in the emergency room included an x-ray of the left prophylaxis.   Cont coumadin    Consultations: ID    Procedures: None    Complications: None    Discharge Condition: stable    Discharge Medications:      Discharge Medications      CHANGE how you take these medications      Instructions Prescription detail changed:  · when to take this  · additional instructions      Take 1 tablet (5 mg total) by mouth nightly.    Quantity:  30 tablet  Refills:  3        CONTINUE taking these medications      Instructions Prescription details   acetaminophen 325 MG Tabs  Comm Meclizine HCl 25 MG Chew      Chew 1 capsule by mouth daily as needed. Refills:  0     Mometasone Furo-Formoterol Fum 200-5 MCG/ACT Aero  Commonly known as:  DULERA      Inhale 2 puffs into the lungs 2 (two) times daily.  Rinse mouth afterwards   Early Xiang patient's questions spending >30 minutes with patient in well over half time in face-to-face discussion of further evaluation and therapy. Refugio Dave  8/15/2017  2:56 PM

## 2017-08-15 NOTE — PLAN OF CARE
Problem: PAIN - ADULT  Goal: Verbalizes/displays adequate comfort level or patient's stated pain goal  INTERVENTIONS:  - Encourage pt to monitor pain and request assistance  - Assess pain using appropriate pain scale  - Administer analgesics based on type needed  - Consider post-discharge preferences of patient/family/discharge partner  - Complete POLST form as appropriate  - Assess patient's ability to be responsible for managing their own health  - Refer to Case Management Department for coordinating disc Progressing    Goal: Incision(s), wounds(s) or drain site(s) healing without S/S of infection  INTERVENTIONS:  - Assess and document risk factors for pressure ulcer development  - Assess and document skin integrity  - Assess and document dressing/incision,

## 2017-08-15 NOTE — PLAN OF CARE
Problem: PAIN - ADULT  Goal: Verbalizes/displays adequate comfort level or patient's stated pain goal  INTERVENTIONS:  - Encourage pt to monitor pain and request assistance  - Assess pain using appropriate pain scale  - Administer analgesics based on type interpreters to assist at discharge as needed  - Consider post-discharge preferences of patient/family/discharge partner  - Complete POLST form as appropriate  - Assess patient's ability to be responsible for managing their own health  - Refer to [facility] Initiate interventions, skin care algorithm/standards of care as needed   Outcome: Adequate for Discharge    Goal: Incision(s), wounds(s) or drain site(s) healing without S/S of infection  INTERVENTIONS:  - Assess and document risk factors for pressure ulc

## 2017-08-15 NOTE — PROGRESS NOTES
INFECTIOUS DISEASE PROGRESS NOTE    Wilfredonghia Sabrina Patient Status:  Inpatient    10/13/1950 MRN [id number]   Location [facility] 5SW/SE Attending Vi Anderson MD   Hosp Day # 3 PCP Phu Berger.  DO Chris     Subjective:  Patient seen and examine Seborrheic keratoses     Acute chest pain     PVD (peripheral vascular disease) (HCC)     Mitral regurgitation     CAD (coronary artery disease), native coronary artery     Mitral insufficiency     Cellulitis and abscess of foot     Cellulitis of foot

## 2017-08-16 ENCOUNTER — TELEPHONE (OUTPATIENT)
Dept: INTERNAL MEDICINE UNIT | Facility: HOSPITAL | Age: 67
End: 2017-08-16

## 2017-08-16 ENCOUNTER — PATIENT OUTREACH (OUTPATIENT)
Dept: FAMILY MEDICINE CLINIC | Facility: CLINIC | Age: 67
End: 2017-08-16

## 2017-08-16 ENCOUNTER — TELEPHONE (OUTPATIENT)
Dept: MEDSURG UNIT | Facility: HOSPITAL | Age: 67
End: 2017-08-16

## 2017-08-16 RX ORDER — MAGNESIUM GLUCONATE 30 MG(550)
1 TABLET ORAL DAILY
Status: ON HOLD | COMMUNITY
End: 2018-01-01

## 2017-08-16 NOTE — TELEPHONE ENCOUNTER
HOSPITALIST RN-TELEPHONE NOTE    Per Dr Cherie Dinh pt to follow up with INR on 8/17/17. Call made to pt to notify of instructions to follow up tomorrow. Per pt she draws her own PT/INR at home and calls results to [address] [place] [address] [place].      Communicati

## 2017-08-16 NOTE — PROGRESS NOTES
Initial Post Discharge Follow Up   Discharge Date: 8/15/17  Contact Date: 8/16/2017    Consent Verification:  Assessment Completed With: Patient  HIPAA Verified? Yes    1.  Tell me why you were in the hospital?  I had a blister on my 3rd toe on the left fo needed. Disp:  Rfl:    TraZODone HCl 50 MG Oral Tab Take 1 tablet (50 mg total) by mouth nightly.  (Patient taking differently: Take 25 mg by mouth nightly.  ) Disp: 90 tablet Rfl: 1   LANTUS SOLOSTAR 100 UNIT/ML Subcutaneous Solution Pen-injector 10 u sq d 11   Albuterol Sulfate HFA (VENTOLIN HFA) 108 (90 BASE) MCG/ACT Inhalation Aero Soln Inhale 2 puffs into the lungs every 4 (four) hours as needed. For wheezing (Patient taking differently: Inhale 2 puffs into the lungs 2 (two) times daily.  For wheezing ) D free.   DISCUSSED THE DISCOUNT DRUG PROGRAM AT [facility] AND MANY OF THE MEDICATIONS PT IS ON ARE COVERED ON THIS PROGRAM. DIRECTED PT TO THE [facility] WEBSITE TO REVIEW WHAT SHE CAN GET FOR $4/30 DAY SUPPLY OR $10/90 DAY SUPPLY.       9. Does the d [address]  [phone]           Interventions: TCM outreach completed. Provided pt with information on low cost prescriptions through the [facility] program.  Medication reconcilation completed.  Pt is providing dressing zamora

## 2017-08-17 ENCOUNTER — OFFICE VISIT (OUTPATIENT)
Dept: FAMILY MEDICINE CLINIC | Facility: CLINIC | Age: 67
End: 2017-08-17

## 2017-08-17 ENCOUNTER — MED REC SCAN ONLY (OUTPATIENT)
Dept: FAMILY MEDICINE CLINIC | Facility: CLINIC | Age: 67
End: 2017-08-17

## 2017-08-17 VITALS
BODY MASS INDEX: 39 KG/M2 | WEIGHT: 175 LBS | SYSTOLIC BLOOD PRESSURE: 126 MMHG | TEMPERATURE: 98 F | DIASTOLIC BLOOD PRESSURE: 78 MMHG | HEART RATE: 85 BPM

## 2017-08-17 DIAGNOSIS — E78.2 MIXED HYPERLIPIDEMIA: ICD-10-CM

## 2017-08-17 DIAGNOSIS — M65.30 TRIGGER FINGER, UNSPECIFIED FINGER, UNSPECIFIED LATERALITY: ICD-10-CM

## 2017-08-17 DIAGNOSIS — E11.8 CONTROLLED TYPE 2 DIABETES MELLITUS WITH COMPLICATION, WITHOUT LONG-TERM CURRENT USE OF INSULIN (HCC): Primary | ICD-10-CM

## 2017-08-17 DIAGNOSIS — I34.1 MITRAL VALVE PROLAPSE: ICD-10-CM

## 2017-08-17 DIAGNOSIS — L03.032 CELLULITIS OF TOE OF LEFT FOOT: ICD-10-CM

## 2017-08-17 DIAGNOSIS — I73.9 PVD (PERIPHERAL VASCULAR DISEASE) (HCC): ICD-10-CM

## 2017-08-17 PROCEDURE — 99214 OFFICE O/P EST MOD 30 MIN: CPT | Performed by: FAMILY MEDICINE

## 2017-08-17 PROCEDURE — G0463 HOSPITAL OUTPT CLINIC VISIT: HCPCS | Performed by: FAMILY MEDICINE

## 2017-08-17 NOTE — PROGRESS NOTES
Has trigger finger   4th digit rt hand and left index finger  Had since may  uses manual wheelchair. My heart feels good.   Had mitraclip  Had improvement in ef now 34 -45%  Has ied implanted   Next echo for Sept.    Toe left 3rd digit  reivewed her phot edema. Atrophy b/l lower ext. Toe 3rd digit blister medial aspect. Assessment/ Plan    1. Controlled type 2 diabetes mellitus with complication, without long-term current use of insulin (HCC)  Reduce lantus to 2u form 4   I recommended d/c    2.  Mix

## 2017-08-18 ENCOUNTER — TELEPHONE (OUTPATIENT)
Dept: OTHER | Age: 67
End: 2017-08-18

## 2017-08-18 ENCOUNTER — MED REC SCAN ONLY (OUTPATIENT)
Dept: FAMILY MEDICINE CLINIC | Facility: CLINIC | Age: 67
End: 2017-08-18

## 2017-08-18 NOTE — TELEPHONE ENCOUNTER
----- Message -----   From: Lexie German   Sent: 8/11/2017   8:17 PM   To: Erich Aguilar Customer Response Pool   Subject: Pharmacy                                           Topic: 454-Registration      Please delete [facility] from my chart.    I now use the [facility]

## 2017-08-21 ENCOUNTER — ANTI-COAG VISIT (OUTPATIENT)
Dept: INTERNAL MEDICINE CLINIC | Facility: CLINIC | Age: 67
End: 2017-08-21

## 2017-08-21 DIAGNOSIS — I73.9 PERIPHERAL VASCULAR DISEASE (HCC): ICD-10-CM

## 2017-08-21 DIAGNOSIS — I73.9 PVD (PERIPHERAL VASCULAR DISEASE) (HCC): ICD-10-CM

## 2017-08-21 LAB — INR: 2.7 (ref 2–3)

## 2017-08-27 ENCOUNTER — HOSPITAL ENCOUNTER (EMERGENCY)
Facility: HOSPITAL | Age: 67
Discharge: HOME | End: 2017-08-27
Attending: EMERGENCY MEDICINE
Payer: MEDICARE

## 2017-08-27 VITALS
SYSTOLIC BLOOD PRESSURE: 98 MMHG | HEART RATE: 98 BPM | WEIGHT: 137 LBS | OXYGEN SATURATION: 97 % | BODY MASS INDEX: 30.82 KG/M2 | RESPIRATION RATE: 18 BRPM | HEIGHT: 56 IN | TEMPERATURE: 99 F | DIASTOLIC BLOOD PRESSURE: 65 MMHG

## 2017-08-27 DIAGNOSIS — R30.0 DYSURIA: Primary | ICD-10-CM

## 2017-08-27 DIAGNOSIS — N76.0 ACUTE VAGINITIS: ICD-10-CM

## 2017-08-27 LAB
BILIRUB UR QL: NEGATIVE
COLOR UR: YELLOW
GLUCOSE UR-MCNC: NEGATIVE MG/DL
HYALINE CASTS #/AREA URNS AUTO: 1 /LPF
KETONES UR-MCNC: NEGATIVE MG/DL
NITRITE UR QL STRIP.AUTO: NEGATIVE
PH UR: 5 [PH] (ref 5–8)
PROT UR-MCNC: NEGATIVE MG/DL
RBC #/AREA URNS AUTO: 1 /HPF
SP GR UR STRIP: 1 (ref 1–1.03)
UROBILINOGEN UR STRIP-ACNC: <2
VIT C UR-MCNC: NEGATIVE MG/DL
WBC #/AREA URNS AUTO: 168 /HPF

## 2017-08-27 PROCEDURE — 81001 URINALYSIS AUTO W/SCOPE: CPT | Performed by: EMERGENCY MEDICINE

## 2017-08-27 PROCEDURE — 87205 SMEAR GRAM STAIN: CPT | Performed by: EMERGENCY MEDICINE

## 2017-08-27 PROCEDURE — 87591 N.GONORRHOEAE DNA AMP PROB: CPT | Performed by: EMERGENCY MEDICINE

## 2017-08-27 PROCEDURE — 87086 URINE CULTURE/COLONY COUNT: CPT | Performed by: EMERGENCY MEDICINE

## 2017-08-27 PROCEDURE — 99283 EMERGENCY DEPT VISIT LOW MDM: CPT

## 2017-08-27 PROCEDURE — 87106 FUNGI IDENTIFICATION YEAST: CPT | Performed by: EMERGENCY MEDICINE

## 2017-08-27 PROCEDURE — 87491 CHLMYD TRACH DNA AMP PROBE: CPT | Performed by: EMERGENCY MEDICINE

## 2017-08-27 PROCEDURE — 87808 TRICHOMONAS ASSAY W/OPTIC: CPT | Performed by: EMERGENCY MEDICINE

## 2017-08-27 RX ORDER — METRONIDAZOLE 7.5 MG/G
1 GEL VAGINAL NIGHTLY
Qty: 70 G | Refills: 0 | Status: SHIPPED | OUTPATIENT
Start: 2017-08-27 | End: 2017-09-03

## 2017-08-27 RX ORDER — CLOTRIMAZOLE AND BETAMETHASONE DIPROPIONATE 10; .64 MG/G; MG/G
1 CREAM TOPICAL 2 TIMES DAILY
Qty: 15 G | Refills: 0 | Status: ON HOLD | OUTPATIENT
Start: 2017-08-27 | End: 2018-01-01

## 2017-08-27 NOTE — ED INITIAL ASSESSMENT (HPI)
Patient presents to ER with c/o painful urination and sedimentation in her urine since last night. Denies fever.

## 2017-08-27 NOTE — ED PROVIDER NOTES
Patient Seen in: [facility] AND [facility] Emergency Department    History   Patient presents with:  Urinary Symptoms (urologic)    Stated Complaint: lost bladder control     HPI    Patient is a 52-year-old female who was recently hospitalized for cellulitis of embolism)    • Peripheral vascular disease ([facility] 2008   • Pneumonia due to organism    • Polio 1950    As Infant   • Pulmonary embolism ([facility]    • Sacroiliitis ([facility]    • Stroke (cerebrum) ([facility] 2005   • Type 1 diabetes mellitus with mild nonproliferative Oral Cap,  Take 1,000 mg by mouth daily. Coenzyme Q10 (COQ10) 100 MG Oral Cap,  Take 1 capsule by mouth daily. Cranberry 250 MG Oral Tab,  Take 250 mg by mouth daily. vitamin E 400 UNITS Oral Cap,  Take 400 Units by mouth daily.      Meclizine H TAKE 1 TABLET BY MOUTH EVERY DAY AS NEEDED   Albuterol Sulfate HFA (VENTOLIN HFA) 108 (90 BASE) MCG/ACT Inhalation Aero Soln,  Inhale 2 puffs into the lungs every 4 (four) hours as needed.  For wheezing  Patient taking differently: Inhale 2 puffs into the l HPI.  Constitutional and vital signs reviewed. All other systems reviewed and negative except as noted above. PSFH elements reviewed from today and agreed except as otherwise stated in HPI.     Physical Exam   ED Triage Vitals [08/27/17 1536]  BP: 1 Radha will need to try a different vaginal cream      Disposition and Plan     Clinical Impression:  Dysuria  (primary encounter diagnosis)  Acute vaginitis    Disposition:  Discharge    [facility]:  [address] [facility] [address], DO  [address]

## 2017-08-28 ENCOUNTER — MED REC SCAN ONLY (OUTPATIENT)
Dept: FAMILY MEDICINE CLINIC | Facility: CLINIC | Age: 67
End: 2017-08-28

## 2017-08-28 LAB
C TRACH DNA SPEC QL NAA+PROBE: NEGATIVE
N GONORRHOEA DNA SPEC QL NAA+PROBE: NEGATIVE

## 2017-08-29 ENCOUNTER — TELEPHONE (OUTPATIENT)
Dept: FAMILY MEDICINE CLINIC | Facility: CLINIC | Age: 67
End: 2017-08-29

## 2017-08-29 LAB — TRICHOMONAS SCREEN: NEGATIVE

## 2017-08-29 NOTE — TELEPHONE ENCOUNTER
Patient calling, was recently seen in ER for dysuria and vaginitis due to abx prescribed for cellulitis. Pt was prescribed various meds, but states she still feels symptoms. Pt wondering about her cultures.   Informed pt culture tests were negative but Dr Catarino Gonzales

## 2017-08-31 ENCOUNTER — PATIENT MESSAGE (OUTPATIENT)
Dept: FAMILY MEDICINE CLINIC | Facility: CLINIC | Age: 67
End: 2017-08-31

## 2017-09-05 ENCOUNTER — OFFICE VISIT (OUTPATIENT)
Dept: FAMILY MEDICINE CLINIC | Facility: CLINIC | Age: 67
End: 2017-09-05

## 2017-09-05 VITALS
HEIGHT: 56 IN | HEART RATE: 76 BPM | DIASTOLIC BLOOD PRESSURE: 59 MMHG | TEMPERATURE: 99 F | SYSTOLIC BLOOD PRESSURE: 111 MMHG

## 2017-09-05 DIAGNOSIS — R10.2 PELVIC PAIN: Primary | ICD-10-CM

## 2017-09-05 DIAGNOSIS — N89.8 VAGINAL IRRITATION: ICD-10-CM

## 2017-09-05 PROCEDURE — 99214 OFFICE O/P EST MOD 30 MIN: CPT | Performed by: FAMILY MEDICINE

## 2017-09-05 PROCEDURE — G0463 HOSPITAL OUTPT CLINIC VISIT: HCPCS | Performed by: FAMILY MEDICINE

## 2017-09-05 RX ORDER — CLOPIDOGREL BISULFATE 75 MG/1
TABLET ORAL
Qty: 90 TABLET | Refills: 0 | Status: SHIPPED | OUTPATIENT
Start: 2017-09-05 | End: 2017-01-01

## 2017-09-05 RX ORDER — LISINOPRIL 5 MG/1
2.5 TABLET ORAL DAILY
Qty: 45 TABLET | Refills: 0 | Status: SHIPPED | OUTPATIENT
Start: 2017-09-05 | End: 2017-01-01

## 2017-09-05 RX ORDER — FUROSEMIDE 20 MG/1
20 TABLET ORAL DAILY
Qty: 90 TABLET | Refills: 0 | Status: SHIPPED | OUTPATIENT
Start: 2017-09-05 | End: 2017-01-01

## 2017-09-05 NOTE — PROGRESS NOTES
Patient ID: Tate Hinojosa is a 73year old female.     HPI  Patient presents with:  ER F/U: [facility]-Vaginal Problems      ED Provider Notes  Date of Service: 8/27/2017 5:55 PM  Sender, Antonia Correa MD   Emergency Medicine      []Manual[]Template  []Copied     Patient BP Readings from Last 6 Encounters:  09/05/17 : 111/59  08/27/17 : 98/65  08/17/17 : 126/78  08/15/17 : 119/68  08/12/17 : 124/61  08/09/17 : 115/53        Review of Systems      Past Medical History:   Diagnosis Date   • Asthma    • Asthma, moderate persi No date: ANGIOPLASTY (CORONARY)  3/4/16: AUTOMATIC EXTERNAL DEFIBRILLATOR, WITH INTEGRA* Left  No date: CARDIAC DEFIBRILLATOR PLACEMENT  No date: CATARACT  7/13/09: CATARACT EXTRACTION W/  INTRAOCULAR LENS IMPLA* Right      Comment: JOSE  2/27/06: CATARACT vitamin E 400 UNITS Oral Cap Take 400 Units by mouth daily. Disp:  Rfl:    Meclizine HCl 25 MG Oral Chew Tab Chew 1 capsule by mouth daily as needed. Disp:  Rfl:    TraZODone HCl 50 MG Oral Tab Take 1 tablet (50 mg total) by mouth nightly.  (Patient jose l SACCHAROMYCES BOULARDII OR Take 1 capsule by mouth 2 (two) times daily.  Probiotic   Disp:  Rfl:    DESLORATADINE 5 MG Oral Tab TAKE 1 TABLET BY MOUTH EVERY DAY AS NEEDED Disp: 90 tablet Rfl: 11   Albuterol Sulfate HFA (VENTOLIN HFA) 108 (90 BASE) MCG/ACT I Comment:Other reaction(s): Rash  Sulfanilamide           Rash    Comment:Other reaction(s): Hives   PHYSICAL EXAM:   Physical Exam  Blood pressure 111/59, pulse 76, temperature 98.9 °F (37.2 °C), temperature source Oral, height 4' 8\" (1.422 m), not curr Follow up if symptoms persist.  Take medicine (if given) as prescribed. Approach to treatment discussed and patient/family member understands and agrees to plan.          Cindi Marin DO  9/5/2017

## 2017-09-06 ENCOUNTER — ANTI-COAG VISIT (OUTPATIENT)
Dept: INTERNAL MEDICINE CLINIC | Facility: CLINIC | Age: 67
End: 2017-09-06

## 2017-09-06 DIAGNOSIS — I73.9 PVD (PERIPHERAL VASCULAR DISEASE) (HCC): ICD-10-CM

## 2017-09-06 DIAGNOSIS — I73.9 PERIPHERAL VASCULAR DISEASE (HCC): ICD-10-CM

## 2017-09-06 LAB — INR: 2.3 (ref 2–3)

## 2017-09-07 ENCOUNTER — OFFICE VISIT (OUTPATIENT)
Dept: OBGYN CLINIC | Facility: CLINIC | Age: 67
End: 2017-09-07

## 2017-09-07 VITALS — DIASTOLIC BLOOD PRESSURE: 68 MMHG | BODY MASS INDEX: 31 KG/M2 | SYSTOLIC BLOOD PRESSURE: 118 MMHG | WEIGHT: 137 LBS

## 2017-09-07 DIAGNOSIS — N89.8 VAGINAL IRRITATION: Primary | ICD-10-CM

## 2017-09-07 PROCEDURE — 99204 OFFICE O/P NEW MOD 45 MIN: CPT | Performed by: OBSTETRICS & GYNECOLOGY

## 2017-09-07 NOTE — TELEPHONE ENCOUNTER
From: Lexie German  To: Abi Gallegos DO  Sent: 8/31/2017 1:26 PM CDT  Subject: Other    Today I've felt a little better. Less cramping. Still waking up drenched in sweat. First day out of bed, for a while.     What did my vaginal test results say

## 2017-09-07 NOTE — PROGRESS NOTES
HPI:    Patient ID: Reina Olivo is a 73year old female. 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   Zaina Ceron mg by mouth daily. Disp:  Rfl:    Coenzyme Q10 (COQ10) 100 MG Oral Cap Take 1 capsule by mouth daily. Disp:  Rfl:    Cranberry 250 MG Oral Tab Take 250 mg by mouth daily. Disp:  Rfl:    vitamin E 400 UNITS Oral Cap Take 400 Units by mouth daily.    Cris Perdue TABLET BY MOUTH EVERY DAY AS NEEDED Disp: 90 tablet Rfl: 11   Albuterol Sulfate HFA (VENTOLIN HFA) 108 (90 BASE) MCG/ACT Inhalation Aero Soln Inhale 2 puffs into the lungs every 4 (four) hours as needed.  For wheezing (Patient taking differently: Inhale 2 p reaction(s): Hives    HISTORY:  Past Medical History:   Diagnosis Date   • Asthma    • Asthma, moderate persistent     Last hosp '96   • BDR (background diabetic retinopathy) ([facility] 1/14/2016   • Blepharitis, both eyes 1/14/2016   • BPPV (benign paroxysmal CATARACT  7/13/09: CATARACT EXTRACTION W/  INTRAOCULAR LENS IMPLA* Right      Comment: JOSE  2/27/06: CATARACT EXTRACTION W/  INTRAOCULAR LENS IMPLA* Left      Comment: JOSE  No date: CATH DRUG ELUTING STENT  No date: COLONOSCOPY  2006: COLONOSCOPY & POLYPEC VS#[id number]

## 2017-09-09 LAB — TRICHOMONAS SCREEN: NEGATIVE

## 2017-09-11 ENCOUNTER — TELEPHONE (OUTPATIENT)
Dept: OBGYN CLINIC | Facility: CLINIC | Age: 67
End: 2017-09-11

## 2017-09-11 ENCOUNTER — OFFICE VISIT (OUTPATIENT)
Dept: FAMILY MEDICINE CLINIC | Facility: CLINIC | Age: 67
End: 2017-09-11

## 2017-09-11 ENCOUNTER — OFFICE VISIT (OUTPATIENT)
Dept: RHEUMATOLOGY | Facility: CLINIC | Age: 67
End: 2017-09-11

## 2017-09-11 VITALS
RESPIRATION RATE: 16 BRPM | TEMPERATURE: 98 F | SYSTOLIC BLOOD PRESSURE: 122 MMHG | DIASTOLIC BLOOD PRESSURE: 79 MMHG | HEART RATE: 83 BPM | BODY MASS INDEX: 36.97 KG/M2 | HEIGHT: 56.5 IN | WEIGHT: 169 LBS

## 2017-09-11 VITALS
HEART RATE: 80 BPM | SYSTOLIC BLOOD PRESSURE: 120 MMHG | DIASTOLIC BLOOD PRESSURE: 70 MMHG | BODY MASS INDEX: 29.97 KG/M2 | HEIGHT: 56.5 IN | WEIGHT: 137 LBS | TEMPERATURE: 98 F

## 2017-09-11 DIAGNOSIS — Z79.4 UNCONTROLLED TYPE 2 DIABETES MELLITUS WITH HYPERGLYCEMIA, WITH LONG-TERM CURRENT USE OF INSULIN (HCC): ICD-10-CM

## 2017-09-11 DIAGNOSIS — F41.0 PANIC ATTACK: ICD-10-CM

## 2017-09-11 DIAGNOSIS — G47.00 INSOMNIA, UNSPECIFIED TYPE: ICD-10-CM

## 2017-09-11 DIAGNOSIS — M65.322 TRIGGER INDEX FINGER OF LEFT HAND: Primary | ICD-10-CM

## 2017-09-11 DIAGNOSIS — IMO0002 TRIGGER FINGER OF BOTH HANDS: Primary | ICD-10-CM

## 2017-09-11 DIAGNOSIS — E11.65 UNCONTROLLED TYPE 2 DIABETES MELLITUS WITH HYPERGLYCEMIA, WITH LONG-TERM CURRENT USE OF INSULIN (HCC): ICD-10-CM

## 2017-09-11 PROCEDURE — 99214 OFFICE O/P EST MOD 30 MIN: CPT | Performed by: FAMILY MEDICINE

## 2017-09-11 PROCEDURE — G0463 HOSPITAL OUTPT CLINIC VISIT: HCPCS | Performed by: INTERNAL MEDICINE

## 2017-09-11 PROCEDURE — G0463 HOSPITAL OUTPT CLINIC VISIT: HCPCS | Performed by: FAMILY MEDICINE

## 2017-09-11 PROCEDURE — 99204 OFFICE O/P NEW MOD 45 MIN: CPT | Performed by: INTERNAL MEDICINE

## 2017-09-11 RX ORDER — IBUPROFEN 600 MG/1
1 TABLET ORAL ONCE AS NEEDED
Qty: 1 KIT | Refills: 0 | Status: SHIPPED | OUTPATIENT
Start: 2017-09-11 | End: 2017-09-11

## 2017-09-11 RX ORDER — TRAZODONE HYDROCHLORIDE 50 MG/1
TABLET ORAL
Qty: 30 TABLET | Refills: 0 | Status: SHIPPED | OUTPATIENT
Start: 2017-09-11 | End: 2017-01-01

## 2017-09-11 NOTE — PATIENT INSTRUCTIONS
Hand surgeons -   Dr. Stephon Mason -   [address]  Phone: [phone]  Fax: [phone]    Dr. Andressa Barr - presence   [phone] [facility]. [address] [facility], [facility] 104    Dr. Zora Castillo    [address]  [place] © [phone] [address], [address] [place]. All rights reserved. This information is not intended as a substitute for professional medical care. Always follow your healthcare professional's instructions.         What is The first sign of trigger finger may be pain where the finger or thumb joins the palm. You may also notice some swelling. As the tendon becomes more inflamed, the finger may start to catch when you try to straighten it.  When the locked tendon releases, the

## 2017-09-11 NOTE — PROGRESS NOTES
Deacon King is a 73year old female who presents for Patient presents with:  Finger Pain: Pt presents finger stifness and pain x 5 months. Per pt symptoms are better. Aggravated by cold. Taking pain medication and massages with some relief. Eliza Luna    HPI:     I Disp:  Rfl:    sodium Chloride 0.9 % Inhalation Nebu Soln Take 3 mL by nebulization as needed for Wheezing.  Disp: 100 vial Rfl: 11   albuterol Sulfate (5 MG/ML) 0.5% Inhalation Nebu Soln TAKE 0.5 ML BY NEBULIZATION EVERY 6 (SIX) HOURS AS NEEDED FOR [address] MEDICAL [facility] Units by mouth daily. Take 4000 mg T/W/TH/S/S. Take 2000 units on M/F. Disp:  Rfl:    B Complex Vitamins (VITAMIN B COMPLEX) Oral Tab Take 1 tablet by mouth daily.  Disp:  Rfl:    Multiple Vitamins-Minerals (EMERGEN-C VITAMIN C) Oral Powd Pack Take 1 Dose Breast cancer ([facility]     DCIS   • Breast cancer of upper-outer quadrant of left female breast ([facility] 10/23/2010   • Cancer of overlapping sites of right female breast ([facility] 10/23/2010   • Cataract 2005, 2009   • Cellulitis     R foot   • Cerebrovascular acci HYSTERECTOMY  2004: MASTECTOMY RIGHT  2004: NEEDLE BIOPSY LEFT      Comment: x 2  No date: OTHER SURGICAL HISTORY      Comment: Orthopedic surgeries X8  No date: OTHER SURGICAL HISTORY      Comment: Stent and Angioplasty  Dr. Wells Ar  No date: OTHER SURGICAL °C) (Oral)   Ht 4' 8.5\" (1.435 m)   Wt 137 lb (62.1 kg)   BMI 30.17 kg/m²   HEENT: Clear oropharynx, no oral ulcers, EOM intact, clear sclear, PERRLA, pleasant, no acute distress, no CAD, no neck tendnerness, good ROM,   No rashes  CVS: RRR, no murmurs  R /LPF    WBC      0 - 5 /HPF    RBC      0 - 3 /HPF    BACTERIA      None Seen /HPF    Glucose      70 - 99 mg/dL 124 (H)   Sodium      136 - 144 mmol/L 141   Potassium      3.3 - 5.1 mmol/L 3.7   Chloride      95 - 110 mmol/L 111 (H)   [employer] [place], [place] [address]  Phone: [phone]  Fax: [phone]    Jaswinder Mayfield MD  9/11/2017  1:52 PM

## 2017-09-11 NOTE — PROGRESS NOTES
Left 2nd digits. Has advanced trigger finger and Dr. Ashley Milligan wants her to get surgical eval.    Has been off the sleeping pills since Thursday had some panic attacks.  (trazadone is what she stopped.)  Getting night sweats as well.     No sob no cp no n/v     S

## 2017-09-12 ENCOUNTER — TELEPHONE (OUTPATIENT)
Dept: OBGYN CLINIC | Facility: CLINIC | Age: 67
End: 2017-09-12

## 2017-09-13 NOTE — TELEPHONE ENCOUNTER
Pt is confused since dr. Yduy Hill did not prescribe any medication for her. She knows the metrogel was for the bv but does not understand how the clotrimazole being used on the outside will help the yeast infection. Should she use it internally?  And if so for

## 2017-09-13 NOTE — TELEPHONE ENCOUNTER
Informed pt vaginal culture showed yeast, and that she should respond to the cream prescribed. Pt voices the metronidazole gel has helped with her discharge, but she continues to have frequency with urination. No other symptoms other than frequency.

## 2017-09-13 NOTE — TELEPHONE ENCOUNTER
This should go away with time. If it becomes extreme, another urine culture can be done but this has tested negative. Avoid caffeine and excess liquids until better. Can follow up with PCP if becomes an issue.

## 2017-09-18 ENCOUNTER — OFFICE VISIT (OUTPATIENT)
Dept: SURGERY | Facility: CLINIC | Age: 67
End: 2017-09-18

## 2017-09-18 ENCOUNTER — TELEPHONE (OUTPATIENT)
Dept: SURGERY | Facility: CLINIC | Age: 67
End: 2017-09-18

## 2017-09-18 DIAGNOSIS — M65.322 TRIGGER FINGER, LEFT INDEX FINGER: ICD-10-CM

## 2017-09-18 DIAGNOSIS — M65.322 TRIGGER FINGER, LEFT INDEX FINGER: Primary | ICD-10-CM

## 2017-09-18 DIAGNOSIS — M65.341 TRIGGER FINGER, RIGHT RING FINGER: Primary | ICD-10-CM

## 2017-09-18 PROCEDURE — G0463 HOSPITAL OUTPT CLINIC VISIT: HCPCS | Performed by: PLASTIC SURGERY

## 2017-09-18 PROCEDURE — 99203 OFFICE O/P NEW LOW 30 MIN: CPT | Performed by: PLASTIC SURGERY

## 2017-09-18 NOTE — PROGRESS NOTES
Pt request for surgery signed by pt and witnessed and signed by RN. Pt has a prescription for oxycodone and narcotic hand-out instruction sheet given to and reviewed w/patient.   Pre-Surgical Instruction Handout, Hand Elevation Handout, and Post-Operative

## 2017-09-18 NOTE — H&P
Billy Tao is a 73year old female that presents with Patient presents with:  Pain: Trigger digits- RRF, LIF  .     REFERRED BY:  Winter Perez    Pacemaker: Yes  Latex Allergy: no  Coumadin: Yes  Plavix: Yes  Other anticoagulants: No  Cardiac stents daily. Disp: 15 g Rfl: 0   Potassium Gluconate 595 (99 K) MG Oral Tab Take 50 mg by mouth daily. Disp:  Rfl:    Warfarin Sodium 7.5 MG Oral Tab Take 7.5 mg by mouth See Admin Instructions.  Wednesdays Disp:  Rfl:    Rosuvastatin Calcium 20 MG Oral Tab Take External Cream Apply 1 Application topically 3 (three) times daily as needed. Disp: 1 Tube Rfl: 0   acetaminophen 325 MG Oral Tab Take 2 tablets (650 mg total) by mouth every 6 (six) hours as needed.  Disp: 30 tablet Rfl: 4   Cholecalciferol (VITAMIN D3) 20 Aminoglycosides           Augmentin [Amoxicil*    Rash  Clindamycin             Tightness in Throat  Dilaudid [[employer]rom[employer]*        Comment:agitation  Levofloxacin [Quixi*    Nausea and vomiting  Potassium Clavulana*    Unknown    Comment:Other reaction(s Polio 1951    As Infant   • Pulmonary embolism ([facility] 1987   • Sacroiliitis ([address]    • Stented coronary artery    • Stroke (cerebrum) ([address] 2005   • Type 1 diabetes mellitus with mild nonproliferative diabetic retinopathy and without macular edema ([address] 1/1 None on file     Family History   Problem Relation Age of Onset   • Hypertension Mother    • Thyroid disease Mother    • Diabetes Father    • Glaucoma Father    • Other [OTHER] Father    • Colon Cancer Brother 48   • Cancer Sister      Thyroid cancer   • the right side with the Dupuytren's nodule             9/18/2017  Daly Hernandez MD

## 2017-09-20 ENCOUNTER — HOSPITAL ENCOUNTER (OUTPATIENT)
Dept: CARDIOLOGY CLINIC | Facility: HOSPITAL | Age: 67
Discharge: HOME | End: 2017-09-20
Attending: NURSE PRACTITIONER
Payer: MEDICARE

## 2017-09-20 ENCOUNTER — HOSPITAL ENCOUNTER (OUTPATIENT)
Dept: CV DIAGNOSTICS | Facility: HOSPITAL | Age: 67
Discharge: HOME | End: 2017-09-20
Attending: THORACIC SURGERY (CARDIOTHORACIC VASCULAR SURGERY)
Payer: MEDICARE

## 2017-09-20 VITALS
HEART RATE: 82 BPM | DIASTOLIC BLOOD PRESSURE: 57 MMHG | OXYGEN SATURATION: 95 % | SYSTOLIC BLOOD PRESSURE: 116 MMHG | RESPIRATION RATE: 19 BRPM | TEMPERATURE: 98 F

## 2017-09-20 DIAGNOSIS — I34.0 MITRAL REGURGITATION: ICD-10-CM

## 2017-09-20 PROCEDURE — 93306 TTE W/DOPPLER COMPLETE: CPT | Performed by: INTERNAL MEDICINE

## 2017-09-20 PROCEDURE — 99214 OFFICE O/P EST MOD 30 MIN: CPT

## 2017-09-20 NOTE — PROGRESS NOTES
Kin Mallory Note    Subjective:   Patient presents for routine 6 month postop TMVR visit.  She underwent Mitraclip placement x 1 with Dr. Radha Gordillo, with emergent reop for bleeding from RA appendage 3/15/17.      Since our last visit in June was mild regurgitation. 4. Left atrium: The left atrium was mildly dilated. The left atrial volume     was mildly increased. 5. Tricuspid valve: Structurally normal valve. Impressions:  This study is compared with previous dated 4/19/2017:  The  mitral c Oral Cap Take 1,000 mg by mouth daily. Disp:  Rfl:    Coenzyme Q10 (COQ10) 100 MG Oral Cap Take 1 capsule by mouth daily. Disp:  Rfl:    Cranberry 250 MG Oral Tab Take 1,000 mg by mouth daily.    Disp:  Rfl:    vitamin E 400 UNITS Oral Cap Take 400 Unit AS NEEDED Disp: 90 tablet Rfl: 11   Albuterol Sulfate HFA (VENTOLIN HFA) 108 (90 BASE) MCG/ACT Inhalation Aero Soln Inhale 2 puffs into the lungs every 4 (four) hours as needed.  For wheezing (Patient taking differently: Inhale 2 puffs into the lungs 2 (two script provided  3. F/U with Dr. Camille Pierce if indicated - or if CP/SOB returns for   4. F/U with Dr. Ginny Welch as directed, next appt per pt is November 21, 2017  5.  F/U in [facility]  clinic in 6  Months with echo, or sooner if clinically indicated

## 2017-09-21 ENCOUNTER — TELEPHONE (OUTPATIENT)
Dept: CARDIOLOGY CLINIC | Facility: HOSPITAL | Age: 67
End: 2017-09-21

## 2017-09-21 LAB — INR: 2.5 (ref 2–3)

## 2017-09-21 NOTE — PROGRESS NOTES
I called patient with results of echo from yesterday. Patient's EF remains at 35-40%, clip present with mild regurgitation. Overall, there has been no significant interval changes compared to previous echo on  6/21/17.   [facility]

## 2017-09-22 ENCOUNTER — ANTI-COAG VISIT (OUTPATIENT)
Dept: INTERNAL MEDICINE CLINIC | Facility: CLINIC | Age: 67
End: 2017-09-22

## 2017-09-22 DIAGNOSIS — I73.9 PERIPHERAL VASCULAR DISEASE (HCC): ICD-10-CM

## 2017-09-22 DIAGNOSIS — I73.9 PVD (PERIPHERAL VASCULAR DISEASE) (HCC): ICD-10-CM

## 2017-09-28 ENCOUNTER — HOSPITAL ENCOUNTER (OUTPATIENT)
Dept: GENERAL RADIOLOGY | Age: 67
Discharge: HOME | End: 2017-09-28
Attending: FAMILY MEDICINE | Admitting: FAMILY MEDICINE
Payer: MEDICARE

## 2017-09-28 ENCOUNTER — OFFICE VISIT (OUTPATIENT)
Dept: FAMILY MEDICINE CLINIC | Facility: CLINIC | Age: 67
End: 2017-09-28

## 2017-09-28 VITALS
HEART RATE: 90 BPM | DIASTOLIC BLOOD PRESSURE: 60 MMHG | OXYGEN SATURATION: 97 % | SYSTOLIC BLOOD PRESSURE: 104 MMHG | TEMPERATURE: 99 F

## 2017-09-28 DIAGNOSIS — R05.9 COUGH: ICD-10-CM

## 2017-09-28 DIAGNOSIS — R05.9 COUGH: Primary | ICD-10-CM

## 2017-09-28 LAB
ADENOVIRUS PCR:: NEGATIVE
B PERT DNA SPEC QL NAA+PROBE: NEGATIVE
C PNEUM DNA SPEC QL NAA+PROBE: NEGATIVE
CORONAVIRUS 229E PCR:: NEGATIVE
CORONAVIRUS HKU1 PCR:: NEGATIVE
CORONAVIRUS NL63 PCR:: NEGATIVE
CORONAVIRUS OC43 PCR:: NEGATIVE
FLUAV H1 2009 PAND RNA SPEC QL NAA+PROBE: NEGATIVE
FLUAV H1 RNA SPEC QL NAA+PROBE: NEGATIVE
FLUAV H3 RNA SPEC QL NAA+PROBE: NEGATIVE
FLUAV RNA SPEC QL NAA+PROBE: NEGATIVE
FLUBV RNA SPEC QL NAA+PROBE: NEGATIVE
METAPNEUMOVIRUS PCR:: NEGATIVE
MYCOPLASMA PNEUMONIA PCR:: NEGATIVE
PARAINFLUENZA 1 PCR:: NEGATIVE
PARAINFLUENZA 2 PCR:: NEGATIVE
PARAINFLUENZA 3 PCR:: NEGATIVE
PARAINFLUENZA 4 PCR:: NEGATIVE
RHINOVIRUS/ENTERO PCR:: POSITIVE
RSV RNA SPEC QL NAA+PROBE: NEGATIVE

## 2017-09-28 PROCEDURE — 99214 OFFICE O/P EST MOD 30 MIN: CPT | Performed by: FAMILY MEDICINE

## 2017-09-28 PROCEDURE — 71020 XR CHEST PA + LAT CHEST (CPT=71020): CPT | Performed by: FAMILY MEDICINE

## 2017-09-28 PROCEDURE — G0463 HOSPITAL OUTPT CLINIC VISIT: HCPCS | Performed by: FAMILY MEDICINE

## 2017-09-28 RX ORDER — OSELTAMIVIR PHOSPHATE 75 MG/1
75 CAPSULE ORAL 2 TIMES DAILY
Qty: 10 CAPSULE | Refills: 0 | Status: SHIPPED | OUTPATIENT
Start: 2017-09-28 | End: 2017-01-01 | Stop reason: ALTCHOICE

## 2017-09-28 RX ORDER — CIPROFLOXACIN 500 MG/1
500 TABLET, FILM COATED ORAL 2 TIMES DAILY
Qty: 20 TABLET | Refills: 0 | Status: SHIPPED | OUTPATIENT
Start: 2017-09-28 | End: 2017-01-01 | Stop reason: ALTCHOICE

## 2017-09-28 NOTE — PROGRESS NOTES
Has inr machine  Coughing bad for days (3d)  Pos sob occ cp    Has been using albuterol in nebulizer. puls ox ok at home here 97%        Patient's past medical surgical family social history was reviewed.      Review of Systems  Allergic: no environmental

## 2017-10-01 ENCOUNTER — TELEPHONE (OUTPATIENT)
Dept: FAMILY MEDICINE CLINIC | Facility: CLINIC | Age: 67
End: 2017-10-01

## 2017-10-01 NOTE — TELEPHONE ENCOUNTER
Patient paged Saturday 9/30/17 at 10 pm complaining of postnasal drip and congestion that is causing her to feel like she is having panic attack. Patient talking normally and does not seem distressed.   She is taking Mucinex, advised okay for her to take h

## 2017-10-02 NOTE — TELEPHONE ENCOUNTER
Note      From: Burke Frank  To: Ted Peter, DO  Sent: 10/2/2017  8:37 AM CDT  Subject: Other    I was really worse on Sat.9/30    New Sinus stuff. Congestion in the back of my      throat, tried to cough to get it moving,    but it doesn't.  (Anoth

## 2017-10-02 NOTE — TELEPHONE ENCOUNTER
Please read Sian's Plan message,LOV 9/28/17      Stated she have missed 4 doses Cipro since she was told not to take it Saturday -because she has Rhinovirus ,very hoarse voice,coughing while speaking (productive at times),no facial tenderness  Asking if she sh

## 2017-10-05 NOTE — PROGRESS NOTES
Coughing is better  Still on cipro  Tested pos for rhinovirus. No sob    Diabetes has appt with Dr. Robert Fowler next month. Occasional anxiety  trazadone helps  Wanted to know if she can take a xanx.         Patient's past medical surgical family social hi

## 2017-10-08 NOTE — ED PROVIDER NOTES
Patient Seen in: [facility] AND [facility] Emergency Department    History   Patient presents with:  Abnormal Result (metabolic, cardiac)    Stated Complaint:     HPI    The patient is a 43-year-old female with past history of asthma, insulin-dependent diabetes Coronary atherosclerosis    • Deep vein thrombosis (HCC)    • Diabetes ([facility]    • DM type 2 (diabetes mellitus, type 2) ([facility] 1222    W/O Complication  Pill, then Insulin added 2003   • Femoral artery stenosis [facility])     Right Superficial   • Floaters 2007 acromioclaviular                bursa  No date: OTHER SURGICAL HISTORY Right      Comment: Amputation of 5th toe    Family History   Problem Relation Age of Onset   • Hypertension Mother    • Thyroid disease Mother    • Diabetes Father    • Glaucoma Father no redness or warmth to the touch      ED Course     Labs Reviewed   PROTHROMBIN TIME (PT) - Abnormal; Notable for the following:        Result Value    PT 14.8 (*)     All other components within normal limits   BASIC METABOLIC PANEL (8) - Abnormal; Notab insulin ([address]  (primary encounter diagnosis)  Subtherapeutic international normalized ratio (INR)    Disposition:  Discharge    Follow-up:  Naveen Duran DO  [address] [facility]  [address] [facility] [phone]      Call Dr. Cathy Pfeiffer tomorrow wi

## 2017-10-09 NOTE — TELEPHONE ENCOUNTER
Pt calling regarding going to the ER yesterday and her INR was 1.2. PT  was advise to take 7.5 dosage for coumadin. Pt  would like to know know what she should take for the rest of the week.  Pt state that she was given a schedule for the rest of the week

## 2017-10-09 NOTE — TELEPHONE ENCOUNTER
Patient states was in the ER yesterday due to high blood glucose readings. She was told to inform her PCP office. States she has an appt with [facility] 10/19/17 and does not want to come in any earlier.  She states she was seen last week and will be seen next

## 2017-10-09 NOTE — TELEPHONE ENCOUNTER
See message below. Yo [address] [phone] and alprazolam 1 mg was last refilled by Dr [facility] 1/9/17 to take nightly quantity 1+3 refills. No refills left.      Dr [facility],    Please advise on pended refill request. Clinical staff to call in this

## 2017-10-09 NOTE — TELEPHONE ENCOUNTER
RECEIVED FAX FRM [facility]  REQUESTING MORE REFILLS. ..  BUT MEDICATION NOT LISTED IN MED. LIST

## 2017-10-12 NOTE — H&P
H&P UPDATE:      Needs RRF trig/DUP    RECTR pacemaker: Yes  Latex Allergy: no  Coumadin: Yes  Plavix: Yes  Other anticoagulants: No  Cardiac stents: Yes    HAND DOMINANCE: Left  Profession: retired caregiver    RECONSTRUCTIVE HISTORY    [address] Rosuvastatin Calcium 20 MG Oral Tab Take 20 mg by mouth nightly. Disp:  Rfl:    sodium Chloride 0.9 % Inhalation Nebu Soln Take 3 mL by nebulization as needed for Wheezing.  Disp: 100 vial Rfl: 11   albuterol Sulfate (5 MG/ML) 0.5% Inhalation Nebu Soln TAKE Albuterol Sulfate HFA (VENTOLIN HFA) 108 (90 BASE) MCG/ACT Inhalation Aero Soln Inhale 2 puffs into the lungs every 4 (four) hours as needed. For wheezing (Patient taking differently: Inhale 2 puffs into the lungs 2 (two) times daily.  For wheezing ) Disp: • Cataract 2005, 2009   • Cellulitis     R foot   • Cerebrovascular accident [facility])    • Congestive heart disease ([facility]    • COPD (chronic obstructive pulmonary disease) ([facility]    • Coronary atherosclerosis    • Deep vein thrombosis (HCC)    • Diabetes ([facility] Comment: Orthopedic surgeries X8  No date: OTHER SURGICAL HISTORY      Comment: Stent and Angioplasty  Dr. Araceli Phoenix  No date: OTHER SURGICAL HISTORY      Comment: Arthrocentesis of L shoulder acromioclaviular                bursa  No date: OTHER SURGICAL H Blanche Benavides RN (Registered Nurse)      Pt request for surgery signed by pt and witnessed and signed by RN. Pt has a prescription for oxycodone and narcotic hand-out instruction sheet given to and reviewed w/patient.   Pre-Surgical Instruction Hand PAIN                   Yes  TRIGGER          Yes  LOCKING           Yes     PREVIOUS TREATMENT  None     Treatment helped?  n/a      Pt here for evaluation of triggers to LIF and RRF.   Pt states they both click and lock and are painful, 2-3/10.           C Meclizine HCl 25 MG Oral Chew Tab Chew 1 capsule by mouth daily as needed. Disp:  Rfl:    LANTUS SOLOSTAR 100 UNIT/ML Subcutaneous Solution Pen-injector 10 u sq daily (Patient taking differently: Inject 4 Units into the skin nightly.  10 u sq daily ) Disp: • Breast cancer of upper-outer quadrant of left female breast ([facility] 10/23/2010   • Cancer of overlapping sites of right female breast ([facility] 10/23/2010   • Cataract 2005, 2009   • Cellulitis       R foot   • Cerebrovascular accident [facility])     • Congestive h No date: COLONOSCOPY  2006: COLONOSCOPY & POLYPECTOMY  No date: HYSTERECTOMY  2004: MASTECTOMY RIGHT  2004: NEEDLE BIOPSY LEFT      Comment: x 2  No date: OTHER SURGICAL HISTORY      Comment: Orthopedic surgeries X8  No date: OTHER SURGICAL HISTORY      Co EARS: Inspection - Right: Normal, Left: Normal  NECK/THYROID: Inspection - Normal, Palpation - Normal, Thyroid gland - Normal, No adenopathy  RESPIRATORY: Inspection - Normal, Effort - Normal  CARDIOVASCULAR: Regular rhythm, No murmurs  ABDOMEN: Inspection Malignant neoplasm of breast (female) ([facility]   2/6/2004  Biopsy    Right breast, core biopsy performed at [facility] and path reviewed by Fabrizio-ductal carcinoma in situ. A single focus suspicious for microinvasion is seen.    3/10/2004  Surgery    Rig

## 2017-10-12 NOTE — TELEPHONE ENCOUNTER
Pt calling to give her INR results. Pt INR 2.3 pt would like to know what dosage to take for the rest of the week. .. please advise

## 2017-10-12 NOTE — TELEPHONE ENCOUNTER
Spoke with patient. She was seen in the emergency department for hyperglycemia due to prednisone prescribed for chest congestion. This has been resolved. She also completed a course of Cipro, and is feeling much better.     All changes to medications a

## 2017-10-17 NOTE — TELEPHONE ENCOUNTER
Spoke to pt informed her of Cierra's instructions below. Pt states she will get a ride to the ER today and will f/u with [facility] on Monday, 10/23/17, at 3pm at [facility] location. Pt verbalized understanding.

## 2017-10-17 NOTE — ED INITIAL ASSESSMENT (HPI)
Patient unable to get into podiatrist, sent to ED for eval of wound to left great toe x 2 days. Patient has hx of PVD and DM. + redness to site, no drainage.

## 2017-10-17 NOTE — ED NOTES
Pt to ED via [facility] per baseline- states that she was \"dancing in her wheelchair this past Saturday at a wedding and bumped the tip of her left 1st digit on something.  Pt states painful with tip of that toe blue- states over time, bluish color went away and n

## 2017-10-17 NOTE — TELEPHONE ENCOUNTER
Called Dr Gurmeet Augustine office and informed him of message below. Per Dr Walter Coulter, have pt go to ER to be evaluated and to either f/u with [place] on Thursday or with [facility]CR on Monday when he returns.

## 2017-10-17 NOTE — TELEPHONE ENCOUNTER
Spoke to pt and she states her left great toe started having pain 2 days ago. Denies any specific injury -- went to a wedding and may have bumped toe but not sure. Pt is in a wheelchair. States toe has a small cut near tip of toenail.  Started drainage cl

## 2017-10-19 NOTE — PROGRESS NOTES
Pervious amputation Dr. Santiago Dey   Left great toe with swelling redness  Exam  Lateral aspect great toenail with apperent pus under the nail. renewss to mid foot    Dancing at a weeding \"I thought maybe I hit it. \"  On kelflex.       No dp pulse    A/p  Ce

## 2017-10-20 NOTE — TELEPHONE ENCOUNTER
Pt called to cancel her surgery appointment including OT and nurses visits, due to other illness. Pt will call back when she is ready to set new appts.

## 2017-10-20 NOTE — PROGRESS NOTES
Has continuing cellulitis  Has appt for doppler on Nov 1st  Has appt with Dr. Shery Blizzard  Swelling less pain is less  Redness is receding    Exam  Red sweollen great toe  No discusage    A/p  1. Cellulitis of toe of left foot  F/u with podiatry    2.  PVD (marcin

## 2017-10-23 NOTE — PROGRESS NOTES
HPI:    Patient ID: Aleksandr Bartholomew is a 78year old female. HPI  This 80-year-old female called with concerns of an infection with the left great toe.   She was placed on an antibiotic a week or so ago with minimal success in the last 3 or 4 days she was pl by mouth daily. Disp:  Rfl:    Rosuvastatin Calcium 20 MG Oral Tab Take 20 mg by mouth nightly. Disp:  Rfl:    sodium Chloride 0.9 % Inhalation Nebu Soln Take 3 mL by nebulization as needed for Wheezing.  Disp: 100 vial Rfl: 11   albuterol Sulfate (5 MG/ML) BASE) MCG/ACT Inhalation Aero Soln Inhale 2 puffs into the lungs every 4 (four) hours as needed. For wheezing (Patient taking differently: Inhale 2 puffs into the lungs 2 (two) times daily.  For wheezing ) Disp: 3 Inhaler Rfl: 3   Alcohol Swabs ([facility]) 7 the remaining Cipro. I do not anticipate further concerns and will follow-up as appropriate. ASSESSMENT/PLAN:   Paronychia of great toe of left foot  (primary encounter diagnosis)    No orders of the defined types were placed in this encounter.

## 2017-10-27 NOTE — TELEPHONE ENCOUNTER
Spoke to pt and she states that her left great toe is having needle like pain and now has a crack in it. States she has been soaking in Epsom salts as directed. No topical Abx used. Taking Cipro as directed. Applying bandage and gauze.  States site has some

## 2017-10-30 NOTE — TELEPHONE ENCOUNTER
Karthik Dior, from [employer] is calling and states that today's INR =1.2, states that she also faxed the official results to the MD' s office,

## 2017-10-30 NOTE — TELEPHONE ENCOUNTER
[id number] Chen Rader was on cipro so dose was adjusted. Please tell patient that when she is off cipro she should restart regular dose.

## 2017-10-30 NOTE — TELEPHONE ENCOUNTER
S/w pt and she states on 10/28 the pain increased again in left hallux. She denies any swelling, redness, d/c, fever or chills. She rates pain 9/10. She states if she compresses the toe with a bandage it slightly alleviates the pain.  If not wrapped the nita

## 2017-10-30 NOTE — TELEPHONE ENCOUNTER
Pt called stating pt talked to the nurse last Friday. Pt had a toenail removed 10-23-17 and was in a lot of pain. Today is still having pain. Pt has wrapped the toe and temp relieve. Can pt be seen today or should pt go to the er.    Please call to nikita

## 2017-10-31 PROBLEM — L03.116 CELLULITIS OF LEFT FOOT: Status: ACTIVE | Noted: 2017-01-01

## 2017-10-31 PROBLEM — I73.9 ARTERIAL INSUFFICIENCY OF LOWER EXTREMITY (HCC): Status: ACTIVE | Noted: 2017-01-01

## 2017-10-31 NOTE — H&P
[facility]    PATIENT'S NAME: Isela King   ATTENDING PHYSICIAN: Vanessa Engle MD   PATIENT ACCOUNT#:   [id number]    LOCATION:  [place] [address]  MEDICAL RECORD #:   [id number]       YOB: 1950  ADMISSION DATE:       10/31/2017 surgery and stenting on bilateral lower extremities. She does have a graft in her left lower extremity. She has history of breast cancer in 2004 status post mastectomy; no further treatment was required. She has history of asthma.   She had cerebrovascul air.  HEENT:  Atraumatic. Oropharynx clear. Moist mucous membranes. Normal hard and soft palate. NECK:  Trachea midline. Full range of motion. Supple. No thyromegaly or lymphadenopathy. LUNGS:  Clear to auscultation bilaterally.   Normal respiratory

## 2017-10-31 NOTE — ED NOTES
Patient here for left foot pain and swelling for the last few days. Patient has a history of PVD. MD at bedside.

## 2017-10-31 NOTE — ED PROVIDER NOTES
Patient Seen in: [facility] AND [facility] Emergency Department    History   Patient presents with: Foot Pain    Stated Complaint: Left Foot Pain     HPI    Patient presents on the advice of her orthopedic physician.   She was sent here for possible vascular ins Myocardial infarction 2015    x2   • Osteopenia    • Other ill-defined conditions(799.89) 2010    Post Polio   • PE (pulmonary embolism)    • Peripheral vascular disease ([facility] 2008   • Pneumonia due to organism    • Polio 1950    As Infant   • Pulmonary em 500 MG Oral Tablet 24 Hr,  Take 1 tablet (500 mg total) by mouth 2 (two) times daily with meals.    Warfarin Sodium 5 MG Oral Tab,  Nightly and as directed by coumadin clinic   Warfarin Sodium 7.5 MG Oral Tab,  As directed by coumadin clinic up to three amanda Complex Vitamins (VITAMIN B COMPLEX) Oral Tab,  Take 1 tablet by mouth daily. Multiple Vitamins-Minerals (EMERGEN-C VITAMIN C) Oral Powd Pack,  Take 1 Dose by mouth daily. SACCHAROMYCES BOULARDII OR,  Take 1 capsule by mouth 2 (two) times daily.  Probio energy  Cardiovascular: no chest pain  Respiratory: no shortness of breath  Gastrointestinal: no abdominal pain    Positive for stated complaint: Left Foot Pain   Other systems are as noted in HPI. Constitutional and vital signs reviewed.       All other s and has had intervention done by Dr. Artur Noyola. She also sees Dr. Diana Morales. I did talk to Dr. Dominga Mccullough. He knows this patient very well. He and I discussed this patient he did call back and request a arterial ultrasound to be done of the left lower extremity. orders were created for panel order CBC WITH DIFFERENTIAL WITH PLATELET.   Procedure                               Abnormality         Status                     ---------                               -----------         ------                     CBC W/ D

## 2017-10-31 NOTE — ED INITIAL ASSESSMENT (HPI)
Pt c/o right great toe pain, recently had a nail removed but denies any complications. Was advised to come to ER by her podiatrist to r/o circulation complications. Pt c/o increased pain and redness. Denies drainage.

## 2017-10-31 NOTE — PROGRESS NOTES
HPI:    Patient ID: Elvie Dougherty is a 78year old female. HPI  This 79-year-old female presents today with progressive increasing pain of the left forefoot. I did treat this patient for an ingrown toenail with some degree of infection.   She states that Take 50 mg by mouth daily. Disp:  Rfl:    Rosuvastatin Calcium 20 MG Oral Tab Take 20 mg by mouth nightly. Disp:  Rfl:    sodium Chloride 0.9 % Inhalation Nebu Soln Take 3 mL by nebulization as needed for Wheezing.  Disp: 100 vial Rfl: 11   albuterol Sulfat 108 (90 BASE) MCG/ACT Inhalation Aero Soln Inhale 2 puffs into the lungs every 4 (four) hours as needed. For wheezing (Patient taking differently: Inhale 2 puffs into the lungs 2 (two) times daily.  For wheezing ) Disp: 3 Inhaler Rfl: 3   Alcohol Swabs ([facility] . Based on no apparent evidence of infection and the severe pain I have questioned about the vascular status of this left extremity. It is such that I have sent this patient to the emergency room for follow-up workup and probable admission.   She w

## 2017-11-01 NOTE — PROGRESS NOTES
[facility]  Progress Note    Tulio Bledsoemarie Patient Status:  Inpatient    10/13/1950 MRN [id number]   Location [facility] 5SW/SE Attending Young Muller MD   Hosp Day # 1 PCP Juan Miguel Martinez.  DO Chris       Subjective:       Objec

## 2017-11-01 NOTE — PLAN OF CARE
Problem: Patient/Family Goals  Goal: Patient/Family Long Term Goal  Patient's Long Term Goal: To go home.   Interventions:  - Monitor VS  - Assess for pain  - Ensure comfort  - Adm meds per orders    - See additional Care Plan goals for specific interventio period  INTERVENTIONS  - Monitor WBC  - Administer growth factors as ordered  - Implement neutropenic guidelines  Outcome: Progressing  Pt afebrile, will continue to monitor WBCs and temps.     Problem: SAFETY ADULT - FALL  Goal: Free from fall injury  INTE as we care for you?   - Provide timely, complete, and accurate information to patient/family  - Incorporate patient and family knowledge, values, beliefs, and cultural backgrounds into the planning and delivery of care  - Encourage patient/family to partic

## 2017-11-01 NOTE — CONSULTS
[facility]  Vascular Interventional Radiology Consultation    Ankurcosme Dougherty Patient Status:  Inpatient    10/13/1950 MRN [id number]   Location [facility] 5SW/SE Attending Liza Rowan MD   Hosp Day # 1 PCP Bandar Varma.  DO Chris     H benign    • IBS (irritable bowel syndrome)    • ID[facility] dependent diabetes mellitus) [facility]    W/early BDR   • Meibomian gland dysfunction 2006   • Menometrorrhagia    • Myocardial infarction 2015    x2   • Osteopenia    • Other ill-defined condi Cancer Self 53   • Macular degeneration Neg      Social History:  reports that she has never smoked. She has never used smokeless tobacco. She reports that she does not drink alcohol or use drugs.     Allergies:    Clindamycin             Tightness in Throa Intravenous, PRN  •  Glucose-Vitamin C (DEX-4) 4-0.006 g chewable tab 4 tablet, 4 tablet, Oral, Q15 Min PRN  •  Insulin Aspart Pen (NOVOLOG) 100 UNIT/ML flexpen 1-7 Units, 1-7 Units, Subcutaneous, TID CC  •  morphINE sulfate (PF) 4 MG/ML injection 4 mg, 4 Warfarin Sodium (COUMADIN) tab 5 mg, 5 mg, Oral, Once per day on Sun Mon Tue Thu Fri Sat  •  Warfarin Sodium (COUMADIN) tab 7.5 mg, 7.5 mg, Oral, Once per day on Wed  •  cetirizine (ZYRTEC) tab 10 mg, 10 mg, Oral, Daily  •  STRESS FORMULA/ZINC (STRESS FORM Popliteal DP PT   Right 1+ 0  Doppler   Left 1+ 0 0 Doppler       Us Arterial Duplex Lower Extremity Left (cpt=93926)    Result Date: 11/1/2017  CONCLUSION:  1. There is diffuse atherosclerotic disease.  The left femoral-anterior tibial bypass graft is russell disease) [facility]     Mitral regurgitation     CAD (coronary artery disease), native coronary artery     Mitral insufficiency     Cellulitis and abscess of foot     Cellulitis of foot     Hyperglycemia     Vaginal irritation     Arterial insufficiency of lowe

## 2017-11-01 NOTE — PLAN OF CARE
Problem: Patient/Family Goals  Goal: Patient/Family Long Term Goal  Patient's Long Term Goal: To go home.   Interventions:  - Monitor VS  - Assess for pain  - Ensure comfort  - Adm meds per orders    - See additional Care Plan goals for specific interventio Progressing      Problem: SAFETY ADULT - FALL  Goal: Free from fall injury  INTERVENTIONS:  - Assess pt frequently for physical needs  - Identify cognitive and physical deficits and behaviors that affect risk of falls.   - [place] fall precautions as kd to participate in care and decision-making at the level they choose  - Honor patient and family perspectives and choices   Outcome: Progressing      Comments: Pt is alert and oriented. Had CTA done which showed occlusion. Plan for left leg PTA on 11/3/17.

## 2017-11-01 NOTE — PROGRESS NOTES
[facility]    Progress Note    Marvia Samples Patient Status:  Inpatient    10/13/1950 MRN [id number]   Location [facility] 5SW/SE Attending Clarke Llanes MD   Hosp Day # 1 PCP Mariposa Miles. DO Chris       Subjective:    Heladio Mathews Oral, Q4H PRN **OR** HYDROcodone-acetaminophen (NORCO) 5-325 MG per tab 1 tablet, 1 tablet, Oral, Q4H PRN **OR** HYDROcodone-acetaminophen (NORCO) 5-325 MG per tab 2 tablet, 2 tablet, Oral, Q4H PRN  •  morphINE sulfate (PF) 2 MG/ML injection 1 mg, 1 mg, In (LASIX) tab 20 mg, 20 mg, Oral, Daily  •  lisinopril (PRINIVIL,ZESTRIL) tab 2.5 mg, 2.5 mg, Oral, Daily  •  Meclizine HCl (ANTIVERT) tab 25 mg, 25 mg, Oral, Daily PRN  •  Fluticasone Furoate-Vilanterol (BREO ELLIPTA) 200-25 MCG/INH inhaler 1 puff, 1 puff, 13.5  12.3   HCT  40.6  36.9   MCV  90.7  90.2   MCH  30.1  30.0   MCHC  33.2  33.3   RDW  13.7  13.6   WBC  7.4  6.1   PLT  159  162         Recent Labs   Lab  10/31/17   1538  11/01/17   0604   GLU  294*  182*   BUN  10  10   CREATSERUM  0.64  0.41*   GF runoff in the right lower extremity via the anterior and posterior tibial arteries. Distal occlusion of the right peroneal artery. 3. Diffuse muscular atrophy involving the left greater than right lower extremities.  4. Marked presumed synovitis involving b

## 2017-11-01 NOTE — PROGRESS NOTES
[facility] dosing service    Initial Pharmacokinetic Consult for Vancomycin Dosing     Drew Sandoval is a 78year old female who is being treated for cellulitis. Pharmacy has been asked to dose Vancomycin by Dr. Romario Gary.      She is allergic to clindamyci PharmD  10/31/2017  9:28 PM  [address] [place]: [phone]

## 2017-11-02 NOTE — PROGRESS NOTES
Patient currently hospitalized from 10/31 for arterial insufficiency of lower extremity. I will follow up with patient after hospitalization and TCM episode.

## 2017-11-02 NOTE — PROGRESS NOTES
[facility]    Progress Note    [facility] Patient Status:  Inpatient    10/13/1950 MRN [id number]   Location [facility] 5SW/SE Attending Petra Workman MD   Hosp Day # 2 PCP Merari Comment. DO Chris       Subjective:    Avis Nieto HYDROcodone-acetaminophen (NORCO) 5-325 MG per tab 1 tablet, 1 tablet, Oral, Q4H PRN **OR** HYDROcodone-acetaminophen (NORCO) 5-325 MG per tab 2 tablet, 2 tablet, Oral, Q4H PRN  •  morphINE sulfate (PF) 2 MG/ML injection 1 mg, 1 mg, Intravenous, Q2H PRN ** lisinopril (PRINIVIL,ZESTRIL) tab 2.5 mg, 2.5 mg, Oral, Daily  •  Meclizine HCl (ANTIVERT) tab 25 mg, 25 mg, Oral, Daily PRN  •  Fluticasone Furoate-Vilanterol (BREO ELLIPTA) 200-25 MCG/INH inhaler 1 puff, 1 puff, Inhalation, Daily  •  nitroGLYCERIN (NITRO 12.3  12.2   HCT  40.6  36.9  37.0   MCV  90.7  90.2  90.0   MCH  30.1  30.0  29.7   MCHC  33.2  33.3  33.0   RDW  13.7  13.6  13.6   WBC  7.4  6.1  5.8   PLT  159  162  166         Recent Labs   Lab  10/31/17   1538  11/01/17   0604  11/02/17   0315   GLU Right common femoral-anterior tibial venous bypass is noted and appears patent. There is a thready two-vessel runoff in the right lower extremity via the anterior and posterior tibial arteries. Distal occlusion of the right peroneal artery.  3. Diffuse musc

## 2017-11-02 NOTE — PLAN OF CARE
Problem: Patient/Family Goals  Goal: Patient/Family Long Term Goal  Patient's Long Term Goal: To go home.   Interventions:  - Monitor VS  - Assess for pain  - Ensure comfort  - Adm meds per orders    - See additional Care Plan goals for specific interventio factors as ordered  - Implement neutropenic guidelines   Outcome: Progressing  Afebrile, continue iv vanco, trough today 13.5    Problem: SAFETY ADULT - FALL  Goal: Free from fall injury  INTERVENTIONS:  - Assess pt frequently for physical needs  - Identif I use the bedpan\"  - Provide timely, complete, and accurate information to patient/family  - Incorporate patient and family knowledge, values, beliefs, and cultural backgrounds into the planning and delivery of care  - Encourage patient/family to particip

## 2017-11-03 NOTE — PROGRESS NOTES
[facility]    Progress Note    Peter Enciso Patient Status:  Inpatient    10/13/1950 MRN [id number]   Location [facility] 5SW/SE Attending Theresa Ohara MD   Hosp Day # 3 PCP Pamella Arroyo. DO Chris       Subjective:    [facility] [place] Oral, Q4H PRN **OR** HYDROcodone-acetaminophen (NORCO) 5-325 MG per tab 1 tablet, 1 tablet, Oral, Q4H PRN **OR** HYDROcodone-acetaminophen (NORCO) 5-325 MG per tab 2 tablet, 2 tablet, Oral, Q4H PRN  •  morphINE sulfate (PF) 2 MG/ML injection 1 mg, 1 mg, In Oral, Daily  •  lisinopril (PRINIVIL,ZESTRIL) tab 2.5 mg, 2.5 mg, Oral, Daily  •  Meclizine HCl (ANTIVERT) tab 25 mg, 25 mg, Oral, Daily PRN  •  Fluticasone Furoate-Vilanterol (BREO ELLIPTA) 200-25 MCG/INH inhaler 1 puff, 1 puff, Inhalation, Daily  •  nitr 12.3  12.2  12.7   HCT  36.9  37.0  38.3   MCV  90.2  90.0  89.2   MCH  30.0  29.7  29.6   MCHC  33.3  33.0  33.2   RDW  13.6  13.6  13.2   WBC  6.1  5.8  5.7   PLT  162  166  177         Recent Labs   Lab  11/01/17   0604  11/02/17   0315  11/03/17   0615

## 2017-11-03 NOTE — TELEPHONE ENCOUNTER
Left message this office did not call her. Last encounter was with RN on 10/30/17, where she was given dosing for Warfarin.    Left message if more questions may call [facility] back    Pt is currently in hospital.

## 2017-11-03 NOTE — PROGRESS NOTES
[place]  [id number]      Post procedure/ recovery [facility] Patient's vital signs stable, access site dry and intact, no signs and symptoms of bleeding/ hematoma. Some bruising noted on the surrounding puncture site. Arm board i

## 2017-11-03 NOTE — PLAN OF CARE
Problem: Patient/Family Goals  Goal: Patient/Family Long Term Goal  Patient's Long Term Goal: To go home.   Interventions:  - Monitor VS  - Assess for pain  - Ensure comfort  - Adm meds per orders    - See additional Care Plan goals for specific interventio as ordered  - Implement neutropenic guidelines   Outcome: Progressing  Afebrile this shift.      Problem: SAFETY ADULT - FALL  Goal: Free from fall injury  INTERVENTIONS:  - Assess pt frequently for physical needs  - Identify cognitive and physical deficits

## 2017-11-03 NOTE — BRIEF PROCEDURE NOTE
[facility]  Procedure Note    Knowles Melissa Patient Status:  Inpatient    10/13/1950 MRN [id number]   Location [facility] Attending Skeeter Jeans, MD   Hosp Day # 3 PCP Leydi Blake.  DO Chris     Procedure: Lower externall

## 2017-11-04 NOTE — PLAN OF CARE
Diabetes/Glucose Control    • Glucose maintained within prescribed range Progressing        PAIN - ADULT    • Verbalizes/displays adequate comfort level or patient's stated pain goal Progressing        Patient/Family Goals    • Patient/Family [facility]

## 2017-11-05 NOTE — DISCHARGE SUMMARY
[facility]    Discharge Summary    Amber Headley Patient Status:  Inpatient    10/13/1950 MRN [id number]   Location [facility] Attending No att. providers found   [facility] Day # 4 PCP Verline Cancer.  DO Chris     Date of Admission: 10 regurgitation     CAD (coronary artery disease), native coronary artery     Mitral insufficiency     Cellulitis and abscess of foot     Cellulitis of foot     Hyperglycemia     Vaginal irritation     Arterial insufficiency of lower extremity (HCC)     Cell Instructions Prescription details   Acetaminophen-Codeine #3 300-30 MG Tabs  Commonly known as:  TYLENOL #3      Take 1 tablet by mouth every 4 (four) hours as needed.    Quantity:  12 tablet  Refills:  0     cephALEXin 500 MG Caps  Commonly known as: acid 1000 MG Tabs  Commonly known as:  VITAMIN C      Take 1,000 mg by mouth daily. Refills:  0     ASPIRIN CHILDRENS 81 MG Chew  Generic drug:  aspirin      Chew 81 mg by mouth daily.    Refills:  0     BD ULTRA-FINE LANCETS Misc      Use 3 times a day tablet  Refills:  3     Mometasone Furo-Formoterol Fum 200-5 MCG/ACT Aero  Commonly known as:  DULERA      Inhale 2 puffs into the lungs 2 (two) times daily.  Rinse mouth afterwards   Quantity:  3 Inhaler  Refills:  3     MULTI-VITAMINS Tabs      Take by mo directed by your doctor or nurse    Bring a paper prescription for each of these medications  · Acetaminophen-Codeine #3 300-30 MG Tabs  · cephALEXin 500 MG Caps             Kate Tomas  11/5/2017  1:00 PM    Greater than 30 minutes spent on preparation

## 2017-11-06 NOTE — PROGRESS NOTES
Initial Post Discharge Follow Up   Discharge Date: 11/4/17  Contact Date: 11/6/2017    Consent Verification:  Assessment Completed With: Patient  HIPAA Verified? Yes     1.  Tell me why you were in the hospital?  \" I went to my podiatrist. I had extreme p (COREG) 3.125 MG Oral Tab Take 1 tablet (3.125 mg total) by mouth 2 (two) times daily with meals. Disp: 180 tablet Rfl: 3   lisinopril 5 MG Oral Tab Take 0.5 tablets (2.5 mg total) by mouth daily.  Disp: 45 tablet Rfl: 3   MetFORMIN HCl  MG Oral Table Subcutaneous Solution Pen-injector 10 u sq daily (Patient taking differently: Inject 7 Units into the skin nightly. 10 u sq daily ) Disp: 5 pen Rfl: 3   Glucose Blood (ONETOUCH ULTRA BLUE) In Vitro Strip USE AS DIRECTED 3 TIMES EVERY DAY AND AS NEEDED.  Dis Probiotic   Disp:  Rfl:        4. What questions do you have about your medications? \" No\"    5. How often do you forget to take your medicine? \" I don't\"     6. Do you stop taking your medicine when you feel better? No    7.  Do you ever stop taking y [address]  [phone] [facility] Echocardiography  [place] [address] her tylenol intake from ALL sources to 300 mg daily. She stated understanding.  She wished to talk with Dr. Wilson Maxwell tomorrow about possibly lowering of her dose of Lasix as she hates the way it makes her go more often and of course, now that she has the fresher t

## 2017-11-07 NOTE — PROGRESS NOTES
HPI:    Tony Schulte is a 78year old female here today for hospital follow up.    She was discharged from Inpatient hospital[facility]  to Home   Admission Date: 10/31/17   Discharge Date: 11/4/17  Hospital Discharge Diagnosis: No Value exists for Saline Nasal Spray 0.65 % Nasal Solution 3 sprays by Nasal route nightly as needed for congestion. ALPRAZolam 1 MG Oral Tab Take one tablet by mouth nightly as needed. (Patient taking differently: 0.5 mg daily as needed.  Take one tablet by mouth nightl (Patient taking differently: Inject 7 Units into the skin nightly. 10 u sq daily )   Glucose Blood (ONETOUCH ULTRA BLUE) In Vitro Strip USE AS DIRECTED 3 TIMES EVERY DAY AND AS NEEDED.    acetaminophen 325 MG Oral Tab Take 2 tablets (650 mg total) by mouth (benign paroxysmal positional vertigo); [facility] Breast cancer of upper-outer quadrant of left female breast ([facility] (10/23/2010); Cancer of overlapping sites of right female breast ([facility] (10/23/2010); Cataract (2005, 2009);  Cellulitis; Elvin Adler family history includes Breast Cancer (age of onset: 46) in her self; Breast Cancer (age of onset: 61) in her sister; Cancer in her sister; Colon Cancer (age of onset: 48) in her brother; Colonic Polyps in her brother; Crohn's Disease in her brother; Akhil Wynne tenderness  MUSCULOSKELETAL: atrophy b/l lower ext. EXTREMITIES: redness left foot and toe  Ulceration on great toe and luisana ulcer toe #3 left  Mild erythema. foot warm. dp pulses absent.       ASSESSMENT/ PLAN:   Diagnoses and all orders for this visit:

## 2017-11-08 NOTE — TELEPHONE ENCOUNTER
Pt had to cancel appt for today, states she was recently discharged from [address] [place] [address], pt continues to be in a lot of pain, asking for appt before the end of the year. Pls advise thank you.

## 2017-11-09 NOTE — TELEPHONE ENCOUNTER
pt called,  LOV 10/31/17 with SCR for left foot toe pain, was then sent to the ED.    pt asking, when would SCR like to see her again.

## 2017-11-11 ENCOUNTER — PRIOR ORIGINAL RECORDS (OUTPATIENT)
Dept: OTHER | Age: 67
End: 2017-11-11

## 2017-11-14 ENCOUNTER — PRIOR ORIGINAL RECORDS (OUTPATIENT)
Dept: OTHER | Age: 67
End: 2017-11-14

## 2017-11-16 NOTE — PROGRESS NOTES
Pt with swollen and red left great toe  Has scaling skin benieth great toe  \"I bumped it on tusday morning getting out of bed. \"  Painful , really painful today. \"    Had procedure 11/3/2017  Lower externally arteriogram, left common femoral atherectomy,

## 2017-11-20 LAB
BUN: 13 MG/DL
CALCIUM: 8.5 MG/DL
CHLORIDE: 103 MEQ/L
CREATININE, SERUM: 0.48 MG/DL
GLUCOSE: 183 MG/DL
HEMATOCRIT: 36.4 %
HEMOGLOBIN: 12 G/DL
INR: 3.5
PLATELETS: 169 K/UL
POTASSIUM, SERUM: 3.9 MEQ/L
RED BLOOD COUNT: 4.04 X 10-6/U
SODIUM: 135 MEQ/L
WHITE BLOOD COUNT: 5.5 X 10-3/U

## 2017-11-21 ENCOUNTER — PRIOR ORIGINAL RECORDS (OUTPATIENT)
Dept: OTHER | Age: 67
End: 2017-11-21

## 2017-11-21 PROBLEM — H43.392 FLOATER, VITREOUS, LEFT: Status: ACTIVE | Noted: 2017-01-01

## 2017-11-21 PROBLEM — E10.3293 TYPE 1 DIABETES MELLITUS WITH MILD NONPROLIFERATIVE RETINOPATHY OF BOTH EYES WITHOUT MACULAR EDEMA (HCC): Status: ACTIVE | Noted: 2017-01-01

## 2017-11-21 NOTE — ASSESSMENT & PLAN NOTE
Discussed with patient that they have mild diabetic retinopathy changes in both eyes that does not require any treatment at this time.

## 2017-11-21 NOTE — PROGRESS NOTES
Steven Way is a 78year old female.     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 Disp:  Rfl:    Acetaminophen-Codeine #3 300-30 MG Oral Tab Take 1 tablet by mouth every 4 (four) hours as needed.  Disp: 12 tablet Rfl: 0   Saline Nasal Spray 0.65 % Nasal Solution 3 sprays by Nasal route nightly as need NEBULIZATION EVERY 6 (SIX) HOURS AS NEEDED FOR WHEEZING. Disp: 100 mL Rfl: 2   ascorbic acid 1000 MG Oral Tab Take 1,000 mg by mouth daily. Disp:  Rfl:    Cinnamon 500 MG Oral Cap Take 1,000 mg by mouth daily.    Disp:  Rfl:    Coenzyme Q10 (COQ10) 100 MG O RANEXA 500 MG Oral Tablet 12 Hr Take 500 mg by mouth 2 (two) times daily. Disp:  Rfl:    NITROSTAT 0.4 MG Sublingual SL Tab Place 0.4 mg under the tongue every 5 (five) minutes as needed.    Disp:  Rfl: 5   Olopatadine HCl (PATANOL) 0.1 % Ophthalmic Sol Left    Disc Good rim, Temporal crescent Good rim    C/D Ratio 0.5 0.5    Macula DBH along superonasal arcade, few MA below fovea, no CSME, no MICAELA some MA above, below and temp to fovea     Vessels Normal Normal    Periphery Normal Normal            Refrac

## 2017-11-21 NOTE — PATIENT INSTRUCTIONS
Pseudophakia of both eyes  Optional distance only glasses today. Update as needed.      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

## 2017-11-21 NOTE — TELEPHONE ENCOUNTER
Pharmacy called in for pt requesting a refill on medication below. Pharmacy states pt is out of medication. Current Outpatient Prescriptions:      •  ALPRAZolam 1 MG Oral Tab, Take one tablet by mouth nightly as needed.  (Patient taking differently: 0.5

## 2017-11-21 NOTE — ASSESSMENT & PLAN NOTE
Diabetes type I: mild background diabetic retinopathy, no signs of neovascularization noted. No treatment necessary at this time. Patient was instructed to monitor vision for sudden changes and to call if any changes noted.   Discussed ocular and systemic

## 2017-11-22 NOTE — ED INITIAL ASSESSMENT (HPI)
Pt states that she had an angioplasty done 11/10 and denies having any problems but woke up around midnight with pain at the L groin and in her L toes.

## 2017-11-22 NOTE — TELEPHONE ENCOUNTER
LMTCB (for pharmacy as pharmacy is currently closed). May transfer to Triage. Noted last Rx signed by [facility] was 10/9/17 for #30 and 2-Refills. If called in correctly, pt should have refill(s) left.  If pharmacy states they only received one (quantity of 3

## 2017-11-22 NOTE — ED NOTES
Pt states that she had an angioplasty performed on 11/10 and has had no problems since. Pt reports that pain developed around midnight tonight in her L groing that goes down to her L great toe.  States that the pain is a 7/10 and is like a heat/throbbing fe

## 2017-11-22 NOTE — ED PROVIDER NOTES
Patient Seen in: [facility] Emergency Department    History   Patient presents with:  Pain (neurologic)    Stated Complaint: pain at angioplasty site    HPI    Patient is a 61-year-old female that underwent angioplasty and atherectomy of her left Menometrorrhagia    • Myocardial infarction 2015    x2   • Osteopenia    • Other ill-defined conditions(799.89) 2010    Post Polio   • PE (pulmonary embolism)    • Peripheral vascular disease ([facility] 2008   • Pneumonia due to organism    • Polio 1951    As I src: Temporal  SpO2: 100 %  O2 Device: None (Room air)    Current:/70   Pulse 82   Temp (!) 97.4 °F (36.3 °C) (Temporal)   Resp 18   Ht 142.2 cm (4' 8\")   Wt 62.1 kg   SpO2 99%   BMI 30.71 kg/m²         Physical Exam    Patient is awake alert no dis ultrasound, there has been restoration of patency of the left common femoral artery, with much improved flow within the femoral-anterior tibial bypass graft. There remains residual narrowing at the stented distal anastomosis of the graft.  Continued ultraso

## 2017-11-24 NOTE — TELEPHONE ENCOUNTER
This nurse spoke with Isidoro Pearson at [facility] AND [facility] [phone] who confirmed patient still has refills available for the alprazolam and pharmacy has already contacted patient to  medication.

## 2017-11-28 NOTE — TELEPHONE ENCOUNTER
Please advise on refill request     Hypertensive Medications  Protocol Criteria:  · Appointment scheduled in the past 6 months or in the next 3 months  · BMP or CMP in the past 12 months  · Creatinine result < 2  Recent Outpatient Visits            1 week 11/22/2017   [place] [phone] 11/22/2017

## 2017-12-01 NOTE — TELEPHONE ENCOUNTER
I think she sees Dr. Savannah Sherwood now. I would rather not refill this as then these prescriptions just continue to come to me.

## 2017-12-01 NOTE — TELEPHONE ENCOUNTER
Please advise regarding pended refill requests.      LF= 9/5/17 #90 for both meds    Hypertensive Medications  Protocol Criteria:  · Appointment scheduled in the past 6 months or in the next 3 months  · BMP or CMP in the past 12 months  · Creatinine result [place] [phone] 11/22/2017

## 2017-12-04 NOTE — PROGRESS NOTES
Wants to get off metformin (\"I don't like the way my stomach feels after it. \")  Wants to go back on insulin  250-350  No sob no chest pain no n/v  No hypoglycemia  Was on novolog  On 10 of lantus.     Toes  Left great toe no erythema no pus or drainage u/s reprot reviewed    5. Hypomagnesemia  Continue mag. - MAGNESIUM; Future    6. Vitamin D deficiency  Recheck levels. - MICROALB/CREAT RATIO, RANDOM URINE;  Future  - VITAMIN D, 25-HYDROXY; Future

## 2017-12-05 PROBLEM — I73.9 PERIPHERAL VASCULAR DISEASE, UNSPECIFIED (HCC): Status: ACTIVE | Noted: 2017-01-01

## 2017-12-05 PROBLEM — Z79.01 LONG TERM (CURRENT) USE OF ANTICOAGULANTS: Status: ACTIVE | Noted: 2017-01-01

## 2017-12-05 NOTE — TELEPHONE ENCOUNTER
Hi Dr. Jan Lyles,    We need a new referral for Anticoagulation clinic. I have pended a new order in Epic. Pls sign.  Thanks

## 2017-12-06 NOTE — TELEPHONE ENCOUNTER
Pt states [facility] put her back on humalog yesterday since metformin was not working well. Pt states usually checks blood sugar before her 3 meals and also checks right before bedtime.  Is asking if she should also follow sliding scale when she checks before bed

## 2017-12-08 NOTE — PROGRESS NOTES
Called to pt since she is no longer on TCM enrollment to assess if she is still interested in our CCM program. She said she was, but was resting in bed at the time and asked if I could call her back at 3:00pm today to do the initial CCM assessment.  P= Call

## 2017-12-08 NOTE — PROGRESS NOTES
Spoke to [facility] at length about CCM, HIPAA verified, current care plan and performed CCM assessment.  Reviewed pt Patient Active Problem List:     Type II diabetes mellitus (HCC)     Asthma     Hypertension, benign     Coronary atherosclerosis     Major depre was an alcoholic. She had taken care of him. Now he is back in the house with her and she is still , but he helps her physically and she helps him mentally. She has to remind him to take his meds. She said he has short term memory issues.   No child running in the past with Metformin much higher. She is also on sliding scale. She only eats 2 meals a day ( does carb counting) and then she eats a snack with late time. She won't eat 3 meals a day and she sleeps late. She stays up late and always have.  Sh effects. Care Team: Reviewed and updated. Your appointments     Date & Time Appointment Department [facility])    Dec 14, 2017  2:10 PM CST Exam - Established with Tiago Mcclure, [address] [facility].  [address], [facility] [address] adjustments with the PCP for her readings. She has an appt on Monday 12/11/17.  She will talk with Dr. Montez Hester about possibly going to the Endocrinologist.      Care Plan: Reviewed and updated where needed  Sending education on: declines need for at this t

## 2017-12-14 NOTE — PROGRESS NOTES
HPI:    Patient ID: Patricia Monroy is a 78year old female. HPI  81-year-old female presents for follow-up and healing is complete. Since I last saw this patient she has had vascular surgery with good success.   Some minor nail debridement was accomplished daily. Disp:  Rfl:    Acetaminophen-Codeine #3 300-30 MG Oral Tab Take 1 tablet by mouth every 4 (four) hours as needed. Disp: 12 tablet Rfl: 0   Saline Nasal Spray 0.65 % Nasal Solution 3 sprays by Nasal route nightly as needed for congestion.  Disp:  Rfl: Disp:  Rfl:    LANTUS SOLOSTAR 100 UNIT/ML Subcutaneous Solution Pen-injector 10 u sq daily (Patient taking differently: Inject 7 Units into the skin nightly. Pt taking differently.  She takes usually 10 u sq daily, but adjusts it depending her ranges. ) Leonardo Carmona Oral Chew Tab Chew 81 mg by mouth daily.    Disp:  Rfl:      Allergies:  Clindamycin             Tightness in Throat  Levofloxacin [Quixi*    Nausea and vomiting  Augmentin [Amoxicil*    Rash  Dilaudid [Hydromorp*    Confusion    Comment:agitation  Scopolam

## 2017-12-15 NOTE — TELEPHONE ENCOUNTER
Current Outpatient Prescriptions:  Mometasone Furo-Formoterol Fum (DULERA) 200-5 MCG/ACT Inhalation Aerosol Inhale 2 puffs into the lungs 2 (two) times daily.  Rinse mouth afterwards Disp: 3 Inhaler Rfl: 3

## 2018-01-01 ENCOUNTER — ANESTHESIA EVENT (OUTPATIENT)
Dept: INTERVENTIONAL RADIOLOGY/VASCULAR | Facility: HOSPITAL | Age: 68
DRG: 215 | End: 2018-01-01
Payer: MEDICARE

## 2018-01-01 ENCOUNTER — TELEPHONE (OUTPATIENT)
Dept: OTHER | Age: 68
End: 2018-01-01

## 2018-01-01 ENCOUNTER — TELEPHONE (OUTPATIENT)
Dept: PULMONOLOGY | Facility: CLINIC | Age: 68
End: 2018-01-01

## 2018-01-01 ENCOUNTER — PATIENT OUTREACH (OUTPATIENT)
Dept: CASE MANAGEMENT | Age: 68
End: 2018-01-01

## 2018-01-01 ENCOUNTER — APPOINTMENT (OUTPATIENT)
Dept: GENERAL RADIOLOGY | Facility: HOSPITAL | Age: 68
DRG: 239 | End: 2018-01-01
Attending: HOSPITALIST
Payer: MEDICARE

## 2018-01-01 ENCOUNTER — APPOINTMENT (OUTPATIENT)
Dept: GENERAL RADIOLOGY | Facility: HOSPITAL | Age: 68
DRG: 291 | End: 2018-01-01
Attending: EMERGENCY MEDICINE
Payer: MEDICARE

## 2018-01-01 ENCOUNTER — TELEPHONE (OUTPATIENT)
Dept: FAMILY MEDICINE CLINIC | Facility: CLINIC | Age: 68
End: 2018-01-01

## 2018-01-01 ENCOUNTER — APPOINTMENT (OUTPATIENT)
Dept: GENERAL RADIOLOGY | Facility: HOSPITAL | Age: 68
DRG: 239 | End: 2018-01-01
Attending: INTERNAL MEDICINE
Payer: MEDICARE

## 2018-01-01 ENCOUNTER — ANTI-COAG VISIT (OUTPATIENT)
Dept: INTERNAL MEDICINE CLINIC | Facility: CLINIC | Age: 68
End: 2018-01-01

## 2018-01-01 ENCOUNTER — ANESTHESIA (OUTPATIENT)
Dept: SURGERY | Facility: HOSPITAL | Age: 68
DRG: 239 | End: 2018-01-01
Payer: MEDICARE

## 2018-01-01 ENCOUNTER — TELEPHONE (OUTPATIENT)
Dept: CARDIOLOGY CLINIC | Facility: HOSPITAL | Age: 68
End: 2018-01-01

## 2018-01-01 ENCOUNTER — SNF VISIT (OUTPATIENT)
Dept: INTERNAL MEDICINE CLINIC | Facility: SKILLED NURSING FACILITY | Age: 68
End: 2018-01-01

## 2018-01-01 ENCOUNTER — TELEPHONE (OUTPATIENT)
Dept: GASTROENTEROLOGY | Facility: CLINIC | Age: 68
End: 2018-01-01

## 2018-01-01 ENCOUNTER — APPOINTMENT (OUTPATIENT)
Dept: GENERAL RADIOLOGY | Facility: HOSPITAL | Age: 68
DRG: 215 | End: 2018-01-01
Attending: HOSPITALIST
Payer: MEDICARE

## 2018-01-01 ENCOUNTER — APPOINTMENT (OUTPATIENT)
Dept: GENERAL RADIOLOGY | Facility: HOSPITAL | Age: 68
DRG: 300 | End: 2018-01-01
Attending: HOSPITALIST
Payer: MEDICARE

## 2018-01-01 ENCOUNTER — APPOINTMENT (OUTPATIENT)
Dept: GENERAL RADIOLOGY | Facility: HOSPITAL | Age: 68
DRG: 239 | End: 2018-01-01
Attending: CLINICAL NURSE SPECIALIST
Payer: MEDICARE

## 2018-01-01 ENCOUNTER — LAB ENCOUNTER (OUTPATIENT)
Dept: LAB | Age: 68
End: 2018-01-01
Attending: INTERNAL MEDICINE
Payer: MEDICARE

## 2018-01-01 ENCOUNTER — HOSPITAL ENCOUNTER (OUTPATIENT)
Dept: GENERAL RADIOLOGY | Facility: HOSPITAL | Age: 68
Discharge: HOME | End: 2018-01-01
Attending: INTERNAL MEDICINE
Payer: MEDICARE

## 2018-01-01 ENCOUNTER — HOSPITAL ENCOUNTER (INPATIENT)
Facility: HOSPITAL | Age: 68
LOS: 11 days | Discharge: HOME HEALTH | DRG: 215 | End: 2018-01-01
Attending: EMERGENCY MEDICINE | Admitting: HOSPITALIST
Payer: MEDICARE

## 2018-01-01 ENCOUNTER — NURSE TRIAGE (OUTPATIENT)
Dept: OTHER | Age: 68
End: 2018-01-01

## 2018-01-01 ENCOUNTER — OFFICE VISIT (OUTPATIENT)
Dept: FAMILY MEDICINE CLINIC | Facility: CLINIC | Age: 68
End: 2018-01-01

## 2018-01-01 ENCOUNTER — TELEPHONE (OUTPATIENT)
Dept: SURGERY | Facility: CLINIC | Age: 68
End: 2018-01-01

## 2018-01-01 ENCOUNTER — APPOINTMENT (OUTPATIENT)
Dept: GENERAL RADIOLOGY | Facility: HOSPITAL | Age: 68
DRG: 239 | End: 2018-01-01
Attending: EMERGENCY MEDICINE
Payer: MEDICARE

## 2018-01-01 ENCOUNTER — TELEPHONE (OUTPATIENT)
Dept: CARDIOLOGY UNIT | Facility: HOSPITAL | Age: 68
End: 2018-01-01

## 2018-01-01 ENCOUNTER — APPOINTMENT (OUTPATIENT)
Dept: ULTRASOUND IMAGING | Facility: HOSPITAL | Age: 68
DRG: 239 | End: 2018-01-01
Attending: INTERNAL MEDICINE
Payer: MEDICARE

## 2018-01-01 ENCOUNTER — APPOINTMENT (OUTPATIENT)
Dept: CV DIAGNOSTICS | Facility: HOSPITAL | Age: 68
DRG: 291 | End: 2018-01-01
Attending: INTERNAL MEDICINE
Payer: MEDICARE

## 2018-01-01 ENCOUNTER — APPOINTMENT (OUTPATIENT)
Dept: ULTRASOUND IMAGING | Facility: HOSPITAL | Age: 68
DRG: 291 | End: 2018-01-01
Attending: INTERNAL MEDICINE
Payer: MEDICARE

## 2018-01-01 ENCOUNTER — APPOINTMENT (OUTPATIENT)
Dept: GENERAL RADIOLOGY | Facility: HOSPITAL | Age: 68
DRG: 291 | End: 2018-01-01
Attending: HOSPITALIST
Payer: MEDICARE

## 2018-01-01 ENCOUNTER — APPOINTMENT (OUTPATIENT)
Dept: CT IMAGING | Facility: HOSPITAL | Age: 68
DRG: 215 | End: 2018-01-01
Attending: INTERNAL MEDICINE
Payer: MEDICARE

## 2018-01-01 ENCOUNTER — APPOINTMENT (OUTPATIENT)
Dept: INTERVENTIONAL RADIOLOGY/VASCULAR | Facility: HOSPITAL | Age: 68
DRG: 239 | End: 2018-01-01
Attending: CLINICAL NURSE SPECIALIST
Payer: MEDICARE

## 2018-01-01 ENCOUNTER — HOSPITAL ENCOUNTER (INPATIENT)
Facility: HOSPITAL | Age: 68
LOS: 21 days | Discharge: HOSPICE | DRG: 291 | End: 2018-01-01
Attending: HOSPITALIST | Admitting: HOSPITALIST
Payer: MEDICARE

## 2018-01-01 ENCOUNTER — TELEPHONE (OUTPATIENT)
Dept: INTERVENTIONAL RADIOLOGY/VASCULAR | Facility: HOSPITAL | Age: 68
End: 2018-01-01

## 2018-01-01 ENCOUNTER — APPOINTMENT (OUTPATIENT)
Dept: ULTRASOUND IMAGING | Facility: HOSPITAL | Age: 68
DRG: 271 | End: 2018-01-01
Attending: HOSPITALIST
Payer: MEDICARE

## 2018-01-01 ENCOUNTER — APPOINTMENT (OUTPATIENT)
Dept: ULTRASOUND IMAGING | Facility: HOSPITAL | Age: 68
DRG: 271 | End: 2018-01-01
Attending: CLINICAL NURSE SPECIALIST
Payer: MEDICARE

## 2018-01-01 ENCOUNTER — HOSPITAL ENCOUNTER (INPATIENT)
Facility: HOSPITAL | Age: 68
LOS: 38 days | Discharge: NURSING, REHAB OR LONG-TERM CARE | DRG: 239 | End: 2018-01-01
Attending: HOSPITALIST | Admitting: HOSPITALIST
Payer: MEDICARE

## 2018-01-01 ENCOUNTER — APPOINTMENT (OUTPATIENT)
Dept: CT IMAGING | Facility: HOSPITAL | Age: 68
DRG: 239 | End: 2018-01-01
Attending: EMERGENCY MEDICINE
Payer: MEDICARE

## 2018-01-01 ENCOUNTER — ANESTHESIA EVENT (OUTPATIENT)
Dept: SURGERY | Facility: HOSPITAL | Age: 68
DRG: 239 | End: 2018-01-01
Payer: MEDICARE

## 2018-01-01 ENCOUNTER — APPOINTMENT (OUTPATIENT)
Dept: ULTRASOUND IMAGING | Facility: HOSPITAL | Age: 68
DRG: 300 | End: 2018-01-01
Attending: EMERGENCY MEDICINE
Payer: MEDICARE

## 2018-01-01 ENCOUNTER — APPOINTMENT (OUTPATIENT)
Dept: CV DIAGNOSTICS | Facility: HOSPITAL | Age: 68
DRG: 239 | End: 2018-01-01
Attending: INTERNAL MEDICINE
Payer: MEDICARE

## 2018-01-01 ENCOUNTER — APPOINTMENT (OUTPATIENT)
Dept: LAB | Age: 68
End: 2018-01-01
Attending: FAMILY MEDICINE
Payer: MEDICARE

## 2018-01-01 ENCOUNTER — TELEPHONE (OUTPATIENT)
Dept: PAIN CLINIC | Facility: HOSPITAL | Age: 68
End: 2018-01-01

## 2018-01-01 ENCOUNTER — OFFICE VISIT (OUTPATIENT)
Dept: FAMILY MEDICINE CLINIC | Facility: CLINIC | Age: 68
End: 2018-01-01
Payer: MEDICARE

## 2018-01-01 ENCOUNTER — ANESTHESIA EVENT (OUTPATIENT)
Dept: MEDSURG UNIT | Facility: HOSPITAL | Age: 68
End: 2018-01-01

## 2018-01-01 ENCOUNTER — HOSPITAL ENCOUNTER (OUTPATIENT)
Dept: ULTRASOUND IMAGING | Facility: HOSPITAL | Age: 68
Discharge: HOME | End: 2018-01-01
Attending: RADIOLOGY
Payer: MEDICARE

## 2018-01-01 ENCOUNTER — APPOINTMENT (OUTPATIENT)
Dept: CT IMAGING | Facility: HOSPITAL | Age: 68
DRG: 291 | End: 2018-01-01
Attending: HOSPITALIST
Payer: MEDICARE

## 2018-01-01 ENCOUNTER — HOSPITAL ENCOUNTER (OUTPATIENT)
Dept: CARDIOLOGY CLINIC | Facility: HOSPITAL | Age: 68
End: 2018-01-01

## 2018-01-01 ENCOUNTER — HOSPITAL ENCOUNTER (INPATIENT)
Facility: HOSPITAL | Age: 68
LOS: 5 days | Discharge: HOME | DRG: 291 | End: 2018-01-01
Attending: EMERGENCY MEDICINE | Admitting: HOSPITALIST
Payer: MEDICARE

## 2018-01-01 ENCOUNTER — HOSPITAL ENCOUNTER (EMERGENCY)
Facility: HOSPITAL | Age: 68
Discharge: HOME | End: 2018-01-01
Attending: EMERGENCY MEDICINE
Payer: MEDICARE

## 2018-01-01 ENCOUNTER — ANESTHESIA (OUTPATIENT)
Dept: MEDSURG UNIT | Facility: HOSPITAL | Age: 68
End: 2018-01-01

## 2018-01-01 ENCOUNTER — OFFICE VISIT (OUTPATIENT)
Dept: SURGERY | Facility: CLINIC | Age: 68
End: 2018-01-01

## 2018-01-01 ENCOUNTER — PATIENT MESSAGE (OUTPATIENT)
Dept: FAMILY MEDICINE CLINIC | Facility: CLINIC | Age: 68
End: 2018-01-01

## 2018-01-01 ENCOUNTER — APPOINTMENT (OUTPATIENT)
Dept: NUCLEAR MEDICINE | Facility: HOSPITAL | Age: 68
DRG: 291 | End: 2018-01-01
Attending: INTERNAL MEDICINE
Payer: MEDICARE

## 2018-01-01 ENCOUNTER — APPOINTMENT (OUTPATIENT)
Dept: GENERAL RADIOLOGY | Facility: HOSPITAL | Age: 68
DRG: 239 | End: 2018-01-01
Attending: SURGERY
Payer: MEDICARE

## 2018-01-01 ENCOUNTER — APPOINTMENT (OUTPATIENT)
Dept: CV DIAGNOSTICS | Facility: HOSPITAL | Age: 68
DRG: 271 | End: 2018-01-01
Attending: HOSPITALIST
Payer: MEDICARE

## 2018-01-01 ENCOUNTER — APPOINTMENT (OUTPATIENT)
Dept: MRI IMAGING | Facility: HOSPITAL | Age: 68
DRG: 291 | End: 2018-01-01
Attending: Other
Payer: MEDICARE

## 2018-01-01 ENCOUNTER — HOSPITAL ENCOUNTER (INPATIENT)
Facility: HOSPITAL | Age: 68
LOS: 4 days | Discharge: HOME HEALTH | DRG: 300 | End: 2018-01-01
Attending: EMERGENCY MEDICINE | Admitting: HOSPITALIST
Payer: MEDICARE

## 2018-01-01 ENCOUNTER — SNF/IP PROF CHARGE ONLY (OUTPATIENT)
Dept: INTERNAL MEDICINE CLINIC | Facility: CLINIC | Age: 68
End: 2018-01-01

## 2018-01-01 ENCOUNTER — HOSPITAL ENCOUNTER (INPATIENT)
Facility: HOSPITAL | Age: 68
LOS: 2 days | Discharge: HOME | DRG: 271 | End: 2018-01-01
Attending: EMERGENCY MEDICINE | Admitting: HOSPITALIST
Payer: MEDICARE

## 2018-01-01 ENCOUNTER — HOSPITAL ENCOUNTER (OUTPATIENT)
Dept: CARDIOLOGY CLINIC | Facility: HOSPITAL | Age: 68
Discharge: HOME | End: 2018-01-01
Attending: NURSE PRACTITIONER
Payer: MEDICARE

## 2018-01-01 ENCOUNTER — APPOINTMENT (OUTPATIENT)
Dept: INTERVENTIONAL RADIOLOGY/VASCULAR | Facility: HOSPITAL | Age: 68
End: 2018-01-01
Attending: CLINICAL NURSE SPECIALIST
Payer: MEDICARE

## 2018-01-01 ENCOUNTER — APPOINTMENT (OUTPATIENT)
Dept: INTERVENTIONAL RADIOLOGY/VASCULAR | Facility: HOSPITAL | Age: 68
DRG: 215 | End: 2018-01-01
Attending: NURSE PRACTITIONER
Payer: MEDICARE

## 2018-01-01 ENCOUNTER — HOSPITAL ENCOUNTER (INPATIENT)
Facility: HOSPITAL | Age: 68
LOS: 5 days | DRG: 291 | End: 2018-01-01
Attending: HOSPITALIST | Admitting: HOSPITALIST
Payer: OTHER MISCELLANEOUS

## 2018-01-01 ENCOUNTER — APPOINTMENT (OUTPATIENT)
Dept: INTERVENTIONAL RADIOLOGY/VASCULAR | Facility: HOSPITAL | Age: 68
DRG: 215 | End: 2018-01-01
Attending: INTERNAL MEDICINE
Payer: MEDICARE

## 2018-01-01 ENCOUNTER — APPOINTMENT (OUTPATIENT)
Dept: GENERAL RADIOLOGY | Facility: HOSPITAL | Age: 68
DRG: 291 | End: 2018-01-01
Attending: INTERNAL MEDICINE
Payer: MEDICARE

## 2018-01-01 ENCOUNTER — OFFICE VISIT (OUTPATIENT)
Dept: PULMONOLOGY | Facility: CLINIC | Age: 68
End: 2018-01-01

## 2018-01-01 ENCOUNTER — HOSPITAL ENCOUNTER (OUTPATIENT)
Dept: CV DIAGNOSTICS | Facility: HOSPITAL | Age: 68
End: 2018-01-01
Attending: INTERNAL MEDICINE
Payer: MEDICARE

## 2018-01-01 ENCOUNTER — APPOINTMENT (OUTPATIENT)
Dept: GENERAL RADIOLOGY | Facility: HOSPITAL | Age: 68
DRG: 215 | End: 2018-01-01
Attending: EMERGENCY MEDICINE
Payer: MEDICARE

## 2018-01-01 ENCOUNTER — APPOINTMENT (OUTPATIENT)
Dept: CV DIAGNOSTICS | Facility: HOSPITAL | Age: 68
DRG: 215 | End: 2018-01-01
Attending: HOSPITALIST
Payer: MEDICARE

## 2018-01-01 ENCOUNTER — TELEPHONE (OUTPATIENT)
Dept: INTERNAL MEDICINE UNIT | Facility: HOSPITAL | Age: 68
End: 2018-01-01

## 2018-01-01 ENCOUNTER — OFFICE VISIT (OUTPATIENT)
Dept: PODIATRY CLINIC | Facility: CLINIC | Age: 68
End: 2018-01-01

## 2018-01-01 ENCOUNTER — PATIENT MESSAGE (OUTPATIENT)
Dept: PULMONOLOGY | Facility: CLINIC | Age: 68
End: 2018-01-01

## 2018-01-01 VITALS
HEART RATE: 69 BPM | OXYGEN SATURATION: 97 % | WEIGHT: 140 LBS | DIASTOLIC BLOOD PRESSURE: 71 MMHG | BODY MASS INDEX: 30.62 KG/M2 | SYSTOLIC BLOOD PRESSURE: 136 MMHG | HEIGHT: 56.5 IN

## 2018-01-01 VITALS
BODY MASS INDEX: 32.88 KG/M2 | HEART RATE: 95 BPM | TEMPERATURE: 98 F | OXYGEN SATURATION: 96 % | RESPIRATION RATE: 22 BRPM | DIASTOLIC BLOOD PRESSURE: 72 MMHG | WEIGHT: 150.31 LBS | SYSTOLIC BLOOD PRESSURE: 115 MMHG | HEIGHT: 56.5 IN

## 2018-01-01 VITALS
TEMPERATURE: 98 F | DIASTOLIC BLOOD PRESSURE: 69 MMHG | HEART RATE: 71 BPM | WEIGHT: 138 LBS | SYSTOLIC BLOOD PRESSURE: 113 MMHG | BODY MASS INDEX: 30 KG/M2

## 2018-01-01 VITALS
TEMPERATURE: 98 F | SYSTOLIC BLOOD PRESSURE: 93 MMHG | DIASTOLIC BLOOD PRESSURE: 46 MMHG | BODY MASS INDEX: 30.52 KG/M2 | RESPIRATION RATE: 16 BRPM | HEART RATE: 71 BPM | WEIGHT: 135.69 LBS | OXYGEN SATURATION: 97 % | HEIGHT: 56 IN

## 2018-01-01 VITALS
HEIGHT: 56 IN | SYSTOLIC BLOOD PRESSURE: 108 MMHG | DIASTOLIC BLOOD PRESSURE: 71 MMHG | WEIGHT: 140 LBS | HEART RATE: 78 BPM | BODY MASS INDEX: 31.49 KG/M2

## 2018-01-01 VITALS
HEART RATE: 80 BPM | HEIGHT: 56.5 IN | WEIGHT: 138 LBS | BODY MASS INDEX: 30.19 KG/M2 | DIASTOLIC BLOOD PRESSURE: 72 MMHG | SYSTOLIC BLOOD PRESSURE: 126 MMHG

## 2018-01-01 VITALS
BODY MASS INDEX: 30.82 KG/M2 | TEMPERATURE: 98 F | HEIGHT: 56 IN | SYSTOLIC BLOOD PRESSURE: 107 MMHG | HEART RATE: 66 BPM | WEIGHT: 137 LBS | DIASTOLIC BLOOD PRESSURE: 72 MMHG

## 2018-01-01 VITALS
DIASTOLIC BLOOD PRESSURE: 66 MMHG | HEART RATE: 81 BPM | WEIGHT: 175 LBS | TEMPERATURE: 98 F | SYSTOLIC BLOOD PRESSURE: 106 MMHG | BODY MASS INDEX: 39 KG/M2

## 2018-01-01 VITALS
DIASTOLIC BLOOD PRESSURE: 68 MMHG | SYSTOLIC BLOOD PRESSURE: 110 MMHG | BODY MASS INDEX: 33 KG/M2 | TEMPERATURE: 98 F | WEIGHT: 149.81 LBS | RESPIRATION RATE: 20 BRPM | HEART RATE: 76 BPM

## 2018-01-01 VITALS
OXYGEN SATURATION: 100 % | DIASTOLIC BLOOD PRESSURE: 58 MMHG | TEMPERATURE: 98 F | HEART RATE: 87 BPM | RESPIRATION RATE: 24 BRPM | SYSTOLIC BLOOD PRESSURE: 104 MMHG

## 2018-01-01 VITALS
TEMPERATURE: 98 F | BODY MASS INDEX: 31.72 KG/M2 | OXYGEN SATURATION: 96 % | WEIGHT: 141 LBS | SYSTOLIC BLOOD PRESSURE: 115 MMHG | DIASTOLIC BLOOD PRESSURE: 72 MMHG | HEART RATE: 90 BPM | HEIGHT: 56 IN | RESPIRATION RATE: 16 BRPM

## 2018-01-01 VITALS
BODY MASS INDEX: 30 KG/M2 | TEMPERATURE: 98 F | SYSTOLIC BLOOD PRESSURE: 119 MMHG | DIASTOLIC BLOOD PRESSURE: 71 MMHG | WEIGHT: 134 LBS | HEART RATE: 97 BPM

## 2018-01-01 VITALS
HEIGHT: 56.5 IN | RESPIRATION RATE: 16 BRPM | TEMPERATURE: 98 F | WEIGHT: 140 LBS | DIASTOLIC BLOOD PRESSURE: 57 MMHG | HEART RATE: 82 BPM | OXYGEN SATURATION: 97 % | BODY MASS INDEX: 30.62 KG/M2 | SYSTOLIC BLOOD PRESSURE: 103 MMHG

## 2018-01-01 VITALS
TEMPERATURE: 98 F | RESPIRATION RATE: 20 BRPM | HEART RATE: 96 BPM | OXYGEN SATURATION: 97 % | BODY MASS INDEX: 38 KG/M2 | WEIGHT: 174 LBS | DIASTOLIC BLOOD PRESSURE: 82 MMHG | SYSTOLIC BLOOD PRESSURE: 93 MMHG

## 2018-01-01 VITALS
SYSTOLIC BLOOD PRESSURE: 104 MMHG | TEMPERATURE: 98 F | WEIGHT: 141.31 LBS | HEART RATE: 93 BPM | BODY MASS INDEX: 31.79 KG/M2 | OXYGEN SATURATION: 98 % | DIASTOLIC BLOOD PRESSURE: 64 MMHG | RESPIRATION RATE: 18 BRPM | HEIGHT: 56 IN

## 2018-01-01 VITALS
SYSTOLIC BLOOD PRESSURE: 117 MMHG | OXYGEN SATURATION: 100 % | BODY MASS INDEX: 29.56 KG/M2 | HEIGHT: 57 IN | TEMPERATURE: 99 F | RESPIRATION RATE: 20 BRPM | HEART RATE: 78 BPM | DIASTOLIC BLOOD PRESSURE: 65 MMHG | WEIGHT: 137 LBS

## 2018-01-01 VITALS
BODY MASS INDEX: 30 KG/M2 | SYSTOLIC BLOOD PRESSURE: 121 MMHG | RESPIRATION RATE: 18 BRPM | DIASTOLIC BLOOD PRESSURE: 58 MMHG | HEART RATE: 87 BPM | OXYGEN SATURATION: 94 % | WEIGHT: 135.81 LBS | TEMPERATURE: 99 F

## 2018-01-01 DIAGNOSIS — I73.9 PVD (PERIPHERAL VASCULAR DISEASE) (HCC): ICD-10-CM

## 2018-01-01 DIAGNOSIS — I73.9 ARTERIAL INSUFFICIENCY OF LOWER EXTREMITY (HCC): ICD-10-CM

## 2018-01-01 DIAGNOSIS — I73.9 PERIPHERAL VASCULAR DISEASE (HCC): ICD-10-CM

## 2018-01-01 DIAGNOSIS — E11.65 UNCONTROLLED TYPE 2 DIABETES MELLITUS WITH HYPERGLYCEMIA, WITH LONG-TERM CURRENT USE OF INSULIN (HCC): ICD-10-CM

## 2018-01-01 DIAGNOSIS — I73.9 PERIPHERAL VASCULAR DISEASE, UNSPECIFIED (HCC): ICD-10-CM

## 2018-01-01 DIAGNOSIS — E11.319 TYPE 2 DIABETES MELLITUS WITH BOTH EYES AFFECTED BY RETINOPATHY WITHOUT MACULAR EDEMA, WITH LONG-TERM CURRENT USE OF INSULIN, UNSPECIFIED RETINOPATHY SEVERITY (HCC): ICD-10-CM

## 2018-01-01 DIAGNOSIS — E78.00 HIGH CHOLESTEROL: ICD-10-CM

## 2018-01-01 DIAGNOSIS — B91 POST-POLIO LIMB MUSCLE WEAKNESS: ICD-10-CM

## 2018-01-01 DIAGNOSIS — Z79.01 LONG TERM (CURRENT) USE OF ANTICOAGULANTS: ICD-10-CM

## 2018-01-01 DIAGNOSIS — R35.0 FREQUENT URINATION: ICD-10-CM

## 2018-01-01 DIAGNOSIS — R19.7 DIARRHEA, UNSPECIFIED TYPE: ICD-10-CM

## 2018-01-01 DIAGNOSIS — I50.9 ACUTE ON CHRONIC CONGESTIVE HEART FAILURE, UNSPECIFIED HEART FAILURE TYPE (HCC): ICD-10-CM

## 2018-01-01 DIAGNOSIS — M62.81 POST-POLIO LIMB MUSCLE WEAKNESS: Primary | ICD-10-CM

## 2018-01-01 DIAGNOSIS — B37.2 CANDIDAL DERMATITIS: Primary | ICD-10-CM

## 2018-01-01 DIAGNOSIS — E83.42 HYPOMAGNESEMIA: ICD-10-CM

## 2018-01-01 DIAGNOSIS — E08.649: ICD-10-CM

## 2018-01-01 DIAGNOSIS — I99.8 PAIN OF LEFT LOWER EXTREMITY DUE TO ISCHEMIA: ICD-10-CM

## 2018-01-01 DIAGNOSIS — Z79.4 TYPE 2 DIABETES MELLITUS WITH FOOT ULCER, WITH LONG-TERM CURRENT USE OF INSULIN (HCC): ICD-10-CM

## 2018-01-01 DIAGNOSIS — E55.9 VITAMIN D DEFICIENCY: ICD-10-CM

## 2018-01-01 DIAGNOSIS — I73.9 PAD (PERIPHERAL ARTERY DISEASE) (HCC): ICD-10-CM

## 2018-01-01 DIAGNOSIS — E08.649: Primary | ICD-10-CM

## 2018-01-01 DIAGNOSIS — I10 HYPERTENSION, BENIGN: ICD-10-CM

## 2018-01-01 DIAGNOSIS — I73.9 PAD (PERIPHERAL ARTERY DISEASE) (HCC): Primary | ICD-10-CM

## 2018-01-01 DIAGNOSIS — L03.119 CELLULITIS AND ABSCESS OF FOOT: ICD-10-CM

## 2018-01-01 DIAGNOSIS — M79.605 PAIN OF LEFT LOWER EXTREMITY DUE TO ISCHEMIA: ICD-10-CM

## 2018-01-01 DIAGNOSIS — Z79.4: ICD-10-CM

## 2018-01-01 DIAGNOSIS — R73.9 HYPERGLYCEMIA: ICD-10-CM

## 2018-01-01 DIAGNOSIS — G89.29 CHRONIC PAIN OF RIGHT HIP: ICD-10-CM

## 2018-01-01 DIAGNOSIS — Z79.4 UNCONTROLLED TYPE 2 DIABETES MELLITUS WITH HYPERGLYCEMIA, WITH LONG-TERM CURRENT USE OF INSULIN (HCC): ICD-10-CM

## 2018-01-01 DIAGNOSIS — E11.621 TYPE 2 DIABETES MELLITUS WITH FOOT ULCER, WITH LONG-TERM CURRENT USE OF INSULIN (HCC): ICD-10-CM

## 2018-01-01 DIAGNOSIS — M25.531 RIGHT WRIST PAIN: ICD-10-CM

## 2018-01-01 DIAGNOSIS — I96 GANGRENE OF EXTREMITY (HCC): ICD-10-CM

## 2018-01-01 DIAGNOSIS — R91.8 INFILTRATE OF LOWER LOBE OF LEFT LUNG PRESENT ON IMAGING STUDY: ICD-10-CM

## 2018-01-01 DIAGNOSIS — R05.9 COUGH: Primary | ICD-10-CM

## 2018-01-01 DIAGNOSIS — Z80.0 FAMILY HISTORY OF COLON CANCER: ICD-10-CM

## 2018-01-01 DIAGNOSIS — L97.509 TYPE 2 DIABETES MELLITUS WITH FOOT ULCER, WITH LONG-TERM CURRENT USE OF INSULIN (HCC): ICD-10-CM

## 2018-01-01 DIAGNOSIS — R06.00 DYSPNEA, UNSPECIFIED TYPE: Primary | ICD-10-CM

## 2018-01-01 DIAGNOSIS — M62.81 POST-POLIO LIMB MUSCLE WEAKNESS: ICD-10-CM

## 2018-01-01 DIAGNOSIS — E10.3293 TYPE 1 DIABETES MELLITUS WITH MILD NONPROLIFERATIVE RETINOPATHY OF BOTH EYES WITHOUT MACULAR EDEMA (HCC): ICD-10-CM

## 2018-01-01 DIAGNOSIS — Z89.612 STATUS POST ABOVE KNEE AMPUTATION OF LEFT LOWER EXTREMITY: ICD-10-CM

## 2018-01-01 DIAGNOSIS — R60.1 ANASARCA: ICD-10-CM

## 2018-01-01 DIAGNOSIS — T14.8XXA ABRASION: Primary | ICD-10-CM

## 2018-01-01 DIAGNOSIS — I73.9 PVD (PERIPHERAL VASCULAR DISEASE) (HCC): Primary | ICD-10-CM

## 2018-01-01 DIAGNOSIS — N39.0 RECURRENT UTI: Primary | ICD-10-CM

## 2018-01-01 DIAGNOSIS — R13.10 DYSPHAGIA, UNSPECIFIED TYPE: ICD-10-CM

## 2018-01-01 DIAGNOSIS — Z71.1 PERSON WITH FEARED COMPLAINT IN WHOM NO DIAGNOSIS WAS MADE: Primary | ICD-10-CM

## 2018-01-01 DIAGNOSIS — F32.1 MAJOR DEPRESSIVE DISORDER, SINGLE EPISODE, MODERATE (HCC): ICD-10-CM

## 2018-01-01 DIAGNOSIS — Z79.4 TYPE 2 DIABETES MELLITUS WITH FOOT ULCER, WITH LONG-TERM CURRENT USE OF INSULIN (HCC): Primary | ICD-10-CM

## 2018-01-01 DIAGNOSIS — L97.509 TYPE 2 DIABETES MELLITUS WITH FOOT ULCER, WITH LONG-TERM CURRENT USE OF INSULIN (HCC): Primary | ICD-10-CM

## 2018-01-01 DIAGNOSIS — L02.619 CELLULITIS AND ABSCESS OF FOOT: ICD-10-CM

## 2018-01-01 DIAGNOSIS — E78.2 MIXED HYPERLIPIDEMIA: ICD-10-CM

## 2018-01-01 DIAGNOSIS — Z86.010 HISTORY OF COLON POLYPS: Primary | ICD-10-CM

## 2018-01-01 DIAGNOSIS — N32.81 OVERACTIVE BLADDER: ICD-10-CM

## 2018-01-01 DIAGNOSIS — M79.605 PAIN OF LEFT LOWER EXTREMITY DUE TO ISCHEMIA: Primary | ICD-10-CM

## 2018-01-01 DIAGNOSIS — Z86.711 HISTORY OF PULMONARY EMBOLISM: ICD-10-CM

## 2018-01-01 DIAGNOSIS — Z79.4: Primary | ICD-10-CM

## 2018-01-01 DIAGNOSIS — G14 POST-POLIO SYNDROME: ICD-10-CM

## 2018-01-01 DIAGNOSIS — M25.551 CHRONIC PAIN OF RIGHT HIP: ICD-10-CM

## 2018-01-01 DIAGNOSIS — Z86.718 HISTORY OF DVT (DEEP VEIN THROMBOSIS): ICD-10-CM

## 2018-01-01 DIAGNOSIS — Z79.01 LONG TERM (CURRENT) USE OF ANTICOAGULANTS: Primary | ICD-10-CM

## 2018-01-01 DIAGNOSIS — R06.00 DYSPNEA, UNSPECIFIED TYPE: ICD-10-CM

## 2018-01-01 DIAGNOSIS — I25.10 CORONARY ARTERY DISEASE INVOLVING NATIVE CORONARY ARTERY OF NATIVE HEART WITHOUT ANGINA PECTORIS: Chronic | ICD-10-CM

## 2018-01-01 DIAGNOSIS — R06.00 DYSPNEA ON EXERTION: Primary | ICD-10-CM

## 2018-01-01 DIAGNOSIS — B91 POST-POLIO LIMB MUSCLE WEAKNESS: Primary | ICD-10-CM

## 2018-01-01 DIAGNOSIS — N39.0 RECURRENT UTI: ICD-10-CM

## 2018-01-01 DIAGNOSIS — J45.21 MILD INTERMITTENT ASTHMA WITH ACUTE EXACERBATION: ICD-10-CM

## 2018-01-01 DIAGNOSIS — K57.90 DIVERTICULOSIS OF INTESTINE WITHOUT BLEEDING, UNSPECIFIED INTESTINAL TRACT LOCATION: ICD-10-CM

## 2018-01-01 DIAGNOSIS — R57.0 CARDIOGENIC SHOCK (HCC): ICD-10-CM

## 2018-01-01 DIAGNOSIS — R11.2 NON-INTRACTABLE VOMITING WITH NAUSEA, UNSPECIFIED VOMITING TYPE: Primary | ICD-10-CM

## 2018-01-01 DIAGNOSIS — I99.8 PAIN OF LEFT LOWER EXTREMITY DUE TO ISCHEMIA: Primary | ICD-10-CM

## 2018-01-01 DIAGNOSIS — J96.00 ACUTE RESPIRATORY FAILURE, UNSPECIFIED WHETHER WITH HYPOXIA OR HYPERCAPNIA (HCC): ICD-10-CM

## 2018-01-01 DIAGNOSIS — G54.0 BRACHIAL PLEXOPATHY: ICD-10-CM

## 2018-01-01 DIAGNOSIS — E11.621 TYPE 2 DIABETES MELLITUS WITH FOOT ULCER, WITH LONG-TERM CURRENT USE OF INSULIN (HCC): Primary | ICD-10-CM

## 2018-01-01 DIAGNOSIS — Z79.4 TYPE 2 DIABETES MELLITUS WITH BOTH EYES AFFECTED BY RETINOPATHY WITHOUT MACULAR EDEMA, WITH LONG-TERM CURRENT USE OF INSULIN, UNSPECIFIED RETINOPATHY SEVERITY (HCC): ICD-10-CM

## 2018-01-01 DIAGNOSIS — Z86.010 HISTORY OF COLONIC POLYPS: Primary | ICD-10-CM

## 2018-01-01 DIAGNOSIS — D50.9 IRON DEFICIENCY ANEMIA, UNSPECIFIED IRON DEFICIENCY ANEMIA TYPE: ICD-10-CM

## 2018-01-01 DIAGNOSIS — I10 ESSENTIAL HYPERTENSION: ICD-10-CM

## 2018-01-01 DIAGNOSIS — L03.113 CELLULITIS OF RIGHT ARM: ICD-10-CM

## 2018-01-01 DIAGNOSIS — E78.00 PURE HYPERCHOLESTEROLEMIA: Primary | ICD-10-CM

## 2018-01-01 DIAGNOSIS — N39.0 URINARY TRACT INFECTION WITHOUT HEMATURIA, SITE UNSPECIFIED: ICD-10-CM

## 2018-01-01 DIAGNOSIS — R13.10 DYSPHAGIA, UNSPECIFIED TYPE: Primary | ICD-10-CM

## 2018-01-01 DIAGNOSIS — N39.0 URINARY TRACT INFECTION WITHOUT HEMATURIA, SITE UNSPECIFIED: Primary | ICD-10-CM

## 2018-01-01 LAB
25(OH)D3 SERPL-MCNC: 63.2 NG/ML
ALBUMIN SERPL BCP-MCNC: 2 G/DL (ref 3.5–4.8)
ALBUMIN SERPL BCP-MCNC: 2 G/DL (ref 3.5–4.8)
ALBUMIN SERPL BCP-MCNC: 2.1 G/DL (ref 3.5–4.8)
ALBUMIN SERPL BCP-MCNC: 2.2 G/DL (ref 3.5–4.8)
ALBUMIN SERPL BCP-MCNC: 2.2 G/DL (ref 3.5–4.8)
ALBUMIN SERPL BCP-MCNC: 2.3 G/DL (ref 3.5–4.8)
ALBUMIN SERPL BCP-MCNC: 2.4 G/DL (ref 3.5–4.8)
ALBUMIN SERPL BCP-MCNC: 2.5 G/DL (ref 3.5–4.8)
ALBUMIN SERPL BCP-MCNC: 2.7 G/DL (ref 3.5–4.8)
ALBUMIN SERPL BCP-MCNC: 2.8 G/DL (ref 3.5–4.8)
ALBUMIN SERPL BCP-MCNC: 3 G/DL (ref 3.5–4.8)
ALBUMIN SERPL BCP-MCNC: 3.1 G/DL (ref 3.5–4.8)
ALBUMIN SERPL BCP-MCNC: 3.6 G/DL (ref 3.5–4.8)
ALBUMIN/GLOB SERPL: 0.7 {RATIO} (ref 1–2)
ALBUMIN/GLOB SERPL: 0.8 {RATIO} (ref 1–2)
ALBUMIN/GLOB SERPL: 0.9 {RATIO} (ref 1–2)
ALBUMIN/GLOB SERPL: 0.9 {RATIO} (ref 1–2)
ALBUMIN/GLOB SERPL: 1 {RATIO} (ref 1–2)
ALBUMIN/GLOB SERPL: 1.1 {RATIO} (ref 1–2)
ALP SERPL-CCNC: 34 U/L (ref 32–100)
ALP SERPL-CCNC: 36 U/L (ref 32–100)
ALP SERPL-CCNC: 36 U/L (ref 32–100)
ALP SERPL-CCNC: 38 U/L (ref 32–100)
ALP SERPL-CCNC: 39 U/L (ref 32–100)
ALP SERPL-CCNC: 39 U/L (ref 32–100)
ALP SERPL-CCNC: 43 U/L (ref 32–100)
ALP SERPL-CCNC: 43 U/L (ref 32–100)
ALP SERPL-CCNC: 45 U/L (ref 32–100)
ALP SERPL-CCNC: 45 U/L (ref 32–100)
ALP SERPL-CCNC: 48 U/L (ref 32–100)
ALP SERPL-CCNC: 51 U/L (ref 32–100)
ALT SERPL-CCNC: 112 U/L (ref 14–54)
ALT SERPL-CCNC: 12 U/L (ref 14–54)
ALT SERPL-CCNC: 14 U/L (ref 14–54)
ALT SERPL-CCNC: 153 U/L (ref 14–54)
ALT SERPL-CCNC: 168 U/L (ref 14–54)
ALT SERPL-CCNC: 246 U/L (ref 14–54)
ALT SERPL-CCNC: 25 U/L (ref 14–54)
ALT SERPL-CCNC: 343 U/L (ref 14–54)
ALT SERPL-CCNC: 36 U/L (ref 14–54)
ALT SERPL-CCNC: 515 U/L (ref 14–54)
ALT SERPL-CCNC: 55 U/L (ref 14–54)
ALT SERPL-CCNC: 606 U/L (ref 14–54)
ALT SERPL-CCNC: 741 U/L (ref 14–54)
ALT SERPL-CCNC: 882 U/L (ref 14–54)
AMMONIA PLAS-MCNC: <16 UG/DL (ref 19.5–64.6)
ANION GAP SERPL CALC-SCNC: 10 MMOL/L (ref 0–18)
ANION GAP SERPL CALC-SCNC: 11 MMOL/L (ref 0–18)
ANION GAP SERPL CALC-SCNC: 12 MMOL/L (ref 0–18)
ANION GAP SERPL CALC-SCNC: 13 MMOL/L (ref 0–18)
ANION GAP SERPL CALC-SCNC: 14 MMOL/L (ref 0–18)
ANION GAP SERPL CALC-SCNC: 15 MMOL/L (ref 0–18)
ANION GAP SERPL CALC-SCNC: 19 MMOL/L (ref 0–18)
ANION GAP SERPL CALC-SCNC: 4 MMOL/L (ref 0–18)
ANION GAP SERPL CALC-SCNC: 5 MMOL/L (ref 0–18)
ANION GAP SERPL CALC-SCNC: 6 MMOL/L (ref 0–18)
ANION GAP SERPL CALC-SCNC: 7 MMOL/L (ref 0–18)
ANION GAP SERPL CALC-SCNC: 8 MMOL/L (ref 0–18)
ANION GAP SERPL CALC-SCNC: 9 MMOL/L (ref 0–18)
ANTIBODY SCREEN: NEGATIVE
ANTIBODY SCREEN: NEGATIVE
APTT PPP: 27.4 SECONDS (ref 23.2–35.3)
APTT PPP: 28.2 SECONDS (ref 23.2–35.3)
APTT PPP: 32.7 SECONDS (ref 23.2–35.3)
APTT PPP: 46 SECONDS (ref 23.2–35.3)
APTT PPP: 70.5 SECONDS (ref 23.2–35.3)
APTT PPP: 80.2 SECONDS (ref 23.2–35.3)
APTT PPP: 83.2 SECONDS (ref 23.2–35.3)
APTT PPP: 98.5 SECONDS (ref 23.2–35.3)
AST SERPL-CCNC: 112 U/L (ref 15–41)
AST SERPL-CCNC: 16 U/L (ref 15–41)
AST SERPL-CCNC: 19 U/L (ref 15–41)
AST SERPL-CCNC: 21 U/L (ref 15–41)
AST SERPL-CCNC: 22 U/L (ref 15–41)
AST SERPL-CCNC: 233 U/L (ref 15–41)
AST SERPL-CCNC: 243 U/L (ref 15–41)
AST SERPL-CCNC: 33 U/L (ref 15–41)
AST SERPL-CCNC: 355 U/L (ref 15–41)
AST SERPL-CCNC: 44 U/L (ref 15–41)
AST SERPL-CCNC: 45 U/L (ref 15–41)
AST SERPL-CCNC: 61 U/L (ref 15–41)
AST SERPL-CCNC: 636 U/L (ref 15–41)
AST SERPL-CCNC: 880 U/L (ref 15–41)
BACTERIA UR QL AUTO: NEGATIVE /HPF
BASE EXCESS BLD CALC-SCNC: -11.1 MMOL/L (ref ?–2)
BASE EXCESS BLD CALC-SCNC: 2.9 MMOL/L (ref ?–2)
BASE EXCESS BLD CALC-SCNC: 4.8 MMOL/L (ref ?–2)
BASOPHILS # BLD: 0 K/UL (ref 0–0.2)
BASOPHILS # BLD: 0.1 K/UL (ref 0–0.2)
BASOPHILS NFR BLD: 0 %
BASOPHILS NFR BLD: 1 %
BASOPHILS NFR BLD: 2 %
BILIRUB SERPL-MCNC: 0.5 MG/DL (ref 0.3–1.2)
BILIRUB SERPL-MCNC: 0.6 MG/DL (ref 0.3–1.2)
BILIRUB SERPL-MCNC: 0.7 MG/DL (ref 0.3–1.2)
BILIRUB SERPL-MCNC: 0.9 MG/DL (ref 0.3–1.2)
BILIRUB SERPL-MCNC: 0.9 MG/DL (ref 0.3–1.2)
BILIRUB SERPL-MCNC: 1 MG/DL (ref 0.3–1.2)
BILIRUB SERPL-MCNC: 1.1 MG/DL (ref 0.3–1.2)
BILIRUB SERPL-MCNC: 1.4 MG/DL (ref 0.3–1.2)
BILIRUB SERPL-MCNC: 1.5 MG/DL (ref 0.3–1.2)
BILIRUB SERPL-MCNC: 1.6 MG/DL (ref 0.3–1.2)
BILIRUB SERPL-MCNC: 1.7 MG/DL (ref 0.3–1.2)
BILIRUB SERPL-MCNC: 1.7 MG/DL (ref 0.3–1.2)
BILIRUB UR QL: NEGATIVE
BLOOD GAS EPAP: 5 CM H2O
BLOOD GAS IPAP: 10 CM H2O
BLOOD TYPE BARCODE: 9500
BUN SERPL-MCNC: 10 MG/DL (ref 8–20)
BUN SERPL-MCNC: 10 MG/DL (ref 8–20)
BUN SERPL-MCNC: 11 MG/DL (ref 8–20)
BUN SERPL-MCNC: 12 MG/DL (ref 8–20)
BUN SERPL-MCNC: 13 MG/DL (ref 8–20)
BUN SERPL-MCNC: 14 MG/DL (ref 8–20)
BUN SERPL-MCNC: 15 MG/DL (ref 8–20)
BUN SERPL-MCNC: 16 MG/DL (ref 8–20)
BUN SERPL-MCNC: 17 MG/DL (ref 8–20)
BUN SERPL-MCNC: 18 MG/DL (ref 8–20)
BUN SERPL-MCNC: 19 MG/DL (ref 8–20)
BUN SERPL-MCNC: 21 MG/DL (ref 8–20)
BUN SERPL-MCNC: 23 MG/DL (ref 8–20)
BUN SERPL-MCNC: 24 MG/DL (ref 8–20)
BUN SERPL-MCNC: 25 MG/DL (ref 8–20)
BUN SERPL-MCNC: 25 MG/DL (ref 8–20)
BUN SERPL-MCNC: 26 MG/DL (ref 8–20)
BUN SERPL-MCNC: 27 MG/DL (ref 8–20)
BUN SERPL-MCNC: 27 MG/DL (ref 8–20)
BUN SERPL-MCNC: 28 MG/DL (ref 8–20)
BUN SERPL-MCNC: 30 MG/DL (ref 8–20)
BUN SERPL-MCNC: 32 MG/DL (ref 8–20)
BUN SERPL-MCNC: 32 MG/DL (ref 8–20)
BUN SERPL-MCNC: 33 MG/DL (ref 8–20)
BUN SERPL-MCNC: 34 MG/DL (ref 8–20)
BUN SERPL-MCNC: 35 MG/DL (ref 8–20)
BUN SERPL-MCNC: 35 MG/DL (ref 8–20)
BUN SERPL-MCNC: 36 MG/DL (ref 8–20)
BUN SERPL-MCNC: 37 MG/DL (ref 8–20)
BUN SERPL-MCNC: 40 MG/DL (ref 8–20)
BUN SERPL-MCNC: 44 MG/DL (ref 8–20)
BUN SERPL-MCNC: 7 MG/DL (ref 8–20)
BUN SERPL-MCNC: 9 MG/DL (ref 8–20)
BUN SERPL-MCNC: 9 MG/DL (ref 8–20)
BUN/CREAT SERPL: 11.7 (ref 10–20)
BUN/CREAT SERPL: 12.2 (ref 10–20)
BUN/CREAT SERPL: 12.9 (ref 10–20)
BUN/CREAT SERPL: 13 (ref 10–20)
BUN/CREAT SERPL: 13.6 (ref 10–20)
BUN/CREAT SERPL: 13.6 (ref 10–20)
BUN/CREAT SERPL: 14.3 (ref 10–20)
BUN/CREAT SERPL: 15.1 (ref 10–20)
BUN/CREAT SERPL: 15.7 (ref 10–20)
BUN/CREAT SERPL: 15.8 (ref 10–20)
BUN/CREAT SERPL: 15.9 (ref 10–20)
BUN/CREAT SERPL: 16.3 (ref 10–20)
BUN/CREAT SERPL: 16.4 (ref 10–20)
BUN/CREAT SERPL: 16.4 (ref 10–20)
BUN/CREAT SERPL: 17.6 (ref 10–20)
BUN/CREAT SERPL: 17.6 (ref 10–20)
BUN/CREAT SERPL: 17.7 (ref 10–20)
BUN/CREAT SERPL: 17.7 (ref 10–20)
BUN/CREAT SERPL: 18.5 (ref 10–20)
BUN/CREAT SERPL: 19 (ref 10–20)
BUN/CREAT SERPL: 19.2 (ref 10–20)
BUN/CREAT SERPL: 19.3 (ref 10–20)
BUN/CREAT SERPL: 19.6 (ref 10–20)
BUN/CREAT SERPL: 19.8 (ref 10–20)
BUN/CREAT SERPL: 20 (ref 10–20)
BUN/CREAT SERPL: 20.5 (ref 10–20)
BUN/CREAT SERPL: 20.8 (ref 10–20)
BUN/CREAT SERPL: 21.9 (ref 10–20)
BUN/CREAT SERPL: 22.4 (ref 10–20)
BUN/CREAT SERPL: 22.6 (ref 10–20)
BUN/CREAT SERPL: 22.9 (ref 10–20)
BUN/CREAT SERPL: 23.4 (ref 10–20)
BUN/CREAT SERPL: 23.7 (ref 10–20)
BUN/CREAT SERPL: 23.8 (ref 10–20)
BUN/CREAT SERPL: 24.6 (ref 10–20)
BUN/CREAT SERPL: 24.6 (ref 10–20)
BUN/CREAT SERPL: 25 (ref 10–20)
BUN/CREAT SERPL: 25.7 (ref 10–20)
BUN/CREAT SERPL: 26.1 (ref 10–20)
BUN/CREAT SERPL: 26.7 (ref 10–20)
BUN/CREAT SERPL: 26.9 (ref 10–20)
BUN/CREAT SERPL: 27.1 (ref 10–20)
BUN/CREAT SERPL: 27.3 (ref 10–20)
BUN/CREAT SERPL: 27.8 (ref 10–20)
BUN/CREAT SERPL: 29 (ref 10–20)
BUN/CREAT SERPL: 29 (ref 10–20)
BUN/CREAT SERPL: 29.5 (ref 10–20)
BUN/CREAT SERPL: 31.1 (ref 10–20)
BUN/CREAT SERPL: 31.3 (ref 10–20)
BUN/CREAT SERPL: 31.8 (ref 10–20)
BUN/CREAT SERPL: 32.5 (ref 10–20)
BUN/CREAT SERPL: 32.7 (ref 10–20)
BUN/CREAT SERPL: 34 (ref 10–20)
BUN/CREAT SERPL: 34.7 (ref 10–20)
BUN/CREAT SERPL: 34.7 (ref 10–20)
BUN/CREAT SERPL: 35.2 (ref 10–20)
BUN/CREAT SERPL: 35.3 (ref 10–20)
BUN/CREAT SERPL: 36 (ref 10–20)
BUN/CREAT SERPL: 37 (ref 10–20)
BUN/CREAT SERPL: 37.7 (ref 10–20)
BUN/CREAT SERPL: 38.1 (ref 10–20)
BUN/CREAT SERPL: 38.1 (ref 10–20)
BUN/CREAT SERPL: 42.1 (ref 10–20)
BUN/CREAT SERPL: 45.8 (ref 10–20)
BUN/CREAT SERPL: 47.5 (ref 10–20)
CA-I BLD-SCNC: 1.06 MMOL/L (ref 0.95–1.32)
CA-I BLD-SCNC: 1.12 MMOL/L (ref 0.95–1.32)
CALCIUM SERPL-MCNC: 6.6 MG/DL (ref 8.5–10.5)
CALCIUM SERPL-MCNC: 7.1 MG/DL (ref 8.5–10.5)
CALCIUM SERPL-MCNC: 7.2 MG/DL (ref 8.5–10.5)
CALCIUM SERPL-MCNC: 7.3 MG/DL (ref 8.5–10.5)
CALCIUM SERPL-MCNC: 7.3 MG/DL (ref 8.5–10.5)
CALCIUM SERPL-MCNC: 7.5 MG/DL (ref 8.5–10.5)
CALCIUM SERPL-MCNC: 7.6 MG/DL (ref 8.5–10.5)
CALCIUM SERPL-MCNC: 7.6 MG/DL (ref 8.5–10.5)
CALCIUM SERPL-MCNC: 7.7 MG/DL (ref 8.5–10.5)
CALCIUM SERPL-MCNC: 7.8 MG/DL (ref 8.5–10.5)
CALCIUM SERPL-MCNC: 7.9 MG/DL (ref 8.5–10.5)
CALCIUM SERPL-MCNC: 8 MG/DL (ref 8.5–10.5)
CALCIUM SERPL-MCNC: 8 MG/DL (ref 8.5–10.5)
CALCIUM SERPL-MCNC: 8.1 MG/DL (ref 8.5–10.5)
CALCIUM SERPL-MCNC: 8.2 MG/DL (ref 8.5–10.5)
CALCIUM SERPL-MCNC: 8.3 MG/DL (ref 8.5–10.5)
CALCIUM SERPL-MCNC: 8.3 MG/DL (ref 8.5–10.5)
CALCIUM SERPL-MCNC: 8.4 MG/DL (ref 8.5–10.5)
CALCIUM SERPL-MCNC: 8.5 MG/DL (ref 8.5–10.5)
CALCIUM SERPL-MCNC: 8.6 MG/DL (ref 8.5–10.5)
CALCIUM SERPL-MCNC: 8.6 MG/DL (ref 8.5–10.5)
CALCIUM SERPL-MCNC: 8.7 MG/DL (ref 8.5–10.5)
CALCIUM SERPL-MCNC: 8.8 MG/DL (ref 8.5–10.5)
CALCIUM SERPL-MCNC: 8.9 MG/DL (ref 8.5–10.5)
CALCIUM SERPL-MCNC: 8.9 MG/DL (ref 8.5–10.5)
CALCIUM SERPL-MCNC: 9.3 MG/DL (ref 8.5–10.5)
CHLORIDE SERPL-SCNC: 100 MMOL/L (ref 95–110)
CHLORIDE SERPL-SCNC: 101 MMOL/L (ref 95–110)
CHLORIDE SERPL-SCNC: 101 MMOL/L (ref 95–110)
CHLORIDE SERPL-SCNC: 102 MMOL/L (ref 95–110)
CHLORIDE SERPL-SCNC: 102 MMOL/L (ref 95–110)
CHLORIDE SERPL-SCNC: 103 MMOL/L (ref 95–110)
CHLORIDE SERPL-SCNC: 104 MMOL/L (ref 95–110)
CHLORIDE SERPL-SCNC: 105 MMOL/L (ref 95–110)
CHLORIDE SERPL-SCNC: 105 MMOL/L (ref 95–110)
CHLORIDE SERPL-SCNC: 106 MMOL/L (ref 95–110)
CHLORIDE SERPL-SCNC: 106 MMOL/L (ref 95–110)
CHLORIDE SERPL-SCNC: 107 MMOL/L (ref 95–110)
CHLORIDE SERPL-SCNC: 107 MMOL/L (ref 95–110)
CHLORIDE SERPL-SCNC: 108 MMOL/L (ref 95–110)
CHLORIDE SERPL-SCNC: 108 MMOL/L (ref 95–110)
CHLORIDE SERPL-SCNC: 109 MMOL/L (ref 95–110)
CHLORIDE SERPL-SCNC: 110 MMOL/L (ref 95–110)
CHLORIDE SERPL-SCNC: 110 MMOL/L (ref 95–110)
CHLORIDE SERPL-SCNC: 113 MMOL/L (ref 95–110)
CHLORIDE SERPL-SCNC: 113 MMOL/L (ref 95–110)
CHLORIDE SERPL-SCNC: 117 MMOL/L (ref 95–110)
CHLORIDE SERPL-SCNC: 117 MMOL/L (ref 95–110)
CHLORIDE SERPL-SCNC: 118 MMOL/L (ref 95–110)
CHLORIDE SERPL-SCNC: 118 MMOL/L (ref 95–110)
CHLORIDE SERPL-SCNC: 119 MMOL/L (ref 95–110)
CHLORIDE SERPL-SCNC: 119 MMOL/L (ref 95–110)
CHLORIDE SERPL-SCNC: 121 MMOL/L (ref 95–110)
CHLORIDE SERPL-SCNC: 124 MMOL/L (ref 95–110)
CHLORIDE SERPL-SCNC: 126 MMOL/L (ref 95–110)
CHLORIDE SERPL-SCNC: 88 MMOL/L (ref 95–110)
CHLORIDE SERPL-SCNC: 90 MMOL/L (ref 95–110)
CHLORIDE SERPL-SCNC: 90 MMOL/L (ref 95–110)
CHLORIDE SERPL-SCNC: 91 MMOL/L (ref 95–110)
CHLORIDE SERPL-SCNC: 92 MMOL/L (ref 95–110)
CHLORIDE SERPL-SCNC: 92 MMOL/L (ref 95–110)
CHLORIDE SERPL-SCNC: 93 MMOL/L (ref 95–110)
CHLORIDE SERPL-SCNC: 93 MMOL/L (ref 95–110)
CHLORIDE SERPL-SCNC: 94 MMOL/L (ref 95–110)
CHLORIDE SERPL-SCNC: 95 MMOL/L (ref 95–110)
CHLORIDE SERPL-SCNC: 95 MMOL/L (ref 95–110)
CHLORIDE SERPL-SCNC: 96 MMOL/L (ref 95–110)
CHLORIDE SERPL-SCNC: 97 MMOL/L (ref 95–110)
CHLORIDE SERPL-SCNC: 98 MMOL/L (ref 95–110)
CHLORIDE SERPL-SCNC: 99 MMOL/L (ref 95–110)
CHOLEST SERPL-MCNC: 130 MG/DL (ref 110–200)
CHOLEST SERPL-MCNC: 75 MG/DL (ref 110–200)
CHOLEST SERPL-MCNC: 89 MG/DL (ref 110–200)
CK SERPL-CCNC: 29 U/L (ref 38–234)
CLARITY UR: CLEAR
CO2 SERPL-SCNC: 15 MMOL/L (ref 22–32)
CO2 SERPL-SCNC: 15 MMOL/L (ref 22–32)
CO2 SERPL-SCNC: 16 MMOL/L (ref 22–32)
CO2 SERPL-SCNC: 18 MMOL/L (ref 22–32)
CO2 SERPL-SCNC: 18 MMOL/L (ref 22–32)
CO2 SERPL-SCNC: 19 MMOL/L (ref 22–32)
CO2 SERPL-SCNC: 19 MMOL/L (ref 22–32)
CO2 SERPL-SCNC: 21 MMOL/L (ref 22–32)
CO2 SERPL-SCNC: 21 MMOL/L (ref 22–32)
CO2 SERPL-SCNC: 22 MMOL/L (ref 22–32)
CO2 SERPL-SCNC: 23 MMOL/L (ref 22–32)
CO2 SERPL-SCNC: 24 MMOL/L (ref 22–32)
CO2 SERPL-SCNC: 25 MMOL/L (ref 22–32)
CO2 SERPL-SCNC: 26 MMOL/L (ref 22–32)
CO2 SERPL-SCNC: 27 MMOL/L (ref 22–32)
CO2 SERPL-SCNC: 28 MMOL/L (ref 22–32)
CO2 SERPL-SCNC: 29 MMOL/L (ref 22–32)
CO2 SERPL-SCNC: 30 MMOL/L (ref 22–32)
COHGB MFR BLD: 1.9 % (ref 0–1.5)
COHGB MFR BLD: 2.2 % (ref 0–1.5)
COLOR UR: YELLOW
CORTIS SERPL-MCNC: 20 MCG/DL
CREAT SERPL-MCNC: 0.44 MG/DL (ref 0.5–1.5)
CREAT SERPL-MCNC: 0.48 MG/DL (ref 0.5–1.5)
CREAT SERPL-MCNC: 0.51 MG/DL (ref 0.5–1.5)
CREAT SERPL-MCNC: 0.52 MG/DL (ref 0.5–1.5)
CREAT SERPL-MCNC: 0.52 MG/DL (ref 0.5–1.5)
CREAT SERPL-MCNC: 0.54 MG/DL (ref 0.5–1.5)
CREAT SERPL-MCNC: 0.54 MG/DL (ref 0.5–1.5)
CREAT SERPL-MCNC: 0.56 MG/DL (ref 0.5–1.5)
CREAT SERPL-MCNC: 0.57 MG/DL (ref 0.5–1.5)
CREAT SERPL-MCNC: 0.57 MG/DL (ref 0.5–1.5)
CREAT SERPL-MCNC: 0.59 MG/DL (ref 0.5–1.5)
CREAT SERPL-MCNC: 0.59 MG/DL (ref 0.5–1.5)
CREAT SERPL-MCNC: 0.6 MG/DL (ref 0.5–1.5)
CREAT SERPL-MCNC: 0.6 MG/DL (ref 0.5–1.5)
CREAT SERPL-MCNC: 0.61 MG/DL (ref 0.5–1.5)
CREAT SERPL-MCNC: 0.62 MG/DL (ref 0.5–1.5)
CREAT SERPL-MCNC: 0.63 MG/DL (ref 0.5–1.5)
CREAT SERPL-MCNC: 0.64 MG/DL (ref 0.5–1.5)
CREAT SERPL-MCNC: 0.65 MG/DL (ref 0.5–1.5)
CREAT SERPL-MCNC: 0.67 MG/DL (ref 0.5–1.5)
CREAT SERPL-MCNC: 0.67 MG/DL (ref 0.5–1.5)
CREAT SERPL-MCNC: 0.69 MG/DL (ref 0.5–1.5)
CREAT SERPL-MCNC: 0.69 MG/DL (ref 0.5–1.5)
CREAT SERPL-MCNC: 0.7 MG/DL (ref 0.5–1.5)
CREAT SERPL-MCNC: 0.72 MG/DL (ref 0.5–1.5)
CREAT SERPL-MCNC: 0.72 MG/DL (ref 0.5–1.5)
CREAT SERPL-MCNC: 0.74 MG/DL (ref 0.5–1.5)
CREAT SERPL-MCNC: 0.75 MG/DL (ref 0.5–1.5)
CREAT SERPL-MCNC: 0.76 MG/DL (ref 0.5–1.5)
CREAT SERPL-MCNC: 0.76 MG/DL (ref 0.5–1.5)
CREAT SERPL-MCNC: 0.78 MG/DL (ref 0.5–1.5)
CREAT SERPL-MCNC: 0.79 MG/DL (ref 0.5–1.5)
CREAT SERPL-MCNC: 0.79 MG/DL (ref 0.5–1.5)
CREAT SERPL-MCNC: 0.8 MG/DL (ref 0.5–1.5)
CREAT SERPL-MCNC: 0.81 MG/DL (ref 0.5–1.5)
CREAT SERPL-MCNC: 0.82 MG/DL (ref 0.5–1.5)
CREAT SERPL-MCNC: 0.82 MG/DL (ref 0.5–1.5)
CREAT SERPL-MCNC: 0.84 MG/DL (ref 0.5–1.5)
CREAT SERPL-MCNC: 0.84 MG/DL (ref 0.5–1.5)
CREAT SERPL-MCNC: 0.85 MG/DL (ref 0.5–1.5)
CREAT SERPL-MCNC: 0.85 MG/DL (ref 0.5–1.5)
CREAT SERPL-MCNC: 0.86 MG/DL (ref 0.5–1.5)
CREAT SERPL-MCNC: 0.86 MG/DL (ref 0.5–1.5)
CREAT SERPL-MCNC: 0.88 MG/DL (ref 0.5–1.5)
CREAT SERPL-MCNC: 0.89 MG/DL (ref 0.5–1.5)
CREAT SERPL-MCNC: 0.92 MG/DL (ref 0.5–1.5)
CREAT SERPL-MCNC: 0.92 MG/DL (ref 0.5–1.5)
CREAT SERPL-MCNC: 0.93 MG/DL (ref 0.5–1.5)
CREAT SERPL-MCNC: 0.96 MG/DL (ref 0.5–1.5)
CREAT SERPL-MCNC: 0.96 MG/DL (ref 0.5–1.5)
CREAT SERPL-MCNC: 0.99 MG/DL (ref 0.5–1.5)
CREAT SERPL-MCNC: 1 MG/DL (ref 0.5–1.5)
CREAT SERPL-MCNC: 1.02 MG/DL (ref 0.5–1.5)
CREAT SERPL-MCNC: 1.03 MG/DL (ref 0.5–1.5)
CREAT SERPL-MCNC: 1.04 MG/DL (ref 0.5–1.5)
CREAT SERPL-MCNC: 1.05 MG/DL (ref 0.5–1.5)
CREAT SERPL-MCNC: 1.06 MG/DL (ref 0.5–1.5)
CREAT SERPL-MCNC: 1.06 MG/DL (ref 0.5–1.5)
CREAT SERPL-MCNC: 1.1 MG/DL (ref 0.5–1.5)
CREAT SERPL-MCNC: 1.11 MG/DL (ref 0.5–1.5)
CREAT SERPL-MCNC: 1.12 MG/DL (ref 0.5–1.5)
CREAT SERPL-MCNC: 1.15 MG/DL (ref 0.5–1.5)
CREAT SERPL-MCNC: 1.15 MG/DL (ref 0.5–1.5)
CREAT SERPL-MCNC: 1.19 MG/DL (ref 0.5–1.5)
CREAT SERPL-MCNC: 1.24 MG/DL (ref 0.5–1.5)
CREAT SERPL-MCNC: 1.25 MG/DL (ref 0.5–1.5)
CREAT UR-MCNC: 26.4 MG/DL
CREAT UR-MCNC: 59.2 MG/DL
EOSINOPHIL # BLD: 0 K/UL (ref 0–0.7)
EOSINOPHIL # BLD: 0.1 K/UL (ref 0–0.7)
EOSINOPHIL # BLD: 0.2 K/UL (ref 0–0.7)
EOSINOPHIL # BLD: 0.3 K/UL (ref 0–0.7)
EOSINOPHIL # BLD: 0.4 K/UL (ref 0–0.7)
EOSINOPHIL # BLD: 0.5 K/UL (ref 0–0.7)
EOSINOPHIL # BLD: 0.6 K/UL (ref 0–0.7)
EOSINOPHIL NFR BLD: 0 %
EOSINOPHIL NFR BLD: 1 %
EOSINOPHIL NFR BLD: 2 %
EOSINOPHIL NFR BLD: 3 %
EOSINOPHIL NFR BLD: 4 %
EOSINOPHIL NFR BLD: 5 %
EOSINOPHIL NFR BLD: 6 %
EOSINOPHIL NFR BLD: 7 %
EOSINOPHIL NFR BLD: 8 %
ERYTHROCYTE [DISTWIDTH] IN BLOOD BY AUTOMATED COUNT: 13.1 % (ref 11–15)
ERYTHROCYTE [DISTWIDTH] IN BLOOD BY AUTOMATED COUNT: 13.1 % (ref 11–15)
ERYTHROCYTE [DISTWIDTH] IN BLOOD BY AUTOMATED COUNT: 13.2 % (ref 11–15)
ERYTHROCYTE [DISTWIDTH] IN BLOOD BY AUTOMATED COUNT: 13.5 % (ref 11–15)
ERYTHROCYTE [DISTWIDTH] IN BLOOD BY AUTOMATED COUNT: 13.8 % (ref 11–15)
ERYTHROCYTE [DISTWIDTH] IN BLOOD BY AUTOMATED COUNT: 13.9 % (ref 11–15)
ERYTHROCYTE [DISTWIDTH] IN BLOOD BY AUTOMATED COUNT: 14 % (ref 11–15)
ERYTHROCYTE [DISTWIDTH] IN BLOOD BY AUTOMATED COUNT: 14 % (ref 11–15)
ERYTHROCYTE [DISTWIDTH] IN BLOOD BY AUTOMATED COUNT: 14.1 % (ref 11–15)
ERYTHROCYTE [DISTWIDTH] IN BLOOD BY AUTOMATED COUNT: 14.2 % (ref 11–15)
ERYTHROCYTE [DISTWIDTH] IN BLOOD BY AUTOMATED COUNT: 14.6 % (ref 11–15)
ERYTHROCYTE [DISTWIDTH] IN BLOOD BY AUTOMATED COUNT: 14.6 % (ref 11–15)
ERYTHROCYTE [DISTWIDTH] IN BLOOD BY AUTOMATED COUNT: 14.7 % (ref 11–15)
ERYTHROCYTE [DISTWIDTH] IN BLOOD BY AUTOMATED COUNT: 14.7 % (ref 11–15)
ERYTHROCYTE [DISTWIDTH] IN BLOOD BY AUTOMATED COUNT: 15.2 % (ref 11–15)
ERYTHROCYTE [DISTWIDTH] IN BLOOD BY AUTOMATED COUNT: 15.3 % (ref 11–15)
ERYTHROCYTE [DISTWIDTH] IN BLOOD BY AUTOMATED COUNT: 15.4 % (ref 11–15)
ERYTHROCYTE [DISTWIDTH] IN BLOOD BY AUTOMATED COUNT: 15.4 % (ref 11–15)
ERYTHROCYTE [DISTWIDTH] IN BLOOD BY AUTOMATED COUNT: 15.5 % (ref 11–15)
ERYTHROCYTE [DISTWIDTH] IN BLOOD BY AUTOMATED COUNT: 15.6 % (ref 11–15)
ERYTHROCYTE [DISTWIDTH] IN BLOOD BY AUTOMATED COUNT: 15.6 % (ref 11–15)
ERYTHROCYTE [DISTWIDTH] IN BLOOD BY AUTOMATED COUNT: 15.8 % (ref 11–15)
ERYTHROCYTE [DISTWIDTH] IN BLOOD BY AUTOMATED COUNT: 16.2 % (ref 11–15)
ERYTHROCYTE [DISTWIDTH] IN BLOOD BY AUTOMATED COUNT: 16.4 % (ref 11–15)
ERYTHROCYTE [DISTWIDTH] IN BLOOD BY AUTOMATED COUNT: 16.6 % (ref 11–15)
ERYTHROCYTE [DISTWIDTH] IN BLOOD BY AUTOMATED COUNT: 16.9 % (ref 11–15)
ERYTHROCYTE [DISTWIDTH] IN BLOOD BY AUTOMATED COUNT: 17 % (ref 11–15)
ERYTHROCYTE [DISTWIDTH] IN BLOOD BY AUTOMATED COUNT: 17.1 % (ref 11–15)
ERYTHROCYTE [DISTWIDTH] IN BLOOD BY AUTOMATED COUNT: 17.2 % (ref 11–15)
ERYTHROCYTE [DISTWIDTH] IN BLOOD BY AUTOMATED COUNT: 17.2 % (ref 11–15)
ERYTHROCYTE [DISTWIDTH] IN BLOOD BY AUTOMATED COUNT: 17.5 % (ref 11–15)
ERYTHROCYTE [DISTWIDTH] IN BLOOD BY AUTOMATED COUNT: 17.8 % (ref 11–15)
ERYTHROCYTE [DISTWIDTH] IN BLOOD BY AUTOMATED COUNT: 17.9 % (ref 11–15)
ERYTHROCYTE [DISTWIDTH] IN BLOOD BY AUTOMATED COUNT: 17.9 % (ref 11–15)
ERYTHROCYTE [DISTWIDTH] IN BLOOD BY AUTOMATED COUNT: 18.2 % (ref 11–15)
ERYTHROCYTE [DISTWIDTH] IN BLOOD BY AUTOMATED COUNT: 18.3 % (ref 11–15)
ERYTHROCYTE [DISTWIDTH] IN BLOOD BY AUTOMATED COUNT: 18.9 % (ref 11–15)
ERYTHROCYTE [DISTWIDTH] IN BLOOD BY AUTOMATED COUNT: 19.5 % (ref 11–15)
ERYTHROCYTE [DISTWIDTH] IN BLOOD BY AUTOMATED COUNT: 19.6 % (ref 11–15)
ERYTHROCYTE [DISTWIDTH] IN BLOOD BY AUTOMATED COUNT: 19.8 % (ref 11–15)
ERYTHROCYTE [DISTWIDTH] IN BLOOD BY AUTOMATED COUNT: 19.9 % (ref 11–15)
ERYTHROCYTE [DISTWIDTH] IN BLOOD BY AUTOMATED COUNT: 20 % (ref 11–15)
ERYTHROCYTE [DISTWIDTH] IN BLOOD BY AUTOMATED COUNT: 20.3 % (ref 11–15)
ERYTHROCYTE [DISTWIDTH] IN BLOOD BY AUTOMATED COUNT: 20.4 % (ref 11–15)
ERYTHROCYTE [DISTWIDTH] IN BLOOD BY AUTOMATED COUNT: 20.7 % (ref 11–15)
ERYTHROCYTE [DISTWIDTH] IN BLOOD BY AUTOMATED COUNT: 21.7 % (ref 11–15)
ERYTHROCYTE [DISTWIDTH] IN BLOOD BY AUTOMATED COUNT: 22 % (ref 11–15)
GLOBULIN PLAS-MCNC: 2.3 G/DL (ref 2.5–3.7)
GLOBULIN PLAS-MCNC: 2.5 G/DL (ref 2.5–3.7)
GLOBULIN PLAS-MCNC: 2.7 G/DL (ref 2.5–3.7)
GLOBULIN PLAS-MCNC: 2.8 G/DL (ref 2.5–3.7)
GLOBULIN PLAS-MCNC: 2.9 G/DL (ref 2.5–3.7)
GLOBULIN PLAS-MCNC: 3 G/DL (ref 2.5–3.7)
GLOBULIN PLAS-MCNC: 3.2 G/DL (ref 2.5–3.7)
GLOBULIN PLAS-MCNC: 3.4 G/DL (ref 2.5–3.7)
GLUCOSE BLDC GLUCOMTR-MCNC: 101 MG/DL (ref 70–99)
GLUCOSE BLDC GLUCOMTR-MCNC: 102 MG/DL (ref 70–99)
GLUCOSE BLDC GLUCOMTR-MCNC: 103 MG/DL (ref 70–99)
GLUCOSE BLDC GLUCOMTR-MCNC: 104 MG/DL (ref 70–99)
GLUCOSE BLDC GLUCOMTR-MCNC: 105 MG/DL (ref 70–99)
GLUCOSE BLDC GLUCOMTR-MCNC: 106 MG/DL (ref 70–99)
GLUCOSE BLDC GLUCOMTR-MCNC: 106 MG/DL (ref 70–99)
GLUCOSE BLDC GLUCOMTR-MCNC: 108 MG/DL (ref 70–99)
GLUCOSE BLDC GLUCOMTR-MCNC: 109 MG/DL (ref 70–99)
GLUCOSE BLDC GLUCOMTR-MCNC: 110 MG/DL (ref 70–99)
GLUCOSE BLDC GLUCOMTR-MCNC: 110 MG/DL (ref 70–99)
GLUCOSE BLDC GLUCOMTR-MCNC: 111 MG/DL (ref 70–99)
GLUCOSE BLDC GLUCOMTR-MCNC: 112 MG/DL (ref 70–99)
GLUCOSE BLDC GLUCOMTR-MCNC: 112 MG/DL (ref 70–99)
GLUCOSE BLDC GLUCOMTR-MCNC: 113 MG/DL (ref 70–99)
GLUCOSE BLDC GLUCOMTR-MCNC: 113 MG/DL (ref 70–99)
GLUCOSE BLDC GLUCOMTR-MCNC: 114 MG/DL (ref 70–99)
GLUCOSE BLDC GLUCOMTR-MCNC: 114 MG/DL (ref 70–99)
GLUCOSE BLDC GLUCOMTR-MCNC: 115 MG/DL (ref 70–99)
GLUCOSE BLDC GLUCOMTR-MCNC: 116 MG/DL (ref 70–99)
GLUCOSE BLDC GLUCOMTR-MCNC: 117 MG/DL (ref 70–99)
GLUCOSE BLDC GLUCOMTR-MCNC: 119 MG/DL (ref 70–99)
GLUCOSE BLDC GLUCOMTR-MCNC: 119 MG/DL (ref 70–99)
GLUCOSE BLDC GLUCOMTR-MCNC: 120 MG/DL (ref 70–99)
GLUCOSE BLDC GLUCOMTR-MCNC: 121 MG/DL (ref 70–99)
GLUCOSE BLDC GLUCOMTR-MCNC: 122 MG/DL (ref 70–99)
GLUCOSE BLDC GLUCOMTR-MCNC: 123 MG/DL (ref 70–99)
GLUCOSE BLDC GLUCOMTR-MCNC: 124 MG/DL (ref 70–99)
GLUCOSE BLDC GLUCOMTR-MCNC: 125 MG/DL (ref 70–99)
GLUCOSE BLDC GLUCOMTR-MCNC: 126 MG/DL (ref 70–99)
GLUCOSE BLDC GLUCOMTR-MCNC: 126 MG/DL (ref 70–99)
GLUCOSE BLDC GLUCOMTR-MCNC: 127 MG/DL (ref 70–99)
GLUCOSE BLDC GLUCOMTR-MCNC: 128 MG/DL (ref 70–99)
GLUCOSE BLDC GLUCOMTR-MCNC: 129 MG/DL (ref 70–99)
GLUCOSE BLDC GLUCOMTR-MCNC: 130 MG/DL (ref 70–99)
GLUCOSE BLDC GLUCOMTR-MCNC: 130 MG/DL (ref 70–99)
GLUCOSE BLDC GLUCOMTR-MCNC: 132 MG/DL (ref 70–99)
GLUCOSE BLDC GLUCOMTR-MCNC: 133 MG/DL (ref 70–99)
GLUCOSE BLDC GLUCOMTR-MCNC: 134 MG/DL (ref 70–99)
GLUCOSE BLDC GLUCOMTR-MCNC: 134 MG/DL (ref 70–99)
GLUCOSE BLDC GLUCOMTR-MCNC: 135 MG/DL (ref 70–99)
GLUCOSE BLDC GLUCOMTR-MCNC: 136 MG/DL (ref 70–99)
GLUCOSE BLDC GLUCOMTR-MCNC: 137 MG/DL (ref 70–99)
GLUCOSE BLDC GLUCOMTR-MCNC: 137 MG/DL (ref 70–99)
GLUCOSE BLDC GLUCOMTR-MCNC: 138 MG/DL (ref 70–99)
GLUCOSE BLDC GLUCOMTR-MCNC: 138 MG/DL (ref 70–99)
GLUCOSE BLDC GLUCOMTR-MCNC: 139 MG/DL (ref 70–99)
GLUCOSE BLDC GLUCOMTR-MCNC: 139 MG/DL (ref 70–99)
GLUCOSE BLDC GLUCOMTR-MCNC: 140 MG/DL (ref 70–99)
GLUCOSE BLDC GLUCOMTR-MCNC: 140 MG/DL (ref 70–99)
GLUCOSE BLDC GLUCOMTR-MCNC: 141 MG/DL (ref 70–99)
GLUCOSE BLDC GLUCOMTR-MCNC: 142 MG/DL (ref 70–99)
GLUCOSE BLDC GLUCOMTR-MCNC: 143 MG/DL (ref 70–99)
GLUCOSE BLDC GLUCOMTR-MCNC: 144 MG/DL (ref 70–99)
GLUCOSE BLDC GLUCOMTR-MCNC: 145 MG/DL (ref 70–99)
GLUCOSE BLDC GLUCOMTR-MCNC: 145 MG/DL (ref 70–99)
GLUCOSE BLDC GLUCOMTR-MCNC: 146 MG/DL (ref 70–99)
GLUCOSE BLDC GLUCOMTR-MCNC: 147 MG/DL (ref 70–99)
GLUCOSE BLDC GLUCOMTR-MCNC: 148 MG/DL (ref 70–99)
GLUCOSE BLDC GLUCOMTR-MCNC: 148 MG/DL (ref 70–99)
GLUCOSE BLDC GLUCOMTR-MCNC: 149 MG/DL (ref 70–99)
GLUCOSE BLDC GLUCOMTR-MCNC: 150 MG/DL (ref 70–99)
GLUCOSE BLDC GLUCOMTR-MCNC: 151 MG/DL (ref 70–99)
GLUCOSE BLDC GLUCOMTR-MCNC: 151 MG/DL (ref 70–99)
GLUCOSE BLDC GLUCOMTR-MCNC: 152 MG/DL (ref 70–99)
GLUCOSE BLDC GLUCOMTR-MCNC: 153 MG/DL (ref 70–99)
GLUCOSE BLDC GLUCOMTR-MCNC: 154 MG/DL (ref 70–99)
GLUCOSE BLDC GLUCOMTR-MCNC: 155 MG/DL (ref 70–99)
GLUCOSE BLDC GLUCOMTR-MCNC: 155 MG/DL (ref 70–99)
GLUCOSE BLDC GLUCOMTR-MCNC: 156 MG/DL (ref 70–99)
GLUCOSE BLDC GLUCOMTR-MCNC: 157 MG/DL (ref 70–99)
GLUCOSE BLDC GLUCOMTR-MCNC: 158 MG/DL (ref 70–99)
GLUCOSE BLDC GLUCOMTR-MCNC: 159 MG/DL (ref 70–99)
GLUCOSE BLDC GLUCOMTR-MCNC: 159 MG/DL (ref 70–99)
GLUCOSE BLDC GLUCOMTR-MCNC: 160 MG/DL (ref 70–99)
GLUCOSE BLDC GLUCOMTR-MCNC: 161 MG/DL (ref 70–99)
GLUCOSE BLDC GLUCOMTR-MCNC: 162 MG/DL (ref 70–99)
GLUCOSE BLDC GLUCOMTR-MCNC: 162 MG/DL (ref 70–99)
GLUCOSE BLDC GLUCOMTR-MCNC: 163 MG/DL (ref 70–99)
GLUCOSE BLDC GLUCOMTR-MCNC: 164 MG/DL (ref 70–99)
GLUCOSE BLDC GLUCOMTR-MCNC: 164 MG/DL (ref 70–99)
GLUCOSE BLDC GLUCOMTR-MCNC: 165 MG/DL (ref 70–99)
GLUCOSE BLDC GLUCOMTR-MCNC: 165 MG/DL (ref 70–99)
GLUCOSE BLDC GLUCOMTR-MCNC: 166 MG/DL (ref 70–99)
GLUCOSE BLDC GLUCOMTR-MCNC: 166 MG/DL (ref 70–99)
GLUCOSE BLDC GLUCOMTR-MCNC: 168 MG/DL (ref 70–99)
GLUCOSE BLDC GLUCOMTR-MCNC: 168 MG/DL (ref 70–99)
GLUCOSE BLDC GLUCOMTR-MCNC: 169 MG/DL (ref 70–99)
GLUCOSE BLDC GLUCOMTR-MCNC: 170 MG/DL (ref 70–99)
GLUCOSE BLDC GLUCOMTR-MCNC: 171 MG/DL (ref 70–99)
GLUCOSE BLDC GLUCOMTR-MCNC: 173 MG/DL (ref 70–99)
GLUCOSE BLDC GLUCOMTR-MCNC: 173 MG/DL (ref 70–99)
GLUCOSE BLDC GLUCOMTR-MCNC: 174 MG/DL (ref 70–99)
GLUCOSE BLDC GLUCOMTR-MCNC: 175 MG/DL (ref 70–99)
GLUCOSE BLDC GLUCOMTR-MCNC: 176 MG/DL (ref 70–99)
GLUCOSE BLDC GLUCOMTR-MCNC: 177 MG/DL (ref 70–99)
GLUCOSE BLDC GLUCOMTR-MCNC: 177 MG/DL (ref 70–99)
GLUCOSE BLDC GLUCOMTR-MCNC: 178 MG/DL (ref 70–99)
GLUCOSE BLDC GLUCOMTR-MCNC: 179 MG/DL (ref 70–99)
GLUCOSE BLDC GLUCOMTR-MCNC: 179 MG/DL (ref 70–99)
GLUCOSE BLDC GLUCOMTR-MCNC: 180 MG/DL (ref 70–99)
GLUCOSE BLDC GLUCOMTR-MCNC: 181 MG/DL (ref 70–99)
GLUCOSE BLDC GLUCOMTR-MCNC: 181 MG/DL (ref 70–99)
GLUCOSE BLDC GLUCOMTR-MCNC: 182 MG/DL (ref 70–99)
GLUCOSE BLDC GLUCOMTR-MCNC: 183 MG/DL (ref 70–99)
GLUCOSE BLDC GLUCOMTR-MCNC: 184 MG/DL (ref 70–99)
GLUCOSE BLDC GLUCOMTR-MCNC: 185 MG/DL (ref 70–99)
GLUCOSE BLDC GLUCOMTR-MCNC: 185 MG/DL (ref 70–99)
GLUCOSE BLDC GLUCOMTR-MCNC: 186 MG/DL (ref 70–99)
GLUCOSE BLDC GLUCOMTR-MCNC: 187 MG/DL (ref 70–99)
GLUCOSE BLDC GLUCOMTR-MCNC: 187 MG/DL (ref 70–99)
GLUCOSE BLDC GLUCOMTR-MCNC: 188 MG/DL (ref 70–99)
GLUCOSE BLDC GLUCOMTR-MCNC: 189 MG/DL (ref 70–99)
GLUCOSE BLDC GLUCOMTR-MCNC: 190 MG/DL (ref 70–99)
GLUCOSE BLDC GLUCOMTR-MCNC: 191 MG/DL (ref 70–99)
GLUCOSE BLDC GLUCOMTR-MCNC: 192 MG/DL (ref 70–99)
GLUCOSE BLDC GLUCOMTR-MCNC: 193 MG/DL (ref 70–99)
GLUCOSE BLDC GLUCOMTR-MCNC: 194 MG/DL (ref 70–99)
GLUCOSE BLDC GLUCOMTR-MCNC: 195 MG/DL (ref 70–99)
GLUCOSE BLDC GLUCOMTR-MCNC: 196 MG/DL (ref 70–99)
GLUCOSE BLDC GLUCOMTR-MCNC: 196 MG/DL (ref 70–99)
GLUCOSE BLDC GLUCOMTR-MCNC: 197 MG/DL (ref 70–99)
GLUCOSE BLDC GLUCOMTR-MCNC: 197 MG/DL (ref 70–99)
GLUCOSE BLDC GLUCOMTR-MCNC: 198 MG/DL (ref 70–99)
GLUCOSE BLDC GLUCOMTR-MCNC: 199 MG/DL (ref 70–99)
GLUCOSE BLDC GLUCOMTR-MCNC: 200 MG/DL (ref 70–99)
GLUCOSE BLDC GLUCOMTR-MCNC: 200 MG/DL (ref 70–99)
GLUCOSE BLDC GLUCOMTR-MCNC: 201 MG/DL (ref 70–99)
GLUCOSE BLDC GLUCOMTR-MCNC: 201 MG/DL (ref 70–99)
GLUCOSE BLDC GLUCOMTR-MCNC: 202 MG/DL (ref 70–99)
GLUCOSE BLDC GLUCOMTR-MCNC: 203 MG/DL (ref 70–99)
GLUCOSE BLDC GLUCOMTR-MCNC: 205 MG/DL (ref 70–99)
GLUCOSE BLDC GLUCOMTR-MCNC: 206 MG/DL (ref 70–99)
GLUCOSE BLDC GLUCOMTR-MCNC: 208 MG/DL (ref 70–99)
GLUCOSE BLDC GLUCOMTR-MCNC: 208 MG/DL (ref 70–99)
GLUCOSE BLDC GLUCOMTR-MCNC: 209 MG/DL (ref 70–99)
GLUCOSE BLDC GLUCOMTR-MCNC: 211 MG/DL (ref 70–99)
GLUCOSE BLDC GLUCOMTR-MCNC: 212 MG/DL (ref 70–99)
GLUCOSE BLDC GLUCOMTR-MCNC: 213 MG/DL (ref 70–99)
GLUCOSE BLDC GLUCOMTR-MCNC: 213 MG/DL (ref 70–99)
GLUCOSE BLDC GLUCOMTR-MCNC: 214 MG/DL (ref 70–99)
GLUCOSE BLDC GLUCOMTR-MCNC: 214 MG/DL (ref 70–99)
GLUCOSE BLDC GLUCOMTR-MCNC: 215 MG/DL (ref 70–99)
GLUCOSE BLDC GLUCOMTR-MCNC: 216 MG/DL (ref 70–99)
GLUCOSE BLDC GLUCOMTR-MCNC: 216 MG/DL (ref 70–99)
GLUCOSE BLDC GLUCOMTR-MCNC: 217 MG/DL (ref 70–99)
GLUCOSE BLDC GLUCOMTR-MCNC: 218 MG/DL (ref 70–99)
GLUCOSE BLDC GLUCOMTR-MCNC: 218 MG/DL (ref 70–99)
GLUCOSE BLDC GLUCOMTR-MCNC: 219 MG/DL (ref 70–99)
GLUCOSE BLDC GLUCOMTR-MCNC: 220 MG/DL (ref 70–99)
GLUCOSE BLDC GLUCOMTR-MCNC: 221 MG/DL (ref 70–99)
GLUCOSE BLDC GLUCOMTR-MCNC: 222 MG/DL (ref 70–99)
GLUCOSE BLDC GLUCOMTR-MCNC: 223 MG/DL (ref 70–99)
GLUCOSE BLDC GLUCOMTR-MCNC: 226 MG/DL (ref 70–99)
GLUCOSE BLDC GLUCOMTR-MCNC: 227 MG/DL (ref 70–99)
GLUCOSE BLDC GLUCOMTR-MCNC: 228 MG/DL (ref 70–99)
GLUCOSE BLDC GLUCOMTR-MCNC: 228 MG/DL (ref 70–99)
GLUCOSE BLDC GLUCOMTR-MCNC: 229 MG/DL (ref 70–99)
GLUCOSE BLDC GLUCOMTR-MCNC: 230 MG/DL (ref 70–99)
GLUCOSE BLDC GLUCOMTR-MCNC: 230 MG/DL (ref 70–99)
GLUCOSE BLDC GLUCOMTR-MCNC: 231 MG/DL (ref 70–99)
GLUCOSE BLDC GLUCOMTR-MCNC: 232 MG/DL (ref 70–99)
GLUCOSE BLDC GLUCOMTR-MCNC: 233 MG/DL (ref 70–99)
GLUCOSE BLDC GLUCOMTR-MCNC: 234 MG/DL (ref 70–99)
GLUCOSE BLDC GLUCOMTR-MCNC: 235 MG/DL (ref 70–99)
GLUCOSE BLDC GLUCOMTR-MCNC: 235 MG/DL (ref 70–99)
GLUCOSE BLDC GLUCOMTR-MCNC: 237 MG/DL (ref 70–99)
GLUCOSE BLDC GLUCOMTR-MCNC: 239 MG/DL (ref 70–99)
GLUCOSE BLDC GLUCOMTR-MCNC: 240 MG/DL (ref 70–99)
GLUCOSE BLDC GLUCOMTR-MCNC: 241 MG/DL (ref 70–99)
GLUCOSE BLDC GLUCOMTR-MCNC: 242 MG/DL (ref 70–99)
GLUCOSE BLDC GLUCOMTR-MCNC: 242 MG/DL (ref 70–99)
GLUCOSE BLDC GLUCOMTR-MCNC: 243 MG/DL (ref 70–99)
GLUCOSE BLDC GLUCOMTR-MCNC: 243 MG/DL (ref 70–99)
GLUCOSE BLDC GLUCOMTR-MCNC: 244 MG/DL (ref 70–99)
GLUCOSE BLDC GLUCOMTR-MCNC: 244 MG/DL (ref 70–99)
GLUCOSE BLDC GLUCOMTR-MCNC: 246 MG/DL (ref 70–99)
GLUCOSE BLDC GLUCOMTR-MCNC: 247 MG/DL (ref 70–99)
GLUCOSE BLDC GLUCOMTR-MCNC: 249 MG/DL (ref 70–99)
GLUCOSE BLDC GLUCOMTR-MCNC: 249 MG/DL (ref 70–99)
GLUCOSE BLDC GLUCOMTR-MCNC: 250 MG/DL (ref 70–99)
GLUCOSE BLDC GLUCOMTR-MCNC: 251 MG/DL (ref 70–99)
GLUCOSE BLDC GLUCOMTR-MCNC: 255 MG/DL (ref 70–99)
GLUCOSE BLDC GLUCOMTR-MCNC: 257 MG/DL (ref 70–99)
GLUCOSE BLDC GLUCOMTR-MCNC: 260 MG/DL (ref 70–99)
GLUCOSE BLDC GLUCOMTR-MCNC: 260 MG/DL (ref 70–99)
GLUCOSE BLDC GLUCOMTR-MCNC: 263 MG/DL (ref 70–99)
GLUCOSE BLDC GLUCOMTR-MCNC: 264 MG/DL (ref 70–99)
GLUCOSE BLDC GLUCOMTR-MCNC: 266 MG/DL (ref 70–99)
GLUCOSE BLDC GLUCOMTR-MCNC: 266 MG/DL (ref 70–99)
GLUCOSE BLDC GLUCOMTR-MCNC: 270 MG/DL (ref 70–99)
GLUCOSE BLDC GLUCOMTR-MCNC: 270 MG/DL (ref 70–99)
GLUCOSE BLDC GLUCOMTR-MCNC: 271 MG/DL (ref 70–99)
GLUCOSE BLDC GLUCOMTR-MCNC: 272 MG/DL (ref 70–99)
GLUCOSE BLDC GLUCOMTR-MCNC: 273 MG/DL (ref 70–99)
GLUCOSE BLDC GLUCOMTR-MCNC: 275 MG/DL (ref 70–99)
GLUCOSE BLDC GLUCOMTR-MCNC: 276 MG/DL (ref 70–99)
GLUCOSE BLDC GLUCOMTR-MCNC: 277 MG/DL (ref 70–99)
GLUCOSE BLDC GLUCOMTR-MCNC: 279 MG/DL (ref 70–99)
GLUCOSE BLDC GLUCOMTR-MCNC: 281 MG/DL (ref 70–99)
GLUCOSE BLDC GLUCOMTR-MCNC: 282 MG/DL (ref 70–99)
GLUCOSE BLDC GLUCOMTR-MCNC: 282 MG/DL (ref 70–99)
GLUCOSE BLDC GLUCOMTR-MCNC: 283 MG/DL (ref 70–99)
GLUCOSE BLDC GLUCOMTR-MCNC: 287 MG/DL (ref 70–99)
GLUCOSE BLDC GLUCOMTR-MCNC: 287 MG/DL (ref 70–99)
GLUCOSE BLDC GLUCOMTR-MCNC: 289 MG/DL (ref 70–99)
GLUCOSE BLDC GLUCOMTR-MCNC: 290 MG/DL (ref 70–99)
GLUCOSE BLDC GLUCOMTR-MCNC: 290 MG/DL (ref 70–99)
GLUCOSE BLDC GLUCOMTR-MCNC: 292 MG/DL (ref 70–99)
GLUCOSE BLDC GLUCOMTR-MCNC: 300 MG/DL (ref 70–99)
GLUCOSE BLDC GLUCOMTR-MCNC: 301 MG/DL (ref 70–99)
GLUCOSE BLDC GLUCOMTR-MCNC: 305 MG/DL (ref 70–99)
GLUCOSE BLDC GLUCOMTR-MCNC: 305 MG/DL (ref 70–99)
GLUCOSE BLDC GLUCOMTR-MCNC: 307 MG/DL (ref 70–99)
GLUCOSE BLDC GLUCOMTR-MCNC: 311 MG/DL (ref 70–99)
GLUCOSE BLDC GLUCOMTR-MCNC: 311 MG/DL (ref 70–99)
GLUCOSE BLDC GLUCOMTR-MCNC: 326 MG/DL (ref 70–99)
GLUCOSE BLDC GLUCOMTR-MCNC: 327 MG/DL (ref 70–99)
GLUCOSE BLDC GLUCOMTR-MCNC: 328 MG/DL (ref 70–99)
GLUCOSE BLDC GLUCOMTR-MCNC: 332 MG/DL (ref 70–99)
GLUCOSE BLDC GLUCOMTR-MCNC: 333 MG/DL (ref 70–99)
GLUCOSE BLDC GLUCOMTR-MCNC: 337 MG/DL (ref 70–99)
GLUCOSE BLDC GLUCOMTR-MCNC: 340 MG/DL (ref 70–99)
GLUCOSE BLDC GLUCOMTR-MCNC: 348 MG/DL (ref 70–99)
GLUCOSE BLDC GLUCOMTR-MCNC: 361 MG/DL (ref 70–99)
GLUCOSE BLDC GLUCOMTR-MCNC: 363 MG/DL (ref 70–99)
GLUCOSE BLDC GLUCOMTR-MCNC: 364 MG/DL (ref 70–99)
GLUCOSE BLDC GLUCOMTR-MCNC: 370 MG/DL (ref 70–99)
GLUCOSE BLDC GLUCOMTR-MCNC: 372 MG/DL (ref 70–99)
GLUCOSE BLDC GLUCOMTR-MCNC: 388 MG/DL (ref 70–99)
GLUCOSE BLDC GLUCOMTR-MCNC: 404 MG/DL (ref 70–99)
GLUCOSE BLDC GLUCOMTR-MCNC: 43 MG/DL (ref 70–99)
GLUCOSE BLDC GLUCOMTR-MCNC: 44 MG/DL (ref 70–99)
GLUCOSE BLDC GLUCOMTR-MCNC: 441 MG/DL (ref 70–99)
GLUCOSE BLDC GLUCOMTR-MCNC: 46 MG/DL (ref 70–99)
GLUCOSE BLDC GLUCOMTR-MCNC: 66 MG/DL (ref 70–99)
GLUCOSE BLDC GLUCOMTR-MCNC: 80 MG/DL (ref 70–99)
GLUCOSE BLDC GLUCOMTR-MCNC: 83 MG/DL (ref 70–99)
GLUCOSE BLDC GLUCOMTR-MCNC: 83 MG/DL (ref 70–99)
GLUCOSE BLDC GLUCOMTR-MCNC: 84 MG/DL (ref 70–99)
GLUCOSE BLDC GLUCOMTR-MCNC: 86 MG/DL (ref 70–99)
GLUCOSE BLDC GLUCOMTR-MCNC: 89 MG/DL (ref 70–99)
GLUCOSE BLDC GLUCOMTR-MCNC: 89 MG/DL (ref 70–99)
GLUCOSE BLDC GLUCOMTR-MCNC: 91 MG/DL (ref 70–99)
GLUCOSE BLDC GLUCOMTR-MCNC: 92 MG/DL (ref 70–99)
GLUCOSE BLDC GLUCOMTR-MCNC: 92 MG/DL (ref 70–99)
GLUCOSE BLDC GLUCOMTR-MCNC: 93 MG/DL (ref 70–99)
GLUCOSE BLDC GLUCOMTR-MCNC: 94 MG/DL (ref 70–99)
GLUCOSE BLDC GLUCOMTR-MCNC: 95 MG/DL (ref 70–99)
GLUCOSE BLDC GLUCOMTR-MCNC: 97 MG/DL (ref 70–99)
GLUCOSE BLDC GLUCOMTR-MCNC: 98 MG/DL (ref 70–99)
GLUCOSE BLOOD: 221
GLUCOSE SERPL-MCNC: 100 MG/DL (ref 70–99)
GLUCOSE SERPL-MCNC: 102 MG/DL (ref 70–99)
GLUCOSE SERPL-MCNC: 102 MG/DL (ref 70–99)
GLUCOSE SERPL-MCNC: 106 MG/DL (ref 70–99)
GLUCOSE SERPL-MCNC: 107 MG/DL (ref 70–99)
GLUCOSE SERPL-MCNC: 112 MG/DL (ref 70–99)
GLUCOSE SERPL-MCNC: 113 MG/DL (ref 70–99)
GLUCOSE SERPL-MCNC: 119 MG/DL (ref 70–99)
GLUCOSE SERPL-MCNC: 123 MG/DL (ref 70–99)
GLUCOSE SERPL-MCNC: 124 MG/DL (ref 70–99)
GLUCOSE SERPL-MCNC: 128 MG/DL (ref 70–99)
GLUCOSE SERPL-MCNC: 128 MG/DL (ref 70–99)
GLUCOSE SERPL-MCNC: 132 MG/DL (ref 70–99)
GLUCOSE SERPL-MCNC: 132 MG/DL (ref 70–99)
GLUCOSE SERPL-MCNC: 133 MG/DL (ref 70–99)
GLUCOSE SERPL-MCNC: 135 MG/DL (ref 70–99)
GLUCOSE SERPL-MCNC: 136 MG/DL (ref 70–99)
GLUCOSE SERPL-MCNC: 141 MG/DL (ref 70–99)
GLUCOSE SERPL-MCNC: 142 MG/DL (ref 70–99)
GLUCOSE SERPL-MCNC: 144 MG/DL (ref 70–99)
GLUCOSE SERPL-MCNC: 151 MG/DL (ref 70–99)
GLUCOSE SERPL-MCNC: 153 MG/DL (ref 70–99)
GLUCOSE SERPL-MCNC: 160 MG/DL (ref 70–99)
GLUCOSE SERPL-MCNC: 162 MG/DL (ref 70–99)
GLUCOSE SERPL-MCNC: 166 MG/DL (ref 70–99)
GLUCOSE SERPL-MCNC: 167 MG/DL (ref 70–99)
GLUCOSE SERPL-MCNC: 169 MG/DL (ref 70–99)
GLUCOSE SERPL-MCNC: 174 MG/DL (ref 70–99)
GLUCOSE SERPL-MCNC: 177 MG/DL (ref 70–99)
GLUCOSE SERPL-MCNC: 177 MG/DL (ref 70–99)
GLUCOSE SERPL-MCNC: 179 MG/DL (ref 70–99)
GLUCOSE SERPL-MCNC: 182 MG/DL (ref 70–99)
GLUCOSE SERPL-MCNC: 185 MG/DL (ref 70–99)
GLUCOSE SERPL-MCNC: 185 MG/DL (ref 70–99)
GLUCOSE SERPL-MCNC: 202 MG/DL (ref 70–99)
GLUCOSE SERPL-MCNC: 202 MG/DL (ref 70–99)
GLUCOSE SERPL-MCNC: 214 MG/DL (ref 70–99)
GLUCOSE SERPL-MCNC: 214 MG/DL (ref 70–99)
GLUCOSE SERPL-MCNC: 222 MG/DL (ref 70–99)
GLUCOSE SERPL-MCNC: 222 MG/DL (ref 70–99)
GLUCOSE SERPL-MCNC: 224 MG/DL (ref 70–99)
GLUCOSE SERPL-MCNC: 227 MG/DL (ref 70–99)
GLUCOSE SERPL-MCNC: 228 MG/DL (ref 70–99)
GLUCOSE SERPL-MCNC: 231 MG/DL (ref 70–99)
GLUCOSE SERPL-MCNC: 234 MG/DL (ref 70–99)
GLUCOSE SERPL-MCNC: 241 MG/DL (ref 70–99)
GLUCOSE SERPL-MCNC: 243 MG/DL (ref 70–99)
GLUCOSE SERPL-MCNC: 246 MG/DL (ref 70–99)
GLUCOSE SERPL-MCNC: 252 MG/DL (ref 70–99)
GLUCOSE SERPL-MCNC: 258 MG/DL (ref 70–99)
GLUCOSE SERPL-MCNC: 260 MG/DL (ref 70–99)
GLUCOSE SERPL-MCNC: 263 MG/DL (ref 70–99)
GLUCOSE SERPL-MCNC: 268 MG/DL (ref 70–99)
GLUCOSE SERPL-MCNC: 279 MG/DL (ref 70–99)
GLUCOSE SERPL-MCNC: 283 MG/DL (ref 70–99)
GLUCOSE SERPL-MCNC: 289 MG/DL (ref 70–99)
GLUCOSE SERPL-MCNC: 290 MG/DL (ref 70–99)
GLUCOSE SERPL-MCNC: 310 MG/DL (ref 70–99)
GLUCOSE SERPL-MCNC: 310 MG/DL (ref 70–99)
GLUCOSE SERPL-MCNC: 321 MG/DL (ref 70–99)
GLUCOSE SERPL-MCNC: 325 MG/DL (ref 70–99)
GLUCOSE SERPL-MCNC: 332 MG/DL (ref 70–99)
GLUCOSE SERPL-MCNC: 86 MG/DL (ref 70–99)
GLUCOSE SERPL-MCNC: 98 MG/DL (ref 70–99)
GLUCOSE UR-MCNC: 50 MG/DL
GLUCOSE UR-MCNC: >=500 MG/DL
GLUCOSE UR-MCNC: >=500 MG/DL
GLUCOSE UR-MCNC: NEGATIVE MG/DL
HBA1C MFR BLD: 7.1 % (ref 4–6)
HBA1C MFR BLD: 7.6 % (ref 4–6)
HBA1C MFR BLD: 7.7 % (ref 4–6)
HBA1C MFR BLD: 7.8 % (ref 4–6)
HBA1C MFR BLD: 8 % (ref 4–6)
HCO3 BLDA-SCNC: 11.5 MEQ/L (ref 21–27)
HCO3 BLDA-SCNC: 25.3 MEQ/L (ref 21–27)
HCO3 BLDA-SCNC: 27 MEQ/L (ref 21–27)
HCT VFR BLD AUTO: 23.5 % (ref 35–48)
HCT VFR BLD AUTO: 23.7 % (ref 35–48)
HCT VFR BLD AUTO: 23.7 % (ref 35–48)
HCT VFR BLD AUTO: 24 % (ref 35–48)
HCT VFR BLD AUTO: 24.6 % (ref 35–48)
HCT VFR BLD AUTO: 24.7 % (ref 35–48)
HCT VFR BLD AUTO: 24.8 % (ref 35–48)
HCT VFR BLD AUTO: 24.9 % (ref 35–48)
HCT VFR BLD AUTO: 25 % (ref 35–48)
HCT VFR BLD AUTO: 25.1 % (ref 35–48)
HCT VFR BLD AUTO: 25.1 % (ref 35–48)
HCT VFR BLD AUTO: 25.2 % (ref 35–48)
HCT VFR BLD AUTO: 25.4 % (ref 35–48)
HCT VFR BLD AUTO: 25.5 % (ref 35–48)
HCT VFR BLD AUTO: 25.8 % (ref 35–48)
HCT VFR BLD AUTO: 26 % (ref 35–48)
HCT VFR BLD AUTO: 26.1 % (ref 35–48)
HCT VFR BLD AUTO: 26.5 % (ref 35–48)
HCT VFR BLD AUTO: 26.7 % (ref 35–48)
HCT VFR BLD AUTO: 26.8 % (ref 35–48)
HCT VFR BLD AUTO: 26.8 % (ref 35–48)
HCT VFR BLD AUTO: 27 % (ref 35–48)
HCT VFR BLD AUTO: 27.2 % (ref 35–48)
HCT VFR BLD AUTO: 27.3 % (ref 35–48)
HCT VFR BLD AUTO: 27.3 % (ref 35–48)
HCT VFR BLD AUTO: 27.6 % (ref 35–48)
HCT VFR BLD AUTO: 27.8 % (ref 35–48)
HCT VFR BLD AUTO: 27.9 % (ref 35–48)
HCT VFR BLD AUTO: 28.1 % (ref 35–48)
HCT VFR BLD AUTO: 28.5 % (ref 35–48)
HCT VFR BLD AUTO: 28.6 % (ref 35–48)
HCT VFR BLD AUTO: 28.9 % (ref 35–48)
HCT VFR BLD AUTO: 29.3 % (ref 35–48)
HCT VFR BLD AUTO: 29.4 % (ref 35–48)
HCT VFR BLD AUTO: 29.5 % (ref 35–48)
HCT VFR BLD AUTO: 29.6 % (ref 35–48)
HCT VFR BLD AUTO: 29.8 % (ref 35–48)
HCT VFR BLD AUTO: 30 % (ref 35–48)
HCT VFR BLD AUTO: 30.5 % (ref 35–48)
HCT VFR BLD AUTO: 32.8 % (ref 35–48)
HCT VFR BLD AUTO: 32.9 % (ref 35–48)
HCT VFR BLD AUTO: 34.2 % (ref 35–48)
HCT VFR BLD AUTO: 34.6 % (ref 35–48)
HCT VFR BLD AUTO: 34.8 % (ref 35–48)
HCT VFR BLD AUTO: 35.2 % (ref 35–48)
HCT VFR BLD AUTO: 35.9 % (ref 35–48)
HCT VFR BLD AUTO: 36.6 % (ref 35–48)
HCT VFR BLD AUTO: 37 % (ref 35–48)
HCT VFR BLD AUTO: 40.2 % (ref 35–48)
HCT VFR BLD AUTO: 46.2 % (ref 35–48)
HDLC SERPL-MCNC: 29 MG/DL
HDLC SERPL-MCNC: 31 MG/DL
HDLC SERPL-MCNC: 62 MG/DL
HGB BLD-MCNC: 10 G/DL (ref 12–16)
HGB BLD-MCNC: 10.1 G/DL (ref 12–16)
HGB BLD-MCNC: 10.3 G/DL (ref 12–16)
HGB BLD-MCNC: 10.9 G/DL (ref 12–16)
HGB BLD-MCNC: 11 G/DL (ref 12–16)
HGB BLD-MCNC: 11 G/DL (ref 12–16)
HGB BLD-MCNC: 11.2 G/DL (ref 12–16)
HGB BLD-MCNC: 11.4 G/DL (ref 12–16)
HGB BLD-MCNC: 11.8 G/DL (ref 12–16)
HGB BLD-MCNC: 12 G/DL (ref 12–16)
HGB BLD-MCNC: 12.1 G/DL (ref 12–16)
HGB BLD-MCNC: 12.3 G/DL (ref 12–16)
HGB BLD-MCNC: 13.4 G/DL (ref 12–16)
HGB BLD-MCNC: 15.4 G/DL (ref 12–16)
HGB BLD-MCNC: 7.1 G/DL (ref 12–16)
HGB BLD-MCNC: 7.4 G/DL (ref 12–16)
HGB BLD-MCNC: 7.5 G/DL (ref 12–16)
HGB BLD-MCNC: 7.5 G/DL (ref 12–16)
HGB BLD-MCNC: 7.6 G/DL (ref 12–16)
HGB BLD-MCNC: 7.7 G/DL (ref 12–16)
HGB BLD-MCNC: 7.8 G/DL (ref 12–16)
HGB BLD-MCNC: 7.8 G/DL (ref 12–16)
HGB BLD-MCNC: 7.9 G/DL (ref 12–16)
HGB BLD-MCNC: 8 G/DL (ref 12–16)
HGB BLD-MCNC: 8.1 G/DL (ref 12–16)
HGB BLD-MCNC: 8.1 G/DL (ref 12–16)
HGB BLD-MCNC: 8.2 G/DL (ref 12–16)
HGB BLD-MCNC: 8.2 G/DL (ref 12–16)
HGB BLD-MCNC: 8.3 G/DL (ref 12–16)
HGB BLD-MCNC: 8.4 G/DL (ref 12–16)
HGB BLD-MCNC: 8.4 G/DL (ref 12–16)
HGB BLD-MCNC: 8.5 G/DL (ref 12–16)
HGB BLD-MCNC: 8.6 G/DL (ref 12–16)
HGB BLD-MCNC: 8.7 G/DL (ref 12–16)
HGB BLD-MCNC: 8.8 G/DL (ref 12–16)
HGB BLD-MCNC: 8.9 G/DL (ref 12–16)
HGB BLD-MCNC: 9 G/DL (ref 12–16)
HGB BLD-MCNC: 9.1 G/DL (ref 12–16)
HGB BLD-MCNC: 9.3 G/DL (ref 12–16)
HGB BLD-MCNC: 9.4 G/DL (ref 12–16)
HGB BLD-MCNC: 9.4 G/DL (ref 12–16)
HGB BLD-MCNC: 9.9 G/DL (ref 12–16)
HGB UR QL STRIP.AUTO: NEGATIVE
HYALINE CASTS #/AREA URNS AUTO: 10 /LPF
HYALINE CASTS #/AREA URNS AUTO: 6 /LPF
INR BLD: 1.1 (ref 0.9–1.2)
INR BLD: 1.2 (ref 0.9–1.2)
INR BLD: 1.4 (ref 0.9–1.2)
INR BLD: 1.4 (ref 0.9–1.2)
INR BLD: 1.5 (ref 0.9–1.2)
INR BLD: 1.6 (ref 0.9–1.2)
INR BLD: 1.8 (ref 0.9–1.2)
INR BLD: 1.8 (ref 0.9–1.2)
INR BLD: 1.9 (ref 0.9–1.2)
INR BLD: 2 (ref 0.9–1.2)
INR BLD: 2.1 (ref 0.9–1.2)
INR BLD: 2.2 (ref 0.9–1.2)
INR BLD: 2.2 (ref 0.9–1.2)
INR BLD: 2.3 (ref 0.9–1.2)
INR BLD: 2.4 (ref 0.9–1.2)
INR BLD: 2.5 (ref 0.9–1.2)
INR BLD: 2.5 (ref 0.9–1.2)
INR BLD: 2.6 (ref 0.9–1.2)
INR BLD: 2.6 (ref 0.9–1.2)
INR BLD: 2.8 (ref 0.9–1.2)
INR BLD: 3 (ref 0.9–1.2)
INR BLD: 3.1 (ref 0.9–1.2)
INR BLD: 3.2 (ref 0.9–1.2)
INR BLD: 3.3 (ref 0.9–1.2)
INR BLD: 3.4 (ref 0.9–1.2)
INR BLD: 3.5 (ref 0.9–1.2)
INR BLD: 3.7 (ref 0.9–1.2)
INR BLD: 4 (ref 0.9–1.2)
INR BLD: 4.4 (ref 0.9–1.2)
INR BLD: 7.4 (ref 0.9–1.2)
INR BLD: 8.2 (ref 0.9–1.2)
INR BLD: 9 (ref 0.9–1.2)
INR: 1.2 (ref 2–3)
INR: 1.5 (ref 2–3)
INR: 1.8 (ref 2–3)
INR: 2 (ref 2–3)
INR: 2.4 (ref 2–3)
INR: 2.5 (ref 2–3)
INR: 2.8 (ref 2–3)
INR: 3.2 (ref 2–3)
INR: 4.1 (ref 2–3)
KETONES UR-MCNC: 20 MG/DL
KETONES UR-MCNC: NEGATIVE MG/DL
LACTATE SERPL-SCNC: 1.7 MMOL/L (ref 0.5–2.2)
LACTATE SERPL-SCNC: 1.7 MMOL/L (ref 0.5–2.2)
LACTATE SERPL-SCNC: 1.8 MMOL/L (ref 0.5–2.2)
LACTATE SERPL-SCNC: 2.7 MMOL/L (ref 0.5–2.2)
LACTATE SERPL-SCNC: 3.1 MMOL/L (ref 0.5–2.2)
LACTATE SERPL-SCNC: 4.9 MMOL/L (ref 0.5–2.2)
LACTATE SERPL-SCNC: 5.5 MMOL/L (ref 0.5–2.2)
LDLC SERPL CALC-MCNC: 38 MG/DL (ref 0–99)
LDLC SERPL CALC-MCNC: 58 MG/DL (ref 0–99)
LDLC SERPL DIRECT ASSAY-MCNC: 31 MG/DL (ref 0–99)
LEUKOCYTE ESTERASE UR QL STRIP.AUTO: NEGATIVE
LEUKOCYTE ESTERASE UR QL STRIP.AUTO: NEGATIVE
LYMPHOCYTES # BLD: 0.4 K/UL (ref 1–4)
LYMPHOCYTES # BLD: 0.5 K/UL (ref 1–4)
LYMPHOCYTES # BLD: 0.5 K/UL (ref 1–4)
LYMPHOCYTES # BLD: 0.6 K/UL (ref 1–4)
LYMPHOCYTES # BLD: 0.7 K/UL (ref 1–4)
LYMPHOCYTES # BLD: 0.8 K/UL (ref 1–4)
LYMPHOCYTES # BLD: 0.9 K/UL (ref 1–4)
LYMPHOCYTES # BLD: 1 K/UL (ref 1–4)
LYMPHOCYTES # BLD: 1.1 K/UL (ref 1–4)
LYMPHOCYTES # BLD: 1.2 K/UL (ref 1–4)
LYMPHOCYTES # BLD: 1.3 K/UL (ref 1–4)
LYMPHOCYTES # BLD: 1.4 K/UL (ref 1–4)
LYMPHOCYTES # BLD: 1.7 K/UL (ref 1–4)
LYMPHOCYTES # BLD: 1.8 K/UL (ref 1–4)
LYMPHOCYTES NFR BLD: 10 %
LYMPHOCYTES NFR BLD: 11 %
LYMPHOCYTES NFR BLD: 12 %
LYMPHOCYTES NFR BLD: 13 %
LYMPHOCYTES NFR BLD: 14 %
LYMPHOCYTES NFR BLD: 15 %
LYMPHOCYTES NFR BLD: 16 %
LYMPHOCYTES NFR BLD: 17 %
LYMPHOCYTES NFR BLD: 18 %
LYMPHOCYTES NFR BLD: 19 %
LYMPHOCYTES NFR BLD: 19 %
LYMPHOCYTES NFR BLD: 20 %
LYMPHOCYTES NFR BLD: 25 %
LYMPHOCYTES NFR BLD: 27 %
LYMPHOCYTES NFR BLD: 3 %
LYMPHOCYTES NFR BLD: 34 %
LYMPHOCYTES NFR BLD: 5 %
LYMPHOCYTES NFR BLD: 6 %
LYMPHOCYTES NFR BLD: 6 %
LYMPHOCYTES NFR BLD: 8 %
LYMPHOCYTES NFR BLD: 9 %
MAGNESIUM SERPL-MCNC: 1.2 MG/DL (ref 1.8–2.5)
MAGNESIUM SERPL-MCNC: 1.4 MG/DL (ref 1.8–2.5)
MAGNESIUM SERPL-MCNC: 1.6 MG/DL (ref 1.8–2.5)
MAGNESIUM SERPL-MCNC: 1.7 MG/DL (ref 1.8–2.5)
MAGNESIUM SERPL-MCNC: 1.7 MG/DL (ref 1.8–2.5)
MAGNESIUM SERPL-MCNC: 1.8 MG/DL (ref 1.8–2.5)
MAGNESIUM SERPL-MCNC: 1.8 MG/DL (ref 1.8–2.5)
MAGNESIUM SERPL-MCNC: 1.9 MG/DL (ref 1.8–2.5)
MAGNESIUM SERPL-MCNC: 2 MG/DL (ref 1.8–2.5)
MAGNESIUM SERPL-MCNC: 2.1 MG/DL (ref 1.8–2.5)
MAGNESIUM SERPL-MCNC: 2.2 MG/DL (ref 1.8–2.5)
MCH RBC QN AUTO: 21.7 PG (ref 27–32)
MCH RBC QN AUTO: 21.8 PG (ref 27–32)
MCH RBC QN AUTO: 21.9 PG (ref 27–32)
MCH RBC QN AUTO: 22 PG (ref 27–32)
MCH RBC QN AUTO: 22.2 PG (ref 27–32)
MCH RBC QN AUTO: 22.4 PG (ref 27–32)
MCH RBC QN AUTO: 22.4 PG (ref 27–32)
MCH RBC QN AUTO: 22.5 PG (ref 27–32)
MCH RBC QN AUTO: 22.5 PG (ref 27–32)
MCH RBC QN AUTO: 23.1 PG (ref 27–32)
MCH RBC QN AUTO: 23.2 PG (ref 27–32)
MCH RBC QN AUTO: 23.3 PG (ref 27–32)
MCH RBC QN AUTO: 23.5 PG (ref 27–32)
MCH RBC QN AUTO: 23.6 PG (ref 27–32)
MCH RBC QN AUTO: 23.8 PG (ref 27–32)
MCH RBC QN AUTO: 24.6 PG (ref 27–32)
MCH RBC QN AUTO: 24.7 PG (ref 27–32)
MCH RBC QN AUTO: 24.7 PG (ref 27–32)
MCH RBC QN AUTO: 24.8 PG (ref 27–32)
MCH RBC QN AUTO: 25 PG (ref 27–32)
MCH RBC QN AUTO: 25.2 PG (ref 27–32)
MCH RBC QN AUTO: 25.3 PG (ref 27–32)
MCH RBC QN AUTO: 26 PG (ref 27–32)
MCH RBC QN AUTO: 26.4 PG (ref 27–32)
MCH RBC QN AUTO: 26.5 PG (ref 27–32)
MCH RBC QN AUTO: 26.6 PG (ref 27–32)
MCH RBC QN AUTO: 26.6 PG (ref 27–32)
MCH RBC QN AUTO: 26.8 PG (ref 27–32)
MCH RBC QN AUTO: 26.9 PG (ref 27–32)
MCH RBC QN AUTO: 26.9 PG (ref 27–32)
MCH RBC QN AUTO: 27 PG (ref 27–32)
MCH RBC QN AUTO: 27.1 PG (ref 27–32)
MCH RBC QN AUTO: 27.2 PG (ref 27–32)
MCH RBC QN AUTO: 27.2 PG (ref 27–32)
MCH RBC QN AUTO: 27.3 PG (ref 27–32)
MCH RBC QN AUTO: 27.3 PG (ref 27–32)
MCH RBC QN AUTO: 27.4 PG (ref 27–32)
MCH RBC QN AUTO: 27.4 PG (ref 27–32)
MCH RBC QN AUTO: 27.5 PG (ref 27–32)
MCH RBC QN AUTO: 27.5 PG (ref 27–32)
MCH RBC QN AUTO: 27.6 PG (ref 27–32)
MCH RBC QN AUTO: 27.7 PG (ref 27–32)
MCH RBC QN AUTO: 27.8 PG (ref 27–32)
MCH RBC QN AUTO: 27.9 PG (ref 27–32)
MCH RBC QN AUTO: 28.1 PG (ref 27–32)
MCH RBC QN AUTO: 29.1 PG (ref 27–32)
MCH RBC QN AUTO: 29.1 PG (ref 27–32)
MCH RBC QN AUTO: 29.2 PG (ref 27–32)
MCH RBC QN AUTO: 29.2 PG (ref 27–32)
MCH RBC QN AUTO: 30.1 PG (ref 27–32)
MCH RBC QN AUTO: 30.2 PG (ref 27–32)
MCH RBC QN AUTO: 30.5 PG (ref 27–32)
MCHC RBC AUTO-ENTMCNC: 28.3 G/DL (ref 32–37)
MCHC RBC AUTO-ENTMCNC: 28.4 G/DL (ref 32–37)
MCHC RBC AUTO-ENTMCNC: 28.5 G/DL (ref 32–37)
MCHC RBC AUTO-ENTMCNC: 28.7 G/DL (ref 32–37)
MCHC RBC AUTO-ENTMCNC: 28.8 G/DL (ref 32–37)
MCHC RBC AUTO-ENTMCNC: 28.8 G/DL (ref 32–37)
MCHC RBC AUTO-ENTMCNC: 29.2 G/DL (ref 32–37)
MCHC RBC AUTO-ENTMCNC: 29.2 G/DL (ref 32–37)
MCHC RBC AUTO-ENTMCNC: 29.5 G/DL (ref 32–37)
MCHC RBC AUTO-ENTMCNC: 29.5 G/DL (ref 32–37)
MCHC RBC AUTO-ENTMCNC: 29.8 G/DL (ref 32–37)
MCHC RBC AUTO-ENTMCNC: 30.2 G/DL (ref 32–37)
MCHC RBC AUTO-ENTMCNC: 30.2 G/DL (ref 32–37)
MCHC RBC AUTO-ENTMCNC: 30.3 G/DL (ref 32–37)
MCHC RBC AUTO-ENTMCNC: 30.6 G/DL (ref 32–37)
MCHC RBC AUTO-ENTMCNC: 30.8 G/DL (ref 32–37)
MCHC RBC AUTO-ENTMCNC: 31 G/DL (ref 32–37)
MCHC RBC AUTO-ENTMCNC: 31.1 G/DL (ref 32–37)
MCHC RBC AUTO-ENTMCNC: 31.1 G/DL (ref 32–37)
MCHC RBC AUTO-ENTMCNC: 31.3 G/DL (ref 32–37)
MCHC RBC AUTO-ENTMCNC: 31.4 G/DL (ref 32–37)
MCHC RBC AUTO-ENTMCNC: 31.5 G/DL (ref 32–37)
MCHC RBC AUTO-ENTMCNC: 31.5 G/DL (ref 32–37)
MCHC RBC AUTO-ENTMCNC: 31.6 G/DL (ref 32–37)
MCHC RBC AUTO-ENTMCNC: 31.7 G/DL (ref 32–37)
MCHC RBC AUTO-ENTMCNC: 31.8 G/DL (ref 32–37)
MCHC RBC AUTO-ENTMCNC: 31.9 G/DL (ref 32–37)
MCHC RBC AUTO-ENTMCNC: 32 G/DL (ref 32–37)
MCHC RBC AUTO-ENTMCNC: 32.1 G/DL (ref 32–37)
MCHC RBC AUTO-ENTMCNC: 32.1 G/DL (ref 32–37)
MCHC RBC AUTO-ENTMCNC: 32.2 G/DL (ref 32–37)
MCHC RBC AUTO-ENTMCNC: 32.4 G/DL (ref 32–37)
MCHC RBC AUTO-ENTMCNC: 32.5 G/DL (ref 32–37)
MCHC RBC AUTO-ENTMCNC: 32.7 G/DL (ref 32–37)
MCHC RBC AUTO-ENTMCNC: 32.8 G/DL (ref 32–37)
MCHC RBC AUTO-ENTMCNC: 32.8 G/DL (ref 32–37)
MCHC RBC AUTO-ENTMCNC: 32.9 G/DL (ref 32–37)
MCHC RBC AUTO-ENTMCNC: 33 G/DL (ref 32–37)
MCHC RBC AUTO-ENTMCNC: 33.1 G/DL (ref 32–37)
MCHC RBC AUTO-ENTMCNC: 33.1 G/DL (ref 32–37)
MCHC RBC AUTO-ENTMCNC: 33.2 G/DL (ref 32–37)
MCHC RBC AUTO-ENTMCNC: 33.3 G/DL (ref 32–37)
MCHC RBC AUTO-ENTMCNC: 33.4 G/DL (ref 32–37)
MCHC RBC AUTO-ENTMCNC: 33.4 G/DL (ref 32–37)
MCHC RBC AUTO-ENTMCNC: 33.6 G/DL (ref 32–37)
MCHC RBC AUTO-ENTMCNC: 34 G/DL (ref 32–37)
MCV RBC AUTO: 75.8 FL (ref 80–100)
MCV RBC AUTO: 76.3 FL (ref 80–100)
MCV RBC AUTO: 76.3 FL (ref 80–100)
MCV RBC AUTO: 76.7 FL (ref 80–100)
MCV RBC AUTO: 76.7 FL (ref 80–100)
MCV RBC AUTO: 76.9 FL (ref 80–100)
MCV RBC AUTO: 77.1 FL (ref 80–100)
MCV RBC AUTO: 77.2 FL (ref 80–100)
MCV RBC AUTO: 77.3 FL (ref 80–100)
MCV RBC AUTO: 77.4 FL (ref 80–100)
MCV RBC AUTO: 77.9 FL (ref 80–100)
MCV RBC AUTO: 78 FL (ref 80–100)
MCV RBC AUTO: 78 FL (ref 80–100)
MCV RBC AUTO: 78.1 FL (ref 80–100)
MCV RBC AUTO: 78.7 FL (ref 80–100)
MCV RBC AUTO: 79.7 FL (ref 80–100)
MCV RBC AUTO: 79.7 FL (ref 80–100)
MCV RBC AUTO: 79.8 FL (ref 80–100)
MCV RBC AUTO: 79.9 FL (ref 80–100)
MCV RBC AUTO: 80.1 FL (ref 80–100)
MCV RBC AUTO: 80.3 FL (ref 80–100)
MCV RBC AUTO: 80.4 FL (ref 80–100)
MCV RBC AUTO: 80.4 FL (ref 80–100)
MCV RBC AUTO: 80.5 FL (ref 80–100)
MCV RBC AUTO: 80.8 FL (ref 80–100)
MCV RBC AUTO: 81.6 FL (ref 80–100)
MCV RBC AUTO: 81.8 FL (ref 80–100)
MCV RBC AUTO: 82 FL (ref 80–100)
MCV RBC AUTO: 82.1 FL (ref 80–100)
MCV RBC AUTO: 82.5 FL (ref 80–100)
MCV RBC AUTO: 82.9 FL (ref 80–100)
MCV RBC AUTO: 82.9 FL (ref 80–100)
MCV RBC AUTO: 83.5 FL (ref 80–100)
MCV RBC AUTO: 83.5 FL (ref 80–100)
MCV RBC AUTO: 83.6 FL (ref 80–100)
MCV RBC AUTO: 83.7 FL (ref 80–100)
MCV RBC AUTO: 83.8 FL (ref 80–100)
MCV RBC AUTO: 83.8 FL (ref 80–100)
MCV RBC AUTO: 83.9 FL (ref 80–100)
MCV RBC AUTO: 83.9 FL (ref 80–100)
MCV RBC AUTO: 84 FL (ref 80–100)
MCV RBC AUTO: 84 FL (ref 80–100)
MCV RBC AUTO: 84.3 FL (ref 80–100)
MCV RBC AUTO: 84.3 FL (ref 80–100)
MCV RBC AUTO: 84.9 FL (ref 80–100)
MCV RBC AUTO: 85 FL (ref 80–100)
MCV RBC AUTO: 85.1 FL (ref 80–100)
MCV RBC AUTO: 87.6 FL (ref 80–100)
MCV RBC AUTO: 87.7 FL (ref 80–100)
MCV RBC AUTO: 87.8 FL (ref 80–100)
MCV RBC AUTO: 88.5 FL (ref 80–100)
MCV RBC AUTO: 90.3 FL (ref 80–100)
MCV RBC AUTO: 90.3 FL (ref 80–100)
MCV RBC AUTO: 90.8 FL (ref 80–100)
METHGB MFR BLD: 0.1 % SAT (ref 0.4–1.5)
METHGB MFR BLD: 0.3 % SAT (ref 0.4–1.5)
MICROALBUMIN UR-MCNC: 0 MG/DL (ref 0–1.8)
MICROALBUMIN/CREAT UR: 0 MG/G{CREAT} (ref 0–20)
MODIFIED ALLEN TEST: POSITIVE
MODIFIED ALLEN TEST: POSITIVE
MONOCYTES # BLD: 0.4 K/UL (ref 0–1)
MONOCYTES # BLD: 0.5 K/UL (ref 0–1)
MONOCYTES # BLD: 0.6 K/UL (ref 0–1)
MONOCYTES # BLD: 0.7 K/UL (ref 0–1)
MONOCYTES # BLD: 0.8 K/UL (ref 0–1)
MONOCYTES # BLD: 0.9 K/UL (ref 0–1)
MONOCYTES # BLD: 1 K/UL (ref 0–1)
MONOCYTES # BLD: 1 K/UL (ref 0–1)
MONOCYTES # BLD: 1.2 K/UL (ref 0–1)
MONOCYTES # BLD: 1.3 K/UL (ref 0–1)
MONOCYTES # BLD: 1.8 K/UL (ref 0–1)
MONOCYTES NFR BLD: 10 %
MONOCYTES NFR BLD: 11 %
MONOCYTES NFR BLD: 12 %
MONOCYTES NFR BLD: 13 %
MONOCYTES NFR BLD: 14 %
MONOCYTES NFR BLD: 15 %
MONOCYTES NFR BLD: 4 %
MONOCYTES NFR BLD: 5 %
MONOCYTES NFR BLD: 7 %
MONOCYTES NFR BLD: 8 %
MONOCYTES NFR BLD: 9 %
MRSA DNA SPEC QL NAA+PROBE: NEGATIVE
NEUTROPHILS # BLD AUTO: 11.4 K/UL (ref 1.8–7.7)
NEUTROPHILS # BLD AUTO: 13.7 K/UL (ref 1.8–7.7)
NEUTROPHILS # BLD AUTO: 17.5 K/UL (ref 1.8–7.7)
NEUTROPHILS # BLD AUTO: 2.5 K/UL (ref 1.8–7.7)
NEUTROPHILS # BLD AUTO: 2.6 K/UL (ref 1.8–7.7)
NEUTROPHILS # BLD AUTO: 2.7 K/UL (ref 1.8–7.7)
NEUTROPHILS # BLD AUTO: 2.9 K/UL (ref 1.8–7.7)
NEUTROPHILS # BLD AUTO: 2.9 K/UL (ref 1.8–7.7)
NEUTROPHILS # BLD AUTO: 3.1 K/UL (ref 1.8–7.7)
NEUTROPHILS # BLD AUTO: 3.2 K/UL (ref 1.8–7.7)
NEUTROPHILS # BLD AUTO: 3.2 K/UL (ref 1.8–7.7)
NEUTROPHILS # BLD AUTO: 3.3 K/UL (ref 1.8–7.7)
NEUTROPHILS # BLD AUTO: 3.4 K/UL (ref 1.8–7.7)
NEUTROPHILS # BLD AUTO: 3.5 K/UL (ref 1.8–7.7)
NEUTROPHILS # BLD AUTO: 3.7 K/UL (ref 1.8–7.7)
NEUTROPHILS # BLD AUTO: 3.7 K/UL (ref 1.8–7.7)
NEUTROPHILS # BLD AUTO: 3.8 K/UL (ref 1.8–7.7)
NEUTROPHILS # BLD AUTO: 3.8 K/UL (ref 1.8–7.7)
NEUTROPHILS # BLD AUTO: 3.9 K/UL (ref 1.8–7.7)
NEUTROPHILS # BLD AUTO: 4 K/UL (ref 1.8–7.7)
NEUTROPHILS # BLD AUTO: 4.1 K/UL (ref 1.8–7.7)
NEUTROPHILS # BLD AUTO: 4.2 K/UL (ref 1.8–7.7)
NEUTROPHILS # BLD AUTO: 4.2 K/UL (ref 1.8–7.7)
NEUTROPHILS # BLD AUTO: 4.3 K/UL (ref 1.8–7.7)
NEUTROPHILS # BLD AUTO: 4.5 K/UL (ref 1.8–7.7)
NEUTROPHILS # BLD AUTO: 4.5 K/UL (ref 1.8–7.7)
NEUTROPHILS # BLD AUTO: 4.6 K/UL (ref 1.8–7.7)
NEUTROPHILS # BLD AUTO: 4.6 K/UL (ref 1.8–7.7)
NEUTROPHILS # BLD AUTO: 4.7 K/UL (ref 1.8–7.7)
NEUTROPHILS # BLD AUTO: 5.1 K/UL (ref 1.8–7.7)
NEUTROPHILS # BLD AUTO: 5.2 K/UL (ref 1.8–7.7)
NEUTROPHILS # BLD AUTO: 5.2 K/UL (ref 1.8–7.7)
NEUTROPHILS # BLD AUTO: 5.4 K/UL (ref 1.8–7.7)
NEUTROPHILS # BLD AUTO: 5.9 K/UL (ref 1.8–7.7)
NEUTROPHILS # BLD AUTO: 6.2 K/UL (ref 1.8–7.7)
NEUTROPHILS # BLD AUTO: 6.4 K/UL (ref 1.8–7.7)
NEUTROPHILS # BLD AUTO: 6.7 K/UL (ref 1.8–7.7)
NEUTROPHILS # BLD AUTO: 7 K/UL (ref 1.8–7.7)
NEUTROPHILS # BLD AUTO: 7.2 K/UL (ref 1.8–7.7)
NEUTROPHILS # BLD AUTO: 7.3 K/UL (ref 1.8–7.7)
NEUTROPHILS # BLD AUTO: 7.4 K/UL (ref 1.8–7.7)
NEUTROPHILS # BLD AUTO: 7.9 K/UL (ref 1.8–7.7)
NEUTROPHILS # BLD AUTO: 8 K/UL (ref 1.8–7.7)
NEUTROPHILS # BLD AUTO: 9 K/UL (ref 1.8–7.7)
NEUTROPHILS # BLD AUTO: 9.7 K/UL (ref 1.8–7.7)
NEUTROPHILS NFR BLD: 52 %
NEUTROPHILS NFR BLD: 54 %
NEUTROPHILS NFR BLD: 62 %
NEUTROPHILS NFR BLD: 63 %
NEUTROPHILS NFR BLD: 64 %
NEUTROPHILS NFR BLD: 65 %
NEUTROPHILS NFR BLD: 65 %
NEUTROPHILS NFR BLD: 66 %
NEUTROPHILS NFR BLD: 66 %
NEUTROPHILS NFR BLD: 67 %
NEUTROPHILS NFR BLD: 68 %
NEUTROPHILS NFR BLD: 69 %
NEUTROPHILS NFR BLD: 70 %
NEUTROPHILS NFR BLD: 71 %
NEUTROPHILS NFR BLD: 72 %
NEUTROPHILS NFR BLD: 73 %
NEUTROPHILS NFR BLD: 74 %
NEUTROPHILS NFR BLD: 75 %
NEUTROPHILS NFR BLD: 76 %
NEUTROPHILS NFR BLD: 78 %
NEUTROPHILS NFR BLD: 79 %
NEUTROPHILS NFR BLD: 79 %
NEUTROPHILS NFR BLD: 81 %
NEUTROPHILS NFR BLD: 82 %
NEUTROPHILS NFR BLD: 82 %
NEUTROPHILS NFR BLD: 83 %
NEUTROPHILS NFR BLD: 85 %
NEUTROPHILS NFR BLD: 85 %
NEUTROPHILS NFR BLD: 86 %
NEUTROPHILS NFR BLD: 87 %
NEUTROPHILS NFR BLD: 88 %
NITRITE UR QL STRIP.AUTO: NEGATIVE
NONHDLC SERPL-MCNC: 46 MG/DL
NONHDLC SERPL-MCNC: 58 MG/DL
NONHDLC SERPL-MCNC: 68 MG/DL
O2 CT BLD-SCNC: 10.4 VOL% (ref 15–23)
O2 CT BLD-SCNC: 11.6 VOL% (ref 15–23)
O2 CT BLD-SCNC: 14.2 VOL% (ref 15–23)
O2/TOTAL GAS SETTING VFR VENT: 21 %
OSMOLALITY UR CALC.SUM OF ELEC: 258 MOSM/KG (ref 275–295)
OSMOLALITY UR CALC.SUM OF ELEC: 260 MOSM/KG (ref 275–295)
OSMOLALITY UR CALC.SUM OF ELEC: 260 MOSM/KG (ref 275–295)
OSMOLALITY UR CALC.SUM OF ELEC: 264 MOSM/KG (ref 275–295)
OSMOLALITY UR CALC.SUM OF ELEC: 266 MOSM/KG (ref 275–295)
OSMOLALITY UR CALC.SUM OF ELEC: 266 MOSM/KG (ref 275–295)
OSMOLALITY UR CALC.SUM OF ELEC: 271 MOSM/KG (ref 275–295)
OSMOLALITY UR CALC.SUM OF ELEC: 271 MOSM/KG (ref 275–295)
OSMOLALITY UR CALC.SUM OF ELEC: 273 MOSM/KG (ref 275–295)
OSMOLALITY UR CALC.SUM OF ELEC: 275 MOSM/KG (ref 275–295)
OSMOLALITY UR CALC.SUM OF ELEC: 276 MOSM/KG (ref 275–295)
OSMOLALITY UR CALC.SUM OF ELEC: 277 MOSM/KG (ref 275–295)
OSMOLALITY UR CALC.SUM OF ELEC: 277 MOSM/KG (ref 275–295)
OSMOLALITY UR CALC.SUM OF ELEC: 278 MOSM/KG (ref 275–295)
OSMOLALITY UR CALC.SUM OF ELEC: 279 MOSM/KG (ref 275–295)
OSMOLALITY UR CALC.SUM OF ELEC: 279 MOSM/KG (ref 275–295)
OSMOLALITY UR CALC.SUM OF ELEC: 281 MOSM/KG (ref 275–295)
OSMOLALITY UR CALC.SUM OF ELEC: 282 MOSM/KG (ref 275–295)
OSMOLALITY UR CALC.SUM OF ELEC: 283 MOSM/KG (ref 275–295)
OSMOLALITY UR CALC.SUM OF ELEC: 284 MOSM/KG (ref 275–295)
OSMOLALITY UR CALC.SUM OF ELEC: 285 MOSM/KG (ref 275–295)
OSMOLALITY UR CALC.SUM OF ELEC: 285 MOSM/KG (ref 275–295)
OSMOLALITY UR CALC.SUM OF ELEC: 287 MOSM/KG (ref 275–295)
OSMOLALITY UR CALC.SUM OF ELEC: 287 MOSM/KG (ref 275–295)
OSMOLALITY UR CALC.SUM OF ELEC: 288 MOSM/KG (ref 275–295)
OSMOLALITY UR CALC.SUM OF ELEC: 289 MOSM/KG (ref 275–295)
OSMOLALITY UR CALC.SUM OF ELEC: 291 MOSM/KG (ref 275–295)
OSMOLALITY UR CALC.SUM OF ELEC: 294 MOSM/KG (ref 275–295)
OSMOLALITY UR CALC.SUM OF ELEC: 294 MOSM/KG (ref 275–295)
OSMOLALITY UR CALC.SUM OF ELEC: 295 MOSM/KG (ref 275–295)
OSMOLALITY UR CALC.SUM OF ELEC: 297 MOSM/KG (ref 275–295)
OSMOLALITY UR CALC.SUM OF ELEC: 300 MOSM/KG (ref 275–295)
OSMOLALITY UR CALC.SUM OF ELEC: 301 MOSM/KG (ref 275–295)
OSMOLALITY UR CALC.SUM OF ELEC: 301 MOSM/KG (ref 275–295)
OSMOLALITY UR CALC.SUM OF ELEC: 302 MOSM/KG (ref 275–295)
OSMOLALITY UR CALC.SUM OF ELEC: 303 MOSM/KG (ref 275–295)
OSMOLALITY UR CALC.SUM OF ELEC: 303 MOSM/KG (ref 275–295)
OSMOLALITY UR CALC.SUM OF ELEC: 304 MOSM/KG (ref 275–295)
OSMOLALITY UR CALC.SUM OF ELEC: 305 MOSM/KG (ref 275–295)
OSMOLALITY UR CALC.SUM OF ELEC: 311 MOSM/KG (ref 275–295)
OSMOLALITY UR CALC.SUM OF ELEC: 314 MOSM/KG (ref 275–295)
OSMOLALITY UR CALC.SUM OF ELEC: 317 MOSM/KG (ref 275–295)
OSMOLALITY UR CALC.SUM OF ELEC: 321 MOSM/KG (ref 275–295)
OSMOLALITY UR CALC.SUM OF ELEC: 322 MOSM/KG (ref 275–295)
OSMOLALITY UR CALC.SUM OF ELEC: 326 MOSM/KG (ref 275–295)
OSMOLALITY UR CALC.SUM OF ELEC: 331 MOSM/KG (ref 275–295)
OSMOLALITY UR CALC.SUM OF ELEC: 333 MOSM/KG (ref 275–295)
OSMOLALITY UR CALC.SUM OF ELEC: 335 MOSM/KG (ref 275–295)
OSMOLALITY UR CALC.SUM OF ELEC: 336 MOSM/KG (ref 275–295)
OSMOLALITY UR CALC.SUM OF ELEC: 337 MOSM/KG (ref 275–295)
OSMOLALITY UR CALC.SUM OF ELEC: 338 MOSM/KG (ref 275–295)
OSMOLALITY UR CALC.SUM OF ELEC: 342 MOSM/KG (ref 275–295)
OSMOLALITY UR: 297 MOSM/KG (ref 300–1100)
PATIENT FASTING: YES
PCO2 BLDA: 18 MM HG (ref 35–45)
PCO2 BLDA: 31 MM HG (ref 35–45)
PCO2 BLDA: 31 MM HG (ref 35–45)
PH BLDA: 7.43 [PH] (ref 7.35–7.45)
PH BLDA: 7.53 [PH] (ref 7.35–7.45)
PH BLDA: 7.55 [PH] (ref 7.35–7.45)
PH UR: 5 [PH] (ref 5–8)
PH UR: 6 [PH] (ref 5–8)
PHOSPHATE SERPL-MCNC: 1.6 MG/DL (ref 2.4–4.7)
PHOSPHATE SERPL-MCNC: 2.1 MG/DL (ref 2.4–4.7)
PHOSPHATE SERPL-MCNC: 2.2 MG/DL (ref 2.4–4.7)
PHOSPHATE SERPL-MCNC: 2.3 MG/DL (ref 2.4–4.7)
PHOSPHATE SERPL-MCNC: 2.7 MG/DL (ref 2.4–4.7)
PHOSPHATE SERPL-MCNC: 3 MG/DL (ref 2.4–4.7)
PHOSPHATE SERPL-MCNC: 3.1 MG/DL (ref 2.4–4.7)
PHOSPHATE SERPL-MCNC: 3.5 MG/DL (ref 2.4–4.7)
PHOSPHATE SERPL-MCNC: 3.6 MG/DL (ref 2.4–4.7)
PHOSPHATE SERPL-MCNC: 4 MG/DL (ref 2.4–4.7)
PHOSPHATE SERPL-MCNC: 4 MG/DL (ref 2.4–4.7)
PHOSPHATE SERPL-MCNC: 4.2 MG/DL (ref 2.4–4.7)
PLATELET # BLD AUTO: 102 K/UL (ref 140–400)
PLATELET # BLD AUTO: 104 K/UL (ref 140–400)
PLATELET # BLD AUTO: 105 K/UL (ref 140–400)
PLATELET # BLD AUTO: 105 K/UL (ref 140–400)
PLATELET # BLD AUTO: 108 K/UL (ref 140–400)
PLATELET # BLD AUTO: 108 K/UL (ref 140–400)
PLATELET # BLD AUTO: 109 K/UL (ref 140–400)
PLATELET # BLD AUTO: 123 K/UL (ref 140–400)
PLATELET # BLD AUTO: 127 K/UL (ref 140–400)
PLATELET # BLD AUTO: 128 K/UL (ref 140–400)
PLATELET # BLD AUTO: 133 K/UL (ref 140–400)
PLATELET # BLD AUTO: 141 K/UL (ref 140–400)
PLATELET # BLD AUTO: 155 K/UL (ref 140–400)
PLATELET # BLD AUTO: 156 K/UL (ref 140–400)
PLATELET # BLD AUTO: 156 K/UL (ref 140–400)
PLATELET # BLD AUTO: 159 K/UL (ref 140–400)
PLATELET # BLD AUTO: 159 K/UL (ref 140–400)
PLATELET # BLD AUTO: 161 K/UL (ref 140–400)
PLATELET # BLD AUTO: 162 K/UL (ref 140–400)
PLATELET # BLD AUTO: 165 K/UL (ref 140–400)
PLATELET # BLD AUTO: 166 K/UL (ref 140–400)
PLATELET # BLD AUTO: 167 K/UL (ref 140–400)
PLATELET # BLD AUTO: 171 K/UL (ref 140–400)
PLATELET # BLD AUTO: 173 K/UL (ref 140–400)
PLATELET # BLD AUTO: 174 K/UL (ref 140–400)
PLATELET # BLD AUTO: 177 K/UL (ref 140–400)
PLATELET # BLD AUTO: 183 K/UL (ref 140–400)
PLATELET # BLD AUTO: 194 K/UL (ref 140–400)
PLATELET # BLD AUTO: 195 K/UL (ref 140–400)
PLATELET # BLD AUTO: 199 K/UL (ref 140–400)
PLATELET # BLD AUTO: 200 K/UL (ref 140–400)
PLATELET # BLD AUTO: 204 K/UL (ref 140–400)
PLATELET # BLD AUTO: 205 K/UL (ref 140–400)
PLATELET # BLD AUTO: 206 K/UL (ref 140–400)
PLATELET # BLD AUTO: 207 K/UL (ref 140–400)
PLATELET # BLD AUTO: 210 K/UL (ref 140–400)
PLATELET # BLD AUTO: 212 K/UL (ref 140–400)
PLATELET # BLD AUTO: 213 K/UL (ref 140–400)
PLATELET # BLD AUTO: 215 K/UL (ref 140–400)
PLATELET # BLD AUTO: 215 K/UL (ref 140–400)
PLATELET # BLD AUTO: 220 K/UL (ref 140–400)
PLATELET # BLD AUTO: 222 K/UL (ref 140–400)
PLATELET # BLD AUTO: 223 K/UL (ref 140–400)
PLATELET # BLD AUTO: 229 K/UL (ref 140–400)
PLATELET # BLD AUTO: 234 K/UL (ref 140–400)
PLATELET # BLD AUTO: 244 K/UL (ref 140–400)
PLATELET # BLD AUTO: 244 K/UL (ref 140–400)
PLATELET # BLD AUTO: 246 K/UL (ref 140–400)
PLATELET # BLD AUTO: 252 K/UL (ref 140–400)
PLATELET # BLD AUTO: 260 K/UL (ref 140–400)
PLATELET # BLD AUTO: 261 K/UL (ref 140–400)
PLATELET # BLD AUTO: 266 K/UL (ref 140–400)
PLATELET # BLD AUTO: 272 K/UL (ref 140–400)
PLATELET # BLD AUTO: 325 K/UL (ref 140–400)
PLATELET # BLD AUTO: 89 K/UL (ref 140–400)
PLATELET # BLD AUTO: 89 K/UL (ref 140–400)
PLATELET # BLD AUTO: 94 K/UL (ref 140–400)
PMV BLD AUTO: 10 FL (ref 7.4–10.3)
PMV BLD AUTO: 10.1 FL (ref 7.4–10.3)
PMV BLD AUTO: 10.1 FL (ref 7.4–10.3)
PMV BLD AUTO: 10.2 FL (ref 7.4–10.3)
PMV BLD AUTO: 10.3 FL (ref 7.4–10.3)
PMV BLD AUTO: 10.4 FL (ref 7.4–10.3)
PMV BLD AUTO: 10.5 FL (ref 7.4–10.3)
PMV BLD AUTO: 10.5 FL (ref 7.4–10.3)
PMV BLD AUTO: 10.6 FL (ref 7.4–10.3)
PMV BLD AUTO: 10.6 FL (ref 7.4–10.3)
PMV BLD AUTO: 10.7 FL (ref 7.4–10.3)
PMV BLD AUTO: 10.8 FL (ref 7.4–10.3)
PMV BLD AUTO: 10.9 FL (ref 7.4–10.3)
PMV BLD AUTO: 11 FL (ref 7.4–10.3)
PMV BLD AUTO: 11.1 FL (ref 7.4–10.3)
PMV BLD AUTO: 11.1 FL (ref 7.4–10.3)
PMV BLD AUTO: 11.2 FL (ref 7.4–10.3)
PMV BLD AUTO: 11.2 FL (ref 7.4–10.3)
PMV BLD AUTO: 11.3 FL (ref 7.4–10.3)
PMV BLD AUTO: 11.4 FL (ref 7.4–10.3)
PMV BLD AUTO: 11.5 FL (ref 7.4–10.3)
PMV BLD AUTO: 11.6 FL (ref 7.4–10.3)
PMV BLD AUTO: 11.7 FL (ref 7.4–10.3)
PMV BLD AUTO: 11.7 FL (ref 7.4–10.3)
PMV BLD AUTO: 12 FL (ref 7.4–10.3)
PMV BLD AUTO: 12 FL (ref 7.4–10.3)
PMV BLD AUTO: 12.1 FL (ref 7.4–10.3)
PMV BLD AUTO: 12.1 FL (ref 7.4–10.3)
PMV BLD AUTO: 12.2 FL (ref 7.4–10.3)
PMV BLD AUTO: 9 FL (ref 7.4–10.3)
PMV BLD AUTO: 9.2 FL (ref 7.4–10.3)
PMV BLD AUTO: 9.3 FL (ref 7.4–10.3)
PMV BLD AUTO: 9.4 FL (ref 7.4–10.3)
PMV BLD AUTO: 9.5 FL (ref 7.4–10.3)
PMV BLD AUTO: 9.6 FL (ref 7.4–10.3)
PMV BLD AUTO: 9.7 FL (ref 7.4–10.3)
PMV BLD AUTO: 9.8 FL (ref 7.4–10.3)
PMV BLD AUTO: 9.8 FL (ref 7.4–10.3)
PMV BLD AUTO: 9.9 FL (ref 7.4–10.3)
PMV BLD AUTO: 9.9 FL (ref 7.4–10.3)
PO2 BLDA: 104 MM HG (ref 80–100)
PO2 BLDA: 55 MM HG (ref 80–100)
PO2 BLDA: 75 MM HG (ref 80–100)
PO2 SATN ADJ TO 0.5 BLD: 23 MMHG (ref 24–28)
PO2 SATN ADJ TO 0.5 BLD: 23 MMHG (ref 24–28)
PO2 SATN ADJ TO 0.5 BLD: 24 MMHG (ref 24–28)
POTASSIUM (BLOOD GAS): 4.5 MMOL/L (ref 3.3–5.1)
POTASSIUM (BLOOD GAS): 5.2 MMOL/L (ref 3.3–5.1)
POTASSIUM SERPL-SCNC: 2.9 MMOL/L (ref 3.3–5.1)
POTASSIUM SERPL-SCNC: 2.9 MMOL/L (ref 3.3–5.1)
POTASSIUM SERPL-SCNC: 3 MMOL/L (ref 3.3–5.1)
POTASSIUM SERPL-SCNC: 3 MMOL/L (ref 3.3–5.1)
POTASSIUM SERPL-SCNC: 3.1 MMOL/L (ref 3.3–5.1)
POTASSIUM SERPL-SCNC: 3.2 MMOL/L (ref 3.3–5.1)
POTASSIUM SERPL-SCNC: 3.3 MMOL/L (ref 3.3–5.1)
POTASSIUM SERPL-SCNC: 3.4 MMOL/L (ref 3.3–5.1)
POTASSIUM SERPL-SCNC: 3.5 MMOL/L (ref 3.3–5.1)
POTASSIUM SERPL-SCNC: 3.5 MMOL/L (ref 3.3–5.1)
POTASSIUM SERPL-SCNC: 3.6 MMOL/L (ref 3.3–5.1)
POTASSIUM SERPL-SCNC: 3.7 MMOL/L (ref 3.3–5.1)
POTASSIUM SERPL-SCNC: 3.8 MMOL/L (ref 3.3–5.1)
POTASSIUM SERPL-SCNC: 3.9 MMOL/L (ref 3.3–5.1)
POTASSIUM SERPL-SCNC: 4 MMOL/L (ref 3.3–5.1)
POTASSIUM SERPL-SCNC: 4.1 MMOL/L (ref 3.3–5.1)
POTASSIUM SERPL-SCNC: 4.2 MMOL/L (ref 3.3–5.1)
POTASSIUM SERPL-SCNC: 4.3 MMOL/L (ref 3.3–5.1)
POTASSIUM SERPL-SCNC: 4.4 MMOL/L (ref 3.3–5.1)
POTASSIUM SERPL-SCNC: 4.5 MMOL/L (ref 3.3–5.1)
POTASSIUM SERPL-SCNC: 4.7 MMOL/L (ref 3.3–5.1)
POTASSIUM SERPL-SCNC: 4.8 MMOL/L (ref 3.3–5.1)
POTASSIUM SERPL-SCNC: 5.1 MMOL/L (ref 3.3–5.1)
POTASSIUM SERPL-SCNC: 5.2 MMOL/L (ref 3.3–5.1)
POTASSIUM SERPL-SCNC: 5.4 MMOL/L (ref 3.3–5.1)
POTASSIUM SERPL-SCNC: 5.5 MMOL/L (ref 3.3–5.1)
POTASSIUM SERPL-SCNC: 5.5 MMOL/L (ref 3.3–5.1)
POTASSIUM SERPL-SCNC: 5.9 MMOL/L (ref 3.3–5.1)
PROT SERPL-MCNC: 4.7 G/DL (ref 5.9–8.4)
PROT SERPL-MCNC: 4.7 G/DL (ref 5.9–8.4)
PROT SERPL-MCNC: 4.9 G/DL (ref 5.9–8.4)
PROT SERPL-MCNC: 5 G/DL (ref 5.9–8.4)
PROT SERPL-MCNC: 5 G/DL (ref 5.9–8.4)
PROT SERPL-MCNC: 5.1 G/DL (ref 5.9–8.4)
PROT SERPL-MCNC: 5.2 G/DL (ref 5.9–8.4)
PROT SERPL-MCNC: 5.5 G/DL (ref 5.9–8.4)
PROT SERPL-MCNC: 5.7 G/DL (ref 5.9–8.4)
PROT SERPL-MCNC: 5.8 G/DL (ref 5.9–8.4)
PROT SERPL-MCNC: 5.8 G/DL (ref 5.9–8.4)
PROT SERPL-MCNC: 5.9 G/DL (ref 5.9–8.4)
PROT SERPL-MCNC: 6.1 G/DL (ref 5.9–8.4)
PROT SERPL-MCNC: 6.8 G/DL (ref 5.9–8.4)
PROT UR-MCNC: 30 MG/DL
PROT UR-MCNC: 30 MG/DL
PROT UR-MCNC: NEGATIVE MG/DL
PROTHROMBIN TIME: 14.2 SECONDS (ref 11.8–14.5)
PROTHROMBIN TIME: 14.6 SECONDS (ref 11.8–14.5)
PROTHROMBIN TIME: 17 SECONDS (ref 11.8–14.5)
PROTHROMBIN TIME: 17 SECONDS (ref 11.8–14.5)
PROTHROMBIN TIME: 17.2 SECONDS (ref 11.8–14.5)
PROTHROMBIN TIME: 18.2 SECONDS (ref 11.8–14.5)
PROTHROMBIN TIME: 20.1 SECONDS (ref 11.8–14.5)
PROTHROMBIN TIME: 20.5 SECONDS (ref 11.8–14.5)
PROTHROMBIN TIME: 21.1 SECONDS (ref 11.8–14.5)
PROTHROMBIN TIME: 21.4 SECONDS (ref 11.8–14.5)
PROTHROMBIN TIME: 21.6 SECONDS (ref 11.8–14.5)
PROTHROMBIN TIME: 22.3 SECONDS (ref 11.8–14.5)
PROTHROMBIN TIME: 22.9 SECONDS (ref 11.8–14.5)
PROTHROMBIN TIME: 23 SECONDS (ref 11.8–14.5)
PROTHROMBIN TIME: 23 SECONDS (ref 11.8–14.5)
PROTHROMBIN TIME: 23.7 SECONDS (ref 11.8–14.5)
PROTHROMBIN TIME: 24.1 SECONDS (ref 11.8–14.5)
PROTHROMBIN TIME: 24.6 SECONDS (ref 11.8–14.5)
PROTHROMBIN TIME: 25.2 SECONDS (ref 11.8–14.5)
PROTHROMBIN TIME: 25.3 SECONDS (ref 11.8–14.5)
PROTHROMBIN TIME: 25.4 SECONDS (ref 11.8–14.5)
PROTHROMBIN TIME: 25.8 SECONDS (ref 11.8–14.5)
PROTHROMBIN TIME: 26.1 SECONDS (ref 11.8–14.5)
PROTHROMBIN TIME: 26.4 SECONDS (ref 11.8–14.5)
PROTHROMBIN TIME: 27.1 SECONDS (ref 11.8–14.5)
PROTHROMBIN TIME: 27.2 SECONDS (ref 11.8–14.5)
PROTHROMBIN TIME: 29 SECONDS (ref 11.8–14.5)
PROTHROMBIN TIME: 30.6 SECONDS (ref 11.8–14.5)
PROTHROMBIN TIME: 30.9 SECONDS (ref 11.8–14.5)
PROTHROMBIN TIME: 32.2 SECONDS (ref 11.8–14.5)
PROTHROMBIN TIME: 32.6 SECONDS (ref 11.8–14.5)
PROTHROMBIN TIME: 33.5 SECONDS (ref 11.8–14.5)
PROTHROMBIN TIME: 34.5 SECONDS (ref 11.8–14.5)
PROTHROMBIN TIME: 36 SECONDS (ref 11.8–14.5)
PROTHROMBIN TIME: 37.8 SECONDS (ref 11.8–14.5)
PROTHROMBIN TIME: 41 SECONDS (ref 11.8–14.5)
PROTHROMBIN TIME: 61.3 SECONDS (ref 11.8–14.5)
PROTHROMBIN TIME: 66.5 SECONDS (ref 11.8–14.5)
PROTHROMBIN TIME: 71.6 SECONDS (ref 11.8–14.5)
PUNCTURE CHARGE: NO
PUNCTURE CHARGE: YES
PUNCTURE CHARGE: YES
RBC # BLD AUTO: 2.83 M/UL (ref 3.7–5.4)
RBC # BLD AUTO: 2.84 M/UL (ref 3.7–5.4)
RBC # BLD AUTO: 2.88 M/UL (ref 3.7–5.4)
RBC # BLD AUTO: 2.95 M/UL (ref 3.7–5.4)
RBC # BLD AUTO: 2.98 M/UL (ref 3.7–5.4)
RBC # BLD AUTO: 2.98 M/UL (ref 3.7–5.4)
RBC # BLD AUTO: 3.02 M/UL (ref 3.7–5.4)
RBC # BLD AUTO: 3.03 M/UL (ref 3.7–5.4)
RBC # BLD AUTO: 3.03 M/UL (ref 3.7–5.4)
RBC # BLD AUTO: 3.04 M/UL (ref 3.7–5.4)
RBC # BLD AUTO: 3.06 M/UL (ref 3.7–5.4)
RBC # BLD AUTO: 3.08 M/UL (ref 3.7–5.4)
RBC # BLD AUTO: 3.08 M/UL (ref 3.7–5.4)
RBC # BLD AUTO: 3.11 M/UL (ref 3.7–5.4)
RBC # BLD AUTO: 3.11 M/UL (ref 3.7–5.4)
RBC # BLD AUTO: 3.12 M/UL (ref 3.7–5.4)
RBC # BLD AUTO: 3.12 M/UL (ref 3.7–5.4)
RBC # BLD AUTO: 3.14 M/UL (ref 3.7–5.4)
RBC # BLD AUTO: 3.16 M/UL (ref 3.7–5.4)
RBC # BLD AUTO: 3.2 M/UL (ref 3.7–5.4)
RBC # BLD AUTO: 3.21 M/UL (ref 3.7–5.4)
RBC # BLD AUTO: 3.22 M/UL (ref 3.7–5.4)
RBC # BLD AUTO: 3.25 M/UL (ref 3.7–5.4)
RBC # BLD AUTO: 3.25 M/UL (ref 3.7–5.4)
RBC # BLD AUTO: 3.26 M/UL (ref 3.7–5.4)
RBC # BLD AUTO: 3.26 M/UL (ref 3.7–5.4)
RBC # BLD AUTO: 3.29 M/UL (ref 3.7–5.4)
RBC # BLD AUTO: 3.3 M/UL (ref 3.7–5.4)
RBC # BLD AUTO: 3.4 M/UL (ref 3.7–5.4)
RBC # BLD AUTO: 3.41 M/UL (ref 3.7–5.4)
RBC # BLD AUTO: 3.43 M/UL (ref 3.7–5.4)
RBC # BLD AUTO: 3.44 M/UL (ref 3.7–5.4)
RBC # BLD AUTO: 3.45 M/UL (ref 3.7–5.4)
RBC # BLD AUTO: 3.46 M/UL (ref 3.7–5.4)
RBC # BLD AUTO: 3.49 M/UL (ref 3.7–5.4)
RBC # BLD AUTO: 3.55 M/UL (ref 3.7–5.4)
RBC # BLD AUTO: 3.56 M/UL (ref 3.7–5.4)
RBC # BLD AUTO: 3.56 M/UL (ref 3.7–5.4)
RBC # BLD AUTO: 3.6 M/UL (ref 3.7–5.4)
RBC # BLD AUTO: 3.65 M/UL (ref 3.7–5.4)
RBC # BLD AUTO: 3.68 M/UL (ref 3.7–5.4)
RBC # BLD AUTO: 3.71 M/UL (ref 3.7–5.4)
RBC # BLD AUTO: 3.76 M/UL (ref 3.7–5.4)
RBC # BLD AUTO: 3.84 M/UL (ref 3.7–5.4)
RBC # BLD AUTO: 3.9 M/UL (ref 3.7–5.4)
RBC # BLD AUTO: 3.91 M/UL (ref 3.7–5.4)
RBC # BLD AUTO: 3.91 M/UL (ref 3.7–5.4)
RBC # BLD AUTO: 3.94 M/UL (ref 3.7–5.4)
RBC # BLD AUTO: 3.95 M/UL (ref 3.7–5.4)
RBC # BLD AUTO: 4.02 M/UL (ref 3.7–5.4)
RBC # BLD AUTO: 4.02 M/UL (ref 3.7–5.4)
RBC # BLD AUTO: 4.06 M/UL (ref 3.7–5.4)
RBC # BLD AUTO: 4.09 M/UL (ref 3.7–5.4)
RBC # BLD AUTO: 4.22 M/UL (ref 3.7–5.4)
RBC # BLD AUTO: 4.3 M/UL (ref 3.7–5.4)
RBC # BLD AUTO: 4.45 M/UL (ref 3.7–5.4)
RBC # BLD AUTO: 5.12 M/UL (ref 3.7–5.4)
RBC #/AREA URNS AUTO: 1 /HPF
RBC #/AREA URNS AUTO: 1 /HPF
RBC #/AREA URNS AUTO: 108 /HPF
RBC #/AREA URNS AUTO: 2 /HPF
RBC #/AREA URNS AUTO: 3 /HPF
RBC #/AREA URNS AUTO: 75 /HPF
RH BLOOD TYPE: POSITIVE
RH BLOOD TYPE: POSITIVE
SAO2 % BLDA: 91.5 % (ref 94–100)
SAO2 % BLDA: 97.1 % (ref 94–100)
SAO2 % BLDA: >98.5 % (ref 94–100)
SODIUM (BLOOD GAS): 126 MMOL/L (ref 135–145)
SODIUM (BLOOD GAS): 139 MMOL/L (ref 135–145)
SODIUM SERPL-SCNC: 120 MMOL/L (ref 136–144)
SODIUM SERPL-SCNC: 122 MMOL/L (ref 136–144)
SODIUM SERPL-SCNC: 122 MMOL/L (ref 136–144)
SODIUM SERPL-SCNC: 123 MMOL/L (ref 136–144)
SODIUM SERPL-SCNC: 124 MMOL/L (ref 136–144)
SODIUM SERPL-SCNC: 126 MMOL/L (ref 136–144)
SODIUM SERPL-SCNC: 127 MMOL/L (ref 136–144)
SODIUM SERPL-SCNC: 127 MMOL/L (ref 136–144)
SODIUM SERPL-SCNC: 128 MMOL/L (ref 136–144)
SODIUM SERPL-SCNC: 129 MMOL/L (ref 136–144)
SODIUM SERPL-SCNC: 130 MMOL/L (ref 136–144)
SODIUM SERPL-SCNC: 131 MMOL/L (ref 136–144)
SODIUM SERPL-SCNC: 132 MMOL/L (ref 136–144)
SODIUM SERPL-SCNC: 133 MMOL/L (ref 136–144)
SODIUM SERPL-SCNC: 134 MMOL/L (ref 136–144)
SODIUM SERPL-SCNC: 135 MMOL/L (ref 136–144)
SODIUM SERPL-SCNC: 136 MMOL/L (ref 136–144)
SODIUM SERPL-SCNC: 137 MMOL/L (ref 136–144)
SODIUM SERPL-SCNC: 137 MMOL/L (ref 136–144)
SODIUM SERPL-SCNC: 138 MMOL/L (ref 136–144)
SODIUM SERPL-SCNC: 139 MMOL/L (ref 136–144)
SODIUM SERPL-SCNC: 139 MMOL/L (ref 136–144)
SODIUM SERPL-SCNC: 140 MMOL/L (ref 136–144)
SODIUM SERPL-SCNC: 140 MMOL/L (ref 136–144)
SODIUM SERPL-SCNC: 141 MMOL/L (ref 136–144)
SODIUM SERPL-SCNC: 142 MMOL/L (ref 136–144)
SODIUM SERPL-SCNC: 142 MMOL/L (ref 136–144)
SODIUM SERPL-SCNC: 144 MMOL/L (ref 136–144)
SODIUM SERPL-SCNC: 145 MMOL/L (ref 136–144)
SODIUM SERPL-SCNC: 146 MMOL/L (ref 136–144)
SODIUM SERPL-SCNC: 149 MMOL/L (ref 136–144)
SODIUM SERPL-SCNC: 151 MMOL/L (ref 136–144)
SODIUM SERPL-SCNC: 153 MMOL/L (ref 136–144)
SODIUM SERPL-SCNC: 154 MMOL/L (ref 136–144)
SODIUM SERPL-SCNC: 154 MMOL/L (ref 136–144)
SODIUM SERPL-SCNC: 155 MMOL/L (ref 136–144)
SODIUM SERPL-SCNC: 158 MMOL/L (ref 136–144)
SODIUM SERPL-SCNC: 160 MMOL/L (ref 136–144)
SODIUM UR-SCNC: 22 MMOL/L
SODIUM UR-SCNC: <10 MMOL/L
SP GR UR STRIP: 1.01 (ref 1–1.03)
SP GR UR STRIP: 1.02 (ref 1–1.03)
TEST STRIP LOT #: NORMAL NUMERIC
TRIGL SERPL-MCNC: 1680 MG/DL (ref 1–149)
TRIGL SERPL-MCNC: 49 MG/DL (ref 1–149)
TRIGL SERPL-MCNC: 65 MG/DL (ref 1–149)
TRIGL SERPL-MCNC: 99 MG/DL (ref 1–149)
TROPONIN I SERPL-MCNC: 0.06 NG/ML (ref ?–0.03)
TROPONIN I SERPL-MCNC: 0.38 NG/ML (ref ?–0.03)
TROPONIN I SERPL-MCNC: 0.39 NG/ML (ref ?–0.03)
TSH SERPL-ACNC: 1.2 UIU/ML (ref 0.45–5.33)
TSH SERPL-ACNC: 1.24 UIU/ML (ref 0.45–5.33)
URATE SERPL-MCNC: 5.6 MG/DL (ref 2.1–7.4)
UROBILINOGEN UR STRIP-ACNC: 4
UROBILINOGEN UR STRIP-ACNC: <2
VANCOMYCIN SERPL-MCNC: 30.3 MCG/ML
VANCOMYCIN TROUGH SERPL-MCNC: 13.8 MCG/ML (ref 10–20)
VANCOMYCIN TROUGH SERPL-MCNC: 17.7 MCG/ML (ref 10–20)
VANCOMYCIN TROUGH SERPL-MCNC: 18.3 MCG/ML (ref 10–20)
VANCOMYCIN TROUGH SERPL-MCNC: 21.7 MCG/ML (ref 10–20)
VANCOMYCIN TROUGH SERPL-MCNC: 29.2 MCG/ML (ref 10–20)
VANCOMYCIN TROUGH SERPL-MCNC: 33.5 MCG/ML (ref 10–20)
VANCOMYCIN TROUGH SERPL-MCNC: 34.8 MCG/ML (ref 10–20)
VANCOMYCIN TROUGH SERPL-MCNC: 7 MCG/ML (ref 10–20)
VIT B12 SERPL-MCNC: >1500 PG/ML (ref 181–914)
VIT C UR-MCNC: 20 MG/DL
VIT C UR-MCNC: 40 MG/DL
VIT C UR-MCNC: NEGATIVE MG/DL
VIT C UR-MCNC: NEGATIVE MG/DL
WBC # BLD AUTO: 10.5 K/UL (ref 4–11)
WBC # BLD AUTO: 10.8 K/UL (ref 4–11)
WBC # BLD AUTO: 11.1 K/UL (ref 4–11)
WBC # BLD AUTO: 13.2 K/UL (ref 4–11)
WBC # BLD AUTO: 16 K/UL (ref 4–11)
WBC # BLD AUTO: 20.6 K/UL (ref 4–11)
WBC # BLD AUTO: 3.8 K/UL (ref 4–11)
WBC # BLD AUTO: 4.2 K/UL (ref 4–11)
WBC # BLD AUTO: 4.3 K/UL (ref 4–11)
WBC # BLD AUTO: 4.4 K/UL (ref 4–11)
WBC # BLD AUTO: 4.5 K/UL (ref 4–11)
WBC # BLD AUTO: 4.6 K/UL (ref 4–11)
WBC # BLD AUTO: 4.8 K/UL (ref 4–11)
WBC # BLD AUTO: 4.9 K/UL (ref 4–11)
WBC # BLD AUTO: 5 K/UL (ref 4–11)
WBC # BLD AUTO: 5 K/UL (ref 4–11)
WBC # BLD AUTO: 5.2 K/UL (ref 4–11)
WBC # BLD AUTO: 5.2 K/UL (ref 4–11)
WBC # BLD AUTO: 5.3 K/UL (ref 4–11)
WBC # BLD AUTO: 5.4 K/UL (ref 4–11)
WBC # BLD AUTO: 5.4 K/UL (ref 4–11)
WBC # BLD AUTO: 5.5 K/UL (ref 4–11)
WBC # BLD AUTO: 5.5 K/UL (ref 4–11)
WBC # BLD AUTO: 5.6 K/UL (ref 4–11)
WBC # BLD AUTO: 5.9 K/UL (ref 4–11)
WBC # BLD AUTO: 6 K/UL (ref 4–11)
WBC # BLD AUTO: 6.3 K/UL (ref 4–11)
WBC # BLD AUTO: 6.3 K/UL (ref 4–11)
WBC # BLD AUTO: 6.4 K/UL (ref 4–11)
WBC # BLD AUTO: 6.5 K/UL (ref 4–11)
WBC # BLD AUTO: 6.5 K/UL (ref 4–11)
WBC # BLD AUTO: 6.6 K/UL (ref 4–11)
WBC # BLD AUTO: 6.7 K/UL (ref 4–11)
WBC # BLD AUTO: 6.7 K/UL (ref 4–11)
WBC # BLD AUTO: 6.8 K/UL (ref 4–11)
WBC # BLD AUTO: 6.8 K/UL (ref 4–11)
WBC # BLD AUTO: 6.9 K/UL (ref 4–11)
WBC # BLD AUTO: 6.9 K/UL (ref 4–11)
WBC # BLD AUTO: 7 K/UL (ref 4–11)
WBC # BLD AUTO: 7.1 K/UL (ref 4–11)
WBC # BLD AUTO: 7.2 K/UL (ref 4–11)
WBC # BLD AUTO: 7.2 K/UL (ref 4–11)
WBC # BLD AUTO: 7.5 K/UL (ref 4–11)
WBC # BLD AUTO: 8.3 K/UL (ref 4–11)
WBC # BLD AUTO: 8.4 K/UL (ref 4–11)
WBC # BLD AUTO: 8.5 K/UL (ref 4–11)
WBC # BLD AUTO: 8.6 K/UL (ref 4–11)
WBC # BLD AUTO: 8.8 K/UL (ref 4–11)
WBC # BLD AUTO: 9 K/UL (ref 4–11)
WBC # BLD AUTO: 9.1 K/UL (ref 4–11)
WBC # BLD AUTO: 9.2 K/UL (ref 4–11)
WBC # BLD AUTO: 9.7 K/UL (ref 4–11)
WBC # BLD AUTO: 9.7 K/UL (ref 4–11)
WBC #/AREA URNS AUTO: 1 /HPF
WBC #/AREA URNS AUTO: 10 /HPF
WBC #/AREA URNS AUTO: 16 /HPF
WBC #/AREA URNS AUTO: 599 /HPF
WBC #/AREA URNS AUTO: 6 /HPF
WBC #/AREA URNS AUTO: <1 /HPF

## 2018-01-01 PROCEDURE — 03U60JZ SUPPLEMENT LEFT AXILLARY ARTERY WITH SYNTHETIC SUBSTITUTE, OPEN APPROACH: ICD-10-PCS | Performed by: THORACIC SURGERY (CARDIOTHORACIC VASCULAR SURGERY)

## 2018-01-01 PROCEDURE — 99232 SBSQ HOSP IP/OBS MODERATE 35: CPT | Performed by: HOSPITALIST

## 2018-01-01 PROCEDURE — 99233 SBSQ HOSP IP/OBS HIGH 50: CPT | Performed by: INTERNAL MEDICINE

## 2018-01-01 PROCEDURE — 99233 SBSQ HOSP IP/OBS HIGH 50: CPT | Performed by: HOSPITALIST

## 2018-01-01 PROCEDURE — 04CQ3ZZ EXTIRPATION OF MATTER FROM LEFT ANTERIOR TIBIAL ARTERY, PERCUTANEOUS APPROACH: ICD-10-PCS | Performed by: RADIOLOGY

## 2018-01-01 PROCEDURE — 71045 X-RAY EXAM CHEST 1 VIEW: CPT | Performed by: CLINICAL NURSE SPECIALIST

## 2018-01-01 PROCEDURE — 99213 OFFICE O/P EST LOW 20 MIN: CPT | Performed by: INTERNAL MEDICINE

## 2018-01-01 PROCEDURE — 93306 TTE W/DOPPLER COMPLETE: CPT | Performed by: INTERNAL MEDICINE

## 2018-01-01 PROCEDURE — 99232 SBSQ HOSP IP/OBS MODERATE 35: CPT | Performed by: INTERNAL MEDICINE

## 2018-01-01 PROCEDURE — 82570 ASSAY OF URINE CREATININE: CPT

## 2018-01-01 PROCEDURE — 99214 OFFICE O/P EST MOD 30 MIN: CPT | Performed by: FAMILY MEDICINE

## 2018-01-01 PROCEDURE — 0DP67UZ REMOVAL OF FEEDING DEVICE FROM STOMACH, VIA NATURAL OR ARTIFICIAL OPENING: ICD-10-PCS | Performed by: HOSPITALIST

## 2018-01-01 PROCEDURE — 0270356 DILATION OF CORONARY ARTERY, ONE ARTERY, BIFURCATION, WITH TWO DRUG-ELUTING INTRALUMINAL DEVICES, PERCUTANEOUS APPROACH: ICD-10-PCS | Performed by: INTERNAL MEDICINE

## 2018-01-01 PROCEDURE — 93306 TTE W/DOPPLER COMPLETE: CPT | Performed by: HOSPITALIST

## 2018-01-01 PROCEDURE — B41F1ZZ FLUOROSCOPY OF RIGHT LOWER EXTREMITY ARTERIES USING LOW OSMOLAR CONTRAST: ICD-10-PCS | Performed by: INTERNAL MEDICINE

## 2018-01-01 PROCEDURE — 99239 HOSP IP/OBS DSCHRG MGMT >30: CPT | Performed by: HOSPITALIST

## 2018-01-01 PROCEDURE — G0463 HOSPITAL OUTPT CLINIC VISIT: HCPCS | Performed by: UROLOGY

## 2018-01-01 PROCEDURE — 71045 X-RAY EXAM CHEST 1 VIEW: CPT | Performed by: INTERNAL MEDICINE

## 2018-01-01 PROCEDURE — 71045 X-RAY EXAM CHEST 1 VIEW: CPT | Performed by: EMERGENCY MEDICINE

## 2018-01-01 PROCEDURE — 73130 X-RAY EXAM OF HAND: CPT | Performed by: HOSPITALIST

## 2018-01-01 PROCEDURE — 0DH67UZ INSERTION OF FEEDING DEVICE INTO STOMACH, VIA NATURAL OR ARTIFICIAL OPENING: ICD-10-PCS | Performed by: HOSPITALIST

## 2018-01-01 PROCEDURE — 99291 CRITICAL CARE FIRST HOUR: CPT | Performed by: HOSPITALIST

## 2018-01-01 PROCEDURE — 99232 SBSQ HOSP IP/OBS MODERATE 35: CPT | Performed by: OTHER

## 2018-01-01 PROCEDURE — 99222 1ST HOSP IP/OBS MODERATE 55: CPT | Performed by: HOSPITALIST

## 2018-01-01 PROCEDURE — 82248 BILIRUBIN DIRECT: CPT

## 2018-01-01 PROCEDURE — 99233 SBSQ HOSP IP/OBS HIGH 50: CPT | Performed by: OTHER

## 2018-01-01 PROCEDURE — 99291 CRITICAL CARE FIRST HOUR: CPT | Performed by: INTERNAL MEDICINE

## 2018-01-01 PROCEDURE — 3E0F7GC INTRODUCTION OF OTHER THERAPEUTIC SUBSTANCE INTO RESPIRATORY TRACT, VIA NATURAL OR ARTIFICIAL OPENING: ICD-10-PCS | Performed by: HOSPITALIST

## 2018-01-01 PROCEDURE — 99283 EMERGENCY DEPT VISIT LOW MDM: CPT

## 2018-01-01 PROCEDURE — G0463 HOSPITAL OUTPT CLINIC VISIT: HCPCS | Performed by: FAMILY MEDICINE

## 2018-01-01 PROCEDURE — 99310 SBSQ NF CARE HIGH MDM 45: CPT | Performed by: NURSE PRACTITIONER

## 2018-01-01 PROCEDURE — 78452 HT MUSCLE IMAGE SPECT MULT: CPT | Performed by: INTERNAL MEDICINE

## 2018-01-01 PROCEDURE — 93926 LOWER EXTREMITY STUDY: CPT | Performed by: HOSPITALIST

## 2018-01-01 PROCEDURE — 80061 LIPID PANEL: CPT

## 2018-01-01 PROCEDURE — 93926 LOWER EXTREMITY STUDY: CPT | Performed by: INTERNAL MEDICINE

## 2018-01-01 PROCEDURE — 83036 HEMOGLOBIN GLYCOSYLATED A1C: CPT

## 2018-01-01 PROCEDURE — 04CL3ZZ EXTIRPATION OF MATTER FROM LEFT FEMORAL ARTERY, PERCUTANEOUS APPROACH: ICD-10-PCS | Performed by: RADIOLOGY

## 2018-01-01 PROCEDURE — 99223 1ST HOSP IP/OBS HIGH 75: CPT | Performed by: HOSPITALIST

## 2018-01-01 PROCEDURE — 90792 PSYCH DIAG EVAL W/MED SRVCS: CPT | Performed by: OTHER

## 2018-01-01 PROCEDURE — 81001 URINALYSIS AUTO W/SCOPE: CPT | Performed by: EMERGENCY MEDICINE

## 2018-01-01 PROCEDURE — 76882 US LMTD JT/FCL EVL NVASC XTR: CPT | Performed by: INTERNAL MEDICINE

## 2018-01-01 PROCEDURE — 5A09357 ASSISTANCE WITH RESPIRATORY VENTILATION, LESS THAN 24 CONSECUTIVE HOURS, CONTINUOUS POSITIVE AIRWAY PRESSURE: ICD-10-PCS | Performed by: HOSPITALIST

## 2018-01-01 PROCEDURE — 02PY33Z REMOVAL OF INFUSION DEVICE FROM GREAT VESSEL, PERCUTANEOUS APPROACH: ICD-10-PCS | Performed by: ANESTHESIOLOGY

## 2018-01-01 PROCEDURE — 74230 X-RAY XM SWLNG FUNCJ C+: CPT | Performed by: INTERNAL MEDICINE

## 2018-01-01 PROCEDURE — 71045 X-RAY EXAM CHEST 1 VIEW: CPT | Performed by: SURGERY

## 2018-01-01 PROCEDURE — 0BH17EZ INSERTION OF ENDOTRACHEAL AIRWAY INTO TRACHEA, VIA NATURAL OR ARTIFICIAL OPENING: ICD-10-PCS | Performed by: EMERGENCY MEDICINE

## 2018-01-01 PROCEDURE — 95816 EEG AWAKE AND DROWSY: CPT | Performed by: OTHER

## 2018-01-01 PROCEDURE — 73630 X-RAY EXAM OF FOOT: CPT | Performed by: HOSPITALIST

## 2018-01-01 PROCEDURE — 93308 TTE F-UP OR LMTD: CPT | Performed by: INTERNAL MEDICINE

## 2018-01-01 PROCEDURE — 93971 EXTREMITY STUDY: CPT | Performed by: EMERGENCY MEDICINE

## 2018-01-01 PROCEDURE — 99220 INITIAL OBSERVATION CARE,LEVL III: CPT | Performed by: HOSPITALIST

## 2018-01-01 PROCEDURE — 99212 OFFICE O/P EST SF 10 MIN: CPT | Performed by: PODIATRIST

## 2018-01-01 PROCEDURE — 02HV33Z INSERTION OF INFUSION DEVICE INTO SUPERIOR VENA CAVA, PERCUTANEOUS APPROACH: ICD-10-PCS | Performed by: HOSPITALIST

## 2018-01-01 PROCEDURE — 85025 COMPLETE CBC W/AUTO DIFF WBC: CPT

## 2018-01-01 PROCEDURE — G0463 HOSPITAL OUTPT CLINIC VISIT: HCPCS | Performed by: INTERNAL MEDICINE

## 2018-01-01 PROCEDURE — 71275 CT ANGIOGRAPHY CHEST: CPT | Performed by: INTERNAL MEDICINE

## 2018-01-01 PROCEDURE — 70450 CT HEAD/BRAIN W/O DYE: CPT | Performed by: EMERGENCY MEDICINE

## 2018-01-01 PROCEDURE — 1111F DSCHRG MED/CURRENT MED MERGE: CPT | Performed by: FAMILY MEDICINE

## 2018-01-01 PROCEDURE — 99204 OFFICE O/P NEW MOD 45 MIN: CPT | Performed by: UROLOGY

## 2018-01-01 PROCEDURE — 30233N1 TRANSFUSION OF NONAUTOLOGOUS RED BLOOD CELLS INTO PERIPHERAL VEIN, PERCUTANEOUS APPROACH: ICD-10-PCS | Performed by: EMERGENCY MEDICINE

## 2018-01-01 PROCEDURE — 70551 MRI BRAIN STEM W/O DYE: CPT | Performed by: OTHER

## 2018-01-01 PROCEDURE — 99223 1ST HOSP IP/OBS HIGH 75: CPT | Performed by: OTHER

## 2018-01-01 PROCEDURE — 3E0436Z INTRODUCTION OF NUTRITIONAL SUBSTANCE INTO CENTRAL VEIN, PERCUTANEOUS APPROACH: ICD-10-PCS | Performed by: HOSPITALIST

## 2018-01-01 PROCEDURE — 4A023N7 MEASUREMENT OF CARDIAC SAMPLING AND PRESSURE, LEFT HEART, PERCUTANEOUS APPROACH: ICD-10-PCS | Performed by: INTERNAL MEDICINE

## 2018-01-01 PROCEDURE — 71045 X-RAY EXAM CHEST 1 VIEW: CPT | Performed by: HOSPITALIST

## 2018-01-01 PROCEDURE — 73110 X-RAY EXAM OF WRIST: CPT | Performed by: INTERNAL MEDICINE

## 2018-01-01 PROCEDURE — 82306 VITAMIN D 25 HYDROXY: CPT

## 2018-01-01 PROCEDURE — 82962 GLUCOSE BLOOD TEST: CPT | Performed by: FAMILY MEDICINE

## 2018-01-01 PROCEDURE — 99496 TRANSJ CARE MGMT HIGH F2F 7D: CPT | Performed by: FAMILY MEDICINE

## 2018-01-01 PROCEDURE — 99231 SBSQ HOSP IP/OBS SF/LOW 25: CPT | Performed by: OTHER

## 2018-01-01 PROCEDURE — 0HQJXZZ REPAIR LEFT UPPER LEG SKIN, EXTERNAL APPROACH: ICD-10-PCS | Performed by: SURGERY

## 2018-01-01 PROCEDURE — 99490 CHRNC CARE MGMT STAFF 1ST 20: CPT

## 2018-01-01 PROCEDURE — 92611 MOTION FLUOROSCOPY/SWALLOW: CPT

## 2018-01-01 PROCEDURE — 047Q3ZZ DILATION OF LEFT ANTERIOR TIBIAL ARTERY, PERCUTANEOUS APPROACH: ICD-10-PCS | Performed by: RADIOLOGY

## 2018-01-01 PROCEDURE — 93925 LOWER EXTREMITY STUDY: CPT | Performed by: RADIOLOGY

## 2018-01-01 PROCEDURE — 02PYX3Z REMOVAL OF INFUSION DEVICE FROM GREAT VESSEL, EXTERNAL APPROACH: ICD-10-PCS | Performed by: HOSPITALIST

## 2018-01-01 PROCEDURE — 82962 GLUCOSE BLOOD TEST: CPT

## 2018-01-01 PROCEDURE — 70450 CT HEAD/BRAIN W/O DYE: CPT | Performed by: HOSPITALIST

## 2018-01-01 PROCEDURE — 02HV33Z INSERTION OF INFUSION DEVICE INTO SUPERIOR VENA CAVA, PERCUTANEOUS APPROACH: ICD-10-PCS | Performed by: ANESTHESIOLOGY

## 2018-01-01 PROCEDURE — 0BH17EZ INSERTION OF ENDOTRACHEAL AIRWAY INTO TRACHEA, VIA NATURAL OR ARTIFICIAL OPENING: ICD-10-PCS | Performed by: ANESTHESIOLOGY

## 2018-01-01 PROCEDURE — 02HA0RJ INSERTION OF SHORT-TERM EXTERNAL HEART ASSIST SYSTEM INTO HEART, INTRAOPERATIVE, OPEN APPROACH: ICD-10-PCS | Performed by: INTERNAL MEDICINE

## 2018-01-01 PROCEDURE — 30233R1 TRANSFUSION OF NONAUTOLOGOUS PLATELETS INTO PERIPHERAL VEIN, PERCUTANEOUS APPROACH: ICD-10-PCS | Performed by: HOSPITALIST

## 2018-01-01 PROCEDURE — 5A1945Z RESPIRATORY VENTILATION, 24-96 CONSECUTIVE HOURS: ICD-10-PCS | Performed by: ANESTHESIOLOGY

## 2018-01-01 PROCEDURE — 36430 TRANSFUSION BLD/BLD COMPNT: CPT | Performed by: ANESTHESIOLOGY

## 2018-01-01 PROCEDURE — 99223 1ST HOSP IP/OBS HIGH 75: CPT | Performed by: INTERNAL MEDICINE

## 2018-01-01 PROCEDURE — 36415 COLL VENOUS BLD VENIPUNCTURE: CPT

## 2018-01-01 PROCEDURE — 83735 ASSAY OF MAGNESIUM: CPT

## 2018-01-01 PROCEDURE — 99495 TRANSJ CARE MGMT MOD F2F 14D: CPT | Performed by: FAMILY MEDICINE

## 2018-01-01 PROCEDURE — 93926 LOWER EXTREMITY STUDY: CPT | Performed by: CLINICAL NURSE SPECIALIST

## 2018-01-01 PROCEDURE — B2111ZZ FLUOROSCOPY OF MULTIPLE CORONARY ARTERIES USING LOW OSMOLAR CONTRAST: ICD-10-PCS | Performed by: INTERNAL MEDICINE

## 2018-01-01 PROCEDURE — 93017 CV STRESS TEST TRACING ONLY: CPT | Performed by: INTERNAL MEDICINE

## 2018-01-01 PROCEDURE — 87086 URINE CULTURE/COLONY COUNT: CPT

## 2018-01-01 PROCEDURE — 82550 ASSAY OF CK (CPK): CPT

## 2018-01-01 PROCEDURE — 73523 X-RAY EXAM HIPS BI 5/> VIEWS: CPT | Performed by: HOSPITALIST

## 2018-01-01 PROCEDURE — 5A1955Z RESPIRATORY VENTILATION, GREATER THAN 96 CONSECUTIVE HOURS: ICD-10-PCS | Performed by: EMERGENCY MEDICINE

## 2018-01-01 PROCEDURE — 80053 COMPREHEN METABOLIC PANEL: CPT

## 2018-01-01 PROCEDURE — 3E0G76Z INTRODUCTION OF NUTRITIONAL SUBSTANCE INTO UPPER GI, VIA NATURAL OR ARTIFICIAL OPENING: ICD-10-PCS | Performed by: HOSPITALIST

## 2018-01-01 PROCEDURE — B41D1ZZ FLUOROSCOPY OF AORTA AND BILATERAL LOWER EXTREMITY ARTERIES USING LOW OSMOLAR CONTRAST: ICD-10-PCS | Performed by: RADIOLOGY

## 2018-01-01 PROCEDURE — 99231 SBSQ HOSP IP/OBS SF/LOW 25: CPT | Performed by: INTERNAL MEDICINE

## 2018-01-01 PROCEDURE — 82043 UR ALBUMIN QUANTITATIVE: CPT

## 2018-01-01 PROCEDURE — 0Y6D0Z3 DETACHMENT AT LEFT UPPER LEG, LOW, OPEN APPROACH: ICD-10-PCS | Performed by: SURGERY

## 2018-01-01 PROCEDURE — B2151ZZ FLUOROSCOPY OF LEFT HEART USING LOW OSMOLAR CONTRAST: ICD-10-PCS | Performed by: INTERNAL MEDICINE

## 2018-01-01 PROCEDURE — 74230 X-RAY XM SWLNG FUNCJ C+: CPT | Performed by: HOSPITALIST

## 2018-01-01 PROCEDURE — 71046 X-RAY EXAM CHEST 2 VIEWS: CPT | Performed by: EMERGENCY MEDICINE

## 2018-01-01 PROCEDURE — 99305 1ST NF CARE MODERATE MDM 35: CPT | Performed by: INTERNAL MEDICINE

## 2018-01-01 PROCEDURE — 99356 PROLONGED SERV,INPATIENT,1ST HR: CPT | Performed by: INTERNAL MEDICINE

## 2018-01-01 PROCEDURE — 36416 COLLJ CAPILLARY BLOOD SPEC: CPT | Performed by: FAMILY MEDICINE

## 2018-01-01 PROCEDURE — 5A0221D ASSISTANCE WITH CARDIAC OUTPUT USING IMPELLER PUMP, CONTINUOUS: ICD-10-PCS | Performed by: INTERNAL MEDICINE

## 2018-01-01 PROCEDURE — G0463 HOSPITAL OUTPT CLINIC VISIT: HCPCS | Performed by: PODIATRIST

## 2018-01-01 RX ORDER — ASPIRIN 81 MG/1
81 TABLET, CHEWABLE ORAL DAILY
Status: DISCONTINUED | OUTPATIENT
Start: 2018-01-01 | End: 2018-01-01

## 2018-01-01 RX ORDER — HEPARIN SODIUM 1000 [USP'U]/ML
60 INJECTION, SOLUTION INTRAVENOUS; SUBCUTANEOUS ONCE
Status: COMPLETED | OUTPATIENT
Start: 2018-01-01 | End: 2018-01-01

## 2018-01-01 RX ORDER — CLOPIDOGREL BISULFATE 75 MG/1
75 TABLET ORAL DAILY
Status: DISCONTINUED | OUTPATIENT
Start: 2018-01-01 | End: 2018-01-01

## 2018-01-01 RX ORDER — SODIUM CHLORIDE 9 MG/ML
INJECTION, SOLUTION INTRAVENOUS CONTINUOUS
Status: DISCONTINUED | OUTPATIENT
Start: 2018-01-01 | End: 2018-01-01

## 2018-01-01 RX ORDER — LIDOCAINE HYDROCHLORIDE 10 MG/ML
0.5 INJECTION, SOLUTION INFILTRATION; PERINEURAL ONCE AS NEEDED
Status: ACTIVE | OUTPATIENT
Start: 2018-01-01 | End: 2018-01-01

## 2018-01-01 RX ORDER — CARVEDILOL 6.25 MG/1
6.25 TABLET ORAL 2 TIMES DAILY WITH MEALS
Status: DISCONTINUED | OUTPATIENT
Start: 2018-01-01 | End: 2018-01-01

## 2018-01-01 RX ORDER — IPRATROPIUM BROMIDE AND ALBUTEROL SULFATE 2.5; .5 MG/3ML; MG/3ML
3 SOLUTION RESPIRATORY (INHALATION)
Status: DISCONTINUED | OUTPATIENT
Start: 2018-01-01 | End: 2018-01-01

## 2018-01-01 RX ORDER — MAGNESIUM GLUCONATE 30 MG(550)
1 TABLET ORAL DAILY
Status: DISCONTINUED | OUTPATIENT
Start: 2018-01-01 | End: 2018-01-01 | Stop reason: RX

## 2018-01-01 RX ORDER — DOBUTAMINE HYDROCHLORIDE 200 MG/100ML
2.5 INJECTION INTRAVENOUS CONTINUOUS
Status: DISCONTINUED | OUTPATIENT
Start: 2018-01-01 | End: 2018-01-01

## 2018-01-01 RX ORDER — SACCHAROMYCES BOULARDII 250 MG
250 CAPSULE ORAL 2 TIMES DAILY
Status: DISCONTINUED | OUTPATIENT
Start: 2018-01-01 | End: 2018-01-01

## 2018-01-01 RX ORDER — HEPARIN SODIUM 1000 [USP'U]/ML
60 INJECTION, SOLUTION INTRAVENOUS; SUBCUTANEOUS ONCE
Status: DISCONTINUED | OUTPATIENT
Start: 2018-01-01 | End: 2018-01-01

## 2018-01-01 RX ORDER — SODIUM CHLORIDE 9 MG/ML
INJECTION, SOLUTION INTRAVENOUS ONCE
Status: COMPLETED | OUTPATIENT
Start: 2018-01-01 | End: 2018-01-01

## 2018-01-01 RX ORDER — DEXTROSE MONOHYDRATE 25 G/50ML
50 INJECTION, SOLUTION INTRAVENOUS AS NEEDED
Status: DISCONTINUED | OUTPATIENT
Start: 2018-01-01 | End: 2018-01-01

## 2018-01-01 RX ORDER — BUMETANIDE 1 MG/1
1 TABLET ORAL DAILY
Status: DISCONTINUED | OUTPATIENT
Start: 2018-01-01 | End: 2018-01-01

## 2018-01-01 RX ORDER — SODIUM CHLORIDE 9 MG/ML
INJECTION, SOLUTION INTRAVENOUS
Status: DISPENSED
Start: 2018-01-01 | End: 2018-01-01

## 2018-01-01 RX ORDER — ECHINACEA PURPUREA EXTRACT 125 MG
3 TABLET ORAL NIGHTLY PRN
Status: DISCONTINUED | OUTPATIENT
Start: 2018-01-01 | End: 2018-01-01

## 2018-01-01 RX ORDER — 0.9 % SODIUM CHLORIDE 0.9 %
VIAL (ML) INJECTION
Status: COMPLETED
Start: 2018-01-01 | End: 2018-01-01

## 2018-01-01 RX ORDER — ONDANSETRON 2 MG/ML
4 INJECTION INTRAMUSCULAR; INTRAVENOUS EVERY 6 HOURS PRN
Status: DISCONTINUED | OUTPATIENT
Start: 2018-01-01 | End: 2018-01-01

## 2018-01-01 RX ORDER — BUMETANIDE 0.25 MG/ML
1 INJECTION, SOLUTION INTRAMUSCULAR; INTRAVENOUS ONCE
Status: COMPLETED | OUTPATIENT
Start: 2018-01-01 | End: 2018-01-01

## 2018-01-01 RX ORDER — POTASSIUM CHLORIDE 14.9 MG/ML
20 INJECTION INTRAVENOUS ONCE
Status: COMPLETED | OUTPATIENT
Start: 2018-01-01 | End: 2018-01-01

## 2018-01-01 RX ORDER — LIDOCAINE HYDROCHLORIDE 20 MG/ML
INJECTION, SOLUTION EPIDURAL; INFILTRATION; INTRACAUDAL; PERINEURAL
Status: COMPLETED
Start: 2018-01-01 | End: 2018-01-01

## 2018-01-01 RX ORDER — MECLIZINE HYDROCHLORIDE 25 MG/1
25 TABLET ORAL 3 TIMES DAILY PRN
Qty: 30 TABLET | Refills: 0 | Status: ON HOLD | OUTPATIENT
Start: 2018-01-01 | End: 2018-01-01

## 2018-01-01 RX ORDER — ASCORBIC ACID 500 MG
1000 TABLET ORAL DAILY
Status: DISCONTINUED | OUTPATIENT
Start: 2018-01-01 | End: 2018-01-01

## 2018-01-01 RX ORDER — SODIUM CHLORIDE 9 MG/ML
INJECTION, SOLUTION INTRAVENOUS
Status: COMPLETED
Start: 2018-01-01 | End: 2018-01-01

## 2018-01-01 RX ORDER — MIDAZOLAM HYDROCHLORIDE 1 MG/ML
INJECTION INTRAMUSCULAR; INTRAVENOUS AS NEEDED
Status: DISCONTINUED | OUTPATIENT
Start: 2018-01-01 | End: 2018-01-01 | Stop reason: SURG

## 2018-01-01 RX ORDER — METHOCARBAMOL 500 MG/1
500 TABLET, FILM COATED ORAL ONCE
Status: COMPLETED | OUTPATIENT
Start: 2018-01-01 | End: 2018-01-01

## 2018-01-01 RX ORDER — POTASSIUM CHLORIDE 29.8 MG/ML
40 INJECTION INTRAVENOUS ONCE
Status: COMPLETED | OUTPATIENT
Start: 2018-01-01 | End: 2018-01-01

## 2018-01-01 RX ORDER — FUROSEMIDE 10 MG/ML
40 INJECTION INTRAMUSCULAR; INTRAVENOUS EVERY 8 HOURS PRN
Status: DISCONTINUED | OUTPATIENT
Start: 2018-01-01 | End: 2018-01-01

## 2018-01-01 RX ORDER — FUROSEMIDE 20 MG/1
10 TABLET ORAL DAILY
Qty: 45 TABLET | Refills: 3 | Status: ON HOLD | OUTPATIENT
Start: 2018-01-01 | End: 2018-01-01

## 2018-01-01 RX ORDER — ACETAMINOPHEN 325 MG/1
650 TABLET ORAL EVERY 6 HOURS PRN
Status: DISCONTINUED | OUTPATIENT
Start: 2018-01-01 | End: 2018-01-01

## 2018-01-01 RX ORDER — PHENYLEPHRINE HCL 10 MG/ML
VIAL (ML) INJECTION AS NEEDED
Status: DISCONTINUED | OUTPATIENT
Start: 2018-01-01 | End: 2018-01-01 | Stop reason: SURG

## 2018-01-01 RX ORDER — ALPRAZOLAM 0.5 MG/1
0.5 TABLET ORAL NIGHTLY
Status: DISCONTINUED | OUTPATIENT
Start: 2018-01-01 | End: 2018-01-01

## 2018-01-01 RX ORDER — FUROSEMIDE 40 MG/1
40 TABLET ORAL EVERY 8 HOURS PRN
Status: DISCONTINUED | OUTPATIENT
Start: 2018-01-01 | End: 2018-01-01

## 2018-01-01 RX ORDER — TRAMADOL HYDROCHLORIDE 50 MG/1
50 TABLET ORAL 3 TIMES DAILY PRN
Status: DISCONTINUED | OUTPATIENT
Start: 2018-01-01 | End: 2018-01-01

## 2018-01-01 RX ORDER — FUROSEMIDE 20 MG/1
20 TABLET ORAL DAILY
Status: DISCONTINUED | OUTPATIENT
Start: 2018-01-01 | End: 2018-01-01

## 2018-01-01 RX ORDER — HEPARIN SODIUM 5000 [USP'U]/ML
5000 INJECTION, SOLUTION INTRAVENOUS; SUBCUTANEOUS EVERY 12 HOURS SCHEDULED
Status: DISCONTINUED | OUTPATIENT
Start: 2018-01-01 | End: 2018-01-01

## 2018-01-01 RX ORDER — ARIPIPRAZOLE 15 MG/1
40 TABLET ORAL EVERY 4 HOURS
Status: COMPLETED | OUTPATIENT
Start: 2018-01-01 | End: 2018-01-01

## 2018-01-01 RX ORDER — MILRINONE LACTATE 0.2 MG/ML
0.25 INJECTION, SOLUTION INTRAVENOUS CONTINUOUS
Status: DISCONTINUED | OUTPATIENT
Start: 2018-01-01 | End: 2018-01-01

## 2018-01-01 RX ORDER — MULTIVITAMIN/IRON/FOLIC ACID 18MG-0.4MG
500 TABLET ORAL DAILY
Status: DISCONTINUED | OUTPATIENT
Start: 2018-01-01 | End: 2018-01-01

## 2018-01-01 RX ORDER — WARFARIN SODIUM 5 MG/1
5 TABLET ORAL ONCE
Status: COMPLETED | OUTPATIENT
Start: 2018-01-01 | End: 2018-01-01

## 2018-01-01 RX ORDER — POTASSIUM CHLORIDE 20 MEQ/1
40 TABLET, EXTENDED RELEASE ORAL EVERY 4 HOURS
Status: DISPENSED | OUTPATIENT
Start: 2018-01-01 | End: 2018-01-01

## 2018-01-01 RX ORDER — VERAPAMIL HYDROCHLORIDE 2.5 MG/ML
INJECTION, SOLUTION INTRAVENOUS
Status: COMPLETED
Start: 2018-01-01 | End: 2018-01-01

## 2018-01-01 RX ORDER — KETOTIFEN FUMARATE 0.35 MG/ML
1 SOLUTION/ DROPS OPHTHALMIC 2 TIMES DAILY PRN
Status: DISCONTINUED | OUTPATIENT
Start: 2018-01-01 | End: 2018-01-01

## 2018-01-01 RX ORDER — ACETAMINOPHEN AND CODEINE PHOSPHATE 300; 30 MG/1; MG/1
1 TABLET ORAL EVERY 6 HOURS PRN
Status: ON HOLD | COMMUNITY
End: 2018-01-01

## 2018-01-01 RX ORDER — SODIUM CHLORIDE 0.9 % (FLUSH) 0.9 %
10 SYRINGE (ML) INJECTION EVERY 8 HOURS
Status: ON HOLD | COMMUNITY
End: 2018-01-01

## 2018-01-01 RX ORDER — GLYCOPYRROLATE 0.2 MG/ML
INJECTION INTRAMUSCULAR; INTRAVENOUS AS NEEDED
Status: DISCONTINUED | OUTPATIENT
Start: 2018-01-01 | End: 2018-01-01 | Stop reason: SURG

## 2018-01-01 RX ORDER — AMOXICILLIN 250 MG
2 CAPSULE ORAL NIGHTLY
Status: ON HOLD | COMMUNITY
End: 2018-01-01

## 2018-01-01 RX ORDER — LISINOPRIL 5 MG/1
2.5 TABLET ORAL DAILY
Qty: 30 TABLET | Refills: 0 | Status: ON HOLD | OUTPATIENT
Start: 2018-01-01 | End: 2018-01-01

## 2018-01-01 RX ORDER — CEFADROXIL 500 MG/1
500 CAPSULE ORAL 2 TIMES DAILY
Qty: 10 CAPSULE | Refills: 2 | Status: SHIPPED | OUTPATIENT
Start: 2018-01-01 | End: 2018-01-01

## 2018-01-01 RX ORDER — METHOCARBAMOL 500 MG/1
500 TABLET, FILM COATED ORAL 4 TIMES DAILY PRN
Status: DISCONTINUED | OUTPATIENT
Start: 2018-01-01 | End: 2018-01-01

## 2018-01-01 RX ORDER — RANOLAZINE 500 MG/1
500 TABLET, EXTENDED RELEASE ORAL 2 TIMES DAILY
Status: DISCONTINUED | OUTPATIENT
Start: 2018-01-01 | End: 2018-01-01

## 2018-01-01 RX ORDER — CHLORAL HYDRATE 500 MG
1000 CAPSULE ORAL DAILY
Status: ON HOLD | COMMUNITY
End: 2018-01-01

## 2018-01-01 RX ORDER — SODIUM CHLORIDE 9 MG/ML
INJECTION, SOLUTION INTRAVENOUS CONTINUOUS PRN
Status: DISCONTINUED | OUTPATIENT
Start: 2018-01-01 | End: 2018-01-01 | Stop reason: SURG

## 2018-01-01 RX ORDER — ALBUTEROL SULFATE 90 UG/1
2 AEROSOL, METERED RESPIRATORY (INHALATION) 2 TIMES DAILY
Status: DISCONTINUED | OUTPATIENT
Start: 2018-01-01 | End: 2018-01-01

## 2018-01-01 RX ORDER — METOCLOPRAMIDE 10 MG/1
10 TABLET ORAL EVERY 6 HOURS PRN
Status: DISCONTINUED | OUTPATIENT
Start: 2018-01-01 | End: 2018-01-01

## 2018-01-01 RX ORDER — BUMETANIDE 0.25 MG/ML
INJECTION, SOLUTION INTRAMUSCULAR; INTRAVENOUS
Status: DISPENSED
Start: 2018-01-01 | End: 2018-01-01

## 2018-01-01 RX ORDER — POTASSIUM CHLORIDE 1.5 G/1.77G
20 POWDER, FOR SOLUTION ORAL DAILY
Status: ON HOLD | COMMUNITY
End: 2018-01-01

## 2018-01-01 RX ORDER — MILRINONE LACTATE 0.2 MG/ML
INJECTION, SOLUTION INTRAVENOUS CONTINUOUS
Status: DISCONTINUED | OUTPATIENT
Start: 2018-01-01 | End: 2018-01-01

## 2018-01-01 RX ORDER — KETOROLAC TROMETHAMINE 15 MG/ML
15 INJECTION, SOLUTION INTRAMUSCULAR; INTRAVENOUS EVERY 6 HOURS PRN
Status: DISCONTINUED | OUTPATIENT
Start: 2018-01-01 | End: 2018-01-01

## 2018-01-01 RX ORDER — WARFARIN SODIUM 5 MG/1
5 TABLET ORAL EVERY EVENING
Status: DISCONTINUED | OUTPATIENT
Start: 2018-01-01 | End: 2018-01-01

## 2018-01-01 RX ORDER — WARFARIN SODIUM 5 MG/1
TABLET ORAL
COMMUNITY
Start: 2014-04-22 | End: 2018-01-01

## 2018-01-01 RX ORDER — 0.9 % SODIUM CHLORIDE 0.9 %
VIAL (ML) INJECTION
Status: DISPENSED
Start: 2018-01-01 | End: 2018-01-01

## 2018-01-01 RX ORDER — TRAMADOL HYDROCHLORIDE 50 MG/1
50 TABLET ORAL EVERY 6 HOURS PRN
Qty: 20 TABLET | Refills: 0 | Status: SHIPPED | OUTPATIENT
Start: 2018-01-01 | End: 2018-01-01

## 2018-01-01 RX ORDER — 0.9 % SODIUM CHLORIDE 0.9 %
VIAL (ML) INJECTION
Status: DISCONTINUED
Start: 2018-01-01 | End: 2018-01-01

## 2018-01-01 RX ORDER — HEPARIN SODIUM AND DEXTROSE 10000; 5 [USP'U]/100ML; G/100ML
INJECTION INTRAVENOUS CONTINUOUS
Status: DISCONTINUED | OUTPATIENT
Start: 2018-01-01 | End: 2018-01-01

## 2018-01-01 RX ORDER — MECLIZINE HYDROCHLORIDE 25 MG/1
25 TABLET ORAL 3 TIMES DAILY PRN
Status: DISCONTINUED | OUTPATIENT
Start: 2018-01-01 | End: 2018-01-01

## 2018-01-01 RX ORDER — MORPHINE SULFATE 2 MG/ML
2 INJECTION, SOLUTION INTRAMUSCULAR; INTRAVENOUS EVERY 2 HOUR PRN
Status: DISCONTINUED | OUTPATIENT
Start: 2018-01-01 | End: 2018-01-01

## 2018-01-01 RX ORDER — CARVEDILOL 6.25 MG/1
6.25 TABLET ORAL 2 TIMES DAILY WITH MEALS
Qty: 60 TABLET | Refills: 0 | Status: ON HOLD | OUTPATIENT
Start: 2018-01-01 | End: 2018-01-01

## 2018-01-01 RX ORDER — PANTOPRAZOLE SODIUM 40 MG/1
40 TABLET, DELAYED RELEASE ORAL
Status: DISCONTINUED | OUTPATIENT
Start: 2018-01-01 | End: 2018-01-01

## 2018-01-01 RX ORDER — MAGNESIUM OXIDE 400 MG (241.3 MG MAGNESIUM) TABLET
400 TABLET ONCE
Status: COMPLETED | OUTPATIENT
Start: 2018-01-01 | End: 2018-01-01

## 2018-01-01 RX ORDER — DEXTROSE MONOHYDRATE 100 MG/ML
INJECTION, SOLUTION INTRAVENOUS CONTINUOUS PRN
Status: DISCONTINUED | OUTPATIENT
Start: 2018-01-01 | End: 2018-01-01

## 2018-01-01 RX ORDER — METOPROLOL SUCCINATE 25 MG/1
12.5 TABLET, EXTENDED RELEASE ORAL
Status: DISCONTINUED | OUTPATIENT
Start: 2018-01-01 | End: 2018-01-01

## 2018-01-01 RX ORDER — MIDAZOLAM HYDROCHLORIDE 1 MG/ML
INJECTION INTRAMUSCULAR; INTRAVENOUS
Status: COMPLETED
Start: 2018-01-01 | End: 2018-01-01

## 2018-01-01 RX ORDER — DIPHENOXYLATE HYDROCHLORIDE AND ATROPINE SULFATE 2.5; .025 MG/1; MG/1
1 TABLET ORAL DAILY
Status: DISCONTINUED | OUTPATIENT
Start: 2018-01-01 | End: 2018-01-01

## 2018-01-01 RX ORDER — ALBUMIN (HUMAN) 12.5 G/50ML
12.5 SOLUTION INTRAVENOUS ONCE
Status: COMPLETED | OUTPATIENT
Start: 2018-01-01 | End: 2018-01-01

## 2018-01-01 RX ORDER — TRAMADOL HYDROCHLORIDE 50 MG/1
50 TABLET ORAL EVERY 8 HOURS PRN
Qty: 20 TABLET | Refills: 0 | Status: ON HOLD
Start: 2018-01-01 | End: 2018-01-01

## 2018-01-01 RX ORDER — ETOMIDATE 2 MG/ML
INJECTION INTRAVENOUS
Status: COMPLETED
Start: 2018-01-01 | End: 2018-01-01

## 2018-01-01 RX ORDER — AZITHROMYCIN 500 MG/1
500 TABLET, FILM COATED ORAL DAILY
Status: ON HOLD | COMMUNITY
End: 2018-01-01

## 2018-01-01 RX ORDER — ALPRAZOLAM 1 MG/1
TABLET ORAL
COMMUNITY
Start: 2014-03-30 | End: 2018-01-01 | Stop reason: DRUGHIGH

## 2018-01-01 RX ORDER — BUMETANIDE 0.25 MG/ML
1 INJECTION, SOLUTION INTRAMUSCULAR; INTRAVENOUS DAILY
Status: DISCONTINUED | OUTPATIENT
Start: 2018-01-01 | End: 2018-01-01

## 2018-01-01 RX ORDER — SODIUM CHLORIDE 0.9 % (FLUSH) 0.9 %
10 SYRINGE (ML) INJECTION AS NEEDED
Status: DISCONTINUED | OUTPATIENT
Start: 2018-01-01 | End: 2018-01-01

## 2018-01-01 RX ORDER — SODIUM CHLORIDE 0.9 % (FLUSH) 0.9 %
3 SYRINGE (ML) INJECTION AS NEEDED
Status: DISCONTINUED | OUTPATIENT
Start: 2018-01-01 | End: 2018-01-01

## 2018-01-01 RX ORDER — MORPHINE SULFATE 2 MG/ML
0.25 INJECTION, SOLUTION INTRAMUSCULAR; INTRAVENOUS
Status: DISCONTINUED | OUTPATIENT
Start: 2018-01-01 | End: 2018-01-01

## 2018-01-01 RX ORDER — BUMETANIDE 1 MG/1
1 TABLET ORAL DAILY
Qty: 30 TABLET | Refills: 0 | Status: ON HOLD
Start: 2018-01-01 | End: 2018-01-01

## 2018-01-01 RX ORDER — IPRATROPIUM BROMIDE AND ALBUTEROL SULFATE 2.5; .5 MG/3ML; MG/3ML
3 SOLUTION RESPIRATORY (INHALATION) DAILY
Status: ON HOLD | COMMUNITY
End: 2018-01-01

## 2018-01-01 RX ORDER — POTASSIUM CHLORIDE 29.8 MG/ML
INJECTION INTRAVENOUS
Status: COMPLETED
Start: 2018-01-01 | End: 2018-01-01

## 2018-01-01 RX ORDER — HEPARIN SODIUM AND DEXTROSE 10000; 5 [USP'U]/100ML; G/100ML
12 INJECTION INTRAVENOUS ONCE
Status: DISCONTINUED | OUTPATIENT
Start: 2018-01-01 | End: 2018-01-01

## 2018-01-01 RX ORDER — MAGNESIUM SULFATE HEPTAHYDRATE 40 MG/ML
2 INJECTION, SOLUTION INTRAVENOUS ONCE
Status: COMPLETED | OUTPATIENT
Start: 2018-01-01 | End: 2018-01-01

## 2018-01-01 RX ORDER — POLYETHYLENE GLYCOL 3350 17 G/17G
17 POWDER, FOR SOLUTION ORAL DAILY PRN
Status: DISCONTINUED | OUTPATIENT
Start: 2018-01-01 | End: 2018-01-01

## 2018-01-01 RX ORDER — MECLIZINE HCL 12.5 MG/1
25 TABLET ORAL 3 TIMES DAILY PRN
Status: DISCONTINUED | OUTPATIENT
Start: 2018-01-01 | End: 2018-01-01

## 2018-01-01 RX ORDER — ACETAMINOPHEN AND CODEINE PHOSPHATE 300; 30 MG/1; MG/1
1 TABLET ORAL 2 TIMES DAILY PRN
Status: ON HOLD | COMMUNITY
End: 2018-01-01

## 2018-01-01 RX ORDER — ASPIRIN 81 MG/1
81 TABLET ORAL DAILY
Status: DISCONTINUED | OUTPATIENT
Start: 2018-01-01 | End: 2018-01-01

## 2018-01-01 RX ORDER — VITAMIN E 268 MG
400 CAPSULE ORAL DAILY
Status: DISCONTINUED | OUTPATIENT
Start: 2018-01-01 | End: 2018-01-01

## 2018-01-01 RX ORDER — DOBUTAMINE HYDROCHLORIDE 200 MG/100ML
INJECTION INTRAVENOUS CONTINUOUS
Status: DISCONTINUED | OUTPATIENT
Start: 2018-01-01 | End: 2018-01-01

## 2018-01-01 RX ORDER — POTASSIUM CHLORIDE 1.5 G/1.77G
20 POWDER, FOR SOLUTION ORAL DAILY
Status: DISCONTINUED | OUTPATIENT
Start: 2018-01-01 | End: 2018-01-01

## 2018-01-01 RX ORDER — IPRATROPIUM BROMIDE AND ALBUTEROL SULFATE 2.5; .5 MG/3ML; MG/3ML
3 SOLUTION RESPIRATORY (INHALATION) EVERY 6 HOURS PRN
Status: ON HOLD | COMMUNITY
End: 2018-01-01

## 2018-01-01 RX ORDER — FUROSEMIDE 10 MG/ML
20 INJECTION INTRAMUSCULAR; INTRAVENOUS ONCE
Status: COMPLETED | OUTPATIENT
Start: 2018-01-01 | End: 2018-01-01

## 2018-01-01 RX ORDER — CETIRIZINE HYDROCHLORIDE 10 MG/1
10 TABLET ORAL DAILY
Status: DISCONTINUED | OUTPATIENT
Start: 2018-01-01 | End: 2018-01-01

## 2018-01-01 RX ORDER — ALPRAZOLAM 0.5 MG/1
0.5 TABLET ORAL NIGHTLY PRN
Status: DISCONTINUED | OUTPATIENT
Start: 2018-01-01 | End: 2018-01-01

## 2018-01-01 RX ORDER — TRAMADOL HYDROCHLORIDE 50 MG/1
50 TABLET ORAL ONCE
Status: COMPLETED | OUTPATIENT
Start: 2018-01-01 | End: 2018-01-01

## 2018-01-01 RX ORDER — ACETAMINOPHEN AND CODEINE PHOSPHATE 300; 30 MG/1; MG/1
1 TABLET ORAL EVERY 6 HOURS PRN
Status: DISCONTINUED | OUTPATIENT
Start: 2018-01-01 | End: 2018-01-01

## 2018-01-01 RX ORDER — HEPARIN SODIUM 5000 [USP'U]/ML
5000 INJECTION, SOLUTION INTRAVENOUS; SUBCUTANEOUS EVERY 8 HOURS SCHEDULED
Status: DISCONTINUED | OUTPATIENT
Start: 2018-01-01 | End: 2018-01-01

## 2018-01-01 RX ORDER — ENALAPRIL MALEATE 5 MG/1
5 TABLET ORAL 2 TIMES DAILY
Status: DISCONTINUED | OUTPATIENT
Start: 2018-01-01 | End: 2018-01-01

## 2018-01-01 RX ORDER — METOCLOPRAMIDE HYDROCHLORIDE 5 MG/ML
10 INJECTION INTRAMUSCULAR; INTRAVENOUS EVERY 8 HOURS PRN
Status: DISCONTINUED | OUTPATIENT
Start: 2018-01-01 | End: 2018-01-01

## 2018-01-01 RX ORDER — INSULIN GLARGINE 100 [IU]/ML
INJECTION, SOLUTION SUBCUTANEOUS
Qty: 5 PEN | Refills: 3 | Status: ON HOLD | OUTPATIENT
Start: 2018-01-01 | End: 2018-01-01

## 2018-01-01 RX ORDER — NITROGLYCERIN 0.4 MG/1
0.4 TABLET SUBLINGUAL EVERY 5 MIN PRN
Status: DISCONTINUED | OUTPATIENT
Start: 2018-01-01 | End: 2018-01-01

## 2018-01-01 RX ORDER — MORPHINE SULFATE 2 MG/ML
1 INJECTION, SOLUTION INTRAMUSCULAR; INTRAVENOUS EVERY 2 HOUR PRN
Status: DISCONTINUED | OUTPATIENT
Start: 2018-01-01 | End: 2018-01-01

## 2018-01-01 RX ORDER — FUROSEMIDE 10 MG/ML
20 INJECTION INTRAMUSCULAR; INTRAVENOUS
Status: DISCONTINUED | OUTPATIENT
Start: 2018-01-01 | End: 2018-01-01

## 2018-01-01 RX ORDER — FUROSEMIDE 20 MG/1
10 TABLET ORAL DAILY
Status: DISCONTINUED | OUTPATIENT
Start: 2018-01-01 | End: 2018-01-01

## 2018-01-01 RX ORDER — LIDOCAINE 50 MG/G
OINTMENT TOPICAL AS NEEDED
Status: DISCONTINUED | OUTPATIENT
Start: 2018-01-01 | End: 2018-01-01

## 2018-01-01 RX ORDER — ASPIRIN 81 MG/1
324 TABLET, CHEWABLE ORAL ONCE
Status: COMPLETED | OUTPATIENT
Start: 2018-01-01 | End: 2018-01-01

## 2018-01-01 RX ORDER — IPRATROPIUM BROMIDE AND ALBUTEROL SULFATE 2.5; .5 MG/3ML; MG/3ML
3 SOLUTION RESPIRATORY (INHALATION) EVERY 4 HOURS PRN
Status: DISCONTINUED | OUTPATIENT
Start: 2018-01-01 | End: 2018-01-01

## 2018-01-01 RX ORDER — ROSUVASTATIN CALCIUM 20 MG/1
20 TABLET, COATED ORAL NIGHTLY
Status: DISCONTINUED | OUTPATIENT
Start: 2018-01-01 | End: 2018-01-01

## 2018-01-01 RX ORDER — PROTAMINE SULFATE 10 MG/ML
INJECTION, SOLUTION INTRAVENOUS
Status: COMPLETED
Start: 2018-01-01 | End: 2018-01-01

## 2018-01-01 RX ORDER — DOCUSATE SODIUM 100 MG/1
100 CAPSULE, LIQUID FILLED ORAL 2 TIMES DAILY
Status: DISCONTINUED | OUTPATIENT
Start: 2018-01-01 | End: 2018-01-01

## 2018-01-01 RX ORDER — TRAMADOL HYDROCHLORIDE 50 MG/1
50 TABLET ORAL EVERY 6 HOURS PRN
Status: DISCONTINUED | OUTPATIENT
Start: 2018-01-01 | End: 2018-01-01

## 2018-01-01 RX ORDER — NITROGLYCERIN 0.4 MG/1
TABLET SUBLINGUAL
Status: COMPLETED
Start: 2018-01-01 | End: 2018-01-01

## 2018-01-01 RX ORDER — MORPHINE SULFATE 4 MG/ML
4 INJECTION, SOLUTION INTRAMUSCULAR; INTRAVENOUS EVERY 2 HOUR PRN
Status: DISCONTINUED | OUTPATIENT
Start: 2018-01-01 | End: 2018-01-01

## 2018-01-01 RX ORDER — 3% SODIUM CHLORIDE 3 G/100ML
105 INJECTION, SOLUTION INTRAVENOUS CONTINUOUS
Status: DISCONTINUED | OUTPATIENT
Start: 2018-01-01 | End: 2018-01-01

## 2018-01-01 RX ORDER — ETOMIDATE 2 MG/ML
INJECTION INTRAVENOUS AS NEEDED
OUTPATIENT
Start: 2018-01-01

## 2018-01-01 RX ORDER — NITROGLYCERIN 20 MG/100ML
INJECTION INTRAVENOUS
Status: COMPLETED
Start: 2018-01-01 | End: 2018-01-01

## 2018-01-01 RX ORDER — ACETAMINOPHEN AND CODEINE PHOSPHATE 300; 30 MG/1; MG/1
1 TABLET ORAL EVERY 6 HOURS PRN
Qty: 30 TABLET | Refills: 0 | Status: ON HOLD | OUTPATIENT
Start: 2018-01-01 | End: 2018-01-01

## 2018-01-01 RX ORDER — SCOLOPAMINE TRANSDERMAL SYSTEM 1 MG/1
1 PATCH, EXTENDED RELEASE TRANSDERMAL
Status: DISCONTINUED | OUTPATIENT
Start: 2018-01-01 | End: 2018-01-01

## 2018-01-01 RX ORDER — ALPRAZOLAM 0.5 MG/1
0.5 TABLET ORAL NIGHTLY
Qty: 20 TABLET | Refills: 0 | Status: ON HOLD | OUTPATIENT
Start: 2018-01-01 | End: 2018-01-01

## 2018-01-01 RX ORDER — ACETAMINOPHEN AND CODEINE PHOSPHATE 300; 30 MG/1; MG/1
1 TABLET ORAL 2 TIMES DAILY PRN
Status: DISCONTINUED | OUTPATIENT
Start: 2018-01-01 | End: 2018-01-01

## 2018-01-01 RX ORDER — WARFARIN SODIUM 7.5 MG/1
TABLET ORAL
Qty: 90 TABLET | Refills: 11 | Status: ON HOLD | OUTPATIENT
Start: 2018-01-01 | End: 2018-01-01 | Stop reason: DRUGHIGH

## 2018-01-01 RX ORDER — POTASSIUM CHLORIDE 750 MG/1
10 TABLET, EXTENDED RELEASE ORAL DAILY
Status: DISCONTINUED | OUTPATIENT
Start: 2018-01-01 | End: 2018-01-01

## 2018-01-01 RX ORDER — METOPROLOL SUCCINATE 25 MG/1
25 TABLET, EXTENDED RELEASE ORAL
Status: DISCONTINUED | OUTPATIENT
Start: 2018-01-01 | End: 2018-01-01

## 2018-01-01 RX ORDER — LIDOCAINE HYDROCHLORIDE 10 MG/ML
INJECTION, SOLUTION EPIDURAL; INFILTRATION; INTRACAUDAL; PERINEURAL
Status: DISPENSED
Start: 2018-01-01 | End: 2018-01-01

## 2018-01-01 RX ORDER — ONDANSETRON 4 MG/1
4 TABLET, ORALLY DISINTEGRATING ORAL EVERY 6 HOURS PRN
Status: DISCONTINUED | OUTPATIENT
Start: 2018-01-01 | End: 2018-01-01

## 2018-01-01 RX ORDER — ALBUTEROL SULFATE 90 UG/1
2 AEROSOL, METERED RESPIRATORY (INHALATION) EVERY 6 HOURS PRN
Qty: 18 G | Refills: 5 | Status: ON HOLD | OUTPATIENT
Start: 2018-01-01 | End: 2018-01-01

## 2018-01-01 RX ORDER — KETOTIFEN FUMARATE 0.35 MG/ML
1 SOLUTION/ DROPS OPHTHALMIC 2 TIMES DAILY
Status: DISCONTINUED | OUTPATIENT
Start: 2018-01-01 | End: 2018-01-01

## 2018-01-01 RX ORDER — CARVEDILOL 3.12 MG/1
3.12 TABLET ORAL 2 TIMES DAILY WITH MEALS
Status: DISCONTINUED | OUTPATIENT
Start: 2018-01-01 | End: 2018-01-01

## 2018-01-01 RX ORDER — CIPROFLOXACIN 250 MG/1
250 TABLET, FILM COATED ORAL 2 TIMES DAILY
Qty: 20 TABLET | Refills: 0 | Status: SHIPPED | OUTPATIENT
Start: 2018-01-01 | End: 2018-01-01

## 2018-01-01 RX ORDER — FUROSEMIDE 10 MG/ML
20 INJECTION INTRAMUSCULAR; INTRAVENOUS DAILY
Status: DISCONTINUED | OUTPATIENT
Start: 2018-01-01 | End: 2018-01-01

## 2018-01-01 RX ORDER — PROMETHAZINE HYDROCHLORIDE 25 MG/1
TABLET ORAL
Status: DISPENSED
Start: 2018-01-01 | End: 2018-01-01

## 2018-01-01 RX ORDER — BUMETANIDE 1 MG/1
1 TABLET ORAL
Status: DISCONTINUED | OUTPATIENT
Start: 2018-01-01 | End: 2018-01-01

## 2018-01-01 RX ORDER — SODIUM CHLORIDE 9 MG/ML
INJECTION, SOLUTION INTRAVENOUS ONCE
Status: DISCONTINUED | OUTPATIENT
Start: 2018-01-01 | End: 2018-01-01

## 2018-01-01 RX ORDER — BISACODYL 10 MG
10 SUPPOSITORY, RECTAL RECTAL
Status: DISCONTINUED | OUTPATIENT
Start: 2018-01-01 | End: 2018-01-01

## 2018-01-01 RX ORDER — ESCITALOPRAM OXALATE 10 MG/1
5 TABLET ORAL NIGHTLY
Status: DISCONTINUED | OUTPATIENT
Start: 2018-01-01 | End: 2018-01-01

## 2018-01-01 RX ORDER — ALBUTEROL SULFATE 2.5 MG/3ML
2.5 SOLUTION RESPIRATORY (INHALATION) EVERY 6 HOURS PRN
Status: DISCONTINUED | OUTPATIENT
Start: 2018-01-01 | End: 2018-01-01

## 2018-01-01 RX ORDER — LORAZEPAM 2 MG/ML
1 INJECTION INTRAMUSCULAR ONCE
Status: COMPLETED | OUTPATIENT
Start: 2018-01-01 | End: 2018-01-01

## 2018-01-01 RX ORDER — MORPHINE SULFATE 2 MG/ML
0.25 INJECTION, SOLUTION INTRAMUSCULAR; INTRAVENOUS ONCE AS NEEDED
Status: ACTIVE | OUTPATIENT
Start: 2018-01-01 | End: 2018-01-01

## 2018-01-01 RX ORDER — CASTOR OIL AND BALSAM, PERU 788; 87 MG/G; MG/G
OINTMENT TOPICAL 2 TIMES DAILY PRN
Status: DISCONTINUED | OUTPATIENT
Start: 2018-01-01 | End: 2018-01-01

## 2018-01-01 RX ORDER — MORPHINE SULFATE 2 MG/ML
2 INJECTION, SOLUTION INTRAMUSCULAR; INTRAVENOUS ONCE
Status: COMPLETED | OUTPATIENT
Start: 2018-01-01 | End: 2018-01-01

## 2018-01-01 RX ORDER — METOPROLOL SUCCINATE 25 MG/1
12.5 TABLET, EXTENDED RELEASE ORAL DAILY
Status: DISCONTINUED | OUTPATIENT
Start: 2018-01-01 | End: 2018-01-01

## 2018-01-01 RX ORDER — LISINOPRIL 2.5 MG/1
2.5 TABLET ORAL DAILY
Status: DISCONTINUED | OUTPATIENT
Start: 2018-01-01 | End: 2018-01-01

## 2018-01-01 RX ORDER — MORPHINE SULFATE 2 MG/ML
1 INJECTION, SOLUTION INTRAMUSCULAR; INTRAVENOUS
Status: DISCONTINUED | OUTPATIENT
Start: 2018-01-01 | End: 2018-01-01

## 2018-01-01 RX ORDER — ALBUTEROL SULFATE 90 UG/1
2 AEROSOL, METERED RESPIRATORY (INHALATION) EVERY 6 HOURS
Qty: 1 INHALER | Refills: 5 | Status: SHIPPED | OUTPATIENT
Start: 2018-01-01 | End: 2018-01-01

## 2018-01-01 RX ORDER — BUMETANIDE 0.25 MG/ML
0.5 INJECTION, SOLUTION INTRAMUSCULAR; INTRAVENOUS ONCE
Status: COMPLETED | OUTPATIENT
Start: 2018-01-01 | End: 2018-01-01

## 2018-01-01 RX ORDER — CLOPIDOGREL BISULFATE 75 MG/1
75 TABLET ORAL NIGHTLY
Status: DISCONTINUED | OUTPATIENT
Start: 2018-01-01 | End: 2018-01-01

## 2018-01-01 RX ORDER — PSEUDOEPHEDRINE HCL 30 MG
100 TABLET ORAL 2 TIMES DAILY
Qty: 60 CAPSULE | Refills: 0 | Status: ON HOLD
Start: 2018-01-01 | End: 2018-01-01 | Stop reason: ALTCHOICE

## 2018-01-01 RX ORDER — ACETAMINOPHEN AND CODEINE PHOSPHATE 300; 30 MG/1; MG/1
1 TABLET ORAL EVERY 4 HOURS PRN
Status: DISCONTINUED | OUTPATIENT
Start: 2018-01-01 | End: 2018-01-01

## 2018-01-01 RX ORDER — LISINOPRIL 5 MG/1
5 TABLET ORAL DAILY
Status: DISCONTINUED | OUTPATIENT
Start: 2018-01-01 | End: 2018-01-01

## 2018-01-01 RX ORDER — WARFARIN SODIUM 7.5 MG/1
7.5 TABLET ORAL
Status: DISCONTINUED | OUTPATIENT
Start: 2018-01-01 | End: 2018-01-01

## 2018-01-01 RX ORDER — WARFARIN SODIUM 5 MG/1
5 TABLET ORAL
Status: COMPLETED | OUTPATIENT
Start: 2018-01-01 | End: 2018-01-01

## 2018-01-01 RX ORDER — BUMETANIDE 0.25 MG/ML
2 INJECTION, SOLUTION INTRAMUSCULAR; INTRAVENOUS
Status: DISCONTINUED | OUTPATIENT
Start: 2018-01-01 | End: 2018-01-01

## 2018-01-01 RX ORDER — POTASSIUM CHLORIDE 14.9 MG/ML
20 INJECTION INTRAVENOUS ONCE
Status: DISCONTINUED | OUTPATIENT
Start: 2018-01-01 | End: 2018-01-01

## 2018-01-01 RX ORDER — KETOROLAC TROMETHAMINE 30 MG/ML
30 INJECTION, SOLUTION INTRAMUSCULAR; INTRAVENOUS EVERY 6 HOURS PRN
Status: DISPENSED | OUTPATIENT
Start: 2018-01-01 | End: 2018-01-01

## 2018-01-01 RX ORDER — CIPROFLOXACIN 500 MG/1
500 TABLET, FILM COATED ORAL 2 TIMES DAILY
Qty: 20 TABLET | Refills: 0 | Status: SHIPPED | OUTPATIENT
Start: 2018-01-01 | End: 2018-01-01 | Stop reason: ALTCHOICE

## 2018-01-01 RX ORDER — POTASSIUM CHLORIDE 20 MEQ/1
40 TABLET, EXTENDED RELEASE ORAL EVERY 4 HOURS
Status: COMPLETED | OUTPATIENT
Start: 2018-01-01 | End: 2018-01-01

## 2018-01-01 RX ORDER — TRAMADOL HYDROCHLORIDE 50 MG/1
TABLET ORAL
Qty: 20 TABLET | Refills: 0 | Status: ON HOLD
Start: 2018-01-01 | End: 2018-01-01

## 2018-01-01 RX ORDER — BUMETANIDE 0.25 MG/ML
1 INJECTION, SOLUTION INTRAMUSCULAR; INTRAVENOUS
Status: DISCONTINUED | OUTPATIENT
Start: 2018-01-01 | End: 2018-01-01

## 2018-01-01 RX ORDER — INSULIN ASPART 100 [IU]/ML
INJECTION, SOLUTION INTRAVENOUS; SUBCUTANEOUS ONCE
Status: DISCONTINUED | OUTPATIENT
Start: 2018-01-01 | End: 2018-01-01

## 2018-01-01 RX ORDER — SENNA AND DOCUSATE SODIUM 50; 8.6 MG/1; MG/1
2 TABLET, FILM COATED ORAL NIGHTLY
Status: DISCONTINUED | OUTPATIENT
Start: 2018-01-01 | End: 2018-01-01

## 2018-01-01 RX ORDER — ALPRAZOLAM 0.25 MG/1
0.25 TABLET ORAL DAILY
Status: DISCONTINUED | OUTPATIENT
Start: 2018-01-01 | End: 2018-01-01

## 2018-01-01 RX ORDER — METOPROLOL TARTRATE 5 MG/5ML
2.5 INJECTION INTRAVENOUS ONCE
Status: DISCONTINUED | OUTPATIENT
Start: 2018-01-01 | End: 2018-01-01

## 2018-01-01 RX ORDER — METOPROLOL SUCCINATE 25 MG/1
25 TABLET, EXTENDED RELEASE ORAL DAILY
Qty: 30 TABLET | Refills: 0 | Status: ON HOLD | OUTPATIENT
Start: 2018-01-01 | End: 2018-01-01

## 2018-01-01 RX ORDER — SODIUM CHLORIDE 9 MG/ML
INJECTION, SOLUTION INTRAVENOUS CONTINUOUS
Status: ACTIVE | OUTPATIENT
Start: 2018-01-01 | End: 2018-01-01

## 2018-01-01 RX ORDER — LISINOPRIL 5 MG/1
5 TABLET ORAL ONCE
Status: COMPLETED | OUTPATIENT
Start: 2018-01-01 | End: 2018-01-01

## 2018-01-01 RX ORDER — HEPARIN SODIUM 1000 [USP'U]/ML
INJECTION, SOLUTION INTRAVENOUS; SUBCUTANEOUS AS NEEDED
Status: DISCONTINUED | OUTPATIENT
Start: 2018-01-01 | End: 2018-01-01 | Stop reason: SURG

## 2018-01-01 RX ORDER — UBIDECARENONE/VITAMIN E MIXED 100 MG-100
1 CAPSULE ORAL DAILY
Status: DISCONTINUED | OUTPATIENT
Start: 2018-01-01 | End: 2018-01-01 | Stop reason: RX

## 2018-01-01 RX ORDER — DOPAMINE HYDROCHLORIDE 160 MG/100ML
INJECTION, SOLUTION INTRAVENOUS CONTINUOUS
Status: DISCONTINUED | OUTPATIENT
Start: 2018-01-01 | End: 2018-01-01

## 2018-01-01 RX ORDER — PANTOPRAZOLE SODIUM 40 MG/1
40 TABLET, DELAYED RELEASE ORAL
Qty: 30 TABLET | Refills: 0 | Status: ON HOLD
Start: 2018-01-01 | End: 2018-01-01

## 2018-01-01 RX ORDER — VITAMIN C
1 TAB ORAL DAILY
Status: DISCONTINUED | OUTPATIENT
Start: 2018-01-01 | End: 2018-01-01

## 2018-01-01 RX ORDER — WARFARIN SODIUM 5 MG/1
5 TABLET ORAL
Status: DISCONTINUED | OUTPATIENT
Start: 2018-01-01 | End: 2018-01-01

## 2018-01-01 RX ORDER — FUROSEMIDE 10 MG/ML
40 INJECTION INTRAMUSCULAR; INTRAVENOUS DAILY
Status: DISCONTINUED | OUTPATIENT
Start: 2018-01-01 | End: 2018-01-01

## 2018-01-01 RX ORDER — PROMETHAZINE HYDROCHLORIDE 25 MG/1
3 TABLET ORAL AS NEEDED
Qty: 100 AMPULE | Refills: 3 | Status: ON HOLD | OUTPATIENT
Start: 2018-01-01 | End: 2018-01-01

## 2018-01-01 RX ORDER — HEPARIN SODIUM 1000 [USP'U]/ML
INJECTION, SOLUTION INTRAVENOUS; SUBCUTANEOUS
Status: COMPLETED
Start: 2018-01-01 | End: 2018-01-01

## 2018-01-01 RX ORDER — CASTOR OIL AND BALSAM, PERU 788; 87 MG/G; MG/G
OINTMENT TOPICAL
Qty: 1 TUBE | Refills: 0 | Status: ON HOLD
Start: 2018-01-01 | End: 2018-01-01

## 2018-01-01 RX ORDER — METOPROLOL SUCCINATE 25 MG/1
25 TABLET, EXTENDED RELEASE ORAL DAILY
Status: DISCONTINUED | OUTPATIENT
Start: 2018-01-01 | End: 2018-01-01

## 2018-01-01 RX ORDER — HALOPERIDOL 5 MG/ML
1 INJECTION INTRAMUSCULAR
Status: DISCONTINUED | OUTPATIENT
Start: 2018-01-01 | End: 2018-01-01

## 2018-01-01 RX ORDER — POTASSIUM CHLORIDE 20 MEQ/1
40 TABLET, EXTENDED RELEASE ORAL ONCE
Status: DISCONTINUED | OUTPATIENT
Start: 2018-01-01 | End: 2018-01-01

## 2018-01-01 RX ORDER — FUROSEMIDE 10 MG/ML
40 INJECTION INTRAMUSCULAR; INTRAVENOUS 3 TIMES DAILY
Status: DISCONTINUED | OUTPATIENT
Start: 2018-01-01 | End: 2018-01-01

## 2018-01-01 RX ORDER — METHOCARBAMOL 500 MG/1
500 TABLET, FILM COATED ORAL 4 TIMES DAILY PRN
Qty: 40 TABLET | Refills: 0 | Status: SHIPPED | OUTPATIENT
Start: 2018-01-01 | End: 2018-01-01

## 2018-01-01 RX ORDER — FUROSEMIDE 20 MG/1
TABLET ORAL
COMMUNITY
Start: 2014-05-03 | End: 2018-01-01

## 2018-01-01 RX ORDER — VECURONIUM BROMIDE 1 MG/ML
INJECTION, POWDER, LYOPHILIZED, FOR SOLUTION INTRAVENOUS AS NEEDED
Status: DISCONTINUED | OUTPATIENT
Start: 2018-01-01 | End: 2018-01-01 | Stop reason: SURG

## 2018-01-01 RX ORDER — MULTIPLE VITAMINS W/ MINERALS TAB 9MG-400MCG
1 TAB ORAL DAILY
Status: DISCONTINUED | OUTPATIENT
Start: 2018-01-01 | End: 2018-01-01

## 2018-01-01 RX ORDER — ROCURONIUM BROMIDE 10 MG/ML
INJECTION, SOLUTION INTRAVENOUS AS NEEDED
Status: DISCONTINUED | OUTPATIENT
Start: 2018-01-01 | End: 2018-01-01 | Stop reason: SURG

## 2018-01-01 RX ORDER — ASPIRIN 81 MG/1
TABLET ORAL
COMMUNITY
Start: 2014-05-30 | End: 2018-01-01

## 2018-01-01 RX ORDER — CHLORHEXIDINE GLUCONATE 4 G/100ML
30 SOLUTION TOPICAL
Status: COMPLETED | OUTPATIENT
Start: 2018-01-01 | End: 2018-01-01

## 2018-01-01 RX ORDER — MAGNESIUM OXIDE 400 MG (241.3 MG MAGNESIUM) TABLET
800 TABLET ONCE
Status: COMPLETED | OUTPATIENT
Start: 2018-01-01 | End: 2018-01-01

## 2018-01-01 RX ORDER — ALBUTEROL SULFATE 2.5 MG/3ML
2.5 SOLUTION RESPIRATORY (INHALATION)
Status: DISCONTINUED | OUTPATIENT
Start: 2018-01-01 | End: 2018-01-01

## 2018-01-01 RX ORDER — SODIUM CHLORIDE 9 MG/ML
250 INJECTION, SOLUTION INTRAVENOUS ONCE
Status: COMPLETED | OUTPATIENT
Start: 2018-01-01 | End: 2018-01-01

## 2018-01-01 RX ORDER — POTASSIUM CHLORIDE 20 MEQ/1
40 TABLET, EXTENDED RELEASE ORAL ONCE
Status: COMPLETED | OUTPATIENT
Start: 2018-01-01 | End: 2018-01-01

## 2018-01-01 RX ORDER — CETIRIZINE HYDROCHLORIDE 10 MG/1
10 TABLET ORAL DAILY PRN
Status: DISCONTINUED | OUTPATIENT
Start: 2018-01-01 | End: 2018-01-01

## 2018-01-01 RX ORDER — CARVEDILOL 12.5 MG/1
12.5 TABLET ORAL 2 TIMES DAILY WITH MEALS
Status: DISCONTINUED | OUTPATIENT
Start: 2018-01-01 | End: 2018-01-01

## 2018-01-01 RX ORDER — TRAMADOL HYDROCHLORIDE 50 MG/1
TABLET ORAL EVERY 4 HOURS PRN
Qty: 10 TABLET | Refills: 0 | Status: SHIPPED | OUTPATIENT
Start: 2018-01-01 | End: 2018-01-01

## 2018-01-01 RX ORDER — BACITRACIN 50000 [USP'U]/1
INJECTION, POWDER, LYOPHILIZED, FOR SOLUTION INTRAMUSCULAR
Status: COMPLETED
Start: 2018-01-01 | End: 2018-01-01

## 2018-01-01 RX ORDER — METOCLOPRAMIDE HYDROCHLORIDE 5 MG/ML
10 INJECTION INTRAMUSCULAR; INTRAVENOUS EVERY 6 HOURS PRN
Status: DISCONTINUED | OUTPATIENT
Start: 2018-01-01 | End: 2018-01-01

## 2018-01-01 RX ORDER — ENALAPRIL MALEATE 2.5 MG/1
2.5 TABLET ORAL 2 TIMES DAILY
Status: DISCONTINUED | OUTPATIENT
Start: 2018-01-01 | End: 2018-01-01

## 2018-01-01 RX ORDER — FUROSEMIDE 10 MG/ML
40 INJECTION INTRAMUSCULAR; INTRAVENOUS ONCE
Status: COMPLETED | OUTPATIENT
Start: 2018-01-01 | End: 2018-01-01

## 2018-01-01 RX ORDER — ACETAMINOPHEN AND CODEINE PHOSPHATE 300; 30 MG/1; MG/1
2 TABLET ORAL EVERY 4 HOURS PRN
Status: DISCONTINUED | OUTPATIENT
Start: 2018-01-01 | End: 2018-01-01

## 2018-01-01 RX ORDER — WARFARIN SODIUM 7.5 MG/1
7.5 TABLET ORAL NIGHTLY
Status: DISCONTINUED | OUTPATIENT
Start: 2018-01-01 | End: 2018-01-01

## 2018-01-01 RX ORDER — OLOPATADINE HYDROCHLORIDE 1 MG/ML
1 SOLUTION/ DROPS OPHTHALMIC AS NEEDED
Status: DISCONTINUED | OUTPATIENT
Start: 2018-01-01 | End: 2018-01-01 | Stop reason: RX

## 2018-01-01 RX ORDER — HEPARIN SODIUM AND DEXTROSE 10000; 5 [USP'U]/100ML; G/100ML
12 INJECTION INTRAVENOUS ONCE
Status: COMPLETED | OUTPATIENT
Start: 2018-01-01 | End: 2018-01-01

## 2018-01-01 RX ORDER — EPHEDRINE SULFATE 50 MG/ML
INJECTION, SOLUTION INTRAVENOUS AS NEEDED
Status: DISCONTINUED | OUTPATIENT
Start: 2018-01-01 | End: 2018-01-01 | Stop reason: SURG

## 2018-01-01 RX ORDER — 3% SODIUM CHLORIDE 3 G/100ML
INJECTION, SOLUTION INTRAVENOUS CONTINUOUS
Status: DISPENSED | OUTPATIENT
Start: 2018-01-01 | End: 2018-01-01

## 2018-01-01 RX ORDER — ECHINACEA PURPUREA EXTRACT 125 MG
1 TABLET ORAL
Status: DISCONTINUED | OUTPATIENT
Start: 2018-01-01 | End: 2018-01-01

## 2018-01-01 RX ORDER — SPIRONOLACTONE 25 MG/1
12.5 TABLET ORAL DAILY
Status: DISCONTINUED | OUTPATIENT
Start: 2018-01-01 | End: 2018-01-01

## 2018-01-01 RX ORDER — FUROSEMIDE 10 MG/ML
40 INJECTION INTRAMUSCULAR; INTRAVENOUS
Status: DISCONTINUED | OUTPATIENT
Start: 2018-01-01 | End: 2018-01-01

## 2018-01-01 RX ORDER — TRAMADOL HYDROCHLORIDE 50 MG/1
50 TABLET ORAL EVERY 12 HOURS PRN
Qty: 20 TABLET | Refills: 0 | OUTPATIENT
Start: 2018-01-01 | End: 2018-01-01

## 2018-01-01 RX ORDER — SODIUM PHOSPHATE, DIBASIC AND SODIUM PHOSPHATE, MONOBASIC 7; 19 G/133ML; G/133ML
1 ENEMA RECTAL ONCE AS NEEDED
Status: DISCONTINUED | OUTPATIENT
Start: 2018-01-01 | End: 2018-01-01

## 2018-01-01 RX ORDER — ONDANSETRON 2 MG/ML
4 INJECTION INTRAMUSCULAR; INTRAVENOUS ONCE AS NEEDED
Status: ACTIVE | OUTPATIENT
Start: 2018-01-01 | End: 2018-01-01

## 2018-01-01 RX ORDER — MORPHINE SULFATE 2 MG/ML
0.5 INJECTION, SOLUTION INTRAMUSCULAR; INTRAVENOUS EVERY 2 HOUR PRN
Status: DISCONTINUED | OUTPATIENT
Start: 2018-01-01 | End: 2018-01-01

## 2018-01-01 RX ORDER — LISINOPRIL 5 MG/1
2.5 TABLET ORAL DAILY
Status: DISCONTINUED | OUTPATIENT
Start: 2018-01-01 | End: 2018-01-01

## 2018-01-01 RX ORDER — CEFAZOLIN SODIUM/WATER 2 G/20 ML
SYRINGE (ML) INTRAVENOUS
Status: COMPLETED
Start: 2018-01-01 | End: 2018-01-01

## 2018-01-01 RX ORDER — WARFARIN SODIUM 4 MG/1
4 TABLET ORAL NIGHTLY
Status: DISCONTINUED | OUTPATIENT
Start: 2018-01-01 | End: 2018-01-01

## 2018-01-01 RX ORDER — ALBUTEROL SULFATE 90 UG/1
2 AEROSOL, METERED RESPIRATORY (INHALATION) AS NEEDED
Status: DISCONTINUED | OUTPATIENT
Start: 2018-01-01 | End: 2018-01-01

## 2018-01-01 RX ORDER — DEXTROSE MONOHYDRATE 25 G/50ML
50 INJECTION, SOLUTION INTRAVENOUS
Status: DISCONTINUED | OUTPATIENT
Start: 2018-01-01 | End: 2018-01-01

## 2018-01-01 RX ORDER — MORPHINE SULFATE 2 MG/ML
1 INJECTION, SOLUTION INTRAMUSCULAR; INTRAVENOUS ONCE
Status: COMPLETED | OUTPATIENT
Start: 2018-01-01 | End: 2018-01-01

## 2018-01-01 RX ORDER — LORAZEPAM 2 MG/ML
1 INJECTION INTRAMUSCULAR EVERY 8 HOURS PRN
Status: DISCONTINUED | OUTPATIENT
Start: 2018-01-01 | End: 2018-01-01

## 2018-01-01 RX ORDER — MORPHINE SULFATE 4 MG/ML
1 INJECTION, SOLUTION INTRAMUSCULAR; INTRAVENOUS EVERY 2 HOUR PRN
Status: DISCONTINUED | OUTPATIENT
Start: 2018-01-01 | End: 2018-01-01

## 2018-01-01 RX ORDER — ARIPIPRAZOLE 15 MG/1
40 TABLET ORAL ONCE
Status: COMPLETED | OUTPATIENT
Start: 2018-01-01 | End: 2018-01-01

## 2018-01-01 RX ORDER — POTASSIUM CHLORIDE 14.9 MG/ML
INJECTION INTRAVENOUS
Status: COMPLETED
Start: 2018-01-01 | End: 2018-01-01

## 2018-01-01 RX ORDER — ASPIRIN 81 MG/1
TABLET, CHEWABLE ORAL
Status: COMPLETED
Start: 2018-01-01 | End: 2018-01-01

## 2018-01-01 RX ORDER — ASPIRIN 325 MG
325 TABLET ORAL DAILY
Status: DISCONTINUED | OUTPATIENT
Start: 2018-01-01 | End: 2018-01-01

## 2018-01-01 RX ORDER — TRAMADOL HYDROCHLORIDE 50 MG/1
TABLET ORAL
Qty: 20 TABLET | Refills: 0 | Status: CANCELLED
Start: 2018-01-01

## 2018-01-01 RX ORDER — WARFARIN SODIUM 4 MG/1
4 TABLET ORAL DAILY
Status: ON HOLD | COMMUNITY
End: 2018-01-01

## 2018-01-01 RX ORDER — CLOTRIMAZOLE AND BETAMETHASONE DIPROPIONATE 10; .64 MG/G; MG/G
1 CREAM TOPICAL 2 TIMES DAILY
Status: DISCONTINUED | OUTPATIENT
Start: 2018-01-01 | End: 2018-01-01

## 2018-01-01 RX ORDER — SODIUM PHOSPHATE, DIBASIC AND SODIUM PHOSPHATE, MONOBASIC 7; 19 G/133ML; G/133ML
1 ENEMA RECTAL ONCE AS NEEDED
Status: COMPLETED | OUTPATIENT
Start: 2018-01-01 | End: 2018-01-01

## 2018-01-01 RX ORDER — MOMETASONE FUROATE AND FORMOTEROL FUMARATE DIHYDRATE 200; 5 UG/1; UG/1
AEROSOL RESPIRATORY (INHALATION)
Qty: 39 G | Refills: 0 | Status: ON HOLD | OUTPATIENT
Start: 2018-01-01 | End: 2018-01-01

## 2018-01-01 RX ORDER — MORPHINE SULFATE 2 MG/ML
INJECTION, SOLUTION INTRAMUSCULAR; INTRAVENOUS
Status: COMPLETED
Start: 2018-01-01 | End: 2018-01-01

## 2018-01-01 RX ORDER — WARFARIN SODIUM 5 MG/1
5 TABLET ORAL NIGHTLY
Status: DISCONTINUED | OUTPATIENT
Start: 2018-01-01 | End: 2018-01-01

## 2018-01-01 RX ORDER — ETOMIDATE 2 MG/ML
14 INJECTION INTRAVENOUS ONCE
Status: COMPLETED | OUTPATIENT
Start: 2018-01-01 | End: 2018-01-01

## 2018-01-01 RX ORDER — METHOCARBAMOL 500 MG/1
500 TABLET, FILM COATED ORAL 3 TIMES DAILY
Status: DISCONTINUED | OUTPATIENT
Start: 2018-01-01 | End: 2018-01-01

## 2018-01-01 RX ORDER — MORPHINE SULFATE 20 MG/ML
2.5 SOLUTION ORAL
Status: DISCONTINUED | OUTPATIENT
Start: 2018-01-01 | End: 2018-01-01

## 2018-01-01 RX ORDER — TRAMADOL HYDROCHLORIDE 50 MG/1
50 TABLET ORAL EVERY 12 HOURS PRN
Status: DISCONTINUED | OUTPATIENT
Start: 2018-01-01 | End: 2018-01-01

## 2018-01-01 RX ORDER — CARVEDILOL 6.25 MG/1
6.26 TABLET ORAL 2 TIMES DAILY WITH MEALS
Status: DISCONTINUED | OUTPATIENT
Start: 2018-01-01 | End: 2018-01-01

## 2018-01-01 RX ORDER — ATROPINE SULFATE 10 MG/ML
2 SOLUTION/ DROPS OPHTHALMIC EVERY 2 HOUR PRN
Status: DISCONTINUED | OUTPATIENT
Start: 2018-01-01 | End: 2018-01-01

## 2018-01-01 RX ORDER — NALOXONE HYDROCHLORIDE 0.4 MG/ML
80 INJECTION, SOLUTION INTRAMUSCULAR; INTRAVENOUS; SUBCUTANEOUS AS NEEDED
Status: ACTIVE | OUTPATIENT
Start: 2018-01-01 | End: 2018-01-01

## 2018-01-01 RX ORDER — METHOCARBAMOL 500 MG/1
TABLET, FILM COATED ORAL
Qty: 360 TABLET | Refills: 11 | Status: ON HOLD | OUTPATIENT
Start: 2018-01-01 | End: 2018-01-01

## 2018-01-01 RX ORDER — ALBUTEROL SULFATE 90 UG/1
2 AEROSOL, METERED RESPIRATORY (INHALATION) EVERY 6 HOURS PRN
Status: DISCONTINUED | OUTPATIENT
Start: 2018-01-01 | End: 2018-01-01

## 2018-01-01 RX ORDER — CETIRIZINE HYDROCHLORIDE 10 MG/1
10 TABLET ORAL
Status: DISCONTINUED | OUTPATIENT
Start: 2018-01-01 | End: 2018-01-01

## 2018-01-01 RX ORDER — SPIRONOLACTONE 25 MG/1
12.5 TABLET ORAL DAILY
Qty: 30 TABLET | Refills: 0 | Status: ON HOLD
Start: 2018-01-01 | End: 2018-01-01

## 2018-01-01 RX ORDER — LIDOCAINE 50 MG/G
OINTMENT TOPICAL AS NEEDED
Status: CANCELLED | OUTPATIENT
Start: 2018-01-01

## 2018-01-01 RX ORDER — CHLORHEXIDINE GLUCONATE 4 G/100ML
30 SOLUTION TOPICAL
Status: DISCONTINUED | OUTPATIENT
Start: 2018-01-01 | End: 2018-01-01

## 2018-01-01 RX ORDER — ALBUMIN (HUMAN) 12.5 G/50ML
12.5 SOLUTION INTRAVENOUS EVERY 8 HOURS
Status: COMPLETED | OUTPATIENT
Start: 2018-01-01 | End: 2018-01-01

## 2018-01-01 RX ORDER — GLYCOPYRROLATE 0.2 MG/ML
0.4 INJECTION, SOLUTION INTRAMUSCULAR; INTRAVENOUS
Status: DISCONTINUED | OUTPATIENT
Start: 2018-01-01 | End: 2018-01-01

## 2018-01-01 RX ORDER — LISINOPRIL 10 MG/1
10 TABLET ORAL DAILY
Status: DISCONTINUED | OUTPATIENT
Start: 2018-01-01 | End: 2018-01-01

## 2018-01-01 RX ORDER — ASPIRIN 81 MG/1
81 TABLET ORAL DAILY
Status: ON HOLD | COMMUNITY
End: 2018-01-01

## 2018-01-01 RX ORDER — ONDANSETRON 2 MG/ML
4 INJECTION INTRAMUSCULAR; INTRAVENOUS EVERY 4 HOURS PRN
Status: DISCONTINUED | OUTPATIENT
Start: 2018-01-01 | End: 2018-01-01

## 2018-01-01 RX ORDER — DEXTROSE MONOHYDRATE 50 MG/ML
INJECTION, SOLUTION INTRAVENOUS
Status: COMPLETED
Start: 2018-01-01 | End: 2018-01-01

## 2018-01-01 RX ORDER — CEFAZOLIN SODIUM/WATER 2 G/20 ML
SYRINGE (ML) INTRAVENOUS AS NEEDED
Status: DISCONTINUED | OUTPATIENT
Start: 2018-01-01 | End: 2018-01-01 | Stop reason: SURG

## 2018-01-01 RX ORDER — ASPIRIN 81 MG/1
324 TABLET, CHEWABLE ORAL ONCE
Status: DISCONTINUED | OUTPATIENT
Start: 2018-01-01 | End: 2018-01-01

## 2018-01-01 RX ORDER — LISINOPRIL 5 MG/1
TABLET ORAL
Qty: 45 TABLET | Refills: 11 | Status: ON HOLD | OUTPATIENT
Start: 2018-01-01 | End: 2018-01-01

## 2018-01-01 RX ORDER — ECHINACEA PURPUREA EXTRACT 125 MG
3 TABLET ORAL NIGHTLY
Status: DISCONTINUED | OUTPATIENT
Start: 2018-01-01 | End: 2018-01-01

## 2018-01-01 RX ORDER — MORPHINE SULFATE 2 MG/ML
4 INJECTION, SOLUTION INTRAMUSCULAR; INTRAVENOUS EVERY 2 HOUR PRN
Status: DISCONTINUED | OUTPATIENT
Start: 2018-01-01 | End: 2018-01-01

## 2018-01-01 RX ORDER — WARFARIN SODIUM 5 MG/1
7.5 TABLET ORAL EVERY EVENING
Qty: 135 TABLET | Refills: 0 | Status: ON HOLD | OUTPATIENT
Start: 2018-01-01 | End: 2018-01-01 | Stop reason: DRUGHIGH

## 2018-01-01 RX ORDER — ARIPIPRAZOLE 15 MG/1
40 TABLET ORAL EVERY 4 HOURS
Status: DISCONTINUED | OUTPATIENT
Start: 2018-01-01 | End: 2018-01-01

## 2018-01-01 RX ORDER — ENOXAPARIN SODIUM 100 MG/ML
40 INJECTION SUBCUTANEOUS DAILY
Status: DISCONTINUED | OUTPATIENT
Start: 2018-01-01 | End: 2018-01-01

## 2018-01-01 RX ORDER — ALBUTEROL SULFATE 90 UG/1
2 AEROSOL, METERED RESPIRATORY (INHALATION) EVERY 4 HOURS PRN
Status: DISCONTINUED | OUTPATIENT
Start: 2018-01-01 | End: 2018-01-01

## 2018-01-01 RX ORDER — PROTAMINE SULFATE 10 MG/ML
INJECTION, SOLUTION INTRAVENOUS AS NEEDED
Status: DISCONTINUED | OUTPATIENT
Start: 2018-01-01 | End: 2018-01-01 | Stop reason: SURG

## 2018-01-01 RX ORDER — WARFARIN SODIUM 7.5 MG/1
7.5 TABLET ORAL EVERY EVENING
Status: DISCONTINUED | OUTPATIENT
Start: 2018-01-01 | End: 2018-01-01

## 2018-01-01 RX ORDER — HALOPERIDOL 5 MG/ML
2 INJECTION INTRAMUSCULAR
Status: DISCONTINUED | OUTPATIENT
Start: 2018-01-01 | End: 2018-01-01

## 2018-01-01 RX ORDER — ALBUMIN (HUMAN) 12.5 G/50ML
12.5 SOLUTION INTRAVENOUS EVERY 12 HOURS
Status: COMPLETED | OUTPATIENT
Start: 2018-01-01 | End: 2018-01-01

## 2018-01-01 RX ORDER — SODIUM CHLORIDE, SODIUM LACTATE, POTASSIUM CHLORIDE, CALCIUM CHLORIDE 600; 310; 30; 20 MG/100ML; MG/100ML; MG/100ML; MG/100ML
INJECTION, SOLUTION INTRAVENOUS CONTINUOUS
Status: DISCONTINUED | OUTPATIENT
Start: 2018-01-01 | End: 2018-01-01

## 2018-01-01 RX ORDER — CLOPIDOGREL BISULFATE 75 MG/1
TABLET ORAL
Status: COMPLETED
Start: 2018-01-01 | End: 2018-01-01

## 2018-01-01 RX ORDER — DOPAMINE HYDROCHLORIDE 320 MG/100ML
INJECTION, SOLUTION INTRAVENOUS
Status: DISCONTINUED
Start: 2018-01-01 | End: 2018-01-01 | Stop reason: WASHOUT

## 2018-01-01 RX ORDER — HYDROCODONE BITARTRATE AND ACETAMINOPHEN 5; 325 MG/1; MG/1
1 TABLET ORAL EVERY 6 HOURS PRN
Status: DISCONTINUED | OUTPATIENT
Start: 2018-01-01 | End: 2018-01-01

## 2018-01-01 RX ORDER — NITROGLYCERIN 20 MG/100ML
5 INJECTION INTRAVENOUS CONTINUOUS
Status: DISCONTINUED | OUTPATIENT
Start: 2018-01-01 | End: 2018-01-01

## 2018-01-01 RX ORDER — LISINOPRIL 5 MG/1
5 TABLET ORAL DAILY
Qty: 30 TABLET | Refills: 0 | Status: ON HOLD
Start: 2018-01-01 | End: 2018-01-01

## 2018-01-01 RX ORDER — AZITHROMYCIN 250 MG/1
500 TABLET, FILM COATED ORAL DAILY
Status: DISPENSED | OUTPATIENT
Start: 2018-01-01 | End: 2018-01-01

## 2018-01-01 RX ORDER — ACETAMINOPHEN AND CODEINE PHOSPHATE 300; 30 MG/1; MG/1
1 TABLET ORAL 2 TIMES DAILY PRN
Qty: 30 TABLET | Refills: 0 | Status: ON HOLD | OUTPATIENT
Start: 2018-01-01 | End: 2018-01-01

## 2018-01-01 RX ORDER — CEPHALEXIN 500 MG/1
500 CAPSULE ORAL 4 TIMES DAILY
COMMUNITY
End: 2018-01-01 | Stop reason: ALTCHOICE

## 2018-01-01 RX ORDER — MIRTAZAPINE 15 MG/1
15 TABLET, FILM COATED ORAL NIGHTLY
Status: DISCONTINUED | OUTPATIENT
Start: 2018-01-01 | End: 2018-01-01

## 2018-01-01 RX ORDER — CEPHALEXIN 500 MG/1
500 CAPSULE ORAL 4 TIMES DAILY
Qty: 20 CAPSULE | Refills: 0 | Status: SHIPPED | OUTPATIENT
Start: 2018-01-01 | End: 2018-01-01

## 2018-01-01 RX ORDER — WARFARIN SODIUM 7.5 MG/1
7.5 TABLET ORAL WEEKLY
Status: DISCONTINUED | OUTPATIENT
Start: 2018-01-01 | End: 2018-01-01

## 2018-01-01 RX ORDER — ALPRAZOLAM 0.5 MG/1
0.5 TABLET ORAL NIGHTLY
Status: ON HOLD | COMMUNITY
End: 2018-01-01

## 2018-01-01 RX ORDER — MORPHINE SULFATE 4 MG/ML
2 INJECTION, SOLUTION INTRAMUSCULAR; INTRAVENOUS EVERY 2 HOUR PRN
Status: DISCONTINUED | OUTPATIENT
Start: 2018-01-01 | End: 2018-01-01

## 2018-01-01 RX ORDER — BUMETANIDE 0.25 MG/ML
1 INJECTION, SOLUTION INTRAMUSCULAR; INTRAVENOUS ONCE
Status: DISCONTINUED | OUTPATIENT
Start: 2018-01-01 | End: 2018-01-01

## 2018-01-01 RX ORDER — FUROSEMIDE 20 MG/1
20 TABLET ORAL DAILY
Qty: 30 TABLET | Refills: 0 | Status: ON HOLD
Start: 2018-01-01 | End: 2018-01-01

## 2018-01-01 RX ORDER — SODIUM CHLORIDE 1000 MG
2 TABLET, SOLUBLE MISCELLANEOUS ONCE
Status: DISCONTINUED | OUTPATIENT
Start: 2018-01-01 | End: 2018-01-01

## 2018-01-01 RX ORDER — METHOCARBAMOL 500 MG/1
500 TABLET, FILM COATED ORAL 3 TIMES DAILY PRN
Status: DISCONTINUED | OUTPATIENT
Start: 2018-01-01 | End: 2018-01-01

## 2018-01-01 RX ORDER — MAGNESIUM SULFATE HEPTAHYDRATE 40 MG/ML
2 INJECTION, SOLUTION INTRAVENOUS ONCE
Status: DISCONTINUED | OUTPATIENT
Start: 2018-01-01 | End: 2018-01-01

## 2018-01-01 RX ORDER — SUFENTANIL CITRATE 50 UG/ML
INJECTION EPIDURAL; INTRAVENOUS AS NEEDED
Status: DISCONTINUED | OUTPATIENT
Start: 2018-01-01 | End: 2018-01-01 | Stop reason: SURG

## 2018-01-01 RX ORDER — CLOTRIMAZOLE 1 %
1 CREAM (GRAM) TOPICAL 2 TIMES DAILY
Qty: 60 G | Refills: 0 | Status: SHIPPED | OUTPATIENT
Start: 2018-01-01 | End: 2018-01-01

## 2018-01-01 RX ORDER — MIDAZOLAM HYDROCHLORIDE 1 MG/ML
1 INJECTION INTRAMUSCULAR; INTRAVENOUS EVERY 5 MIN PRN
Status: DISCONTINUED | OUTPATIENT
Start: 2018-01-01 | End: 2018-01-01

## 2018-01-01 RX ORDER — WARFARIN SODIUM 4 MG/1
4 TABLET ORAL DAILY
Status: DISCONTINUED | OUTPATIENT
Start: 2018-01-01 | End: 2018-01-01

## 2018-01-01 RX ORDER — POLYETHYLENE GLYCOL 3350, SODIUM CHLORIDE, POTASSIUM CHLORIDE, SODIUM BICARBONATE, AND SODIUM SULFATE 240; 5.84; 2.98; 6.72; 22.72 G/4L; G/4L; G/4L; G/4L; G/4L
POWDER, FOR SOLUTION ORAL
Refills: 0 | COMMUNITY
Start: 2018-01-01 | End: 2018-01-01

## 2018-01-01 RX ADMIN — MIDAZOLAM HYDROCHLORIDE 2 MG: 1 INJECTION INTRAMUSCULAR; INTRAVENOUS at 13:47:00

## 2018-01-01 RX ADMIN — PHENYLEPHRINE HCL 100 MCG: 10 MG/ML VIAL (ML) INJECTION at 15:56:00

## 2018-01-01 RX ADMIN — MIDAZOLAM HYDROCHLORIDE 2 MG: 1 INJECTION INTRAMUSCULAR; INTRAVENOUS at 13:46:00

## 2018-01-01 RX ADMIN — SUFENTANIL CITRATE 20 MCG: 50 INJECTION EPIDURAL; INTRAVENOUS at 13:47:00

## 2018-01-01 RX ADMIN — CEFAZOLIN SODIUM/WATER 2 G: 2 G/20 ML SYRINGE (ML) INTRAVENOUS at 14:00:00

## 2018-01-01 RX ADMIN — PHENYLEPHRINE HCL 100 MCG: 10 MG/ML VIAL (ML) INJECTION at 15:01:00

## 2018-01-01 RX ADMIN — SUFENTANIL CITRATE 30 MCG: 50 INJECTION EPIDURAL; INTRAVENOUS at 13:46:00

## 2018-01-01 RX ADMIN — ROCURONIUM BROMIDE 30 MG: 10 INJECTION, SOLUTION INTRAVENOUS at 11:09:00

## 2018-01-01 RX ADMIN — PHENYLEPHRINE HCL 100 MCG: 10 MG/ML VIAL (ML) INJECTION at 15:51:00

## 2018-01-01 RX ADMIN — SODIUM CHLORIDE: 9 INJECTION, SOLUTION INTRAVENOUS at 14:25:00

## 2018-01-01 RX ADMIN — VECURONIUM BROMIDE 8 MG: 1 INJECTION, POWDER, LYOPHILIZED, FOR SOLUTION INTRAVENOUS at 13:48:00

## 2018-01-01 RX ADMIN — EPHEDRINE SULFATE 5 MG: 50 INJECTION, SOLUTION INTRAVENOUS at 13:57:00

## 2018-01-01 RX ADMIN — EPHEDRINE SULFATE 5 MG: 50 INJECTION, SOLUTION INTRAVENOUS at 14:58:00

## 2018-01-01 RX ADMIN — GLYCOPYRROLATE 0.2 MG: 0.2 INJECTION INTRAMUSCULAR; INTRAVENOUS at 13:46:00

## 2018-01-01 RX ADMIN — PHENYLEPHRINE HCL 100 MCG: 10 MG/ML VIAL (ML) INJECTION at 15:23:00

## 2018-01-01 RX ADMIN — EPHEDRINE SULFATE 5 MG: 50 INJECTION, SOLUTION INTRAVENOUS at 14:13:00

## 2018-01-01 RX ADMIN — PHENYLEPHRINE HCL 100 MCG: 10 MG/ML VIAL (ML) INJECTION at 14:03:00

## 2018-01-01 RX ADMIN — ETOMIDATE 10 MG: 2 INJECTION INTRAVENOUS at 18:20:00

## 2018-01-01 RX ADMIN — SODIUM CHLORIDE: 9 INJECTION, SOLUTION INTRAVENOUS at 15:31:00

## 2018-01-01 RX ADMIN — PHENYLEPHRINE HCL 100 MCG: 10 MG/ML VIAL (ML) INJECTION at 15:42:00

## 2018-01-01 RX ADMIN — MIDAZOLAM HYDROCHLORIDE 2 MG: 1 INJECTION INTRAMUSCULAR; INTRAVENOUS at 10:40:00

## 2018-01-01 RX ADMIN — EPHEDRINE SULFATE 5 MG: 50 INJECTION, SOLUTION INTRAVENOUS at 14:25:00

## 2018-01-01 RX ADMIN — VECURONIUM BROMIDE 2 MG: 1 INJECTION, POWDER, LYOPHILIZED, FOR SOLUTION INTRAVENOUS at 14:35:00

## 2018-01-01 RX ADMIN — EPHEDRINE SULFATE 10 MG: 50 INJECTION, SOLUTION INTRAVENOUS at 13:51:00

## 2018-01-01 RX ADMIN — PHENYLEPHRINE HCL 100 MCG: 10 MG/ML VIAL (ML) INJECTION at 13:51:00

## 2018-01-01 RX ADMIN — PHENYLEPHRINE HCL 100 MCG: 10 MG/ML VIAL (ML) INJECTION at 14:30:00

## 2018-01-01 RX ADMIN — PHENYLEPHRINE HCL 100 MCG: 10 MG/ML VIAL (ML) INJECTION at 14:58:00

## 2018-01-01 RX ADMIN — SODIUM CHLORIDE: 9 INJECTION, SOLUTION INTRAVENOUS at 15:42:00

## 2018-01-01 RX ADMIN — PHENYLEPHRINE HCL 200 MCG: 10 MG/ML VIAL (ML) INJECTION at 16:13:00

## 2018-01-01 RX ADMIN — EPHEDRINE SULFATE 5 MG: 50 INJECTION, SOLUTION INTRAVENOUS at 15:01:00

## 2018-01-01 RX ADMIN — MIDAZOLAM HYDROCHLORIDE 1 MG: 1 INJECTION INTRAMUSCULAR; INTRAVENOUS at 15:11:00

## 2018-01-01 RX ADMIN — PROTAMINE SULFATE 25 MG: 10 INJECTION, SOLUTION INTRAVENOUS at 16:57:00

## 2018-01-01 RX ADMIN — ROCURONIUM BROMIDE 20 MG: 10 INJECTION, SOLUTION INTRAVENOUS at 11:22:00

## 2018-01-01 RX ADMIN — HEPARIN SODIUM 8000 UNITS: 1000 INJECTION, SOLUTION INTRAVENOUS; SUBCUTANEOUS at 14:45:00

## 2018-01-01 RX ADMIN — SODIUM CHLORIDE: 9 INJECTION, SOLUTION INTRAVENOUS at 13:37:00

## 2018-01-01 RX ADMIN — PHENYLEPHRINE HCL 100 MCG: 10 MG/ML VIAL (ML) INJECTION at 14:49:00

## 2018-01-01 RX ADMIN — PHENYLEPHRINE HCL 200 MCG: 10 MG/ML VIAL (ML) INJECTION at 16:09:00

## 2018-01-01 RX ADMIN — SUFENTANIL CITRATE 10 MCG: 50 INJECTION EPIDURAL; INTRAVENOUS at 14:20:00

## 2018-01-01 RX ADMIN — SODIUM CHLORIDE: 9 INJECTION, SOLUTION INTRAVENOUS at 15:43:00

## 2018-01-01 RX ADMIN — PHENYLEPHRINE HCL 200 MCG: 10 MG/ML VIAL (ML) INJECTION at 16:10:00

## 2018-01-18 NOTE — TELEPHONE ENCOUNTER
From: Tony Schulte  To: Pastora Rivera DO  Sent: 1/18/2018 10:21 AM CST  Subject: Prescription Question    Pls contact [place] [phone] and give them an ok to refill my Wafarin 7.5 mg 3x a week. (Other 4 days 5 mg- don't need yet.)  Last INR 2.5.

## 2018-01-18 NOTE — PROGRESS NOTES
Spoke to [facility] at length about CCM, HIPAA verified, current care plan and performed CCM assessment.   Patient Active Problem List:     Type II diabetes mellitus (HCC)     Asthma     Hypertension, benign     Coronary atherosclerosis     Major depressive disor timings etc as she had a few episodes of waking up with sugars in the 30-40's. She said she \" always' is awakened if her sugars are low. She keeps glucose tablets, gel, liquid by her bedside.      Sleep- She said she took herself off the Xanax herself 'col medications and denies experiencing any side effects. Care Team: Reviewed and updated.     Your appointments     Date & Time Appointment Department [facility])    Feb 08, 2018  3:00 PM CST Follow Up Visit with Jw Neumann think is a barrier or something that may be stopping you from doing this? I dont' think anything. How can I help you achieve your goals? Support me and guide me, but also send me the info we talked about.        Care Plan: Reviewed and updated where need

## 2018-02-06 NOTE — TELEPHONE ENCOUNTER
The office staff called and stated the patient showed up at the office wanting a urinalysis. I called the patient and left a message. Dr. Jude Marinelli is placing in an order for a urinalysis.     LMTCB we need to triage

## 2018-02-07 NOTE — TELEPHONE ENCOUNTER
Pt is calling to speak to Marcelina Luna. She states she needs to speak to her in regards of taking antibiotic.   pls advise. She is requesting a call back soon.  Thank you

## 2018-02-11 NOTE — ED NOTES
Pt to ED per lower abd discomfort and diarrhea. Pt has been on Cipro since Sunday for UTI, pt states she was constipated and so she took a laxative last night, now every time she urinates, she has loose stool starting today.  Pt is unsure if she should cont

## 2018-02-11 NOTE — ED PROVIDER NOTES
Patient Seen in: [facility] Emergency Department    History   Patient presents with:  Diarrhea      HPI    Patient presents to the ED complaining of intermittent diarrhea after taking several laxatives to induce a bowel movement after feeling cons 2010    Post Polio   • PE (pulmonary embolism)    • Peripheral vascular disease ([facility] 2008   • Pneumonia due to organism    • Polio 1951    As Infant   • Pulmonary embolism ([facility] [facility] 1987   • Sacroiliitis ([facility]    • Stented coronary artery    • Stroke (cerebru of children:               Social History Main Topics    Smoking status: Never Smoker                                                                Smokeless tobacco: Never Used                        Alcohol use:  No              Drug use: No            O reviewed.       ED Course        Labs Reviewed   URINALYSIS WITH CULTURE REFLEX - Abnormal; Notable for the following:        Result Value    Clarity Urine Hazy (*)     Glucose Urine >=500 (*)     Ascorbic Acid Urine 40  (*)     All other components within coronary artery; Mitral insufficiency; Cellulitis and abscess of foot; Cellulitis of foot; Hyperglycemia; Vaginal irritation; Arterial insufficiency of lower extremity ([address]  Cellulitis of left foot; Type 1 diabetes mellitus with mild nonproliferative reti

## 2018-02-14 ENCOUNTER — PRIOR ORIGINAL RECORDS (OUTPATIENT)
Dept: OTHER | Age: 68
End: 2018-02-14

## 2018-02-15 ENCOUNTER — PRIOR ORIGINAL RECORDS (OUTPATIENT)
Dept: OTHER | Age: 68
End: 2018-02-15

## 2018-02-15 LAB
ALBUMIN: 3.6 G/DL
ALKALINE PHOSPHATATE(ALK PHOS): 45 IU/L
ALT (SGPT): 55 U/L
AST (SGOT): 45 U/L
BILIRUBIN TOTAL: 0.5 MG/DL
BUN: 13 MG/DL
CALCIUM: 8.9 MG/DL
CHLORIDE: 102 MEQ/L
CHOLESTEROL, TOTAL: 130 MG/DL
CREATININE KINASE: 20 U/L
CREATININE, SERUM: 0.52 MG/DL
GLOBULIN: 3.2 G/DL
GLUCOSE: 185 MG/DL
GLUCOSE: 185 MG/DL
HDL CHOLESTEROL: 62 MG/DL
LDL CHOLESTEROL: 58 MG/DL
POTASSIUM, SERUM: 3.9 MEQ/L
PROTEIN, TOTAL: 6.8 G/DL
SGOT (AST): 45 IU/L
SGPT (ALT): 55 IU/L
SODIUM: 138 MEQ/L
TRIGLYCERIDES: 49 MG/DL

## 2018-02-15 NOTE — PROGRESS NOTES
Spoke to Carlitos Patino, HIPAA verified for CCM call.     Medical History  Patient Active Problem List:     Type II diabetes mellitus (HCC)     Asthma     Hypertension, benign     Coronary atherosclerosis     Major depressive disorder, single episode, moderate ([facility] Care A1C due on 10/31/2018  Diabetes Care Dilated Eye Exam due on 11/21/2018  Annual Depression Screen due on 12/04/2018  Fall Risk Screening due on 02/10/2019  LDL Control due on 02/14/2019    Current issues:   INR- pt is taking her coumadin and she tests caffeine, no eating/drinking before bed. Emptying her bladder. Darkened cool room. No electronics or blue light before bed. Patient agrees to goal action plan.   Self-Management Abilities (patient reported)             (1) least confident in ach acknowledgment and validation to patient's concerns. Monthly Minute Total including today: 49       Physical assessment, complete health history, and need for CCM established by Power Contreras.  DO Chris.

## 2018-02-16 NOTE — TELEPHONE ENCOUNTER
Last Procedure:  1/27/15 Colonoscopy  Last Diagnosis:  S/P colonoscopy with polypectomy x 3, diverticular disease, internal hemorrhoids  Recalled for (years): 3 yeaes  Sedation used previously:IV    Last Prep Used (if known):    Quality of prep (if known):

## 2018-02-16 NOTE — TELEPHONE ENCOUNTER
Chart and medications reviewed. Okay to schedule colonoscopy, diagnosis history of colon polyps split dose GoLYTELY preparation. Hold Coumadin for 5 days prior to the procedure. Hold warfarin for 5 days prior to the procedure if okay with cardiology.   H

## 2018-02-19 NOTE — PROGRESS NOTES
Having trouble regulating the sugars  \"I felt like it was low this morning. I checked it and it was 186, it wasn't low. I get that sensation 2 times a week. \"  lantus 5 u daily  6 units is too much  (\"I'll below sometimes in the morning. \")  Then other pulses were all normal.  Carotid pulse was  without bruit      Assessment/ Plan    1. Diabetes mellitus due to underlying condition, uncontrolled, with hypoglycemia without coma, with long-term current use of insulin (HCC)  Discussed.   Reduce lantus to 4 u

## 2018-02-19 NOTE — TELEPHONE ENCOUNTER
I spoke to the pt. I gave her instructions regarding her Coumadin and diabetic medications. She verbalizes understanding and was able to repeat the instructions back to me.     Routed to surgery scheduling for scheduling the procedure

## 2018-02-20 ENCOUNTER — PRIOR ORIGINAL RECORDS (OUTPATIENT)
Dept: OTHER | Age: 68
End: 2018-02-20

## 2018-02-20 NOTE — TELEPHONE ENCOUNTER
Per Dr Maria Del Rosario Dey and Dr Silvia Omer pt may hold her Plavix for 5 days prior to her procedure. I spoke to Tasha Lover, Dr Min Bhakta nurse. She will send me a fax confirmation.     I spoke to Sriram Vasquez and she verbalizes understanding to hold her Plavix for 5 days prior to her pro

## 2018-02-22 ENCOUNTER — PRIOR ORIGINAL RECORDS (OUTPATIENT)
Dept: OTHER | Age: 68
End: 2018-02-22

## 2018-02-23 NOTE — TELEPHONE ENCOUNTER
Received fax confirmation from  [id number] [facility] office that the pt can hold Plavix for 5 days prior to her procedure. Pt already aware.  Fax sent to scanning

## 2018-02-26 NOTE — PROGRESS NOTES
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 Celine Diaz, for evaluation of  recurrent urinary tract infection (chronic obstructive pulmonary disease) (HCC)    • Coronary atherosclerosis    • Deep vein thrombosis (HCC)    • Diabetes ([facility]    • DM type 2 (diabetes mellitus, type 2) ([facility] 5586    W/O Complication  Pill, then Insulin added 2003   • Femoral artery sten SURGICAL HISTORY      Comment: Arthrocentesis of L shoulder acromioclaviular                bursa  No date: OTHER SURGICAL HISTORY Right      Comment: Amputation of 5th toe   Family History   Problem Relation Age of Onset   • Hypertension Mother    • Thyro (primary encounter diagnosis)  Overactive bladder    No orders of the defined types were placed in this encounter.   Recommended:  -Standing order for a urine culture.  -Standing RX for Duricef 500 mg PO BID x 5 days if she has recurrent UTI symptoms after

## 2018-02-27 NOTE — TELEPHONE ENCOUNTER
I spoke with this patient today to reschedule the time of the procedure in order to close the gap where there were no patients scheduled. The patient agreed to the new time from 0930 to 1100 and arrive at 1000.  I am sending her new instructions via [employer]

## 2018-03-01 NOTE — TELEPHONE ENCOUNTER
Spoke to pt. Informed pt should start drinking at 1700 day before of procedure and complete first half by 2000, then on the day of procedure should start drinking prep at 0500 and should be done no later than 0800.  Pt verbalized understanding and had no fu

## 2018-03-06 NOTE — TELEPHONE ENCOUNTER
I spoke to Shearon Blizzard, the pharmacist, at [facility].  The bowel prep was changed to the 240 gram oral solution

## 2018-03-06 NOTE — TELEPHONE ENCOUNTER
[place]/Pharm calling for mutual pt regarding rx:Peg prep not covered by ins, requesting to know if another rx can be prescribed that will be covered:Samir (is covered)  Pls call at:[phone],thanks.     Current Outpatient Prescriptions:   •  PEG 3

## 2018-03-09 NOTE — TELEPHONE ENCOUNTER
Called pt per Dr. Myla Ngo' request to f/u on recommendation of trigger release to LIF followed w/trigger release to RRF and excision of Dupuytren's nodule.   Pt states that she is wheelchair bound and has to rely on her sister, who is out of town now, to

## 2018-03-14 NOTE — PROGRESS NOTES
Spoke to Maria Del Rosario Mcghee, HIPAA verified for CCM call.     Medical History  Patient Active Problem List:     Type II diabetes mellitus (HCC)     Asthma     Hypertension, benign     Coronary atherosclerosis     Major depressive disorder, single episode, moderate ([facility] Scheduled for 3/23/18   FIT Colorectal Screening due on 03/02/2018  Mammogram,1 Yr due on 06/16/2018  Diabetes Care Dilated Eye Exam due on 11/21/2018  Annual Depression Screen due on 12/04/2018  Fall Risk Screening due on 02/10/2019  Diabetes Care A1C due achieving goal to (5) very confident                   confidence: :4          Care Manager Follow Up: ONE MONTH    Reason For Follow Up: review progress and or barriers towards patients goals.      Care managers interventions: see above    Time Spent This

## 2018-03-19 ENCOUNTER — PRIOR ORIGINAL RECORDS (OUTPATIENT)
Dept: OTHER | Age: 68
End: 2018-03-19

## 2018-03-19 NOTE — TELEPHONE ENCOUNTER
Pt called to report INR. INR for today : 2.1    Pt stts she stopped coumadin yesterday due to a procedure she is having Friday .

## 2018-03-19 NOTE — TELEPHONE ENCOUNTER
Pt states she has questions regarding liquids she is suppose to drink. States she is a diabetic and is unsure what to do . Pt would like to know what can and cant drink.  Please call thank you

## 2018-03-19 NOTE — TELEPHONE ENCOUNTER
Spoke to pt, she was not advised to hold plavix for 5 days. She is holding coumadin. Colon scheduled for 3/23. She will begin holding plavix today, please advise. Pls call pt after Dr. Yary Livingston responds.

## 2018-03-19 NOTE — TELEPHONE ENCOUNTER
OK to proceed with procedure as scheduled. She should continue to hold plavix and coumadin. Thank you.

## 2018-03-19 NOTE — TELEPHONE ENCOUNTER
Spoke to pt. She had questions about the clear liquid diet but also having questions about what she should do if her blood sugar became too low.  Double checked with Tracy and she stated that the patient should try drinking liquids, like apple juice and t

## 2018-03-20 PROBLEM — I73.9 PAD (PERIPHERAL ARTERY DISEASE) (HCC): Status: ACTIVE | Noted: 2018-01-01

## 2018-03-20 NOTE — CONSULTS
[facility]  Vascular Interventional Radiology Consultation    Reina Olivo Patient Status:  Emergency    10/13/1950 MRN [id number]   Location [address] Attending Jae Trevino MD   [facility] Day # 0 PCP Pam Meraz.  Mario 2015    x2   • Osteopenia    • Other ill-defined conditions(799.89) 2010    Post Polio   • PE (pulmonary embolism)    • Peripheral vascular disease ([facility] 2008   • Pneumonia due to organism    • Polio 1951    As Infant   • Pulmonary embolism ([facility] 1987   • drugs.     Allergies:    Clindamycin             Tightness in Throat  Levofloxacin [Quixi*    Nausea and vomiting  Augmentin [Amoxicil*    Rash  Dilaudid [Hydromorp*    Confusion    Comment:agitation  Scopolamine             Dizziness  Sulfa Antibiotics motion, tenderness, coolness left   NEURO/PSYCH: orientated x3, normal mood and affect, no sensory or motor deficit    VASCULAR:    Lower Extremity   Femoral Popliteal DP PT   Right       Left 1+  0 Doppler           Laboratory Data:       Impression and P recannulization procedures. Ultrasound shows patent bypass with very sluggish flow. Foot is cool, good movement and, diminished sensation. No evidence of acute limb ischemia. PLAN:  Heparin.               Lower extremity angiogram with probably inte

## 2018-03-20 NOTE — ED INITIAL ASSESSMENT (HPI)
Pt states she has some numbness to left leg that has been getting worse over past 3 days. States leg is getting cool to the touch and is sometimes cyanotic.

## 2018-03-20 NOTE — ED NOTES
Pt took blood sugar using her own device, result 255.  Pt took her own Humalog 5 units, Dr Av Schroeder aware

## 2018-03-21 NOTE — TELEPHONE ENCOUNTER
Dr Yary Livingston, [facility] Fraction below. Did you need to send letter since pt has hx polyps and stating does not want to reschedule? Or enter recall? Thanks. Removed from epic and case request sent to endo.

## 2018-03-21 NOTE — TELEPHONE ENCOUNTER
Pt cancelling CLN procedure [facility] 3/23, pt is in the [facility] (due to not being on comudin/Plavix) pt does not wish to alyce, no need to call, thank you.

## 2018-03-21 NOTE — PROGRESS NOTES
[facility]    Progress Note    Bashir Bennett Patient Status:  Inpatient    10/13/1950 MRN [id number]   Location [facility] 3W/SW Attending Kylah Tucker MD   Hosp Day # 1 PCP Phu Berger. DO Chris       Subjective:    Bashir Bennett Chuy Rosen MD on 3/20/2018 at 18:44     Approved by (CST): Sierra Jean MD on 3/20/2018 at 18:49               • Insulin Aspart Pen  1-7 Units Subcutaneous TID CC   • ALPRAZolam  0.5 mg Oral Nightly   • ascorbic acid  1,000 mg Oral Daily   • aspirin  81 m

## 2018-03-21 NOTE — PROGRESS NOTES
Potassium Gluconate TABS 50 mg is Non-Formulary Medication &  Auto-Substituted to Potassium Chloride 10meq tablet Per P&T PROTOCOL

## 2018-03-21 NOTE — H&P
[facility]    PATIENT'S NAME: Vester Prader   ATTENDING PHYSICIAN: Nanci Saavedra MD   PATIENT ACCOUNT#:   [id number]    LOCATION:  [place] [address]  MEDICAL RECORD #:   [id number]       YOB: 1950  ADMISSION DATE:       03/20/2018 surgery and AICD procedure. She had breast cancer 2004 status post mastectomy. She has history of asthma, cerebrovascular accident, hyperlipidemia, pulmonary embolism. She is chronically anticoagulated with Coumadin.     PAST SURGICAL HISTORY:  As baron discoloration on the left forefoot area. I could not palpate dorsalis pedis pulses. There is cold sensation to the left forefoot. NEUROLOGIC:  Decreased sensation to light touch in the left foot. Otherwise, no focal findings.     ASSESSMENT AND PLAN:

## 2018-03-21 NOTE — PRE-SEDATION ASSESSMENT
IR Pre-Procedure Sedation Assessment    History of snoring or sleep or apnea?    No    History of previous problems with anesthesia or sedation  No    Physical Findings:  Neck: nl ROM  CV: regular rate  PULM: normal respiratory rate/effort    Mallampati Sco

## 2018-03-21 NOTE — PLAN OF CARE
DISCHARGE PLANNING    • Discharge to home or other facility with appropriate resources Progressing        HEMATOLOGIC - ADULT    • Free from bleeding injury Progressing        NEUROLOGICAL - ADULT    • Achieves stable or improved neurological status Progre

## 2018-03-21 NOTE — PROGRESS NOTES
RN received phone call from patient saying that she is currently admitted at [facility] and is waiting to have a peripheral angiogram later today. RN canceled appointment. No need to reschedule appointment per GIN Guerra.  Pt instructed to

## 2018-03-21 NOTE — PROGRESS NOTES
Pharmacy Note: Dietary Supplement Discontinuation Per Policy    NIQ14 CAPS 1 capsule has been discontinued on Peter Enciso per policy. This supplement may be restarted upon discharge using the medication reconciliation process.     Thank you,   Zara Hutchins

## 2018-03-21 NOTE — ED NOTES
Pt is wheelchair bound, RN's attempted to use bed scale but the stretcher did not properly fit on scale. Pt was weighed last week in clinic and was 62.1 kg. 62.1 kg to be used for heparin weight.

## 2018-03-21 NOTE — ED PROVIDER NOTES
Patient Seen in: [facility] AND [facility] Emergency Department    History   Patient presents with:  Numbness Weakness (neurologic)      HPI    Patient with a complicated medical history presents over concern for possible repeat issues of arterial blood flow to 2010    Post Polio   • PE (pulmonary embolism)    • Peripheral vascular disease ([facility] 2008   • Pneumonia due to organism    • Polio 1950    As Infant   • Pulmonary embolism ([facility] 1987   • Sacroiliitis ([facility]    • Stented coronary artery    • Stroke (cerebru Warfarin Sodium 5 MG Oral Tab Take 1.5 tablets (7.5 mg total) by mouth every evening. OR AS DIRECTED BY COUMADIN CLINIC. (Patient taking differently: Take 7.5 mg by mouth every evening.  OR AS DIRECTED BY COUMADIN CLINIC. ) Disp: 135 tablet Rfl: 0 Taking total) by mouth every 6 (six) hours as needed. Disp: 30 tablet Rfl: 4 Taking   Cholecalciferol (VITAMIN D3) 2000 units Oral Tab Take 4,000 Units by mouth daily. Take 4000 mg T/W/TH/S/S. Take 2000 units on M/F.   Disp:  Rfl:  Taking   B Complex Vitamins (VI 50 mg by mouth daily. Disp:  Rfl:  Taking   Rosuvastatin Calcium 20 MG Oral Tab Take 20 mg by mouth nightly. Disp:  Rfl:  Taking   vitamin E 400 UNITS Oral Cap Take 400 Units by mouth daily.    Disp:  Rfl:  Taking   NITROSTAT 0.4 MG Sublingual SL Tab Place History and Family History elements reviewed from today, pertinent positives to the presenting problem noted.     Physical Exam     ED Triage Vitals [03/20/18 1544]  BP: 146/73  Pulse: 90  Resp: 16  Temp: 98.1 °F (36.7 °C)  Temp src: Oral  SpO2: 100 %  O2 D individual orders.    PROTHROMBIN TIME (PT)   PTT, ACTIVATED   HEMOGLOBIN A1C   PTT, ACTIVATED   BASIC METABOLIC PANEL (8)   CBC WITH DIFFERENTIAL WITH PLATELET   POTASSIUM   PTT, ACTIVATED   RAINBOW DRAW LAVENDER   RAINBOW DRAW DARK GREEN   RAINBOW DRAW LI administered)   Acetaminophen-Codeine #3 (TYLENOL #3) 300-30 MG tab 1 tablet (not administered)   albuterol Sulfate (VENTOLIN) (5 MG/ML) 0.5% nebulizer solution 5 mg (not administered)   Albuterol Sulfate  (90 Base) MCG/ACT inhaler 2 puff (not admin Height:    142.2 cm (4' 8\")     *I personally reviewed and interpreted all ED vitals.     Pulse Ox: 98%, Room air, Normal     Monitor Interpretation:   normal sinus rhythm    Differential Diagnosis/ Diagnostic Considerations: Peripheral artery disease cyanosis to her left foot. No distress on examination, arterial ultrasound obtained which shows significant stenosis of the left common femoral artery.   Discussed with interventional radiology, will place the patient on heparin and admit for angiography t

## 2018-03-22 NOTE — PROGRESS NOTES
Called patient with results that was done at [facility] since she is admitted there. Patient's EF remains at 40%, Moderate MVR. Overall, there has been no significant interval changes compared to previous echo on 09/20/17.  Patient has completed 1 year S/P T

## 2018-03-22 NOTE — PROGRESS NOTES
[facility]  Progress Note    Corinanatalisandra Never Patient Status:  Inpatient    10/13/1950 MRN [id number]   Location [facility] 3W/SW Attending Bebe Quarles MD   Hosp Day # 2 PCP Khushi Espino.  DO Chris       Subjective:   Left foot feel bypass graft with improvement in rest pain symptoms. PLAN:  Plavix and Coumadin.               Follow up office visit 2 weeks          GIN Hutchinson  3/22/2018

## 2018-03-22 NOTE — PROCEDURES
[facility] HOSP - [facility]  Procedure Note    Charla Green Patient Status:  Inpatient    10/13/1950 MRN [id number]   Location [facility] Attending Inna Resendiz MD   [facility] Day # 1 PCP Hina Milligan.  DO Chris     Procedure: Samuel Garcia

## 2018-03-22 NOTE — DISCHARGE SUMMARY
[place] [facility] - [facility]    Discharge Summary    Tate Rosadop Patient Status:  Inpatient    10/13/1950 MRN [id number]   Location [facility] 3W/SW Attending Hany Virgen MD   Hosp Day # 2 PCP Ilene Perez DO     Date of Admission: 3/20/ Order Current Status    PROTHROMBIN TIME (PT) Collected (03/20/18 1850)    PTT, ACTIVATED Collected (03/20/18 1850)    RAINBOW DRAW BLUE Collected (03/20/18 1850)          Discharge Plan:   Discharge Condition: Stable    Current Discharge Medication List acetaminophen 325 MG Oral Tab  Take 2 tablets (650 mg total) by mouth every 6 (six) hours as needed. Cholecalciferol (VITAMIN D3) 2000 units Oral Tab  Take 4,000 Units by mouth daily. Take 4000 mg T/W/TH/S/S.   Take 2000 units on M/F.     B Complex V (MULTI-VITAMINS) Oral Tab  Take by mouth. She is now taking tablets not gummies. !! - Potential duplicate medications found. Please discuss with provider.               Discharge Diet: ADA diabetic diet     Discharge Activity: As tolerated       Dischar hours as needed. Quantity:  12 tablet  Refills:  0     ALPRAZolam 0.5 MG Tabs  Commonly known as:  XANAX      Take 0.5 mg by mouth nightly. Refills:  0     ascorbic acid 1000 MG Tabs  Commonly known as:  VITAMIN C      Take 1,000 mg by mouth daily.    R NON FORMULARY      Tumeric 500mg once daily   Refills:  0     NON FORMULARY      Cranberry and buccu 870mg daily   Refills:  0     NON FORMULARY      Marshmallow root 450mg daily   Refills:  0     NON FORMULARY      Focus attention 910 mcg daily   Refill Caps      Take 400 Units by mouth daily. Refills:  0            Follow up: Follow-up Information     Lianet Nassar MD In 2 weeks.     Specialties:  Radiology, Radiology, Diagnostic, INTERVENTIONAL, RADIOLOGY  Contact information:  Sky Jorge

## 2018-03-23 ENCOUNTER — PRIOR ORIGINAL RECORDS (OUTPATIENT)
Dept: OTHER | Age: 68
End: 2018-03-23

## 2018-03-23 NOTE — PROGRESS NOTES
Initial Post Discharge Follow Up   Discharge Date: 3/22/18  Contact Date: 3/23/2018    Consent Verification:  Assessment Completed With: Patient  HIPAA Verified?   Yes    Discharge Dx:   PAD peripheral Artery     General:   • How have you been since your MCG/ACT Inhalation Aerosol Inhale 2 puffs into the lungs 2 (two) times daily.  Rinse mouth afterwards Disp: 3 Inhaler Rfl: 0   NON FORMULARY HSN-2 pills once daily Disp:  Rfl:    NON FORMULARY Tumeric 500mg once daily Disp:  Rfl:    NON FORMULARY Cranberry WHEEZING. Disp: 100 mL Rfl: 2   ascorbic acid 1000 MG Oral Tab Take 1,000 mg by mouth daily. Disp:  Rfl:    Coenzyme Q10 (COQ10) 100 MG Oral Cap Take 1 capsule by mouth daily. Disp:  Rfl:    vitamin E 400 UNITS Oral Cap Take 400 Units by mouth daily.    D prune juice, this afternoon if no results. Referrals/orders at D/C:  Home Health ordered at D/C? No  DME ordered at D/C? No     DX: specifics:    CV Surgery:   Have there been any changes in your wound or incision?     yes     If Yes, explain: bruise impr contact your physician office to cancel.   Please verify with your primary care provider if your insurance requires a referral.        [facility], [address], [address] [address] a rd enema or contact PCP. Pt scheduled for TCM f/u w PCP on 4/2/18. Denies any further needs or concerns prior to APT.      [x]  Discharge Summary, Discharge medications reviewed/discussed/and reconciled with patient, and orders reviewed and discussed

## 2018-03-23 NOTE — TELEPHONE ENCOUNTER
Zofia Noel from [facility] calling to report INR. INR =1.2.  Please advise

## 2018-03-29 NOTE — TELEPHONE ENCOUNTER
Per pharmacy on file they tried to erx twice the Tylenol#3 but did not accept pt need refill on Acetaminophen-Codeine #3 300-30 MG Oral Tab,  Pls advise.       Current Outpatient Prescriptions:

## 2018-03-29 NOTE — TELEPHONE ENCOUNTER
LMTCB. May transfer to Triage. Noted Tylenol #3 is only listed in med hx as being prescribed by [address] [place] [address] (Dr Courtney Ochoa) on 11/14/17 #12. Why is pt requesting this and has this continued to be prescribed by a different provider since then?

## 2018-03-30 NOTE — TELEPHONE ENCOUNTER
Pt has appt with [facility] on Monday, will request refill at that point.

## 2018-04-02 NOTE — PROGRESS NOTES
HPI:    Patricia Monroy is a 78year old female here today for hospital follow up. Had elective colonoscopy and was off coumadin and plavix  As a result had another clot.     She was discharged from Inpatient hospital, [facility]  to Home   Admission D Solution Pen-injector 4 u sq daily (Patient taking differently: Inject 3 Units into the skin nightly.  )   PANAX GINSENG OR Take by mouth every morning. omega-3 fatty acids 1000 MG Oral Cap Take 1,000 mg by mouth daily.    Warfarin Sodium 7.5 MG Oral Ta (two) times daily. Potassium Gluconate 595 (99 K) MG Oral Tab Take 1 tablet by mouth daily.      Rosuvastatin Calcium 20 MG Oral Tab Take 20 mg by mouth nightly.   sodium Chloride 0.9 % Inhalation Nebu Soln Take 3 mL by nebulization as needed for Wheezing has a past medical history of Asthma; Asthma, moderate persistent; BDR (background diabetic retinopathy) ([facility] (1/14/2016); Blepharitis, both eyes (1/14/2016); BPPV (benign paroxysmal positional vertigo); Breast cancer ([facility]  Breast cancer of upper-outer placement; cath drug eluting stent; cataract; colonoscopy; hysterectomy; mastectomy right (2004); needle biopsy left (2004); angiogram; and angioplasty (coronary).     She family history includes Breast Cancer (age of onset: 46) in her self; Breast Cancer ( PERRLA, EOMI, conjunctiva are clear  NECK: supple, no adenopathy, no bruits  CHEST: no chest tenderness  LUNGS: clear to auscultation  CARDIO: RRR without murmur  GI: good BS's, no masses, HSM or tenderness  MUSCULOSKELETAL: back is not tender, FROM of the

## 2018-04-16 PROBLEM — E10.3293 TYPE 1 DIABETES MELLITUS WITH MILD NONPROLIFERATIVE RETINOPATHY OF BOTH EYES WITHOUT MACULAR EDEMA (HCC): Status: RESOLVED | Noted: 2017-01-01 | Resolved: 2018-01-01

## 2018-04-16 NOTE — PROGRESS NOTES
The patient is a 56-year-old female who I know well from prior evaluation of comes in now for follow-up. She notes that she is doing well. She notes that Ventolin and Dulera are helping her significantly.   She notes that occasionally she will cough after

## 2018-04-18 NOTE — TELEPHONE ENCOUNTER
Charla Adame from out patient rehab states she has a order for a video swallow study from dr Anny Shannon but needs the diagnoses to say dysphagia for medicare purposes request for it to be updated n put in the system thank you

## 2018-04-26 NOTE — PROGRESS NOTES
ADULT VIDEOFLUOROSCOPIC SWALLOWING STUDY    Referring Physician: Dr. Rehan Pedroza  Diagnosis: dysphagia   Date of Service: 4/26/2018   Radiologist: Dr. Blayne Chavez results and/or plan of treatment.     HISTORY   Back 2015    x2   • Osteopenia    • Other ill-defined conditions(799.89) 2010    Post Polio   • PE (pulmonary embolism)    • Peripheral vascular disease ([facility] 2008   • Pneumonia due to organism    • Polio 1950    As Infant   • [facility]   • Swallowing, CI: 1%-19% impaired, limited, or restricted  PLAN   Potential: Good    Diet Recommendations:  Solids: Regular  Liquids:  Thin    Recommended compensatory strategies:   Sit upright  Small sips  Small bites  Eat slowly  No straw    Medication Admi

## 2018-04-26 NOTE — PROGRESS NOTES
HPI:    Patient ID: Ian Perez is a 78year old female. HPI  This 61-year-old female called and came in today because of concerns with the dorsal lateral fifth metatarsal head of the right foot. Patient's had previous amputation of the fifth toe.   In MCG/ACT Inhalation Aerosol Inhale 2 puffs into the lungs 2 (two) times daily.  Rinse mouth afterwards Disp: 3 Inhaler Rfl: 0   NON FORMULARY HSN-2 pills once daily Disp:  Rfl:    NON FORMULARY Tumeric 500mg once daily Disp:  Rfl:    NON FORMULARY Cranberry ascorbic acid 1000 MG Oral Tab Take 1,000 mg by mouth daily. Disp:  Rfl:    Coenzyme Q10 (COQ10) 100 MG Oral Cap Take 1 capsule by mouth daily. Disp:  Rfl:    vitamin E 400 UNITS Oral Cap Take 400 Units by mouth daily.    Disp:  Rfl:    acetaminophen 32 reaction(s): POTASSIUM CLAVULANATE  Sulfanilamide           Rash    Comment:Other reaction(s): Hives   PHYSICAL EXAM:   Physical Exam  On physical exam there is an area of irritation over the dorsal lateral aspect of the fifth metatarsal head.   It is not a

## 2018-04-26 NOTE — PATIENT INSTRUCTIONS
SWALLOWING INSTRUCTIONS    DIET:  Regular foods and liquids      SIT UPRIGHT    SMALL BITES AND SMALL, SINGLE SIPS    EAT SLOWLY    NO STRAW    DO NOT TILT HEAD BACK WITH PILLS. TAKE WITH SIPS OF WATER OR PUREE.       TRAM Harper [address] [place] [address]

## 2018-04-26 NOTE — PROGRESS NOTES
RN, please call the patient to let her know that her swallow study was pretty good.   She has mild dysphagia and needs to follow the instructions of the speech pathologist.

## 2018-04-30 NOTE — PROGRESS NOTES
Spoke to Philomena Narvaez, HIPAA verified for CCM call.     Medical History  Patient Active Problem List:     Type II diabetes mellitus (HCC)     Asthma     Hypertension, benign     Coronary atherosclerosis     Major depressive disorder, single episode, moderate ([facility] Exam due on 11/21/2018  LDL Control due on 02/14/2019  Diabetes Care A1C due on 03/20/2019  Fall Risk Screening due on 04/02/2019  Annual Depression Screen due on 04/02/2019    Current issues:           PVD= She said her toes on her left foot were getting changing her insulin and such but is still a little higher, although the highest is 180 so given praise for her attention to detail with things.        • Update to previous barriers: she had the desire, just wasn't sure the best method to go about and she s

## 2018-05-01 ENCOUNTER — PRIOR ORIGINAL RECORDS (OUTPATIENT)
Dept: OTHER | Age: 68
End: 2018-05-01

## 2018-05-01 NOTE — TELEPHONE ENCOUNTER
Spoke with pharmacy preferred albuterol is ProAir, pt informed voiced understanding. Rx for ProAir sent to [facility] to sign off if agreeable.

## 2018-05-02 LAB
ALBUMIN: 3.9 G/DL
ALKALINE PHOSPHATATE(ALK PHOS): 50 IU/L
BILIRUBIN TOTAL: 0.6 MG/DL
BUN: 20 MG/DL
CALCIUM: 9.1 MG/DL
CHLORIDE: 100 MEQ/L
CREATININE, SERUM: 0.55 MG/DL
GLOBULIN: 3.1 G/DL
GLUCOSE: 206 MG/DL
POTASSIUM, SERUM: 4 MEQ/L
PROTEIN, TOTAL: 7 G/DL
SGOT (AST): 46 IU/L
SGPT (ALT): 59 IU/L
SODIUM: 137 MEQ/L

## 2018-05-03 ENCOUNTER — PRIOR ORIGINAL RECORDS (OUTPATIENT)
Dept: OTHER | Age: 68
End: 2018-05-03

## 2018-05-07 NOTE — TELEPHONE ENCOUNTER
From: Deacon King  To: Koffi Fisher DO  Sent: 5/6/2018 2:34 PM CDT  Subject: Test Results Question    Dr Alyssa Montejo,  Didn't know if you received my first message. Do I need to take this again? Only a few drops in the specimen cup.   Couldn't stop urinating

## 2018-05-08 ENCOUNTER — PRIOR ORIGINAL RECORDS (OUTPATIENT)
Dept: OTHER | Age: 68
End: 2018-05-08

## 2018-05-17 NOTE — PROGRESS NOTES
Sugars have been better running 100 to 200   Rarely had up to 250  lantus is down to 3 u  humalog depending 3-4 u before meals if not so good up to 5-6 u      No hypoglycemic episodes.     Has mildly elevated lfts  But chol excellent   (we would both prefer

## 2018-05-21 ENCOUNTER — PRIOR ORIGINAL RECORDS (OUTPATIENT)
Dept: OTHER | Age: 68
End: 2018-05-21

## 2018-05-22 ENCOUNTER — PRIOR ORIGINAL RECORDS (OUTPATIENT)
Dept: OTHER | Age: 68
End: 2018-05-22

## 2018-05-22 PROBLEM — R06.00 DYSPNEA ON EXERTION: Status: ACTIVE | Noted: 2018-01-01

## 2018-05-22 PROBLEM — R06.00 DYSPNEA: Status: ACTIVE | Noted: 2018-01-01

## 2018-05-22 PROBLEM — R06.09 DYSPNEA ON EXERTION: Status: ACTIVE | Noted: 2018-01-01

## 2018-05-22 NOTE — ED INITIAL ASSESSMENT (HPI)
SOB on exertion since 5/17. Pt sent in by Dr. Andie Neal for admission for dyspnea on exertion and unstable angina. Pt denies any weight gain, no chest pain at this time.  Pt states she has a headache and is worried because her BP is 946Z systolic but is us

## 2018-05-23 NOTE — PROGRESS NOTES
[facility] AND [facility]  [facility] Cardiology Progress Note    Steven Way Patient Status:  Observation    10/13/1950 MRN [id number]   Location [facility] 3W/SW Attending Russell Alanis MD   Hosp Day # 0 PCP Roxana Estrada. DO Chris     Assessment:  1.    D pulmonary disease) ([facility]    • Coronary atherosclerosis    • Deep vein thrombosis (HCC)    • Diabetes ([facility]    • DM type 2 (diabetes mellitus, type 2) ([facility] 8198    W/O Complication  Pill, then Insulin added 2003   • Femoral artery stenosis (HCC)     Right Thyroid disease Mother    • Diabetes Father    • Glaucoma Father    • Other [OTHER] Father    • Colon Cancer Brother 48   • Cancer Sister      Thyroid cancer   • Colonic Polyps [OTHER] Brother    • Breast Cancer Sister 61   • Skin cancer Brother    • Crohn

## 2018-05-23 NOTE — H&P
[facility]    PATIENT'S NAME: Suzanne Randhawa   ATTENDING PHYSICIAN: Jordan Schultz MD   PATIENT ACCOUNT#:   [id number]    LOCATION:  [place] [address]  MEDICAL RECORD #:   [id number]       YOB: 1950  ADMISSION DATE:       05/22/2018 chronically anticoagulated with Coumadin. PAST SURGICAL HISTORY:  As mentioned above.     MEDICATIONS:  Please see medication reconciliation list.     ALLERGIES:  Multiple including aminoglycoside, Augmentin, clindamycin, Dilaudid causes agitation, Levaq equivalent in nature. 2.   Diabetes mellitus type 2.  3.   Coronary artery disease. 4.   Peripheral arterial disease. The patient will be admitted to telemetry floor for observation. We will obtain cardiology consult, Dr. Maria Del Rosario Dey to be notified.   Faisal

## 2018-05-23 NOTE — ED PROVIDER NOTES
Patient Seen in: [facility] CLINICS 3w/sw    History   Patient presents with:  Chest Pain Angina (cardiovascular)    Stated Complaint: from Dr Katrina Barrera office, dyspnea on extertion with unstable angina    HPI    61-year-old female presents for evaluation • IBS (irritable bowel syndrome)    • Meibomian gland dysfunction 2006   • Menometrorrhagia    • Myocardial infarction [facility]) 2015    x2   • Osteopenia    • Other ill-defined conditions(669.89) 2010    Post Polio   • PE (pulmonary embolism)    • Periphera reviewed and negative except as noted above.     Physical Exam   ED Triage Vitals  BP: 136/63 [05/22/18 1649]  Pulse: 76 [05/22/18 1648]  Resp: 17 [05/22/18 1648]  Temp: (!) 96.3 °F (35.7 °C) [05/22/18 1648]  Temp src: Temporal [05/22/18 1648]  SpO2: 97 % [ Abnormal; Notable for the following:        Result Value    Glucose 185 (*)     BUN/CREA Ratio 36.0 (*)     All other components within normal limits   BNP (B TYPE NATRIUERTIC PEPTIDE) - Abnormal; Notable for the following:     Beta Natriuretic Peptide 551 failure, ACS, renal failure    Well-appearing patient with normal exam, unremarkable emergency department evaluation. Discussed with and admitted to Dr. Ana M Jarrett, discussed with Dr. Vance Irwin, no additional recommendations. Patient updated at bedside.

## 2018-05-23 NOTE — PROGRESS NOTES
Was going to call pt today for her monthly CCM and noted that she apparently was sent to the ER last night from Dr. Wyatt Sanchez office with dyspnea.  She was then admitted to the hospital. P= Will check next week to see if pt is home from hospital.

## 2018-05-23 NOTE — PROGRESS NOTES
Pharmacy Note: Dietary Supplement Discontinuation Per Policy    ZWR24 CAPS 1 capsule has been discontinued on Derek Skipper per policy. This supplement may be restarted upon discharge using the medication reconciliation process.     Thank you,   Lu Antunez

## 2018-05-23 NOTE — HISTORICAL OFFICE NOTE
Sahil Mcmillan  : 10/13/1950  ACCOUNT:  [id number]  [phone]  PCP: Dr. Gavino Ybarra     TODAY'S DATE: 2018  DICTATED BY:  GIN Zheng]      CHIEF COMPLAINT: [Followup of .  CAD, established, Followup of Cardiomyopathy, ischemic, Followup of H infarction, Sub-Q ICD [facility] 3/16) and PENELOPE    FAMILY HISTORY: Significant for premature CAD. Negative for AAA. SOCIAL HISTORY: SMOKING: Never used tobacco. denies smoking. CAFFEINE: none. ALCOHOL: denies drinking. EXERCISE: no regular exercise.  DIET: represent unstable anginal equivalent. []    ASSESSMENT:  1. . CAD, established  2. Heart failure, systolic, chronic  3. Atherosclerosis, extremity  4. Abnormal EKG  5. Abnormal stress test  6. Anemia  7. Angina, unstable  8.  Cardiomyopathy, ischemic Vitamin D             2000UNIT  1 tablet 4 days a week and 2 tablets     04/15/16 Tylenol with Codeine #300-30MG  PRN                                      10/23/15 Multivitamins                   1 tablet daily                           03/07/14 Clarinex hearing, otherwise negative. CV: Denies chest pain, dizziness, palpitations. RESP: asthma, dyspnea on exertion and on inhalers. GI: denies melena, hematochezia. : frequency of urination and on  furosemide. INTEG: no new rashes, lesions.  MS: difficulty in GI: no masses, tenderness or hepatosplenomegaly, rectal deferred. MS: inadequate gait for exercise/testing and uses wheelchair. EXT: no clubbing or cyanosis. SKIN: no rashes, lesions, ulcers and healed Right groin.   NEURO/PSYCH: alert and oriented to time 12/01/17 *Furosemide           20MG      1 TABLET DAILY.                           11/29/17 *Nitroglycerin        0.4MG     DISSOLVE 1 TABLET UNDER THE TONGUE A     08/01/17 *Rosuvastatin Calcium 20MG      1 TABLET AT BEDTIME.                     07/06/

## 2018-05-23 NOTE — PROGRESS NOTES
Pharmacy Note: Dietary Supplement Discontinuation Per Policy    Potassium Gluconate 585(99k) MG oral Tab  has been discontinued on Terrell Counter per policy. This supplement may be restarted upon discharge using the medication reconciliation process.     Thank

## 2018-05-23 NOTE — PROGRESS NOTES
[place] [facility] - [facility]  Hospitalist Progress  Note     Ian Perez Patient Status:  Observation    10/13/1950  78year old CSN [id number]   Location 324/324-A Attending Trini Nina,  [facility] Se Day # 0 PCP Merari Comment.  Pravin Hence, DO     ASSESSMENT/PLAN Calm, [facility]    Labs:  Recent Labs   Lab  05/22/18   1816  05/23/18   0659   RBC  4.81  4.69   HGB  14.8  14.6   HCT  43.8  42.2   MCV  91.1  89.9   MCH  30.8  31.1   MCHC  33.8  34.6   RDW  12.7  12.7   WBC  7.3  5.9   PLT  152  141     Recent Labs

## 2018-05-23 NOTE — CONSULTS
[facility] AND [facility]  Cardiology Consultation    Jessika Vicky Patient Status:  Emergency    10/13/1950 MRN [id number]   Location [address] Attending Penny Marley MD   Hosp Day # 0 PCP Marguerite Good.  DO JOEY Perez DO catheterization, drug-eluting stents 3/15 11/15, dyslipidemia, hypertension, ischemic CM, Mitral Clip 3/2017 followed by tamonade, sternotomy and evacuation of hematoma/right atrial perforation repair, multiple peripheral angioplasty/stenting, myocardial i MAKING:  Dyspnea on exertion- abrupt onset and continuing daily and multiple times a day.   No evidence of volume overload on exam.  Her symptoms were discussed with Dr. Anny Zamudio and the patient agrees to hospital evaluation with a concern that her symptoms r 03/27/17 Acetaminophen         325MG     1 tablet every 6hrs as needed            03/27/17 Vitamin B Complex               1 daily                                  03/27/17 Vitamin C             500MG     1 daily                                  03/27/17

## 2018-05-24 NOTE — PROGRESS NOTES
[place] [facility] - [facility]  Hospitalist Progress  Note     Lorean [place] Patient Status:  Observation    10/13/1950  78year old CSN [id number]   Location 324/324-A Attending Oli Stroey, [facility] Se Day # 0 PCP Thaddeus Garnett.  Yimi Singh,      ASSESSMENT/PLAN erythema, diaphoresis. Psych: Normal mood and affect.  Calm, cooperative    Labs:  Recent Labs   Lab  05/22/18   1816  05/23/18   0659  05/24/18   0621   RBC  4.81  4.69  4.52   HGB  14.8  14.6  13.8   HCT  43.8  42.2  41.4   MCV  91.1  89.9  91.5   MCH dextrose, Glucose-Vitamin C, glucose, nitroGLYCERIN

## 2018-05-24 NOTE — PROGRESS NOTES
[facility] AND [facility]  Progress Note    Tate Hinojosa Patient Status:  Observation    10/13/1950 MRN [id number]   Location [facility] 3W/SW Attending Cesar Rivera MD   Hosp Day # 0 PCP Ilene Perez DO     Assessment:    1.   Admission with wors apparent distress. HEENT: No focal deficits. Neck: No JVD, carotids 2+ no bruits. Cardiac: Regular rate and rhythm, S1, S2 normal, 1/6 LIVE  Lungs: Clear without wheezes, rales, rhonchi. Normal excursions and effort. Abdomen: Soft, non-tender.    Extrem

## 2018-05-24 NOTE — CM/SW NOTE
Progression of care - Cath when INR down, 2.9 today, cardiology does not want to give reversal agent.    Silver Krueger RN, CTL

## 2018-05-25 NOTE — TELEPHONE ENCOUNTER
From: Reina Olivo  To: Joe Gupta MD  Sent: 5/24/2018 9:52 PM CDT  Subject: Prescription Question    I am presently in the hospital. [address]  Getting an angioplasty tomorrow.    (hopefully my inr will cooperate)    I need my inhaler changed from [employer] [place]

## 2018-05-25 NOTE — PROCEDURES
[facility]    [facility] Cardiac Cath Procedure Note  Elisa Dent Patient Status:  Inpatient    10/13/1950 MRN [id number]   Location [facility] Attending Jose Gonzalez MD   Hosp Day # 1 PCP Power Contreras.  Chris, heparin bridge once INR <2 due to history of cardioembolism  · Will discuss with primary cardiologist regarding 6fr LFA access, rotablation of the LAD next week after HF optimization, allowing INR to drift down further    IV was maintained by MATTHEW and Phu Parker

## 2018-05-25 NOTE — PROGRESS NOTES
[place] [facility]  Hospitalist Progress  Note     Patricia Never Patient Status:  Observation    10/13/1950  78year old CSN [id number]   Location 324/324-A Attending Kayy German, [address] [facility] Day # 1 PCP Khushi Espino.  Paola Pump, DO     ASSESSMENT/PLAN lower extremity due to remote history of polio  Skin: Skin is warm and dry. No rashes, erythema, diaphoresis. Psych: Normal mood and affect.  Calm, cooperative    Labs:  Recent Labs   Lab  05/22/18   1816  05/23/18   0659  05/24/18   0621   RBC  4.81  4.6 Subcutaneous TID CC     Acetaminophen-Codeine #3, ipratropium-albuterol, cetirizine, albuterol sulfate, Normal Saline Flush, acetaminophen, ondansetron HCl, dextrose, Glucose-Vitamin C, glucose, nitroGLYCERIN    >35min spent, >50% spent counseling and coor

## 2018-05-25 NOTE — PRE-SEDATION ASSESSMENT
Pre-Procedure Sedation Assessment    Chief Complaint/Indication for procedure: new decreased EF, angina    History of snoring or sleep or apnea?    No    History of previous problems with anesthesia or sedation  No    Physical Findings:  Neck: nl ROM  CV: R

## 2018-05-25 NOTE — CM/SW NOTE
Progression of care - Cath today, no intervention, possible PCI next week. Heparin drip when INR less than 2.0, keep off Coumadin.   Mannie Causey RN, CTL

## 2018-05-26 NOTE — PROGRESS NOTES
[place] [facility] - [facility]  Hospitalist Progress  Note     Lorean Hotchkiss Patient Status:  Observation    10/13/1950  78year old CSN [id number]   Location 324/324-A Attending Oli Storey, [address] [facility] Day # 2 PCP Thaddeus Garnett.  Yimi Singh, DO     ASSESSMENT/PLAN Stable deformities of her bilateral lower extremity due to remote history of polio  Skin: Skin is warm and dry. No rashes, erythema, diaphoresis. Psych: Normal mood and affect.  Calm, cooperative    Labs:  Recent Labs   Lab  05/23/18   5177  05/24/18   06 • vitamin E  400 Units Oral Daily   • saccharomyces boulardii  250 mg Oral BID   • Insulin Aspart Pen  1-7 Units Subcutaneous TID CC     PEG 3350, magnesium hydroxide, bisacodyl, Acetaminophen-Codeine #3, ipratropium-albuterol, cetirizine, albuterol sulf

## 2018-05-26 NOTE — PROGRESS NOTES
[facility] AND [facility]IN[facility]  Progress Note    Steven Way Patient Status:  Observation    10/13/1950 MRN [id number]   Location [facility] 3W/SW Attending Russell Alanis MD   Hosp Day # 2 PCP Roxana Estrada. DO Chris     Assessment:    1.   Admission with wors and rhythm, S1, S2 normal, 1/6 LIVE  Lungs: Clear without wheezes, rales, rhonchi. Normal excursions and effort. Abdomen: Soft, non-tender. Extremities: Without edema. Sequelae of polio. Peripheral pulses are diminished. Skin: Warm and dry.      Labs:

## 2018-05-27 NOTE — PROGRESS NOTES
[place] [facility] - [facility]  Hospitalist Progress  Note     Bashir Bennett Patient Status:  Observation    10/13/1950  78year old CSN [id number]   Location 324/324-A Attending Britton Grider,  [facility] Se Day # 3 PCP Phu Berger.  Yoana Gonzalez, DO     ASSESSMENT/PLAN HSM, no rebound/guarding. Neuro: Normal reflexes, CN. Sensory/motor exams grossly normal deficit. MS: No joint effusions. Stable deformities of her bilateral lower extremity due to remote history of polio  Skin: Skin is warm and dry.  No rashes, erythe 500 mg Oral BID   • Rosuvastatin Calcium  20 mg Oral Nightly   • ascorbic acid  1,000 mg Oral Daily   • Cholecalciferol  4,000 Units Oral Daily   • Fluticasone Furoate-Vilanterol  1 puff Inhalation Daily   • lisinopril  2.5 mg Oral Daily   • magnesium  50

## 2018-05-27 NOTE — PROGRESS NOTES
[facility] AND CLINICS  Progress Note    Debbra Officer Patient Status:  Observation    10/13/1950 MRN [id number]   Location [facility] 3W/SW Attending Yokasta Noble MD   Hosp Day # 3 PCP Jayson Grandchild. DO Chris     Assessment:    1.   Admission with wors normal, 1/6 LIVE  Lungs: Clear without wheezes, rales, rhonchi. Normal excursions and effort. Abdomen: Soft, non-tender. Extremities: Without edema. Sequelae of polio. Peripheral pulses are diminished. Skin: Warm and dry.      Labs:    Lab Results  Co

## 2018-05-28 NOTE — PROGRESS NOTES
[place] [facility] - [facility]  Hospitalist Progress  Note     Jessika Perry Patient Status:  Observation    10/13/1950  78year old CSN [id number]   Location 324/324-A Attending Cyrus Christopher, [address] [facility] Day # 4 PCP Marguerite Good.  Mahin Plan, DO     ASSESSMENT/PLAN deficit. MS: No joint effusions. Stable deformities of her bilateral lower extremity due to remote history of polio  Skin: Skin is warm and dry. No rashes, erythema, diaphoresis. Psych: Normal mood and affect.  Calm, cooperative    Labs:  Recent Labs Oral Nightly   • Ketotifen Fumarate  1 drop Both Eyes BID   • Ranolazine ER  500 mg Oral BID   • Rosuvastatin Calcium  20 mg Oral Nightly   • ascorbic acid  1,000 mg Oral Daily   • Cholecalciferol  4,000 Units Oral Daily   • Fluticasone Furoate-Vilanterol

## 2018-05-28 NOTE — PROGRESS NOTES
[facility] AND CLINICS  Progress Note    Stuart Zhang Patient Status:  Observation    10/13/1950 MRN [id number]   Location [facility] 3W/SW Attending Madeline Keller MD   Hosp Day # 4 PCP Latesha Berg. DO Chris     Assessment:    1.   Admission with wors deficits. Neck: No JVD, carotids 2+ no bruits. Cardiac: Regular rate and rhythm, S1, S2 normal, 1/6 LIVE  Lungs: Clear without wheezes, rales, rhonchi. Normal excursions and effort. Abdomen: Soft, non-tender. Extremities: Without edema.   Sequelae of p 7:48 AM  INTERVENTIONAL CARDIOLOGY  [facility]

## 2018-05-29 PROBLEM — I25.41 ANEURYSM (ARTERIOVENOUS) OF CORONARY VESSELS: Status: ACTIVE | Noted: 2018-01-01

## 2018-05-29 NOTE — ANESTHESIA POSTPROCEDURE EVALUATION
Patient:  Amber Headley    Procedure Summary     Date:  05/29/18 Room / Location:  [place] [address].    Anesthesia Start:  8705 Anesthesia Stop:  9941    Procedure:  CATH CORONARY INTERVENTION Diagnosis:  (heart failure)    Scheduled Pro

## 2018-05-29 NOTE — OPERATIVE REPORT
[facility]  Operative Note     Lynnette Remy Location: OR   CSN [id number] MRN [id number]   Admission Date 5/22/2018 Operation Date 5/29/2018   Attending Physician Sujit Boss MD Operating Physician Walt Taveras MD      P sheath for the Revloc Gram was inserted and tied down for hemostasis. At that point cardiology inserted in [facility] and performed their procedure and when they were done the sheath was removed and the graft was clamped.   I ensured hemostasis of our anastomosis of

## 2018-05-29 NOTE — ANESTHESIA PREPROCEDURE EVALUATION
Anesthesia PreOp Note    HPI:     Tulio Peacock is a 78year old female who presents for preoperative consultation requested by: * No surgeons listed *    Date of Surgery: 5/29/2018    * No procedures listed *  Indication: * No pre-op diagnosis entered * eyes         Date Noted: 01/14/2016      BDR (background diabetic retinopathy) ([facility] [address]         Date Noted: 01/14/2016      Post-polio limb muscle weakness         Date Noted: 01/11/2016      Family history of colon cancer         Date Noted: 05/19/2015 type 2 (diabetes mellitus, type 2) ([facility] [address] 9044    W/O Complication  Pill, then Insulin added 2003   • Femoral artery stenosis ([facility] [address]     Right Superficial   • Floaters 2007   • Greater trochanteric bursitis    • Heart disease    • High blood pressure    • Hi Tab Take 0.5 tablets (10 mg total) by mouth daily. Disp: 45 tablet Rfl: 3 5/22/2018 at 0900   Albuterol Sulfate HFA (PROAIR HFA) 108 (90 Base) MCG/ACT Inhalation Aero Soln Inhale 2 puffs into the lungs every 6 (six) hours as needed for Wheezing.  Disp: 18 g TABLET BY MOUTH EVERY NIGHT Disp: 90 tablet Rfl: 3 5/22/2018 at Unknown time   magnesium 250 MG Oral Tab Take 500 mg by mouth daily.    Disp:  Rfl:  5/22/2018 at Unknown time   Saline Nasal Spray 0.65 % Nasal Solution 3 sprays by Nasal route nightly as need Unknown time   SACCHAROMYCES BOULARDII OR Take 1 capsule by mouth 2 (two) times daily.  Probiotic   Disp:  Rfl:  5/22/2018 at 0900   Alcohol Swabs ([facility] PREP) 70 % Does not apply Pads  Disp:  Rfl:  5/22/2018 at Unknown time   RANEXA 500 MG Oral Tablet 12 Hr Eric Larios MD 3 Units at 05/28/18 2121    methocarbamol (ROBAXIN) tab 500 mg 500 mg Oral QID PRN Jane AYALA  mg at 05/29/18 0916    0.9%  NaCl infusion  Intravenous Continuous Scar Santoro MD Last Rate: 20 mL/hr at 05/29/18 0537    heparin [facility] Danie Tierney MD 3 mL at 05/24/18 2012    Vitamin C (VITAMIN C) tab 1,000 mg 1,000 mg Oral Daily Sofia Naty AYALA MD 1,000 mg at 05/28/18 1042    Cholecalciferol (VITAMIN D) 1000 units tab 4,000 Units 4,000 Units Oral Daily Mireille Tavares MD 4,000 Units at 05/2 Clindamycin             Tightness in Throat  Levofloxacin [Quixi*    NAUSEA AND VOMITING  Augmentin [Amoxicil*    RASH  Dilaudid [[employer]*    CONFUSION    Comment:agitation  Scopolamine             DIZZINESS  Sulfa Antibiotics       RASH    Comme MPV 11.0 (H) 05/29/2018       Lab Results  Component Value Date    05/29/2018   K 4.4 05/29/2018    05/29/2018   CO2 28 05/29/2018   BUN 19 05/29/2018   CREATSERUM 0.52 05/29/2018    (H) 05/29/2018   PGLU 245 (H) 05/29/2018   CA 8.9 05 amputation) type 2 poorly controlled using insulin, blood dyscrasia (History of PE.  Currently on heparin gtt.),   Abdominal              Anesthesia Plan:   ASA:  4  Plan:   General  Monitors and Lines:   A-line  Post-op Pain Management: IV analgesics  Plan

## 2018-05-29 NOTE — CM/SW NOTE
Progression of care - Impella supported PCI today. Receiving IV Lasix. Plan is to return home w/ spouse when stable.   Ez Lopez RN, CTL

## 2018-05-29 NOTE — PROCEDURES
[facility] - [facility]    [facility] Cardiac Cath Procedure Note  Mel Lairdn Patient Status:  Inpatient    10/13/1950 MRN [id number]   Location [facility] Attending Sharona Stephenson MD   Hosp Day # 5 PCP Anthony Domingo.  Chris, <180, left axillary cut down surgically repaired. Recommendations: PCI performed as above  · Continue DAPT, asa and plavix  · Transfer to ICU     IV was maintained by RN and moderate conscious sedation of versed and fentanyl was given.   Patient was assess

## 2018-05-29 NOTE — PROGRESS NOTES
[place] [facility] - [facility]  Hospitalist Progress  Note     [place] Patient Status:  Observation    10/13/1950  78year old CSN [id number]   Location 324/324-A Attending Schuyler Silva,  [facility] Day # 5 PCP Micheline Shanks.  Carla Conner, DO     ASSESSMENT/PLAN dry. No rashes, erythema, diaphoresis. Psych: Normal mood and affect. Calm, cooperative    Labs:  Recent Labs   Lab  05/27/18   0642  05/27/18   1506  05/28/18   0527  05/29/18   0528   RBC  4.32   --   4.05  3.93   HGB  13.0  12.6  12.4  12.2   HCT  39. Furoate-Vilanterol  1 puff Inhalation Daily   • lisinopril  2.5 mg Oral Daily   • magnesium  500 mg Oral Daily   • THERA/BETA-CAROTENE  1 tablet Oral Daily   • vitamin E  400 Units Oral Daily   • saccharomyces boulardii  250 mg Oral BID     methocarbamol,

## 2018-05-30 NOTE — CONSULTS
[facility]    Report of Consultation    Stuart Leatha Patient Status:  Inpatient    10/13/1950 MRN [id number]   Location [facility] 2W/SW Attending Karol Rudolph MD   Hosp Day # 6 PCP Latesha Berg.  DO Chris     Date of Admiss • Menometrorrhagia    • Myocardial infarction [facility]) 2015    x2   • Osteopenia    • Other ill-defined conditions(799.89) 2010    Post Polio   • PE (pulmonary embolism)    • Peripheral vascular disease ([facility] [address] 2008   • Pneumonia due to organism    • Polio 19 Medications:    Current Facility-Administered Medications:  furosemide (LASIX) injection 20 mg 20 mg Intravenous Daily   0.9%  NaCl infusion  Intravenous Continuous   aspirin chewable tab 324 mg 324 mg Oral Once   Vancomycin HCl (VANCOCIN) 1,000 mg in sodi PRN   ALPRAZolam (XANAX) tab 0.5 mg 0.5 mg Oral Nightly   Clopidogrel Bisulfate (PLAVIX) tab 75 mg 75 mg Oral Nightly   Ketotifen Fumarate (ZADITOR) 0.025 % ophthalmic solution 1 drop 1 drop Both Eyes BID   Ranolazine ER (RANEXA) 12 hr tab 500 mg 500 mg Or 200-5 MCG/ACT Inhalation Aerosol INHALE 2 PUFFS INTO THE LUNGS TWICE DAILY. RINSE MOUTH AFTERWARDS   LISINOPRIL 5 MG Oral Tab TAKE 1/2 TABLET(2.5 MG) BY MOUTH DAILY   ALPRAZolam 0.5 MG Oral Tab Take 0.5 mg by mouth nightly.    LANTUS SOLOSTAR 100 UNIT/ML Diehl 0.5 ML BY NEBULIZATION EVERY 6 (SIX) HOURS AS NEEDED FOR WHEEZING. ascorbic acid 1000 MG Oral Tab Take 1,000 mg by mouth daily. Coenzyme Q10 (COQ10) 100 MG Oral Cap Take 1 capsule by mouth daily.      vitamin E 400 UNITS Oral Cap Take 400 Units by mouth reaction(s): Hives    Review of Systems:   12 point review of systems negative except for above  Physical Exam:   Blood pressure 146/82, pulse 96, temperature 97.6 °F (36.4 °C), temperature source Temporal, resp.  rate 15, height 4' 8.5\" (1.435 m), weight adequate range of motion no appreciable adenopathy mass or JVD. Carotids are 2+ without bruits. Lungs are clear to auscultation and percussion. Cardiac exam there is a normal rate and rhythm with a normal S1,S2 and no pathological murmurs.   No rub or ex Poliomyelitis        Aneurysm (arteriovenous) of coronary vessels          Recommendations:  Patient will need open surgical exposure of axillary artery with implantation of graft for temporary use of the Impella device for cardiologist to perform high ris

## 2018-05-30 NOTE — CM/SW NOTE
CARE MANAGEMENT    Discharge Planning Evaluation:  Saw pt at bedside, to assess her progress. She is A/O X3, resting in bed, awaiting tele bed transfer. Denies any c/o now.    Lives with spouse, but is wheel chair bound since 2010 due to post polio bc

## 2018-05-30 NOTE — PROGRESS NOTES
[facility]    Progress Note    Tulio Bledsoemarie Patient Status:  Inpatient    10/13/1950 MRN [id number]   Location [facility] 2W/SW Attending Justice Rubinstein, MD   Hosp Day # 6 PCP Juan Miguel Martinez. DO Chris       Subjective:      Cal Rose levemir  -add low dose of AC novolog, plus TDD  -monitor closely     Other problems  Wheelchair-bound, due to remote history of poliomyelitis  Dyslipidemia  Peripheral artery disease, severe with a history of left femme tibial bypass  Severe mitral regurgi

## 2018-05-30 NOTE — PROGRESS NOTES
[facility] AND [facility]  Progress Note    Elvie Dougherty Patient Status:  Inpatient    10/13/1950 MRN [id number]   Location [facility] 3W/SW Attending Giovanny Price MD   [facility] Day # 6 PCP Bandar Varma. DO Chris     Assessment:    1.   Admission with worsen distress. HEENT: No focal deficits. Neck: No JVD  Cardiac: Regular rate and rhythm, S1, S2 normal, 2/6 HSM  Lungs: Clear without wheezes, rales, rhonchi. Normal excursions and effort. Abdomen: Soft, non-tender. Extremities: Polio related deformities. infusion Stopped (05/29/18 1255)       Nirali Mcghee MD  5/29/2018  7:41 AM

## 2018-05-30 NOTE — PLAN OF CARE
CARDIOVASCULAR - ADULT    • Maintains optimal cardiac output and hemodynamic stability Progressing    • Absence of cardiac arrhythmias or at baseline Progressing        Diabetes/Glucose Control    • Glucose maintained within prescribed range Progressing elevated off bed, pt able to reach and get a drink from table, takes some pills with applle sauce

## 2018-05-31 NOTE — CM/SW NOTE
SW self-referred to meet w/ pt due to case finding and diagnosis. Pt is from home w/ her ex- and pt stated that they help one another at home. Pt reports to be independent w/ all ADL's.  Pt stated that she plans to return home at discharge w/ Stephon Maguire and

## 2018-05-31 NOTE — PROGRESS NOTES
[facility] AND CLINICS  Progress Note    Stuart Zhang Patient Status:  Inpatient    10/13/1950 MRN [id number]   Location [facility] 3W/SW Attending Karol Rudolph MD   Hosp Day # 7 PCP Latesha Berg. DO hCris     Assessment:    1.   Severe cardiomy Insulin Aspart Pen  2 Units Subcutaneous TID CC   • furosemide  20 mg Intravenous Daily   • aspirin  324 mg Oral Once   • vancomycin  15 mg/kg Intravenous Once   • metoprolol Tartrate  2.5 mg Intravenous Once   • insulin aspart  2-10 Units Subcutaneous Onc

## 2018-05-31 NOTE — PROGRESS NOTES
Misc. Note    More Aguirre NP  2018  [facility] - [facility]    Progress Note    [facility] Patient Status:  Inpatient    10/13/1950 MRN [id number]   Location [facility] 3W/SW Attending Enrique Morrissey MD   Hosp Day # 7 PCP Paco Carver oriented X 3, Left hand grasp weaker than right; Right hand grasp strong; patient states tingling in left hand improving states \"feels like her arm is waking up after it feel asleep\"  Psychiatric: calm   Left subclavian dressing C/D/I  Pulmonary: gemais

## 2018-05-31 NOTE — CARDIAC REHAB
Cardiac Rehab Phase I    Activity:  Distance   Assistance needed   Patient tolerated activity . Education:  Handouts provided and reviewed: [address] Diet: Healthy Cardiac diet reviewed.     Disease Process: Disease process rev

## 2018-05-31 NOTE — PROGRESS NOTES
[place] [facility] - [facility]    Progress Note    Tate Hinojosa Patient Status:  Inpatient    10/13/1950 MRN [id number]   Location [facility] 2W/SW Attending Preston Yee MD   Hosp Day # 7 PCP Ilene Perez DO       Subjective:     numbn up, plus TDD  -monitor closely     Other problems  Wheelchair-bound, due to remote history of poliomyelitis  Dyslipidemia  Peripheral artery disease, severe with a history of left femme tibial bypass  Severe mitral regurgitation status post mitral clip'201

## 2018-05-31 NOTE — CM/SW NOTE
Progression of care - s/p impella supported PCI, now has left arm and hand numbness, CV surg following. Coumadin to be restarted. WC bound, PT pending, current / [facility].   Mannie Causey RN, CTL

## 2018-05-31 NOTE — PROGRESS NOTES
Called to pt to see how she was doing and if I could assist in any manner. She has been in the hospital since 5/22. She said she had someone in her room at the time and asked if she could call me back later on this afternoon. P= Await call back from pt.

## 2018-05-31 NOTE — OCCUPATIONAL THERAPY NOTE
OCCUPATIONAL THERAPY EVALUATION - INPATIENT     Room Number: 469/354-F  Evaluation Date: 5/31/2018  Type of Evaluation: Initial       Physician Order: IP Consult to Occupational Therapy  Reason for Therapy: ADL/IADL Dysfunction and Discharge Planning    OC being alone for extended period time due to her hospital stay. In this OT evaluation patient presents with the following impairments: decreased activity tolerance, new onset LUE weakness and numbness.   These deficits manifest functionally while performi added 2003   • Femoral artery stenosis (HCC)     Right Superficial   • Floaters 2007   • Greater trochanteric bursitis    • Heart disease    • High blood pressure    • High cholesterol    • History of blood clots    • History of blood transfusion    • Hype None          Drives: Yes       Stairs in Home: ramp entrance  Use of Assistive Device(s): WC    Prior Level of [place]:  Mod I w/ ADLs, mobility --pt w/ ADA renovated washroom    SUBJECTIVE  Agreeable to therapy    OCCUPATIONAL THERAPY EXAMINATION unsupported long sit  Feeding: set-up in long-sit  Bathing: NT  Toileting: NT  Upper Extremity Dressing: anticipate SBA  Lower Extremity Dressing:  Mod I    Education Provided: role of OT, fine motor manipulation activities to encourage neuro re-education

## 2018-05-31 NOTE — PHYSICAL THERAPY NOTE
PHYSICAL THERAPY EVALUATION - INPATIENT     Room Number: 302/302-A  Evaluation Date: 5/31/2018  Type of Evaluation: Initial   Physician Order: PT Eval and Treat    Presenting Problem: severe CAD requiring impella insertion via L axillary artery due to sev ex- who has memory impairments and needs reminders on taking medications for example. Will need to assess pt's ability to transfer to < > from wheelchair.  If pt is unable to complete this transfer independently, may need to think about short sub-acu Deep vein thrombosis (HCC)    • [facility]    • DM type 2 (diabetes mellitus, type 2) ([facility] 3494    W/O Complication  Pill, then Insulin added 2003   • Femoral artery stenosis [facility])     Right Superficial   • Floaters 2007   • Greater trochanteric bursi Bedroom: 0       Lives With:  (ex-)  Drives: Yes  Patient Owned Equipment:  (2 manual wheelchairs)       Prior Level of [place]: Pt was independent w/ ADLs. Pt has been wheelchair bound since 2010 due to post polio weakness. Pt drives.  Pt is ab tested           Stoop/Curb Assistance: Not tested       Bed Mobility: Min A    Transfers: NT    Exercise/Education Provided:  Bed mobility    Patient End of Session: In bed;Needs met;Call light within reach;RN aware of session/findings; All patient questio

## 2018-06-01 NOTE — OCCUPATIONAL THERAPY NOTE
OCCUPATIONAL THERAPY TREATMENT NOTE - INPATIENT        Room Number: 302/302-A      Problem List  Principal Problem:    Dyspnea on exertion  Active Problems:    Dyspnea    Unstable angina (HCC)    Poliomyelitis    Aneurysm (arteriovenous) of coronary vessel rinsing, drying)?: A Little  -   Toileting, which includes using toilet, bedpan or urinal? : A Little  -   Putting on and taking off regular upper body clothing?: A Little  -   Taking care of personal grooming such as brushing teeth?: None  -   Eating meal

## 2018-06-01 NOTE — PROGRESS NOTES
[facility]    Progress Note    [facility] Patient Status:  Inpatient    10/13/1950 MRN [id number]   Location [facility] 3W/SW Attending Enrique Morrissey MD   Hosp Day # 8 PCP Merari Comment.  DO Chris       Assessment and Plan: lisinopril  5 mg Oral Daily   • Warfarin Sodium  7.5 mg Oral Nightly   • furosemide  20 mg Intravenous Daily   • aspirin  324 mg Oral Once   • vancomycin  15 mg/kg Intravenous Once   • metoprolol Tartrate  2.5 mg Intravenous Once   • insulin aspart  2-10 U

## 2018-06-01 NOTE — PROGRESS NOTES
[place] [facility]    Progress Note    Tate Hinojosa Patient Status:  Inpatient    10/13/1950 MRN [id number]   Location [facility] 2W/SW Attending Preston Yee MD   Hosp Day # 8 PCP Ilene Perez DO       Subjective:     Numbn hypoglycemia with higher doses of long acting insulin (pt usually eats only 2 meals a day, and some small snack at bedtime)  -adjust levemir and AC novolog, titrate slowly up, plus TDD  -monitor closely    Vertigo  -restart meclizine prn  -but low BP also

## 2018-06-01 NOTE — CM/SW NOTE
Progression of care - Lasix switched to PO, INR 1.1 today, on Coumadin and Heparin drip. Will return home when INR therapeutic. New home health, need RN/PT order.    Rosalio Hogan RN, CTL

## 2018-06-01 NOTE — PROGRESS NOTES
[place] [facility]    Progress Note    Bashir Bennett Patient Status:  Inpatient    10/13/1950 MRN [id number]   Location [facility] 3W/SW Attending Kelsey Stuart MD   Hosp Day # 8 PCP Phu Berger.  DO Chris     Subjective:  Pt. Stat 06/01/2018   CA 8.2 06/01/2018   PTT 60.2 06/01/2018   INR 1.1 06/01/2018       Physical Exam:  Blood pressure 106/69, pulse 71, temperature 97.9 °F (36.6 °C), temperature source Oral, resp.  rate 18, height 4' 8.5\" (1.435 m), weight 147 lb (66.7 kg), SpO2

## 2018-06-01 NOTE — HOME CARE LIAISON
Received referral from Silvano Guillen . Met with patient at the bedside. Patient is agreeable to [facility], pending orders. Brochure and liaison's business card provided with contact information.  All questions addressed and

## 2018-06-02 NOTE — PROGRESS NOTES
[facility]    Progress Note    Billy Tao Patient Status:  Inpatient    10/13/1950 MRN [id number]   Location [facility] 3W/SW Attending Cinthia Navarro MD   [facility] Day # 9 PCP Winter Chris.  DO Chris       Assessment and Plan: 40 mEq Oral Once   • insulin detemir  5 Units Subcutaneous Nightly   • Insulin Aspart Pen  7 Units Subcutaneous TID CC   • furosemide  20 mg Oral Daily   • lisinopril  5 mg Oral Daily   • Warfarin Sodium  7.5 mg Oral Nightly   • vancomycin  15 mg/kg Shanel Ellis

## 2018-06-02 NOTE — PLAN OF CARE
Problem: Patient Centered Care  Goal: Patient preferences are identified and integrated in the patient's plan of care  Interventions:  - What would you like us to know as we care for you?  Patient is from home with ex-; pt is ex-husbands caregiver at Evaluate effectiveness of vasoactive medications to optimize hemodynamic stability  - Monitor arterial and/or venous puncture sites for bleeding and/or hematoma  - Assess quality of pulses, skin color and temperature  - Assess for signs of decreased corona ordered and tolerated  - Evaluate effectiveness of GI medications  - Encourage mobilization and activity  - Obtain nutritional consult as needed  - Establish a toileting routine/schedule  - Consider collaborating with pharmacy to review patient's medicatio Progressing      Problem: Impaired Swallowing  Goal: Minimize aspiration risk  Interventions:  - Patient should be alert and upright for all feedings (90 degrees preferred)  - Offer food and liquids at a slow rate  - No straws  - Encourage small bites of f status, cognitive ability or social support system  Outcome: Progressing

## 2018-06-02 NOTE — PROGRESS NOTES
[facility]    [facility] Note    Lorean Somerset Patient Status:  Inpatient    10/13/1950 MRN [id number]   [facility] 2W/SW Attending Boone Denny MD   [address] Day # 9 PCP Thaddeus Garnett.  DO Chris       Subjective:     Numbn CAD    Insulin-dependent diabetes, labile  h/o significant hypoglycemia with higher doses of long acting insulin (pt usually eats only 2 meals a day, and some small snack at bedtime)  -adjust levemir and AC novolog, titrate slowly up, plus TDD  -monitor cl

## 2018-06-02 NOTE — PROGRESS NOTES
S/P AXILLARY ARTERY IMPELLA INSERTION AND REMOVAL on 5/29  Left hand numbness continues to improve, patient states just her finger tips are numb at this point  Working with PT/OT; encouraged increase activity as fabricio. Pt.  Wheelchair bound  SCDs and IV Hepar

## 2018-06-03 NOTE — PROGRESS NOTES
[facility]    Progress Note    Munira Orozco Patient Status:  Inpatient    10/13/1950 MRN [id number]   Location [facility] 3W/SW Attending Meryle Guarneri, MD   Hosp Day # 10 PCP Milan Causey.  DO Chris       Assessment and Plan: BID with meals   • Insulin Aspart Pen  10 Units Subcutaneous TID CC   • insulin detemir  8 Units Subcutaneous Nightly   • furosemide  20 mg Oral Daily   • lisinopril  5 mg Oral Daily   • Warfarin Sodium  7.5 mg Oral Nightly   • vancomycin  15 mg/kg Yadiel Medina INR 1.1 06/01/2018                       Lima Ray, APN  6/1/2018

## 2018-06-03 NOTE — PROGRESS NOTES
[facility]    Progress Note    Kitty Lopes Patient Status:  Inpatient    10/13/1950 MRN [id number]   [facility] 2W/SW Attending Sriram Villa, [address] Day # 10 PCP Ginna Wright.  DO Chris       Subjective:     Numb labile  h/o significant hypoglycemia with higher doses of long acting insulin (pt usually eats only 2 meals a day, and some small snack at bedtime)  -adjust levemir and AC novolog, titrate slowly up, plus TDD  -monitor closely    Vertigo  -meclizine prn  -

## 2018-06-03 NOTE — OCCUPATIONAL THERAPY NOTE
OCCUPATIONAL THERAPY TREATMENT NOTE - INPATIENT        Room Number: 302/302-A           Problem List  Principal Problem:    Dyspnea on exertion  Active Problems:    Dyspnea    Unstable angina (HCC)    Poliomyelitis    Aneurysm (arteriovenous) of coronary v Device Recommendations: None     PLAN  OT Treatment Plan: Balance activities; Energy conservation/work simplification techniques;ADL training;Functional transfer training; Endurance training;Patient/Family education;Patient/Family training;Equipment eval/edu complete functional transfer with SBA  Comment: CGA    Patient will complete bed mobility w/ SBA  Comment: SBA - goal met    Patient will complete UE dressing w/ SBA  Comment: CGA     Comment:          Goals  on: 18  Frequency: 3-5x/wk    Jacqueline

## 2018-06-04 NOTE — DISCHARGE SUMMARY
[facility] HOSPITALIST  DISCHARGE SUMMARY     Lyla Jones Patient Status:  Inpatient    10/13/1950 MRN [id number]   [facility] 3W/SW Attending Daisy Dill MD   [address] Day # 11 PCP Nelly Guy.  DO Chris     Date of Admission:  wheelchair bound. She had severe mitral regurgitation, requiring MitraClip; postprocedure developed cardiac tamponade and required sternotomy and evacuation of blood.   She has a history of cardiomyopathy, ischemic, with ejection fraction improved from 25% contributing     Other problems  Wheelchair-bound, due to remote history of poliomyelitis  Dyslipidemia  Peripheral artery disease, severe with a history of left femme tibial bypass  Severe mitral regurgitation status post mitral [id number], complicated by this  · additional instructions      4 u sq daily   Quantity:  5 pen  Refills:  3     lisinopril 5 MG Tabs  Commonly known as:  PRINIVIL,ZESTRIL  Start taking on:  6/5/2018  What changed:  See the new instructions.       Take 1 tablet (5 mg total) by mouth Misc      Use 3 times a day   Quantity:  300 each  Refills:  3     Clopidogrel Bisulfate 75 MG Tabs  Commonly known as:  PLAVIX      TAKE 1 TABLET BY MOUTH EVERY NIGHT   Quantity:  90 tablet  Refills:  3     clotrimazole-betamethasone 1-0.05 % Crea  Common 20 MG Tabs  Commonly known as:  CRESTOR      Take 20 mg by mouth nightly. Refills:  0     SACCHAROMYCES BOULARDII OR      Take 1 capsule by mouth 2 (two) times daily.  Probiotic   Refills:  0     Saline Nasal Spray 0.65 % Soln  Commonly known as:  SALINE rate and rhythm. No murmurs, rubs or gallops. Abdomen: Soft, nontender, nondistended. Positive bowel sounds. No rebound or guarding. Neurologic: Wheelchair-bound from previous polio  Musculoskeletal: Moves all extremities. Extremities: No edema.   ----

## 2018-06-04 NOTE — CM/SW NOTE
6/4/18 CM Discharge planning / MDO for home health   Spoke with Marni Peters RN [facility] advised HHC order on chart, pt will be discharging home later today. Pt has arranged for transport home.   Molina Saunders  X [phone]

## 2018-06-04 NOTE — PROGRESS NOTES
[facility]  Progress Note    Kenrick Clements Patient Status:  Inpatient    10/13/1950 MRN [id number]   [facility] 3W/SW Attending Peña Oliveira MD   [facility] Day # 11 PCP Mariana Victoria.  DO Chris     Assessment:    1.  CAD, status po 06/04/2018   CA 8.4 06/04/2018          Lab Results  Component Value Date   TROP 0.02 05/23/2018   TROP 0.01 05/22/2018   TROP 0.02 12/07/2016        Medications:    • Warfarin Sodium  5 mg Oral Nightly   • carvedilol  6.25 mg Oral BID with meals   • Insul

## 2018-06-04 NOTE — CM/SW NOTE
Progression of care - INR 2.2 today, plan is to discharge home w/ home health, orders have been entered.    Justin Del Cid RN, CTL

## 2018-06-04 NOTE — PROGRESS NOTES
Misc. Note    S/P AXILLARY ARTERY IMPELLA INSERTION AND REMOVAL on 5/29  Pt. Without complaints. She states she is going home today. No visitors at bedside. Left subclavian incision CDI with soft bruising noted.  She states her hand continues to improved a

## 2018-06-04 NOTE — TRANSITION NOTE
56-year-old female admitted to [facility] from the office on 5/22/2018 with the abrupt onset of significant dyspnea on exertion and palpitations.   History of prior complex intervention on the circumflex and 2016, and status post mitral clip for arlyn

## 2018-06-05 NOTE — PROGRESS NOTES
Mychart letter sent to the patient for hospital follow up.   [facility] BB date 6-

## 2018-06-06 NOTE — PROGRESS NOTES
Initial Post Discharge Follow Up   Discharge Date: 6/4/18  Contact Date: 6/5/2018    Consent Verification:  Assessment Completed With: Patient  HIPAA Verified?   Yes    Discharge Dx:   Unstable angina.      BOOTH (POA)     Insulin-dependent diabetes, labile mouth daily. Disp: 30 tablet Rfl: 0   Insulin Aspart Pen 100 UNIT/ML Subcutaneous Solution Pen-injector Inject 6 Units into the skin 3 (three) times daily with meals.  Disp: 1 pen Rfl: 0   sodium Chloride 0.9 % Inhalation Nebu Soln Take 3 mL by nebulization 75 MG Oral Tab TAKE 1 TABLET BY MOUTH EVERY NIGHT Disp: 90 tablet Rfl: 3   magnesium 250 MG Oral Tab Take 500 mg by mouth daily. Disp:  Rfl:    Saline Nasal Spray 0.65 % Nasal Solution 3 sprays by Nasal route nightly as needed for congestion.  Disp:  Rfl: mouth daily. She is now taking tablets not gummies. Disp:  Rfl:    aspirin (ASPIRIN CHILDRENS) 81 MG Oral Chew Tab Chew 81 mg by mouth daily.    Disp:  Rfl:      • When you were leaving the hospital were any medication changes discussed with you? yes  • If care provider if your insurance requires a referral.    Jun 20, 2018 11:15 AM CDT Post Op with [facility], [facility])    Jun 25, 2018  9:30 AM CDT  (Arrive by 9:15 AM) [address] [address]  [phone]          PCP TCM/HFU appointment: scheduled at D/C within 7-14 days  no     NCM Reviewed/scheduled/rescheduled PCP TCM/HFU appointment with pt:  Yes       BOOK BY DATE:6/18/2018    [x]  Discharge Summary, Discharge m

## 2018-06-07 NOTE — TELEPHONE ENCOUNTER
Noted. Phoned pt and spoke with her. She states she is having some urgency,frequency and pressure. States she recently had a catheter when in the hospital. Please see plan from Lov copied and pasted below as follows.  She agrees to start the standing order

## 2018-06-07 NOTE — TELEPHONE ENCOUNTER
Patient states [facility] RN collect Urine specimen to take to a lab at [facility] on [address]. Would like to inform clinical staff to keep an eye for results since she believes she may have a UTI. Please advise. Thank you.

## 2018-06-07 NOTE — TELEPHONE ENCOUNTER
Apolinar Fox From [address] [place] [address] would like to let  know that pt was admitted to [address] [place] [address]. Apolinar Fox want to let  know about a mediation interaction .          Coumadin interacts with both her Asprin and Vitamin D

## 2018-06-11 NOTE — TELEPHONE ENCOUNTER
Lalita gallardo [facility] nurse calling to verify if results of urinalysis that were faxed today have been reviewed because seem patient has UTI.      Please advise

## 2018-06-11 NOTE — TELEPHONE ENCOUNTER
Received faxed UA and C&S results from [employer] for this pt and I placed them on [facility] along with a copy of this msg asking him to review and sign off in this msg so that we can contact the pt and Celeste Levy. Tasked to [facility] [place].

## 2018-06-12 PROBLEM — R06.00 DYSPNEA, UNSPECIFIED TYPE: Status: ACTIVE | Noted: 2018-01-01

## 2018-06-12 NOTE — TELEPHONE ENCOUNTER
Reviewed culture results. She should already be on on Duricef. If not, advise Duricef 500 mg PO BID x 5 days please.   Thanks

## 2018-06-12 NOTE — HISTORICAL OFFICE NOTE
Ari Hobbs  : 10/13/1950  ACCOUNT:  [id number]  [phone]  PCP: Dr. Jennifer Gonzalez     TODAY'S DATE: 2018  DICTATED BY:  GIN Garcia]      CHIEF COMPLAINT: [Followup of .  CAD, established, Followup of Cardiomyopathy, ischemic, Followup of H infarction, Sub-Q ICD [facility] 3/16) and PENELOPE    FAMILY HISTORY: Significant for premature CAD. Negative for AAA. SOCIAL HISTORY: SMOKING: Never used tobacco. denies smoking. CAFFEINE: none. ALCOHOL: denies drinking. EXERCISE: no regular exercise.  DIET: represent unstable anginal equivalent. []    ASSESSMENT:  1. . CAD, established  2. Heart failure, systolic, chronic  3. Atherosclerosis, extremity  4. Abnormal EKG  5. Abnormal stress test  6. Anemia  7. Angina, unstable  8.  Cardiomyopathy, ischemic Vitamin D             2000UNIT  1 tablet 4 days a week and 2 tablets     04/15/16 Tylenol with Codeine #300-30MG  PRN                                      10/23/15 Multivitamins                   1 tablet daily                           03/07/14 Clarinex otherwise negative. CV: Denies chest pain, dizziness, palpitations. RESP: asthma, dyspnea on exertion and on inhalers. GI: denies melena, hematochezia. : frequency of urination and on  furosemide. INTEG: no new rashes, lesions.  MS: difficulty in walking, masses, tenderness or hepatosplenomegaly, rectal deferred. MS: inadequate gait for exercise/testing and uses wheelchair. EXT: no clubbing or cyanosis. SKIN: no rashes, lesions, ulcers and healed Right groin.   NEURO/PSYCH: alert and oriented to time, place 12/01/17 *Furosemide           20MG      1 TABLET DAILY.                           11/29/17 *Nitroglycerin        0.4MG     DISSOLVE 1 TABLET UNDER THE TONGUE A     08/01/17 *Rosuvastatin Calcium 20MG      1 TABLET AT BEDTIME.                     07/06/17 [id number]  [phone]  PCP: Dr. Silvia Valera: 07/25/2017  DICTATED BY:  [Dr. Phoenix Contreras ]    CHIEF COMPLAINT: [Followup of .  CAD, established, Followup of Hypercholesteremia, pure, Followup of Hypertension and benign.]    HPI:  [On 07 cramps/spasm Left neck. NEURO: no localized deficits. HEM/LYMPH: denies easy bruising. ALL: no new food or environmental allergies.     PAST HISTORY: CVA 2005,pulmonary embolus, diabetes, postpolio myelitis and right mastectomy 2005    PAST CV HISTORY: 3/20 displaced, no lifts and thrills or rub. AUSC:  regular rhythm, normal S1, S2 without S3; 2/6 holosystolic systolic murmur. CAROTIDS: carotid pulses normal and without bruits. ABD AORTA: abdominal aorta not enlarged. FEM: deferred.  PEDAL: pedal pulses dimin have asked her to start working with her crutches and her braces. If she has recurrent of symptoms of her chest pain, this would alter her risk/benefit ratio, and we would reconsider doing angioplasty of the chronic totally occluded circumflex.  At this poi directed  03/07/14 Warfarin Sodium       2.5MG     as directed per PCP        Priscilla Geronimo M.D.     TD/rt - DD: 07/25/2017 - DT: 07/26/2017 - Job ID: [id number]            [facility] Cardiology - Diagnostic Test Results    TYPE OF TEST: NUCLEAR area of decreased uptake in the left anterior portion. Clinical correlation is recommended. Gated SPECT images demonstrate left ventricular enlargement with severe left ventricular dysfunction. The calculated ejection fraction is 28%.   There w 1:20  Additional Medications Administered: None      Stress ECG: Indeterminate Due to Left bundle branch block  Symptoms: No Chest Pain  Significant Arrhythmias: Rare PVCs  Blood Pressure Response: Normal      TEST CONDUCTED BY: Evan Becker MS and Anna

## 2018-06-12 NOTE — PROGRESS NOTES
[facility]    Progress Note    Billy Tao Patient Status:  Observation    10/13/1950 MRN [id number]   Location [facility] Attending Dalton Velásquez MD   Hosp Day # 0 PCP Winter Crhis.  DO Chris     Subjective:     Constitu outpatient   - continue rocephin at this time  - await culture     Uncontrolled diabetes  We will resume home dose of insulin, use sliding scale coverage as needed.     Benign hypertension with hypertensive heart disease  Blood pressure well controlled con present Electronically signed on 06/12/2018 at 07:51 by Pamela Barton 12-lead    Result Date: 6/11/2018  ECG Report  Interpretation  -------------------------- Sinus Rhythm -Left bundle branch block.  ABNORMAL When compared with ECG of 05/22/2018 1

## 2018-06-12 NOTE — CONSULTS
[facility]  [facility] Cardiology Consult Note    Terrell Counter Patient Status:  Observation    10/13/1950 MRN [id number]   Location [facility] Attending Leda Zacarias MD   Hosp Day # 0 PCP Excell Hammans.  Elliott Hayes DO     78year old female, problem. · Mitral Clip- moderate MR last admission, however would expect signs of heart failure if mitral valve the issue.   · Asthma- breathing treatments have not helped  · PE- dimer negative  · PLAN- recheck echo for degree of MR and LV function, stre IBS (irritable bowel syndrome)    • Meibomian gland dysfunction 2006   • Menometrorrhagia    • Myocardial infarction [facility]) 2015    x2   • Osteopenia    • Other ill-defined conditions(129.89) 2010    Post Polio   • PE (pulmonary embolism)    • Peripheral va (36.4 °C)  Pulse: 76  Resp: 20  BP: 113/41    Intake/Output:     Intake/Output Summary (Last 24 hours) at 06/12/18 1047  Last data filed at 06/12/18 1007   Gross per 24 hour   Intake              595 ml   Output             1650 ml   Net            -1055 m

## 2018-06-12 NOTE — ED INITIAL ASSESSMENT (HPI)
Pt came in via EMS for SOB and dizziness since this morning. BBB on EKG with EMS. Given 324mg aspirin with EMS. Had 2 stents placed before [address] last Monday. Denies CP, N/V/D, HA. Neuro exam intact, RR even and nonlabored, speaking in full sentences.

## 2018-06-12 NOTE — H&P
[address] [facility] Patient Status:  Emergency    10/13/1950 MRN [id number]   Location [facility] Attending Johnson Auguste MD   Hosp Day # 0 PCP Bandar Varma.  Savannah Sherwood DO     Date: • Hyperlipidemia LDL goal <70    • Hypertension, essential, benign    • IBS (irritable bowel syndrome)    • Meibomian gland dysfunction 2006   • Menometrorrhagia    • Myocardial infarction [facility]) 2015    x2   • Osteopenia    • Other ill-defined condition alcohol or use drugs.     Allergies:    Clindamycin             Tightness in Throat  Levofloxacin [Quixi*    NAUSEA AND VOMITING  Augmentin [Amoxicil*    RASH  Dilaudid [Hydromorp*    CONFUSION    Comment:agitation  Scopolamine             DIZZINESS  Sulfa Units into the skin 3 (three) times daily with meals.    Insulin Pen Needle (PEN NEEDLES) 31G X 6 MM Does not apply Misc   No No   Sig: To be used qid   LANTUS SOLOSTAR 100 UNIT/ML Subcutaneous Solution Pen-injector   No No   Si u sq daily   Patient fadumo every evening. To use as directed by coumadin clinic    acetaminophen 325 MG Oral Tab   No No   Sig: Take 2 tablets (650 mg total) by mouth every 6 (six) hours as needed.    albuterol Sulfate (5 MG/ML) 0.5% Inhalation Nebu Soln   No No   Sig: TAKE 0.5 ML BY (106-123)/(49-59) 106/49    General:  Alert and oriented. Diffuse skin problem:  None. Eye:  Pupils are equal, round and reactive to light, extraocular movements are intact, Normal conjunctiva.   HENT:  Normocephalic, oral mucosa is moist.  Head:  Normoce signs of acute exacerbation at this time, continue home inhalers    Pulmonary embolism  INR therapeutic, will continue to monitor. Prophylaxis  Therapeutic INR on Coumadin    CODE STATUS  Full    Primary care physician  Khushi Espino.  Paola Pump, DO    Disposi

## 2018-06-12 NOTE — CONSULTS
Chloe Jacobo, [address] [facility]  [address]     VASCULAR SURGERY CONSULT NOTE      Name: Foreign Hewitt   :   10/13/1950  [id number]     REFERRING PHYSICIAN: Benjamin Perrin. • BPPV (benign paroxysmal positional vertigo)    • Breast cancer [facility])     DCIS   • Breast cancer of upper-outer quadrant of left female breast ([facility] [address] 10/23/2010   • [facility] overlapping sites [facility] 10/23/2010   • Cataract 2005, 2009 No date: OTHER SURGICAL HISTORY      Comment: Orthopedic surgeries X8  No date: OTHER SURGICAL HISTORY      Comment: Stent and Angioplasty  Dr. Rosalina Mina  No date: OTHER SURGICAL HISTORY      Comment: Arthrocentesis of L shoulder acromioclaviular •  Ranolazine ER (RANEXA) 12 hr tab 500 mg, 500 mg, Oral, BID  •  Rosuvastatin Calcium (CRESTOR) 20 MG tab 20 mg, 20 mg, Oral, Nightly  •  saccharomyces boulardii (FLORASTOR) cap 250 mg, 250 mg, Oral, BID  •  Saline Nasal Spray (SALINE MIST) 3 spray, 3 spr Patient's family history includes Breast Cancer (age of onset: 46) in her self; Breast Cancer (age of onset: 61) in her sister; Cancer in her sister; Colon Cancer (age of onset: 48) in her brother; Colonic Polyps in her brother; Crohn's Disease in her [de-identified] CA  8.8  8.8       No results for input(s): ALT, AST, ALB, AMYLASE, LIPASE, LDH in the last 168 hours.     Invalid input(s): ALPHOS, TBIL, DBIL, TPROT    Recent Labs   Lab  06/11/18 2110 06/11/18   2341   TROP  0.01  0.01     No results found for: ANAS, PROCEDURE: XR CHEST AP PORTABLE (CPT=71010) TIME: 2104 hrs. .   COMPARISON: [facility], XR CHEST PA + LAT CHEST (IGJ=[id number]), 5/22/2018, 18:48. INDICATIONS: Shortness of breath today. TECHNIQUE:   Single view.    FINDINGS:  CARDIAC/VASC: Mil CONCLUSION:  1. No fracture. 2. Demineralization. 3. Normal hips. 4. Moderate osteoarthritis lower lumbar spine. 5. Postop changes in the femoral regions. 6. Atherosclerosis. 7. Arterial stents. Dictated by (CST):  Carmen Castillo MD on 5/28/2018 at 1 PROCEDURE: CTA GATED THORACIC AORTA (CPT=71275)  COMPARISON: None. INDICATIONS: Dyspnea on exertion, Heart failure.  Evaluation for axillary access impella  TECHNIQUE: CT images of the chest were obtained with non-ionic intravenous contrast material.  Auto CONCLUSION:  1. Mild atherosclerosis of the aorta mainly involving the arch. No significant atherosclerosis or stenosis within the axillary or subclavian arteries. No aortic aneurysm or dissection. 2.  No pulmonary embolus.  3.  Left upper extremity veno The patient is a 78year old female who has severe peripheral arterial disease with recent occlusion of her left femoral to anterior tibial artery bypass.   This likely occurred 2 weeks ago as the patient states that her symptoms of actually slowly improved

## 2018-06-12 NOTE — TELEPHONE ENCOUNTER
Pt with sob recent heart cath  abx already started by Dr. Rebecca Lopez. Told to go to er by FANNY Smith.    Pt agreed to go

## 2018-06-12 NOTE — PLAN OF CARE
Problem: Patient Centered Care  Goal: Patient preferences are identified and integrated in the patient's plan of care  Interventions:  - What would you like us to know as we care for you? I have a history of polio.   - Provide timely, complete, and accurate

## 2018-06-12 NOTE — ED PROVIDER NOTES
Patient Seen in: [facility] AND [facility] Emergency Department     History   Patient presents with:  Dyspnea DEO SOB (respiratory)    Stated Complaint:     HPI    78year old female status post recent stenting of her LAD, presents to the ED concerned about her \" (cerebrum) ([facility] [address] 2005       Past Surgical History:  No date: ANGIOGRAM  No date: ANGIOPLASTY (CORONARY)  3/4/16: AUTOMATIC EXTERNAL DEFIBRILLATOR, WITH INTEGRA* Left  No date: CARDIAC DEFIBRILLATOR PLACEMENT  No date: CATARACT  7/13/09: CATARACT EXTRACTION PUFFS INTO THE LUNGS TWICE DAILY. RINSE MOUTH AFTERWARDS   ALPRAZolam 0.5 MG Oral Tab,  Take 0.5 mg by mouth nightly.    LANTUS SOLOSTAR 100 UNIT/ML Subcutaneous Solution Pen-injector,  4 u sq daily  Patient taking differently: Inject 4 Units into the skin vitamin E 400 UNITS Oral Cap,  Take 400 Units by mouth daily. acetaminophen 325 MG Oral Tab,  Take 2 tablets (650 mg total) by mouth every 6 (six) hours as needed. Cholecalciferol (VITAMIN D3) 2000 units Oral Tab,  Take 4,000 Units by mouth daily.  Ta • Macular degeneration Neg        Smoking status: Never Smoker                                                              Smokeless tobacco: Never Used                      Alcohol use:  No                Review of Systems    Positive for stated complai Skin: Skin is intact. No petechiae noted. She is not diaphoretic. No cyanosis. No pallor. Psychiatric: She has a normal mood and affect. Her behavior is normal.   Nursing note and vitals reviewed.                 ED Course   Nursing notes and Triage vit EKG    Rate, intervals and axes as noted on EKG Report.   Rate: 97  Rhythm: Sinus Rhythm  Reading: Left bundle branch block, old           Imaging Results Available and Reviewed while in ED: XR CHEST AP PORTABLE  (CPT=71045)   Final Result    PROCEDURE: anticoagulation    Limitations of history:   able to obtain history from patient  Factors limiting our ability to obtain a history:  None    Complicating Factors: The patient already has has Type II diabetes mellitus ([facility] [address] Asthma;  Hypertension, benign; pertinent results, any required  acute management issues, and/or I did need for follow-up, with the patient/gaurdian. See radiology reports for details.        Monitor Interpretation:   normal sinus rhythm with a rate of 90    Oxygen Saturation:  100% on ro if sent home. We also recommended that the patient schedule follow up care with a primary care provider as soon as possible to obtain basic health screening including reassessment of blood pressure.      Condition upon leaving the department: Stable

## 2018-06-12 NOTE — TELEPHONE ENCOUNTER
[facility], patient has already been on antibiotic for 4 days. Patient has started to feel Dizzy with SOB. Please advise.

## 2018-06-13 NOTE — PLAN OF CARE
Problem: Patient Centered Care  Goal: Patient preferences are identified and integrated in the patient's plan of care  Interventions:  - What would you like us to know as we care for you? I have a history of polio.   - Provide timely, complete, and accurate stability  INTERVENTIONS:  - Monitor vital signs, rhythm, and trends  - Monitor for bleeding, hypotension and signs of decreased cardiac output  - Evaluate effectiveness of vasoactive medications to optimize hemodynamic stability  - Monitor arterial and/or

## 2018-06-13 NOTE — CM/SW NOTE
AMANDEEP met with pt at bedside to discuss discharge planning. Pt lives in house with wheelchair access with her ex-. Pt states that her ex- will be available to assist pt upon discharge.  Pt is wheelchair bound but states that she is independent  w

## 2018-06-13 NOTE — PROGRESS NOTES
[facility]  Vascular Surgery Progress Note    Munira Orozco Patient Status:  Inpatient    10/13/1950 MRN [id number]   Location [facility] Attending Henrik Ramsay MD   Hosp Day # 1 PCP Milan Causey. DO Chris     Objective:   Temp: 98. tablet Oral BID PRN   albuterol Sulfate (VENTOLIN) (5 MG/ML) 0.5% nebulizer solution 2.5 mg 2.5 mg Nebulization Q6H PRN   ALPRAZolam (XANAX) tab 0.5 mg 0.5 mg Oral Nightly   Vitamin C (VITAMIN C) tab 1,000 mg 1,000 mg Oral Daily   aspirin chewable tab 81 m 2110  06/12/18   1653  06/13/18   0455  06/13/18   1026   NA  134*  134*  136  136   K  4.5  3.9  3.7  4.0   CL  100  101  105  104   CO2  25  26  25  24   BUN  22*  12  10  8   CREATSERUM  0.76  0.45*  0.46*  0.43*   CA  8.8  8.8  8.9  9.2   GLU  393*  22

## 2018-06-13 NOTE — PROGRESS NOTES
[facility]    Progress Note    Indira Fofana Patient Status:  Observation    10/13/1950 MRN [id number]   Location [address] [facility] Attending Becky Whitley MD   Hosp Day # 1 PCP Destiny Living.  DO Chris     Subjective:     Constitu extremity per cards    Occlusion of her left femoral to anterior tibial artery bypass  - vascular surgery following   - heparin drip  - restart coumadin when ok  - may need amputation in the future     UTI  Was being treated as an outpatient   - continue r disease/vascular stent. Mitraclips visualized. External defibrillator with right parasternal subcutaneous lead.  3. No acute pulmonary disease     Dictated by (CST): Graham Diane MD on 6/11/2018 at 21:33     Approved by (CST): Graham Diane MD

## 2018-06-13 NOTE — PROGRESS NOTES
[facility] AND CLINICS  [facility] Cardiology Progress Note    Elisa Dent Patient Status:  Observation    10/13/1950 MRN [id number]   Location [facility] Attending Tish North MD   Hosp Day # 0 PCP Power Conterras.  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 Rachel Moreau  No date: OTHER SURGICAL HISTORY      Comment: Arthrocentesis of L sh

## 2018-06-13 NOTE — PROGRESS NOTES
Patient with a cold left foot. Assessed left and right foot with offgoing nurse, Roni Sotelo, at change of shift. Patient's left foot is cold to touch. There is redness around her toes, but no open areas. The peroneal pulse is heard by doppler. Area was marked.

## 2018-06-14 NOTE — CM/SW NOTE
Progression of care - Neuro consult and CT ordered for vision changes. On IV abx. Coumadin restarted, INR 1.4. Current / [facility] - resume order needed.    Anitra Dominguez RN, CTL

## 2018-06-14 NOTE — PROGRESS NOTES
[place] [facility] - [facility]    Progress Note    Charolett Fair Patient Status:  Observation    10/13/1950 MRN [id number]   Location [address] [facility] Attending Az Summers MD   Hosp Day # 2 PCP Lissy Hutchinson.  DO Chris     Subjective:     Constitu lower extremity per cards    Occlusion of her left femoral to anterior tibial artery bypass  - vascular surgery following   - heparin drip  - restart coumadin  - may need amputation in the future     UTI  Was being treated as an outpatient   - continue christina chronic right frontal 4 mm subdural effusion isodense to CSF has developed without significant mass effect. 5. No midline shift.      Dictated by (CST): Jovanny Sung MD on 6/14/2018 at 10:29     Approved by (CST): Jovanny Sung MD on 6/14/2018

## 2018-06-14 NOTE — CONSULTS
[facility]    PATIENT'S NAME: Inna Drew   ATTENDING PHYSICIAN: Orion Mccabe MD   CONSULTING PHYSICIAN: Darling Lucia MD   PATIENT ACCOUNT#:   [id number]    LOCATION:  [address] [place] [address] RECORD #:   [id number]       DATE OF BIRTH:  [birth year] her legs and move her feet but almost paraplegic. Deep tendon reflexes are symmetric in the upper extremities. Joint position and sense in the hands is normal.  No carotid bruits.     LABORATORY DATA:  CBC normal.  Basic metabolic profile normal except fo

## 2018-06-14 NOTE — PROGRESS NOTES
[facility] AND [facility]  [facility] Cardiology Progress Note    Bashir Bennett Patient Status:  Observation    10/13/1950 MRN [id number]   Location [address] [place] [address] Attending Warren Stallings., MD   Hosp Day # 2 PCP Phu Berger.  Yoana Gonzalez DO     78year old female Cardiology    --------------------------------------------------------------------------------------------------------------------------------  ROS 10 systems reviewed, pertinent findings above.   Review of Systems   All other systems reviewed and are negat (CORONARY)  3/4/16: AUTOMATIC EXTERNAL DEFIBRILLATOR, WITH INTEGRA* Left  No date: CARDIAC DEFIBRILLATOR PLACEMENT  No date: CATARACT  7/13/09: CATARACT EXTRACTION W/  INTRAOCULAR LENS IMPLA* Right      Comment: JOSE  2/27/06: CATARACT EXTRACTION W/  Danni Isbell pericardial rub, S3.  Lungs: Clear without wheezes, rales, rhonchi or dullness. Normal excursions and effort. Abdomen: Soft, non-tender. BS-present. Extremities: Without clubbing, cyanosis or edema. Peripheral pulses are 2+.   Neurologic: Alert and orie

## 2018-06-14 NOTE — PLAN OF CARE
Problem: Patient Centered Care  Goal: Patient preferences are identified and integrated in the patient's plan of care  Interventions:  - What would you like us to know as we care for you? I have a history of polio.   - Provide timely, complete, and accurate bleeding, hypotension and signs of decreased cardiac output  - Evaluate effectiveness of vasoactive medications to optimize hemodynamic stability  - Monitor arterial and/or venous puncture sites for bleeding and/or hematoma  - Assess quality of pulses, ski

## 2018-06-15 NOTE — PROGRESS NOTES
[facility] AND CLINICS  Progress Note    Elisa Dent Patient Status:  Inpatient    10/13/1950 MRN [id number]   Location [facility] Attending Tish Alba., MD   Hosp Day # 3 PCP Power Contreras. DO Chris     Assessment:    1. Left foot ischemia. cyanotic. Skin: Warm and dry.      Labs:    Lab Results  Component Value Date   WBC 7.3 06/15/2018   HGB 11.1 06/15/2018   HCT 33.2 06/15/2018    06/15/2018    06/15/2018       Lab Results  Component Value Date   PT 18.7 (H) 09/19/2016   INR

## 2018-06-15 NOTE — PROGRESS NOTES
[facility]    Progress Note    Charla Green Patient Status:  Inpatient    10/13/1950 MRN [id number]   Location [address] [facility] Attending Allen Mcrae MD   Hosp Day # 3 PCP Hina Milligan. DO Chris       Subjective:    Charla Green albuterol Sulfate (VENTOLIN) (5 MG/ML) 0.5% nebulizer solution 2.5 mg 2.5 mg Nebulization Q6H PRN   ALPRAZolam (XANAX) tab 0.5 mg 0.5 mg Oral Nightly   Vitamin C (VITAMIN C) tab 1,000 mg 1,000 mg Oral Daily   aspirin chewable tab 81 mg 81 mg Oral Daily extremities  PSYCHE:no depression or anxiety  NEURO: As in HPI    Results:     Lab Results  Component Value Date   WBC 7.3 06/15/2018   HGB 11.1 (L) 06/15/2018   HCT 33.2 (L) 06/15/2018    06/15/2018    06/15/2018   CREATSERUM 0.51 06/15/2018

## 2018-06-15 NOTE — PROGRESS NOTES
Core Measure Update: EF 25% . Currently not on ACE, ARB, Spironolactone. Consider in the future if clinically appropriate  Currently on a long acting BB, metoprolol succinate. Isiah Kong

## 2018-06-15 NOTE — PROGRESS NOTES
[facility] HOSP - [facility]    Progress Note    Amber Headley Patient Status:  Observation    10/13/1950 MRN [id number]   Location [facility] Attending Kendra Rosales MD   Hosp Day # 3 PCP Verline Cancer.  DO Chris     Subjective:     Constitu her left femoral to anterior tibial artery bypass  - vascular surgery following   - heparin drip  - restart coumadin  - may need amputation in the future     UTI  Was being treated as an outpatient   - continue rocephin at this time  - await culture- no gr convexities have progressed. Small chronic right frontal 4 mm subdural effusion isodense to CSF has developed without significant mass effect. 5. No midline shift.      Dictated by (CST): Meghana Aburto MD on 6/14/2018 at 10:29     Approved by (CST): ERICA

## 2018-06-16 NOTE — PROGRESS NOTES
[facility]    Progress Note    Stuart Zhang Patient Status:  Inpatient    10/13/1950 MRN [id number]   Location [address] [facility] Attending Benedicto hCaparro MD   Hosp Day # 4 PCP Latesha Berg.  DO Chris       Assessment and Plan:     1 • cetirizine  10 mg Oral Daily   • Warfarin Sodium  7.5 mg Oral Nightly   • Insulin Aspart Pen  1-11 Units Subcutaneous TID CC and HS   • ALPRAZolam  0.5 mg Oral Nightly   • ascorbic acid  1,000 mg Oral Daily   • aspirin  81 mg Oral Daily   • STRESS FORM progressed. Small chronic right frontal 4 mm subdural effusion isodense to CSF has developed without significant mass effect. 5. No midline shift.      Dictated by (CST): Veronique Starkey MD on 6/14/2018 at 10:29     Approved by (CST): Veronique Starkey

## 2018-06-16 NOTE — PLAN OF CARE
Problem: Patient Centered Care  Goal: Patient preferences are identified and integrated in the patient's plan of care  Interventions:  - What would you like us to know as we care for you? I have a history of polio.   - Provide timely, complete, and accurate and trends  - Monitor for bleeding, hypotension and signs of decreased cardiac output  - Evaluate effectiveness of vasoactive medications to optimize hemodynamic stability  - Monitor arterial and/or venous puncture sites for bleeding and/or hematoma  - Ass

## 2018-06-16 NOTE — PROGRESS NOTES
[facility]    Progress Note    Lyla Jones Patient Status:  Observation    10/13/1950 MRN [id number]   Location [facility] Attending Jules Mariee MD   Hosp Day # 4 PCP Nelly Guy.  DO Chris     Subjective:     Constitu duplex of left lower extremity per cards    Occlusion of her left femoral to anterior tibial artery bypass  - vascular surgery following   - heparin drip  - restarted coumadin  - may need amputation in the future    L foot pain  - due to above  - x-ray tod 03/15/2017   TROP 0.01 06/11/2018   CK 29 (L) 02/14/2018       Xr Foot, Complete (min 3 Views), Left (cpt=73630)    Result Date: 6/16/2018  CONCLUSION:  1. No acute-appearing fracture or dislocation. Moderate diffuse bony location.  Ankylosis of the subtala

## 2018-06-17 NOTE — DISCHARGE SUMMARY
[facility]    Discharge Summary    Rebeca Thrasher Patient Status:  Inpatient    10/13/1950 MRN [id number]   Location [facility] Attending Lexi Flynn MD   Hosp Day # 5 PCP Tony Perez DO     Date of Admission:  [facility])     Long term (current) use of anticoagulants     PAD (peripheral artery disease) (HCC)     Dyspnea on exertion     Dyspnea     Unstable angina (HCC)     Poliomyelitis     Aneurysm (arteriovenous) of coronary vessels     Dyspnea, unspecified type anterior tibial artery bypass  - vascular surgery was consulted  - rec'd heparin gtt + warfarin, INR therapeutic  - no intervention per ct surgery for now, however if pain persists will need amputation    L foot pain  - due to above  - x-ray today reveals tablets (2.5 mg total) by mouth daily.    Quantity:  30 tablet  Refills:  0     Warfarin Sodium 5 MG Tabs  Commonly known as:  COUMADIN  What changed:  · how much to take  · additional instructions      Take 1.5 tablets (7.5 mg total) by mouth every evening clotrimazole-betamethasone 1-0.05 % Crea  Commonly known as:  LOTRISONE      Apply 1 Application topically 2 (two) times daily. Quantity:  15 g  Refills:  0     CoQ10 100 MG Caps      Take 1 capsule by mouth daily.    Refills:  0     [employer] PREP 70 % Pads Quantity:  100 strip  Refills:  11     PANAX GINSENG OR      Take by mouth every morning. Refills:  0     PATANOL 0.1 % Soln  Generic drug:  Olopatadine HCl      Place 1 drop into both eyes as needed for Allergies.    Refills:  0     Pen Needles 31G X 6 M 95692  [phone]        I answered all the patient's questions spending >30 minutes with patient in well over half time in face-to-face discussion of further evaluation and therapy. Yvonne Martins.  Vel Valle  6/17/2018  12:43 PM

## 2018-06-17 NOTE — TRANSITION NOTE
[place] [facility] - [facility]    Cardiology Discharge Summary    Tulio Peacock Patient Status:  Inpatient    10/13/1950 MRN [id number]   Location [address] [facility] Attending No att. providers found   [facility] Day # 5 PCP Juan Miguel Martinez.  Antwon Lam,      Primary Card Discharge Medications      START taking these medications      Instructions Prescription details   Metoprolol Succinate ER 25 MG Tb24  Commonly known as: Toprol XL      Take 1 tablet (25 mg total) by mouth daily.    Quantity:  30 tablet  Refills: EVERY 6 (SIX) HOURS AS NEEDED FOR WHEEZING. Quantity:  100 mL  Refills:  2     Albuterol Sulfate  (90 Base) MCG/ACT Aers  Commonly known as:  PROAIR HFA      Inhale 2 puffs into the lungs every 6 (six) hours as needed for Wheezing.    Quantity:  18 Quantity:  30 tablet  Refills:  0     methocarbamol 500 MG Tabs  Commonly known as:  ROBAXIN      Take 1 tablet (500 mg total) by mouth 4 (four) times daily as needed.    Quantity:  40 tablet  Refills:  0     NITROSTAT 0.4 MG Subl  Generic drug:  nitroGLYCE gummies. Refills:  0     Vitamin B Complex Tabs      Take 1 tablet by mouth daily. Refills:  0     Vitamin D3 2000 units Tabs      Take 4,000 Units by mouth daily. Take 4000 mg T/W/TH/S/S. Take 2000 units on M/F.    Refills:  0     vitamin E 400 UNITS

## 2018-06-17 NOTE — PROGRESS NOTES
[facility]    Progress Note    Reina Villegasns Patient Status:  Inpatient    10/13/1950 MRN [id number]   Location [address] [facility] Attending Nickie Mejia MD   Hosp Day # 5 PCP Pam Meraz. DO Chris       Subjective:    Reina Olivo Cholecalciferol  4,000 Units Oral Daily   • Clopidogrel Bisulfate  75 mg Oral Daily   • Fluticasone Furoate-Vilanterol  1 puff Inhalation Daily   • furosemide  20 mg Oral Daily   • Ketotifen Fumarate  1 drop Both Eyes BID   • Ranolazine ER  500 mg Oral BID benefit of ACE in setting of diabetes   Cut BB in half ? ? Cardiologist to follow and review     ADDENDUM:  Patient seen and examined independently.  Note reviewed and labs reviewed.  Agree with above assessment and plan.     Change Toprol to once daily d

## 2018-06-18 NOTE — PROGRESS NOTES
TCM OUTREACH    Book By Date 7/1/18    My chart message sent to patient requesting call back to [phone] review discharge instructions, medications and schedule TCM apt .

## 2018-06-19 NOTE — PROGRESS NOTES
Initial Post Discharge Follow Up   Discharge Date: 6/17/18  Contact Date: 6/18/2018    Consent Verification:  Assessment Completed With: Patient  HIPAA Verified?   Yes    Discharge Dx:   CHF     General:   • How have you been since your discharge from the methocarbamol 500 MG Oral Tab Take 1 tablet (500 mg total) by mouth 4 (four) times daily as needed. Disp: 40 tablet Rfl: 0   furosemide 20 MG Oral Tab Take 1 tablet (20 mg total) by mouth daily.  Disp: 30 tablet Rfl: 0   Insulin Aspart Pen 100 UNIT/ML Sub 500mg once daily Disp:  Rfl:    NON FORMULARY Cranberry supplement 5 tabs daily  Disp:  Rfl:    NON FORMULARY Focus attention 910 mcg daily Disp:  Rfl:    Insulin Pen Needle (PEN NEEDLES) 31G X 6 MM Does not apply Misc To be used qid Disp: 400 each Rfl: 3 Sublingual SL Tab Place 0.4 mg under the tongue every 5 (five) minutes as needed. Disp:  Rfl: 5   Olopatadine HCl (PATANOL) 0.1 % Ophthalmic Solution Place 1 drop into both eyes as needed for Allergies.  Disp:  Rfl:    Multiple Vitamin (MULTI-VITAMINS) Or Etc): No     Follow up appointments:       Your appointments     Date & Time Appointment Department [facility])    Jun 20, 2018 10:00 AM CDT Post Op with [facility], [facility])    Jun 25, 2018  9:3 NCM Reviewed/scheduled/rescheduled PCP TCM/HFU appointment with pt:  Yes      Have you made all of your follow up appointments? yes    Is there any reason as to why you cannot make your appointments?    No     NCM Reviewed upcoming Specialist Appt with

## 2018-06-21 NOTE — PROGRESS NOTES
Pain in left leg is gone  \"It was really just cold. \"  That has resolved. Took tramadol      uti   Better. Rocephin iv  No dysuria now    The shortness is better. \"I feel ok. \"    Sugars 130-230   \"usually high after dinner. \"      Patient's past med care documents  ? Reviewed need for follow-up on pending tests, need for follow-up on diagnostic tests and need for follow-up on treatments  ? specialists already aware of assumption/resumption of care  ?  Education given to patient on self-management, inde (Patient taking differently: Inject into the skin 3 (three) times daily with meals.  )   sodium Chloride 0.9 % Inhalation Nebu Soln Take 3 mL by nebulization as needed for Wheezing.    Albuterol Sulfate HFA (PROAIR HFA) 108 (90 Base) MCG/ACT Inhalation Aero times daily. Rosuvastatin Calcium 20 MG Oral Tab Take 20 mg by mouth nightly. albuterol Sulfate (5 MG/ML) 0.5% Inhalation Nebu Soln TAKE 0.5 ML BY NEBULIZATION EVERY 6 (SIX) HOURS AS NEEDED FOR WHEEZING.    ascorbic acid 1000 MG Oral Tab Take 1,000 mg b (10/23/2010); Cataract (2005, 2009); Cellulitis; Cerebrovascular accident [facility]); Congestive heart disease ([facility] [address] COPD (chronic obstructive pulmonary disease) ([facility] [address] Coronary atherosclerosis; Deep vein thrombosis ([facility] [address]  Diabetes ([facility] [address] DM type 2 (diabetes m disease in her mother. She  reports that she has never smoked. She has never used smokeless tobacco. She reports that she does not drink alcohol or use drugs. PHYSICAL EXAM:   No LMP recorded. Patient has had a hysterectomy.   Estimated body mass

## 2018-06-25 NOTE — TELEPHONE ENCOUNTER
[facility] Barnett is requesting orders for nursing visits weeks for 5 weeks once a week. They would also like orders for PT and OT as well.

## 2018-06-28 ENCOUNTER — PRIOR ORIGINAL RECORDS (OUTPATIENT)
Dept: OTHER | Age: 68
End: 2018-06-28

## 2018-06-29 ENCOUNTER — PRIOR ORIGINAL RECORDS (OUTPATIENT)
Dept: OTHER | Age: 68
End: 2018-06-29

## 2018-06-30 NOTE — TELEPHONE ENCOUNTER
Patient paged regarding medication that was supposed to be sent in by Dr. Yoana Gonzalez. Called patient back via answering service as blocked caller ID calls cannot be taken by patient. There was no answer.

## 2018-06-30 NOTE — TELEPHONE ENCOUNTER
Patient paged again at 3.07 pm    Was in the hospital 6/29  Was sob  Has had stents in heart. Doing better  Saw Dr Carmen Wagner and Dr Chanel Adler. Was given pain meds for left toe pain due to peripheral vascular disease. Was given tramadol.   Has 3 pills le

## 2018-07-03 PROBLEM — L03.113 CELLULITIS OF RIGHT ARM: Status: ACTIVE | Noted: 2018-01-01

## 2018-07-03 PROBLEM — R79.89 AZOTEMIA: Status: ACTIVE | Noted: 2018-01-01

## 2018-07-03 PROBLEM — I99.8 PAIN OF LEFT LOWER EXTREMITY DUE TO ISCHEMIA: Status: ACTIVE | Noted: 2018-01-01

## 2018-07-03 PROBLEM — E87.1 HYPONATREMIA: Status: ACTIVE | Noted: 2018-01-01

## 2018-07-03 PROBLEM — M79.605 PAIN OF LEFT LOWER EXTREMITY DUE TO ISCHEMIA: Status: ACTIVE | Noted: 2018-01-01

## 2018-07-03 NOTE — TELEPHONE ENCOUNTER
Please see messages below and advise regarding prescription request for Aia 16; previously prescribed at [address] [facility][note 3].     Recent Outpatient Visits            1 week ago PVD (peripheral vascular disease) [facility])    [facility]

## 2018-07-03 NOTE — TELEPHONE ENCOUNTER
MACHELLE req refill for meds below and states Pt is out of meds and has tried sending via [employer] on 6/27    •  methocarbamol 500 MG Oral Tab, Take 1 tablet (500 mg total) by mouth 4 (four) times daily as needed. , Disp: 40 tablet, Rfl: 0

## 2018-07-03 NOTE — ED INITIAL ASSESSMENT (HPI)
Pt arrived via medics to rm h28a with 20g left hand iv for complaint of right hand weakness and pain, states \"I may have an infection\"

## 2018-07-03 NOTE — TELEPHONE ENCOUNTER
Action Requested: Summary for Provider     []  Critical Lab, Recommendations Needed  [] Need Additional Advice  []   FYI    []   Need Orders  [] Need Medications Sent to Pharmacy  []  Other     SUMMARY: Advised to go to the ER now; Celeste Solorzano Pi will ca

## 2018-07-04 NOTE — CONSULTS
Chloe Burgos, [address] [facility]  [address] [facility]     VASCULAR SURGERY CONSULT NOTE      Name: Reina Olivo   :   10/13/1950  [id number]     REFERRING PHYSICIAN: Nickie Ortiz. • Breast cancer [facility])     DCIS   • Breast [facility] upper-outer quadrant [facility] 10/23/2010   • Cancer of overlapping sites of right female breast ([facility] 10/23/2010   • Cataract 2005, 2009   • Cellulitis     R foot   • Cerebrovascular ac Comment: Orthopedic surgeries X8  No date: OTHER SURGICAL HISTORY      Comment: Stent and Angioplasty  Dr. Bert Nguyen  No date: OTHER SURGICAL HISTORY      Comment: Arthrocentesis of L shoulder acromioclaviular                bursa  No date: OTHER SURGICAL H •  Warfarin Sodium (COUMADIN) tab 7.5 mg, 7.5 mg, Oral, Weekly  •  insulin detemir (LEVEMIR) 100 UNIT/ML flextouch 3 Units, 3 Units, Subcutaneous, Nightly  •  dextrose 50 % injection 50 mL, 50 mL, Intravenous, PRN  •  Glucose-Vitamin C (DEX-4) 4-0.006 g ch /53 (BP Location: Right arm)   Pulse 88   Temp 98.4 °F (36.9 °C) (Oral)   Resp 18   Wt 135 lb 12.8 oz (61.6 kg)   SpO2 98%   BMI 30.45 kg/m²   GENERAL: alert and orientated X 3, well developed, well nourished, in no apparent distress  NEURO/PSYCH: no PROCEDURE: XR FOOT, COMPLETE (MIN 3 VIEWS), LEFT (CPT=73630)  COMPARISON: [facility], XR FOOT, COMPLETE (MIN 3 VIEWS), LEFT (CPT=73630), 8/15/2017, 13:15. INDICATIONS: Left foot 2nd toe pain x2 days. No known injury.   TECHNIQUE:  3 views w PROCEDURE: CT BRAIN OR HEAD (CPT=70450) COMPARISON: [facility], RULA/Johnson Ma Final, 9/21/2015, 14:51.   INDICATIONS: Non-traumatic frontal/temporal/orbital pressure 06/07/2018  TECHNIQUE: CT images were obtained without contrast mater CONCLUSION:  1. Small chronic 6 mm right pontine lacunar infarct 2. Cerebral cortical atrophy. Chronic white matter microvascular ischemic changes. 3. Chronic microvascular ischemic changes basal ganglia regions 4.  Prominent extra-axial CSF spaces overlyin PROCEDURE: US ARTERIAL DUPLEX LOWER EXTREMITY LEFT (AAE=64930)  COMPARISON: [facility],  ARTERIAL DUPLEX LOWER EXTREMITY RIGHT (PEN=86419), 3/21/2018, 12:59.   INDICATIONS: History severe peripheral arterial disease, with new on set numbne PROCEDURE: XR CHEST AP PORTABLE (CPT=71010) TIME: 2104 hrs. .   COMPARISON: [facility], XR CHEST PA + LAT CHEST (CVT=78568), 5/22/2018, 18:48. INDICATIONS: Shortness of breath today. TECHNIQUE:   Single view.    FINDINGS:  CARDIAC/VASC: Mil The patient indicated an understanding of these issues and agreed to the plan and all questions were answered. Thank you for allowing to participate in the patient's care. Sincerely,  Chloe Mathias MD

## 2018-07-04 NOTE — PROGRESS NOTES
[place] [facility] - [facility]    Progress Note    Loel End Patient Status:  Inpatient    10/13/1950 MRN [id number]   Location [facility] 5SW/SE Attending Jack Godinez MD   Hosp Day # 1 PCP Janina Perez DO       Subjective:    Amara Rivers Daily  •  furosemide (LASIX) tab 20 mg, 20 mg, Oral, Daily  •  lisinopril (PRINIVIL,ZESTRIL) tab 2.5 mg, 2.5 mg, Oral, Daily  •  magnesium oxide (MAG-OX) tab 500 mg, 500 mg, Oral, Daily  •  Meclizine HCl (ANTIVERT) tab 25 mg, 25 mg, Oral, TID PRN  •  metho and PCI of the LAD  DM II  Benign hypertension with hypertensive heart disease  Asthma  Pulmonary embolism     DVT Prophylaxis - INR therapeutic  Full Code    I answered all the patient's questions spending 35 minutes with patient in well over half time in

## 2018-07-04 NOTE — H&P
[facility]    PATIENT'S NAME: Sara Qasim   ATTENDING PHYSICIAN: Nika Childs MD   PATIENT ACCOUNT#:   [id number]    LOCATION:  [place] [address]  MEDICAL RECORD #:   [id number]       YOB: 1950  ADMISSION DATE:       07/03/2018 reconciliation list.    ALLERGIES:  Multiple, including aminoglycosides, Augmentin, clindamycin, Dilaudid, Levaquin, ciprofloxacin, scopolamine, and sulfa. The patient is not able to provide clear details about her reactions.     FAMILY HISTORY:  Mother Shobha Alvarado left anterior tibial artery graft. 2.   Right hand cellulitis. 3.   Diabetes mellitus type 2, insulin dependent. 4.   Hypertension. The patient will be admitted to general medical floor. IV heparin; discontinue Coumadin.   Unfortunately, the patient

## 2018-07-04 NOTE — ED PROVIDER NOTES
Patient Seen in: [facility] AND [facility] Emergency Department    History   Patient presents with:  Numbness Weakness (neurologic)    Stated Complaint: right hand weakness    HPI    Patient presents emergency department complaining of right hand swelling, weakn dysfunction 2006   • Menometrorrhagia    • Myocardial infarction [facility]) 2015    x2   • Osteopenia    • Other ill-defined conditions(799.89) 2010    Post Polio   • PE (pulmonary embolism)    • Peripheral vascular disease ([facility] 2008   • Pneumonia due to organ (Oral)   Resp 18   SpO2 98%         Physical Exam   Constitutional: She is oriented to person, place, and time. She appears well-developed and well-nourished. No distress. HENT:   Head: Normocephalic.    Eyes: Conjunctivae and EOM are normal.   Neck: Norm order CBC WITH DIFFERENTIAL WITH PLATELET.   Procedure                               Abnormality         Status                     ---------                               -----------         ------                     CBC W/ DIFFERENTIAL[[id number]] cellulitis.           Disposition and Plan     Clinical Impression:  Pain of left lower extremity due to ischemia  (primary encounter diagnosis)  Cellulitis of right arm    Disposition:  Admit  7/3/2018  7:45 pm    Follow-up:  No follow-up provider specifie

## 2018-07-04 NOTE — PROGRESS NOTES
[address] [facility] dosing service    Initial Pharmacokinetic Consult for Vancomycin Dosing     Amber Headley is a 78year old female admitted on 7/3 who is being treated for cellulitis.   Pharmacy has been asked to dose Vancomycin by Dr. Alissa Miller    She is allergic appreciate the opportunity to assist in her care.     Dai Blunt, PharmD  7/3/2018  10:42 PM  [address]: [phone]

## 2018-07-04 NOTE — PROGRESS NOTES
Pharmacy Note: Dietary Supplement Discontinuation Per Policy    YWZ26 capsules has been discontinued on Michell Duncan per policy. This supplement may be restarted upon discharge using the medication reconciliation process.     Thank you,   Robel Stevenson Pha

## 2018-07-04 NOTE — PLAN OF CARE
Problem: Patient Centered Care  Goal: Patient preferences are identified and integrated in the patient's plan of care  Interventions:  - What would you like us to know as we care for you?    - Provide timely, complete, and accurate information to patient/f Progressing      Problem: PAIN - ADULT  Goal: Verbalizes/displays adequate comfort level or patient's stated pain goal  INTERVENTIONS:  - Encourage pt to monitor pain and request assistance  - Assess pain using appropriate pain scale  - Administer analgesi maneuver herself well.     Problem: HEMATOLOGIC - ADULT  Goal: Maintains hematologic stability  INTERVENTIONS  - Assess for signs and symptoms of bleeding or hemorrhage  - Monitor labs and vital signs for trends  - Administer supportive blood products/facto

## 2018-07-05 NOTE — TELEPHONE ENCOUNTER
Per Dr. Joycelyn Dandy: \"Surgical/procedure Clearance (Cancelling CLN - [place] 3/23), Other (please recall colonoscopy for 6 months)\"

## 2018-07-05 NOTE — PLAN OF CARE
Problem: Patient Centered Care  Goal: Patient preferences are identified and integrated in the patient's plan of care  Interventions:  - What would you like us to know as we care for you? I had polio. I live with my ex- and help take care of him.   - discolouration  remains    Problem: PAIN - ADULT  Goal: Verbalizes/displays adequate comfort level or patient's stated pain goal  INTERVENTIONS:  - Encourage pt to monitor pain and request assistance  - Assess pain using appropriate pain scale  - Administe products/factors as ordered and appropriate   Outcome: Progressing

## 2018-07-05 NOTE — PROGRESS NOTES
The patient's left foot erythema is slightly less. The blister on her great toe is unchanged. Her right hand inflammation is slightly less. The patient expressed a wish to hold off on the left above-knee amputation.   I think this is very reasonable as h

## 2018-07-05 NOTE — PROGRESS NOTES
[facility]    Progress Note    Kitty Lopes Patient Status:  Inpatient    10/13/1950 MRN [id number]   Location [facility] 5SW/SE Attending Randall Beth MD   Hosp Day # 2 PCP Ginna Wright. DO Chris       Subjective:    Ky Garvey MCG/INH inhaler 1 puff, 1 puff, Inhalation, Daily  •  furosemide (LASIX) tab 20 mg, 20 mg, Oral, Daily  •  lisinopril (PRINIVIL,ZESTRIL) tab 2.5 mg, 2.5 mg, Oral, Daily  •  magnesium oxide (MAG-OX) tab 500 mg, 500 mg, Oral, Daily  •  Meclizine HCl (Fox Erickson PCI of the LAD  DM II  Benign hypertension with hypertensive heart disease  Asthma  Pulmonary embolism     DVT Prophylaxis - INR therapeutic  Full Code    Results:     Recent Labs   Lab  07/03/18   1751  07/04/18   0528  07/05/18   0549   RBC  4.22  4.02

## 2018-07-05 NOTE — TELEPHONE ENCOUNTER
Recall colon in 3 months from date of 7/5/18 per Dr. Nur Call. Last Colon done 1/27/15.    See ELM conversion from 1/27/18 and TE from 3/21/18

## 2018-07-05 NOTE — PLAN OF CARE
Problem: Patient Centered Care  Goal: Patient preferences are identified and integrated in the patient's plan of care  Interventions:  - What would you like us to know as we care for you? I had polio. I live with my ex- and help take care of him.   - Outcome: Progressing      Problem: PAIN - ADULT  Goal: Verbalizes/displays adequate comfort level or patient's stated pain goal  INTERVENTIONS:  - Encourage pt to monitor pain and request assistance  - Assess pain using appropriate pain scale  - Administ vital signs for trends  - Administer supportive blood products/factors, fluids and medications as ordered and appropriate  - Administer supportive blood products/factors as ordered and appropriate   Outcome: Progressing

## 2018-07-05 NOTE — CM/SW NOTE
SW self-referred to review pt's case due to case finding and diagnosis. Pt is from home w/ her ex- in which they help one another. 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 Pt has a hx at [place] [address] and [facility].  AMANDEEP confirmed that pt is casey

## 2018-07-06 NOTE — PLAN OF CARE
Problem: Patient Centered Care  Goal: Patient preferences are identified and integrated in the patient's plan of care  Interventions:  - What would you like us to know as we care for you? I had polio. I live with my ex- and help take care of him.   - adequate comfort level or patient's stated pain goal  INTERVENTIONS:  - Encourage pt to monitor pain and request assistance  - Assess pain using appropriate pain scale  - Administer analgesics based on type and severity of pain and evaluate response  - Imp

## 2018-07-07 NOTE — PROGRESS NOTES
[place] [facility] - [facility]    Progress Note    Lexie German Patient Status:  Inpatient    10/13/1950 MRN [id number]   Location [facility] 5SW/SE Attending Jane Stoll MD   Hosp Day # 3 PCP Rocío Perez DO       Subjective:    Peter Juarez mg, Oral, Daily  •  Fluticasone Furoate-Vilanterol (BREO ELLIPTA) 200-25 MCG/INH inhaler 1 puff, 1 puff, Inhalation, Daily  •  furosemide (LASIX) tab 20 mg, 20 mg, Oral, Daily  •  lisinopril (PRINIVIL,ZESTRIL) tab 2.5 mg, 2.5 mg, Oral, Daily  •  magnesium impella assisted rotablation and PCI of the LAD  DM II  Benign hypertension with hypertensive heart disease  Asthma  Pulmonary embolism     DVT Prophylaxis - Warfarin  Full Code    Results:     Recent Labs   Lab  07/04/18   0528  07/05/18   0549  07/06/18

## 2018-07-07 NOTE — PLAN OF CARE
Diabetes/Glucose Control    • Glucose maintained within prescribed range Completed        HEMATOLOGIC - ADULT    • Maintains hematologic stability Completed        PAIN - ADULT    • Verbalizes/displays adequate comfort level or patient's stated pain goal C

## 2018-07-07 NOTE — PROGRESS NOTES
Antibiotic Stewardship Note: Duration of Therapy? Vancomycin started 7/3; now D#5  Last trough drawn on 7/6 was subtherapeutic (7.0). Dosing increased to 1500 mg q24h  Next trough level due 7/8 at 20: 30 hrs  Cultures: Urine cxs: in process ngtd.   WBC:

## 2018-07-07 NOTE — HOME CARE LIAISON
Patient is current with [facility]. Met with patient at the bedside. Patient agreeable to resume home health services.

## 2018-07-07 NOTE — PROGRESS NOTES
[facility] dosing service    Follow-up Pharmacokinetic Consult for Vancomycin Dosing     Burke Frank is a 78year old female admitted on 7/3/18 who is being treated for cellulitis. Patient is on day 4 of Vancomycin 1 gm IV Q 24 hours.   Goal trough is 1 ug/mL.    3.  Pharmacy will need BUN/Scr daily while on Vancomycin to assess renal function. 4.  Pharmacy will follow and monitor renal function changes, toxicity and efficacy.     Binh Gaytan, PharmD  7/6/2018  8:41 PM  [place]  Pharmacy Extension

## 2018-07-07 NOTE — DISCHARGE SUMMARY
[place] [facility] - [facility]    Discharge Summary    Bashir Bennett Patient Status:  Inpatient    10/13/1950 MRN [id number]   Location [facility] 5SW/SE Attending Warren Hogan MD   Hosp Day # 4 PCP Phu Berger.  Chris,      Date of Admission vitreous, left     Peripheral vascular disease, unspecified ([facility]     Long term (current) use of anticoagulants     PAD (peripheral artery disease) (HCC)     Dyspnea on exertion     Dyspnea     Unstable angina (HCC)     Poliomyelitis     Aneurysm (arteriov therapeutic  rec'd IV abx in-house  Can check INR at home, next check on Monday 7/9/18     R hand cellulitis  IMPROVED  X-ray reviewed  rec'd IV vanc in-house  Will dc on PO keflex     Other Stable Issues:  CAD s/p impella assisted rotablation and PCI of t instructions      Take 1.5 tablets (7.5 mg total) by mouth every evening. OR AS DIRECTED BY COUMADIN CLINIC.    Quantity:  135 tablet  Refills:  0     Warfarin Sodium 7.5 MG Tabs  Commonly known as:  COUMADIN  What changed:  · how much to take  · how to fadumo Tabs  Commonly known as:  PRINIVIL,ZESTRIL      Take 0.5 tablets (2.5 mg total) by mouth daily. Quantity:  30 tablet  Refills:  0     magnesium 250 MG Tabs      Take 500 mg by mouth daily.    Refills:  0     Meclizine HCl 25 MG Tabs  Commonly known as:  A 20 mg by mouth nightly. Refills:  0     SACCHAROMYCES BOULARDII OR      Take 1 capsule by mouth 2 (two) times daily. Probiotic   Refills:  0     Saline Nasal Spray 0.65 % Soln  Commonly known as:  SALINE MIST      3 sprays by Nasal route nightly.    Refil

## 2018-07-09 NOTE — PROGRESS NOTES
[email or web address] message sent to the patient for hospital follow up. Will not be a TCM patient as she is currently in an active episode.

## 2018-07-10 NOTE — PROGRESS NOTES
Ilir [phone] for post hospital follow up, [facility] contact information provided. Not a TCM.

## 2018-07-11 NOTE — TELEPHONE ENCOUNTER
Song Mena from [facility] requesting an order for pt to have bath assistance twice a week for 2 weeks.  Please advise

## 2018-07-12 NOTE — TELEPHONE ENCOUNTER
Dr Nahid Fried, please note as FYI. Hospital discharge. Difficulty moving around, transferring, cannot drive self. Scheduled to see Dr Nahid Fried 8/6/18 at 9 am. She sees the specialist 8/2/18. She has home health care.  Advised to call back for any reaso

## 2018-07-13 NOTE — TELEPHONE ENCOUNTER
[facility], please advise on orders.

## 2018-07-13 NOTE — TELEPHONE ENCOUNTER
LOV: 6-21-18 Last Rx: 6-30-18    Please advise in regards to refill request. Thank You    Please respond to pool: SANDRA FM LMB LPN/CMA

## 2018-07-14 NOTE — ED INITIAL ASSESSMENT (HPI)
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 Pt states that this has been going on x5 years. Pt states that she is taking keflex for the gangrene x1wk and is taking tramadol for pain, but just ran out.  Pts left great toe is blackened at the tip, skin i

## 2018-07-14 NOTE — ED PROVIDER NOTES
Patient Seen in: [facility] Emergency Department    History   Patient presents with:  Pain (neurologic)    Stated Complaint:     HPI    79-year-old female with history of diabetes, hypertension, hypercholesterolemia, peripheral vascular disease wi • Greater trochanteric bursitis    • Heart disease    • High blood pressure    • High cholesterol    • History of blood clots    • History of blood transfusion    • Hyperlipidemia LDL goal <70    • Hypertension, essential, benign    • IBS (irritable gregoria reviewed and negative except as noted above.     Physical Exam   ED Triage Vitals [07/14/18 0534]  BP: 123/80  Pulse: 87  Resp: 14  Temp: 98.3 °F (36.8 °C)  Temp src: Oral  SpO2: 99 %  O2 Device: None (Room air)    Current:/72   Pulse 90   Temp 98.3 ° provider.

## 2018-07-14 NOTE — CM/SW NOTE
Spoke with patient regarding transportation home and inquired about her ability to transfer from w/c to bed. She stated that she is pretty much bedbound at home, as the pain in her legs is too much to put weight on.   She stated that she would not be able

## 2018-07-16 NOTE — TELEPHONE ENCOUNTER
Please advise on refill request. Last 7/14/18 #10 from ER doctor.     Please respond to pool: EM FM LMB LPN/CMA    Refill Protocol Appointment Criteria  · Appointment scheduled in the past 6 months or in the next 3 months  Recent Outpatient Visits

## 2018-07-17 NOTE — TELEPHONE ENCOUNTER
Please see refill request below. Tramadol given to at ER visit 7/14/18.   Pt asking for refill and has upcoming OV on 7/24/18

## 2018-07-17 NOTE — TELEPHONE ENCOUNTER
Sury Heike called in requesting a refill for Tramadol. Sury Heike stated that pt is still experiencing some pain and her toes are cold to touch.

## 2018-07-18 NOTE — TELEPHONE ENCOUNTER
Dr Sammy Pisano, please advise for Dr Niko Hernández. See the Timmy Fox's note below. Patient seen in ER Sunday for pain, given script she has used up, waiting on refill. Gangrene in her foot, sees vascular surgeon 8/2/18.     Please respond to pool: EM SEMAJ LMB LPN/GELACIO

## 2018-07-18 NOTE — TELEPHONE ENCOUNTER
[facility] OT [facility] PT is out of Tramadol and is in extreme Pain and has been taking Oxycodone that had in the home and getting nauseated.  Reached out to Triage

## 2018-07-19 NOTE — TELEPHONE ENCOUNTER
Please see below and advise    Med pended for phone in     Sent to [facility] of [facility] and MARIANO (on call)    Please respond to pool: SANDRA SANTORO LPN/CMA

## 2018-07-20 NOTE — TELEPHONE ENCOUNTER
Keesha Figueroa is calling back per last VM was rec'd   [place] is asking to move ORDER bath aide for 2 weeks 2x a week for the start of  next week   HH is asking to have a Verbal today   Pt pain is much since refill of med

## 2018-07-23 NOTE — TELEPHONE ENCOUNTER
Dominique/JUANY requesting callback- said order was received late so requesting verbal if ok to delay to this week or next week?

## 2018-07-24 PROBLEM — R11.2 NON-INTRACTABLE VOMITING WITH NAUSEA: Status: ACTIVE | Noted: 2018-01-01

## 2018-07-24 PROBLEM — I99.8 ISCHEMIA OF FOOT: Status: ACTIVE | Noted: 2018-01-01

## 2018-07-24 PROBLEM — Z79.4 UNCONTROLLED TYPE 2 DIABETES MELLITUS WITH HYPERGLYCEMIA, WITH LONG-TERM CURRENT USE OF INSULIN (HCC): Status: ACTIVE | Noted: 2018-01-01

## 2018-07-24 PROBLEM — E11.65 UNCONTROLLED TYPE 2 DIABETES MELLITUS WITH HYPERGLYCEMIA, WITH LONG-TERM CURRENT USE OF INSULIN (HCC): Status: ACTIVE | Noted: 2018-01-01

## 2018-07-24 PROBLEM — R19.7 DIARRHEA: Status: ACTIVE | Noted: 2018-01-01

## 2018-07-24 NOTE — H&P
[facility]    PATIENT'S NAME: Briana Lesches   ATTENDING PHYSICIAN: Sophia Barr MD   PATIENT ACCOUNT#:   [id number]    LOCATION:  [address] [place] [address] RECORD #:   [id number]       YOB: 1950  ADMISSION DATE:       07/24/2018 of her ejection fraction from 25% to 40% with medical therapy. She is status post AICD defibrillator. History of hyperlipidemia, hypertension, asthma, cerebrovascular accident, and pulmonary embolism, chronically anticoagulated with Coumadin.      PAST RADFORD is severe sensitivity to skin touching. Dorsalis pedis and posterior tibialis could not be palpated. NEUROLOGIC:  No acute focal findings. ASSESSMENT AND PLAN:    1.    Left lower extremity ischemia, as outlined above, with no target vessels for inter

## 2018-07-25 NOTE — CONSULTS
[facility] AND [facility]  [facility] Cardiology Consult Note    Burke Frank Patient Status:  Inpatient    10/13/1950 MRN [id number]   Location [facility] 5SW/SE Attending Chastity Romeo MD   Hosp Day # 1 PCP Roxane García DO     73 year old female, vertigo)    • Breast cancer [facility])     DCIS   • Breast cancer of upper-outer quadrant of left female breast ([facility] 10/23/2010   • Cancer of overlapping sites of right female breast ([facility] 10/23/2010   • Cataract 2005, 2009   • Cellulitis     R foot   • Cerebr Orthopedic surgeries X8  No date: OTHER SURGICAL HISTORY      Comment: Stent and Angioplasty  Dr. Vinny Davis  No date: OTHER SURGICAL HISTORY      Comment: Arthrocentesis of L shoulder acromioclaviular                bursa  No date: OTHER SURGICAL HISTORY Right

## 2018-07-25 NOTE — CM/SW NOTE
Pt is from home w/ her ex- in which they help one another. 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 Pt has a hx at [facility].  SW confirmed that pt is current w/ [facility].      SW/CM to remain available for support and/or discharge

## 2018-07-25 NOTE — PROGRESS NOTES
Noted pt was at MD appt yesterday ill appearing with vomiting and diarrhea and also ischemia to her foot. She was sent to hospital as a direct admit. P= Will f/u with pt as CCM as soon as she is discharged.

## 2018-07-25 NOTE — HISTORICAL OFFICE NOTE
Brady Aashish  : 10/13/1950  ACCOUNT:  [id number]  [phone]  PCP: Dr. Zacarias Campa     TODAY'S DATE: 2018  DICTATED BY:  GIN Vincent]      CHIEF COMPLAINT: [hospital follow up.]    HPI:    [On 2018Guillermina Do, a 78year old female, p 2005,pulmonary embolus, diabetes, postpolio myelitis and right mastectomy 2005    PAST CV HISTORY: 3/2017, CAD, cardiac catheterization, drug-eluting stents 3/15 11/15, dyslipidemia, hypertension, ischemic CM, Mitral Clip 3/2017 followed by edith ulloa pulses normal and without bruits. ABD AORTA: deferred. FEM: deferred. PEDAL: unable to assess. EXT: no peripheral edema.      DECISION MAKING:    [ ] Patient's doing better from a cardiac standpoint she is not having the shortness of breath she had before s 05/08/18 ProAir HFA            108 (90   as directed                              05/08/18 Xanax                 0.5MG     1 tablet daily for sleep                 11/21/17 Hair Skin Nails                 2 at am                                  11/2 intervention, this was put off once her angina came under control and has not been done at this point.   She did have severe mitral regurgitation and recurrent heart failure with LV dysfunction all of which significantly improved after having mitral clip a OCCUPATION: disabled.      ALLERGIES: Augmentin - Oral, Clindamycin HCl - Oral, swallowing problems, Levofloxacin - Ophthalmic, nausea and vomitting, Scopolamine HBr - Injection, Dizziness, Sulfa Antibiotics - CLASS, Hives, Sulfanilamide - Vaginal and Hives test  6. Anemia  7. Angina, unstable  8. Cardiomyopathy, ischemic  9. Cerebral infarction, unspecified  10. COPD  6. DM, Type I  12. DM, Type II  13. Hypercholesteremia, pure  14. Hypertension, benign  15. Icd Reprogramming  16.  ICD, not pacemaker depende daily                                  03/27/17 Vitamin D             2000UNIT  1 tablet 4 days a week and 2 tablets     04/15/16 Tylenol with Codeine #300-30MG  PRN                                      10/23/15 Multivitamins                   1 tablet krissy

## 2018-07-25 NOTE — PROGRESS NOTES
[facility]    Progress Note    Lynnette Sandrita Patient Status:  Inpatient    10/13/1950 MRN [id number]   [facility] 5SW/SE Attending Tiffany Busby MD   Hosp Day # 1 PCP Eleazar Lee. DO Chris       Subjective:    Lynnette Remy Oral Daily   • Fluticasone Furoate-Vilanterol  1 puff Inhalation Daily   • insulin detemir  3 Units Subcutaneous Nightly   • Metoprolol Succinate ER  25 mg Oral Daily   • Ranolazine ER  500 mg Oral BID   • Rosuvastatin Calcium  20 mg Oral Nightly   • sacch

## 2018-07-25 NOTE — H&P
[facility]    PATIENT'S NAME: Cecilio Felix   ATTENDING PHYSICIAN: Mary Hernandez MD   PATIENT ACCOUNT#:   [id number]    LOCATION:  [address] [place] [address] RECORD #:   [id number]       YOB: 1950  ADMISSION DATE:       07/24/2018 of ischemic cardiomyopathy with improvement of her ejection fraction from 25% to 40% with medical therapy. She is status post AICD defibrillator.   History of hyperlipidemia, hypertension, asthma, cerebrovascular accident, and pulmonary embolism, chronical to touch, with no open wounds noted. There is severe sensitivity to skin touching. Dorsalis pedis and posterior tibialis could not be palpated. NEUROLOGIC:  No acute focal findings. ASSESSMENT AND PLAN:   1.        Left lower extremity ischemia, as

## 2018-07-25 NOTE — DIETARY NOTE
Nutrition Care:    Patient educated regarding diabetes diet basics. Discussed carbohydrate foods, portion sizes, and food label reading. Handout given and initial meal plan provided. Encouraged to attend outpatient diabetes education. Questions answered.

## 2018-07-25 NOTE — PLAN OF CARE
Problem: Patient Centered Care  Goal: Patient preferences are identified and integrated in the patient's plan of care  Interventions:  - What would you like us to know as we care for you? My left foot really hurts.   - Provide timely, complete, and accurate analgesics based on type and severity of pain and evaluate response  - Implement non-pharmacological measures as appropriate and evaluate response  - Consider cultural and social influences on pain and pain management  - Manage/alleviate anxiety  - Utilize to functional status, cognitive ability or social support system   Outcome: Progressing      Problem: GASTROINTESTINAL - ADULT  Goal: Minimal or absence of nausea and vomiting  INTERVENTIONS:  - Maintain adequate hydration with IV or PO as ordered and tole Fluid restriction as ordered  - Instruct patient on fluid and nutrition restrictions as appropriate   Outcome: Progressing      Problem: HEMATOLOGIC - ADULT  Goal: Free from bleeding injury  (Example usage: patient with low platelets)  INTERVENTIONS:  - Av

## 2018-07-25 NOTE — CONSULTS
[place]loe [place], [address] [facility]  [address]     VASCULAR SURGERY CONSULT NOTE      Name: Elvie Rosalesh   :   10/13/1950  [id number]     REFERRING PHYSICIAN: Earnesitne Schmitz Patient is a 78year old female whom I have been asked to see regarding worsening left lower extremity ischemia.   Patient was seen last month and also 3 weeks ago  where it was determined that she has no revascularization options for her left lower extremi • Blepharitis, both eyes 1/14/2016   • BPPV (benign paroxysmal positional vertigo)    • Breast cancer [facility])     DCIS   • Breast cancer of upper-outer quadrant of left female breast ([facility] [address] 10/23/2010   • Cancer of overlapping sites of right female breast ([facility] 2004:  MASTECTOMY RIGHT  2004: NEEDLE BIOPSY LEFT      Comment: x 2  No date: OTHER SURGICAL HISTORY      Comment: Orthopedic surgeries X8  No date: OTHER SURGICAL HISTORY      Comment: Stent and Angioplasty  Dr. Marilee Calhoun  No date: OTHER SURGICAL HISTORY      C •  Albuterol Sulfate  (90 Base) MCG/ACT inhaler 2 puff, 2 puff, Inhalation, BID  •  ALPRAZolam (XANAX) tab 0.5 mg, 0.5 mg, Oral, Nightly  •  cetirizine (ZYRTEC) tab 10 mg, 10 mg, Oral, Daily  •  Fluticasone Furoate-Vilanterol (BREO ELLIPTA) 200-25 M Comprehensive ROS reviewed and negative except for what's stated above. Including negative for chest pain, shortness of breath, syncope.      EXAM:  /62 (BP Location: Left arm)   Pulse 102   Temp 97.5 °F (36.4 °C) (Oral)   Resp 20   SpO2 97%   GENERA PROCEDURE: XR HAND (MIN 3 VIEWS), RIGHT (CPT=73130)  COMPARISON: None. INDICATIONS: Pain, swelling of right hand. TECHNIQUE: Three views were obtained. FINDINGS:  BONES: No evidence of recent fracture or dislocation.  There is demineralization of the bon PROCEDURE: US VENOUS DOPPLER ARM RIGHT-DIAG IMG (CPT=93971)  COMPARISON: None. INDICATIONS: RUE pain and swelling  TECHNIQUE: Color duplex Doppler venous ultrasound of the right upper extremity was performed in the usual manner.   FINDINGS: Right internal

## 2018-07-26 NOTE — CONSULTS
[facility]    Report of Consultation    Date of Admission:  7/24/2018  Date of Consult:  7/26/2018   Reason for Consultation:     Hyponatremia      History of Present Illness:     Lucero Riggins is a 60-year-old female with past medical history o [facility])    • Coronary atherosclerosis    • Deep vein thrombosis (HCC)    • Diabetes ([facility]    • DM type 2 (diabetes mellitus, type 2) ([facility] 3688    W/O Complication  Pill, then Insulin added 2003   • Femoral artery stenosis (HCC)     Right Superficial   • Nain History   Problem Relation Age of Onset   • Hypertension Mother    • Thyroid disease Mother    • Diabetes Father    • Glaucoma Father    • Other [OTHER] Father    • Colon Cancer Brother 48   • Cancer Sister      Thyroid cancer   • Colonic Polyps Nayely Keating Intravenous Q8H PRN   dextrose 50 % injection 50 mL 50 mL Intravenous PRN   Glucose-Vitamin C (DEX-4) 4-0.006 g chewable tab 4 tablet 4 tablet Oral Q15 Min PRN   glucose (DEX4) oral liquid 15 g 15 g Oral Q15 Min PRN   Insulin Aspart Pen (NOVOLOG) 100 UNIT/ Constitutional: fatigue  Eyes: negative for irritation, redness and visual disturbance  Ears, nose, mouth, throat, and face: negative for hearing loss  Respiratory: negative for cough, hemoptysis and wheezing  Cardiovascular: negative for chest pain, exe 4. 06  3.91   HGB  12.0  11.2*  10.9*   HCT  36.6  34.2*  32.8*   MCV  85.0  84.3  83.9   WBC  9.7  6.7  6.3   PLT  252  215  210     Recent Labs   Lab  07/24/18   1739  07/25/18   0535  07/26/18   0526   GLU  202*  252*  231*   BUN  7*  10  10   CREATSERUM Medardo Kwon MD  7/26/2018

## 2018-07-26 NOTE — PROGRESS NOTES
[facility]    Progress Note    Patricia Monroy Patient Status:  Inpatient    10/13/1950 MRN [id number]   [facility] 5SW/SE Attending Bebe Quarles, [address] [facility] Day # 2 PCP Khushi Espino. DO Chris       Subjective:    Patricia Monroy • cetirizine  10 mg Oral Daily   • Fluticasone Furoate-Vilanterol  1 puff Inhalation Daily   • insulin detemir  3 Units Subcutaneous Nightly   • Metoprolol Succinate ER  25 mg Oral Daily   • Ranolazine ER  500 mg Oral BID   • Rosuvastatin Calcium  20 mg

## 2018-07-26 NOTE — PROGRESS NOTES
[facility]  Progress Note    Foreign Hewitt Patient Status:  Inpatient    10/13/1950 MRN [id number]   [facility] 5SW/SE Attending Fely Jiang, [address] [facility]  Day # 2 PCP Daron Clara. DO Chris     Assessment:    1.   Severe peripheral v Exam:    General: Seems tired and fatigued. HEENT: No focal deficits. Neck: No JVD, carotids 2+ no bruits. Cardiac: Regular rate and rhythm, S1, S2 normal, no murmur  Lungs: Clear without wheezes, rales, rhonchi. Abdomen: Soft, non-tender.         Labs:

## 2018-07-26 NOTE — PLAN OF CARE
Problem: Patient Centered Care  Goal: Patient preferences are identified and integrated in the patient's plan of care  Interventions:  - What would you like us to know as we care for you? \"My left foot really hurts. \"  - Provide timely, complete, and accu response  - Implement non-pharmacological measures as appropriate and evaluate response  - Consider cultural and social influences on pain and pain management  - Manage/alleviate anxiety  - Utilize distraction and/or relaxation techniques  - Monitor for op Progressing      Problem: GASTROINTESTINAL - ADULT  Goal: Minimal or absence of nausea and vomiting  INTERVENTIONS:  - Maintain adequate hydration with IV or PO as ordered and tolerated  - Nasogastric tube to low intermittent suction as ordered  - Evaluate injury  (Example usage: patient with low platelets)  INTERVENTIONS:  - Avoid intramuscular injections, enemas and rectal medication administration  - Ensure safe mobilization of patient  - Hold pressure on venipuncture sites to achieve adequate hemostasis

## 2018-07-26 NOTE — CM/SW NOTE
SW met w/ pt to discuss eventual discharge needs. Pt stated that she would like to go to [facility] for [facility]. SW explained that referral will be made and that [facility] will be listed as first choice.  SW explained that [facility] would need to review referral to see if they

## 2018-07-27 NOTE — PLAN OF CARE
Dr. More Perry called-patient's resp rate: 28, lungs clear but patient also very drowsy(she becomes easily aroused when name called), a, o, x 4, she also c/o dull lateral, lower left pain to her lungs. Dr. More Perry informed of vitals sign results.   Chest x-ray,

## 2018-07-27 NOTE — SIGNIFICANT EVENT
S  Asked to evaluate patient because of c/o sob and lethargy. Medical record partially reviewed. Patient with multiple medical problems. Scheduled for AKA by Dr. Pillo Hyde tomorrow for ischemic L foot. Despite her sob she has had O2 sats of 100%.     She h Mod to severe mitral regurg  Nitro glycerin drip should be of benefit for this as well. May consider IV lasix as well  May help with hyponatremia  6. Asthma  Patient does not seem to be having bronchospasm  Continue nebs  7. Coagulopathy.   Patient had

## 2018-07-27 NOTE — CONSULTS
[facility]    Report of Consultation    Elisa Dent Patient Status:  Inpatient    10/13/1950 MRN [id number]   Location [facility] 2W/SW Attending Kim Gonzalez MD   Hosp Day # 3 PCP Power Contreras.  DO Chris     Date of Admission cholesterol    • History of blood clots    • History of blood transfusion    • Hyperlipidemia LDL goal <70    • Hypertension, essential, benign    • IBS (irritable bowel syndrome)    • Meibomian gland dysfunction 2006   • Menometrorrhagia    • Muscle weakn Cancer Self 53   • Macular degeneration Neg        Social History  Patient Guardian Status:  Not on file.     Other Topics            Concern  Caffeine Concern        No  History of tanning      No  Pt has a pacemaker      No  Pt has a defibrillator  Yes  R Q2H PRN   Or      morphINE sulfate (PF) 4 MG/ML injection 2 mg 2 mg Intravenous Q2H PRN   Or      morphINE sulfate (PF) 4 MG/ML injection 4 mg 4 mg Intravenous Q2H PRN   docusate sodium (COLACE) cap 100 mg 100 mg Oral BID   PEG 3350 (MIRALAX) powder packet MOUTH FOUR TIMES DAILY AS NEEDED   lisinopril 5 MG Oral Tab Take 0.5 tablets (2.5 mg total) by mouth daily. Metoprolol Succinate ER 25 MG Oral Tablet 24 Hr Take 1 tablet (25 mg total) by mouth daily.    potassium chloride 20 MEQ Oral Powd Pack Take 45 mEq once daily   NON FORMULARY Tumeric 500mg once daily   NON FORMULARY Cranberry supplement 5 tabs daily    NON FORMULARY Focus attention 910 mcg daily   Insulin Pen Needle (PEN NEEDLES) 31G X 6 MM Does not apply Misc To be used qid   Clopidogrel Bisulfate 75 Ophthalmic Solution Place 1 drop into both eyes as needed for Allergies.        Allergies    Clindamycin             Tightness in Throat  Levofloxacin [Quixi*    NAUSEA AND VOMITING  Augmentin [Amoxicil*    RASH  Dilaudid [Hydrom[employer]*    CONFUSION    Comment 7/27/2018  CONCLUSION:  Unchanged CT from previous studies without acute intracranial hemorrhage, new focal infarct or mass. Stable small chronic lacunar infarct in the right brain stem.  Stable prominent CSF collection in the left frontal lobe suggesting a agreement without major discrepancies. Dictated by (CST): Lizette Arroyo MD on 7/27/2018 at 6:55     Approved by (CST): Lizette Arroyo MD on 7/27/2018 at 7:03            Assessment   1. Ischemic left foot  2. Acute respiratory failure  3.   Acut surgical intervention. Decision made to hold off above-knee amputation today closely monitoring patient for consideration of above-knee amputation in very near future. -Reviewed vitals, labs and imaging.     Critical care time spent 60 minutes    Dontrell

## 2018-07-27 NOTE — PLAN OF CARE
Pt intubated and attempting to pull on ET tube and medical lines. No restraint harm noted. Pt's sister made aware of POC. No restraint harm noted. Please see Epic Restraint Flow sheet for further assessments and interventions.

## 2018-07-27 NOTE — PLAN OF CARE
Problem: Patient Centered Care  Goal: Patient preferences are identified and integrated in the patient's plan of care  Interventions:  - What would you like us to know as we care for you? \"My left foot really hurts. \"  - Provide timely, complete, and accu analgesics based on type and severity of pain and evaluate response  - Implement non-pharmacological measures as appropriate and evaluate response  - Consider cultural and social influences on pain and pain management  - Manage/alleviate anxiety  - Utilize functional status, cognitive ability or social support system   Outcome: Progressing      Problem: GASTROINTESTINAL - ADULT  Goal: Minimal or absence of nausea and vomiting  INTERVENTIONS:  - Maintain adequate hydration with IV or PO as ordered and tolerat Progressing  K+ covered per protocol     Problem: HEMATOLOGIC - ADULT  Goal: Free from bleeding injury  (Example usage: patient with low platelets)  INTERVENTIONS:  - Avoid intramuscular injections, enemas and rectal medication administration  - Ensure saf

## 2018-07-27 NOTE — PROGRESS NOTES
Vascular Access Note  Inserted by Laura ZARAGOZAN RN    Vascular Access Screening:   Allergies to Lidocaine: no  Allergies to Latex: no  Presence of Pacemaker/Defibrillator: Left Side abdominal area  Mastectomy with Lymph Node Dissection: Right Side mastectomy

## 2018-07-27 NOTE — PROGRESS NOTES
[facility] AND [facility]  Progress Note    Aleksandrrxoann Bartholomew Patient Status:  Inpatient    10/13/1950 MRN [id number]   [facility] 2W/SW Attending Gabriela Siddiqui MD   Hosp Day # 3 PCP Cleo Coates. DO Chris     Assessment:    1.   Dramatic deteriorati Temp (24hrs), Av.1 °F (36.2 °C), Min:96.4 °F (35.8 °C), Max:97.5 °F (36.4 °C)      Intake/Output:    Intake/Output Summary (Last 24 hours) at 18 0742  Last data filed at 18 0147   Gross per 24 hour   Intake           608.75 ml   Output detemir  3 Units Subcutaneous Nightly   • Metoprolol Succinate ER  25 mg Oral Daily   • Ranolazine ER  500 mg Oral BID   • Rosuvastatin Calcium  20 mg Oral Nightly   • saccharomyces boulardii  250 mg Oral BID   • Saline Nasal Spray  3 spray Nasal Nightly

## 2018-07-27 NOTE — CM/SW NOTE
Pt transferred to critical care following incident of shortness of breath, altered mental status. Pt was ultimately intubated. Pt is Full Code. Pt has no advance directive paperwork on file. She lives at home, with support of her ex-.  Pt's sisters

## 2018-07-27 NOTE — PROGRESS NOTES
[facility] dosing service    Initial Pharmacokinetic Consult for Vancomycin Dosing     Kenrick Clements is a 78year old female who is being treated for sepsis.   Pharmacy has been asked to dose Vancomycin by Dr. Baxter Coad    She is allergic to clindamycin; le (25mg/kg, capped at 2g) x 1 dose, followed by 1 gm Q 12 hours  based upon, 67.9 kg weight, renal function, and pharmacokinetics. 2.  Pharmacy ordered Vancomycin trough level(s) prior to 4 th dose. Goal trough level 15-20 ug/mL.     3.  Pharmacy will ne

## 2018-07-27 NOTE — PLAN OF CARE
Problem: Diabetes/Glucose Control  Goal: Glucose maintained within prescribed range  INTERVENTIONS:  - Monitor Blood Glucose as ordered  - Assess for signs and symptoms of hyperglycemia and hypoglycemia  - Administer ordered medications to maintain glucose urine output is marginal.  Dr Patrick Porter called to say that he ordered Milrinone drip to be started this afternoon, her EF on her echo today was decreased at 10%. He also said that the planned L AKA surgery has been cancelled he talked to surgeon.     Problem amount of whitish secretions orally and noted that her OET secretions is slightly blood tinged. Otherwise she is comfortable on the ventilator.

## 2018-07-27 NOTE — SIGNIFICANT EVENT
Nocturnist  Called for decreased mental status. Patient sleepy, barely wakes to paiful stimuli then drifting back asleep. Barely able to give one word answers. Also with rapid shallow respirations.     /66 (BP Location: Right arm)   Pulse 90   Temp

## 2018-07-27 NOTE — PROGRESS NOTES
Pt was noted on chart review to be in the ICU on a vent at this time. CCM will be deferred until at least next month.

## 2018-07-27 NOTE — PROGRESS NOTES
[place] [facility]    Progress Note      Subjective:     Overnight event noted. Was intubated for lethargy. Currently awake but remain intubated     Become lethargic and tachypenic last night requiring intubation.  ABG not suggestive of hypercapni Daily   balsam peru-castor oil (VENELEX) ointment  Topical BID PRN   Clopidogrel Bisulfate (PLAVIX) tab 75 mg 75 mg Oral Daily   Albumin Human (ALBUMINAR) 25 % solution 12.5 g 12.5 g Intravenous Q8H   albuterol sulfate (VENTOLIN) (2.5 MG/3ML) 0.083% nebuli Daily   Fluticasone Furoate-Vilanterol (BREO ELLIPTA) 200-25 MCG/INH inhaler 1 puff 1 puff Inhalation Daily   Meclizine HCl (ANTIVERT) tab 25 mg 25 mg Oral TID PRN   methocarbamol (ROBAXIN) tab 500 mg 500 mg Oral QID PRN   Metoprolol Succinate ER (Toprol X etc.  Clinical correlation is recommended.      03/15/2017 34.2 (H) 25.0 - 34.0 seconds Final   ----------  INR   Date Value Ref Range Status   07/27/2018 2.2 (H) 0.9 - 1.2 Final   Comment:     Only the INR (not the Protime value) should be utilized for   t 7/27/2018 at 7:10          Xr Chest Ap Portable  (cpt=71045)    Result Date: 7/27/2018  CONCLUSION:   * Endotracheal tube has been inserted in good position.  * A nasogastric tube has been inserted beyond the cardioesophageal junction but the distal tip is surgery     2. CAD s/p CABG, CMP, mitral valve clip  - elevated troponin - repeat Echo  - discussed with cardiology     3.  Metabolic acidosis: sepsis/low cardiac output status   - lactic acidosis - lactic acid trending down   - avoid bicarb gtt - will give

## 2018-07-27 NOTE — PROGRESS NOTES
77 y/o female with severe PVD, CLI LLE for amputation transferred to CCU for increased SOB. Most likely combination of acidosis from leg superimposed on EF 25% with prior mitral clip for MR. Was also getting 3% saline to correct for hyponatremia.  Now intub

## 2018-07-27 NOTE — PLAN OF CARE
Pt LOC decreased. Dr. Annel Kapoor at bedside to assess. Pt inubated for airway protection at 0345. ET 7.5 pulled back to 22 at the lip. OG inserted by Dr. Annel Kapoor. See Mar for med administration. Pt VSS. Sister Xochitl Calderon notified of intubation and restraints.

## 2018-07-28 NOTE — PROGRESS NOTES
[address] [facility]    CDI Prediction Tool Protocol (Vancomycin Initiated)    OVP (oral vancomycin prophylaxis) 125 mg PO Daily is being started in this patient based on a score of 13.       Score Breakdown:  High risk antibiotic use (5 points)  Malignanc

## 2018-07-28 NOTE — PROGRESS NOTES
[facility]  Nephrology Daily Progress Note    Burke Frank  [id number]  78year old      HPI:   Burke Frank is a 78year old female. Stable night. Sedated and intubated. Arouses when sedation on hold as per RN.        ROS:     Constitution Results  Component Value Date   WBC 13.2 07/28/2018   HGB 11.0 07/28/2018   HCT 34.2 07/28/2018    07/28/2018   CREATSERUM 0.82 07/28/2018   BUN 13 07/28/2018    07/28/2018   K 3.7 07/28/2018   CL 97 07/28/2018   CO2 22 07/28/2018    07 diameter isodense tissue in the right frontotemporal subdural space which may suggest a small chronic subdural hematoma versus meningeal thickening. Findings are stable.  Moderate chronic appearing deep white matter skin change  in the periventricular white Scarring/atelectasis. 7. External defibrillator. 8. Postop changes right axilla and left shoulder. 9. A preliminary report was submitted and there is agreement without major discrepancies. Dictated by (CST):  Caren Romano MD on 7/27/2018 at 6:55 mg, Intravenous, Q8H  •  nitroGLYCERIN infusion 50mg in D5W 250ml, 5 mcg/min, Intravenous, Continuous  •  Vancomycin HCl (VANCOCIN) 1,000 mg in sodium chloride 0.9 % 250 mL IVPB add-vantage, 15 mg/kg, Intravenous, Q12H  •  Normal Saline Flush 0.9 % injecti tab 0.4 mg, 0.4 mg, Sublingual, Q5 Min PRN  •  Ranolazine ER (RANEXA) 12 hr tab 500 mg, 500 mg, Oral, BID  •  Rosuvastatin Calcium (CRESTOR) tab 20 mg, 20 mg, Oral, Nightly  •  saccharomyces boulardii (FLORASTOR) cap 250 mg, 250 mg, Oral, BID  •  Saline Na failure type     Seborrheic keratoses     Acute chest pain     PVD (peripheral vascular disease) (HCC)     Mitral regurgitation     CAD (coronary artery disease), native coronary artery     Mitral insufficiency     Cellulitis and abscess of foot     Cellul

## 2018-07-28 NOTE — PROGRESS NOTES
[facility]    Cardiology Progress Note    Levi Mcardle Patient Status:  Inpatient    10/13/1950 MRN [id number]   Location [facility] 2W/SW Attending MD Norman [facility] Day # 4 PCP Marcela Perez, DO     2018  Subj BUN  10   --   9   --   11  11  13   CREATSERUM  0.54   --   0.57   --   0.67  0.67  0.82   CA  7.9*   --   8.2*   --   8.2*  8.1*  7.8*   MG   --    --    --    --    --    --   1.2*   PHOS   --    --    --    --    --    --   1.6*   GLU  231*   --   25 oil (VENELEX) ointment  Topical BID PRN   Clopidogrel Bisulfate (PLAVIX) tab 75 mg 75 mg Oral Daily   albuterol sulfate (VENTOLIN) (2.5 MG/3ML) 0.083% nebulizer solution 2.5 mg 2.5 mg Nebulization Q6H PRN   nitroGLYCERIN (NITROSTAT) SL tab 0.4 mg 0.4 mg Diehl Inhalation Daily   Meclizine HCl (ANTIVERT) tab 25 mg 25 mg Oral TID PRN   methocarbamol (ROBAXIN) tab 500 mg 500 mg Oral QID PRN   Metoprolol Succinate ER (Toprol XL) 24 hr tab 25 mg 25 mg Oral Daily   nitroGLYCERIN (NITROSTAT) SL tab 0.4 mg 0.4 mg Sublin anticoagulants     PAD (peripheral artery disease) (HCC)     Dyspnea on exertion     Dyspnea     Unstable angina (HCC)     Poliomyelitis     Aneurysm (arteriovenous) of coronary vessels     Dyspnea, unspecified type     Pain of left lower extremity due to

## 2018-07-28 NOTE — PROGRESS NOTES
Patient remains intubated with worsening heart failure  Patient with EF 10%  Possible MI   Continue to support and stabilize  Will proceed with AKA when medically appropriate

## 2018-07-28 NOTE — PROGRESS NOTES
07/28/18 0710   Vent Information   Interface Invasive   Vent Type PB   Vent ID [id number]   Vent Mode VC/AC   Settings   FiO2 (%) 40 %   Resp Rate (Set) 14   Vt (Set, mL) 500 mL   Waveform Decelerating ramp   PEEP/CPAP (cm H2O) 5 cm H20   Peak Flow LPM 60

## 2018-07-28 NOTE — PROGRESS NOTES
[facility]     Progress Note        Billy Tao Patient Status:  Inpatient    10/13/1950 MRN [id number]   Location [facility] 2W/SW Attending Nolan Ingram MD   [facility] Day # 4 PCP Winter Chris.  DO Chris       Subjective:   Ruddy 5,000 Units Subcutaneous Q8H [facility]   Normal Saline Flush 0.9 % injection 10 mL 10 mL Intravenous PRN   ALPRAZolam (XANAX) tab 0.25 mg 0.25 mg Oral Daily   balsam peru-castor oil (VENELEX) ointment  Topical BID PRN   Clopidogrel Bisulfate (PLAVIX) tab 75 mg 7 tab 0.5 mg 0.5 mg Oral Nightly   cetirizine (ZYRTEC) tab 10 mg 10 mg Oral Daily   Fluticasone Furoate-Vilanterol (BREO ELLIPTA) 200-25 MCG/INH inhaler 1 puff 1 puff Inhalation Daily   Meclizine HCl (ANTIVERT) tab 25 mg 25 mg Oral TID PRN   methocarbamol (R Unchanged CT from previous studies without acute intracranial hemorrhage, new focal infarct or mass. Stable small chronic lacunar infarct in the right brain stem.  Stable prominent CSF collection in the left frontal lobe suggesting a chronic left subdural Chest Ap Portable  (cpt=71045)    Result Date: 7/27/2018  CONCLUSION:  1. Markings appear slightly more prominent than June 11, 2018 which may relate to limited inspiration or early CHF. 2. Borderline cardiomegaly. 3. Postop changes. 4. Coronary stents.  5. cardiac perspective. -  Elevated troponin. Cannot rule out acute ischemic myocardial event.   Per cardiology not a candidate for invasive cardiac procedures at this time.  -Continue IV antibiotic therapy at this time  -Full ventilatory support at this time

## 2018-07-28 NOTE — PROGRESS NOTES
120 [facility] dosing service    Follow-up Pharmacokinetic Consult for Vancomycin Dosing     Lynnette Remy is a 78year old female who is being treated for sepsis. Patient is on day 4 of Vancomycin 1 gm IV Q 12 hours. Goal trough is 15-20 ug/mL.     She is a CULTURE, ROUTINE Status: None   Collection Time: 07/27/18 1:47 AM   Result Value Ref Range   Urine Culture No Growth at 18-24 hrs. N/A         Based on the above:    1.  Decrease Vancomycin at 750 mg IVPB Q 12 hours (based on Trough of 21.7 ug/mL, pharmacok

## 2018-07-28 NOTE — PLAN OF CARE
Problem: SAFETY ADULT - FALL  Goal: Free from fall injury  INTERVENTIONS:  - Assess pt frequently for physical needs  - Identify cognitive and physical deficits and behaviors that affect risk of falls.   - [place] fall precautions as indicated by assessme

## 2018-07-29 NOTE — PLAN OF CARE
Problem: Patient Centered Care  Goal: Patient preferences are identified and integrated in the patient's plan of care  Interventions:  - What would you like us to know as we care for you? \"My left foot really hurts. \"  - Provide timely, complete, and accu Administer analgesics based on type and severity of pain and evaluate response  - Implement non-pharmacological measures as appropriate and evaluate response  - Consider cultural and social influences on pain and pain management  - Manage/alleviate anxiety needs post-hospital services based on physician/LIP order or complex needs related to functional status, cognitive ability or social support system   Outcome: Progressing      Problem: GASTROINTESTINAL - ADULT  Goal: Minimal or absence of nausea and vomiti Fluid restriction as ordered  - Instruct patient on fluid and nutrition restrictions as appropriate   Outcome: Progressing      Problem: HEMATOLOGIC - ADULT  Goal: Free from bleeding injury  (Example usage: patient with low platelets)  INTERVENTIONS:  - Av signs/symptoms of CO2 retention   Outcome: Progressing  Pt sating well on 40% fio2 on vent.       Problem: Impaired Cognition  Goal: Patient will exhibit improved attention, thought processing and/or memory  Interventions:    Outcome: Progressing      Probl

## 2018-07-29 NOTE — PROGRESS NOTES
07/29/18 0715   Vent Information   Interface Invasive   Vent Type PB   Vent ID [id number]   Vent Mode VC/AC   Settings   FiO2 (%) 40 %   Resp Rate (Set) 14   Vt (Set, mL) 500 mL   Waveform Decelerating ramp   PEEP/CPAP (cm H2O) 5 cm H20   Peak Flow LPM 60

## 2018-07-29 NOTE — PROGRESS NOTES
Pulmonary/Critical Care Follow Up Note    HPI:   Billy Tao is a 78year old female with No chief complaint on file. PCP Winter Chris.  Cary Longoria, DO  Admission Attending Delores Richardson MD    Hospital Day #5  Low MAP and milrinone was increased  No other Facility-Administered Medications:   •  docusate sodium (COLACE) liquid 100 mg, 100 mg, Per G Tube, BID  •  potassium chloride IVPB premix 40 mEq, 40 mEq, Intravenous, Once **FOLLOWED BY** potassium chloride IVPB premix 20 mEq, 20 mEq, Intravenous, Once  • PRN  •  ondansetron HCl (ZOFRAN) injection 4 mg, 4 mg, Intravenous, Q6H PRN  •  morphINE sulfate (PF) 4 MG/ML injection 1 mg, 1 mg, Intravenous, Q2H PRN **OR** morphINE sulfate (PF) 4 MG/ML injection 2 mg, 2 mg, Intravenous, Q2H PRN **OR** morphINE sulfate PRN      Allergies:    Clindamycin             Tightness in Throat  Levofloxacin [Quixi*    NAUSEA AND VOMITING  Augmentin [Amoxicil*    RASH  Dilaudid [Hydromorp*    CONFUSION    Comment:agitation  Scopolamine             DIZZINESS  Sulfa Antibiotics NS    7.  Diabetes mellitus  Plan start LA insulin   Wean off gtt    8. Shock liver  LFTs better  INR still elevated  Plan follow    9. FEN  Plan start TFs    DVT px  SCDs    GI px  PPI    31 min CCT          Gonzalo Scales MD

## 2018-07-29 NOTE — PLAN OF CARE
Problem: Diabetes/Glucose Control  Goal: Glucose maintained within prescribed range  INTERVENTIONS:  - Monitor Blood Glucose as ordered  - Assess for signs and symptoms of hyperglycemia and hypoglycemia  - Administer ordered medications to maintain glucose normal limits  INTERVENTIONS:  - Monitor labs and rhythm and assess patient for signs and symptoms of electrolyte imbalances  - Administer electrolyte replacement as ordered  - Monitor response to electrolyte replacements, including rhythm and repeat lab r

## 2018-07-29 NOTE — PROGRESS NOTES
Patient still not stable for OR  Gangrene stable on left foot  Will require milrinone an additional 24 hours  Will evaluate for surgery after that  Can be extubated if appropriate

## 2018-07-29 NOTE — PROGRESS NOTES
[facility]  Nephrology Daily Progress Note    Elvie Dougherty  [id number]  78year old      HPI:   Elvie Dougherty is a 78year old female. Stable overnight. Intubated and sedated. On Milrinone. ROS:     Unable.      PHYSICAL EXAM:   Temp: BUN  13  11  11   CREATSERUM  0.82  0.81  0.81   GFRAA  >60  >60  >60   GFRNAA  >60  >60  >60   CA  7.8*  6.6*  7.1*   ALB  2.8*  2.4*  2.2*   NA  126*  133*  127*   K  3.7  5.4*  3.0*  3.0*   CL  97  98  97   CO2  22  16*  22   ALKPHO  43  39  39   AST Xr Chest Ap Portable  (cpt=71045)    Result Date: 7/27/2018  CONCLUSION:   * Endotracheal tube has been inserted in good position.  * A nasogastric tube has been inserted beyond the cardioesophageal junction but the distal tip is not included on this im (DIPRIVAN) infusion, 5-50 mcg/kg/min (Dosing Weight), Intravenous, Continuous  •  Insulin Regular Human (NOVOLIN R) 100 Units in sodium chloride 0.9 % 100 mL infusion, 1-28 Units/hr, Intravenous, Continuous  •  Pantoprazole Sodium (PROTONIX) 40 mg in [place] Rectal, Once PRN  •  Metoclopramide HCl (REGLAN) injection 10 mg, 10 mg, Intravenous, Q8H PRN  •  dextrose 50 % injection 50 mL, 50 mL, Intravenous, PRN  •  Glucose-Vitamin C (DEX-4) 4-0.006 g chewable tab 4 tablet, 4 tablet, Oral, Q15 Min PRN  •  glucose 3179.53 ml   Output              775 ml   Net          2404.53 ml          ASSESSMENT/PLAN:   Assessment   Patient Active Problem List:     Type II diabetes mellitus (HCC)     Asthma     Hypertension, benign     Coronary atherosclerosis     Major d respiratory failure (HCC)     Metabolic acidosis    UO only fair but creatinine remains stable. Replace K.  Mg, PO4 better. Sodium 127. Shock liver better. Continue gentle IV htdration. Watch for fluid overload. CXR from today still looks OK.   Repeat

## 2018-07-30 NOTE — PROGRESS NOTES
120 [facility] dosing service    Follow-up Pharmacokinetic Consult for Vancomycin Dosing     Elvie Dougherty is a 78year old female admitted on 7/26 who is being treated for sepsis. Patient is on day 5 of Vancomycin 750 mg IV Q 12 hours.   Goal trough is 15-20 Status: None   Collection Time: 07/27/18 1:47 AM   Result Value Ref Range   Urine Culture No Growth at 18-24 hrs. N/A   Based on the above:    1.  Decrease Vancomycin at 750 mg IVPB Q 24 hours (based on not a true Trough since dose was  missed on 7/29 at 18

## 2018-07-30 NOTE — PROGRESS NOTES
[place] [facility] - [facility]    Progress Note    Charolett Fair Patient Status:  Inpatient    10/13/1950 MRN [id number]   Location [facility] 2W/SW Attending Solo Brooks MD   [facility] Day # 6 PCP Lissy Hutchinson.  DO Chris     Subjective:     Unable t care      8–dabetes mellitus   insulin     9–sock liver  LFTs better /trending down  INR lower today 1.9   Plan follow     10– nutrition  Tolerated NG tube feeding    11– GI prophylaxis  PPI    12–DVT prophylaxis  Heparin sq    13- Full code     Critical w on patients current state of illness, I anticipate that, after discharge, patient will require TBD. Laura Molina.  Reina Rowley MD  7/30/2018

## 2018-07-30 NOTE — PLAN OF CARE
CARDIOVASCULAR - ADULT    • Maintains optimal cardiac output and hemodynamic stability Not Progressing    • Absence of cardiac arrhythmias or at baseline Not Progressing        Diabetes/Glucose Control    • Glucose maintained within prescribed range Not Pr ventilator support and too hemodynamically unstable to attempt a breathing trial per Dr. Moreno Sessions. Patient requires soft wrist restraint use in order to maintain ETT and lines. Patient does attempt to pull at ETT and lines.  Last bowel movement was 7/24/2018

## 2018-07-30 NOTE — PROGRESS NOTES
[facility] AND [facility]  Progress Note    Tulio Peacock Patient Status:  Inpatient    10/13/1950 MRN [id number]   [facility] 2W/SW Attending Jose Alfredo Fields MD   [facility] Day # 6 PCP Juan Miguel Martinez. DO Chris     Assessment:    1.   Shock, likely a co edema. Peripheral pulses are absent.   Skin: Left lower leg cool    Labs:    Lab Results  Component Value Date   WBC 10.8 07/30/2018   HGB 10.1 07/30/2018   HCT 30.5 07/30/2018    07/30/2018       Lab Results  Component Value Date   PT 18.7 (H) 09/1 in D5W Stopped (07/27/18 0345)       Oumar Alcaraz MD  7/30/2018  8:32 AM

## 2018-07-30 NOTE — PROGRESS NOTES
Pt's BP running low, with low MAP since beginning of shift (1900 7/29/18)  D/W Dr Jo Zhao (on call for [facility]), orders to start dobutamine and then add dopamine if needed. Also d/w Dr Angi Weinberg, agreed to add dopamine if necessary after trying dobutamine.  Pt

## 2018-07-30 NOTE — DIETARY NOTE
ADULT NUTRITION INITIAL ASSESSMENT    Pt is at high nutrition risk. Pt does not meet malnutrition criteria. RECOMMENDATIONS TO MD:  See Nutrition Intervention for tf nutrient intake.       NUTRITION DIAGNOSIS/PROBLEM:  Inadequate protein energy [place] quadrant of left female breast ([facility] 10/23/2010   • Cancer of overlapping sites of right female breast ([facility] 10/23/2010   • Cataract 2005, 2009   • Cellulitis     R foot   • Cerebrovascular accident [facility])    • Congestive heart disease ([facility]    • COPD ( lb 12.8 oz)  06/21/18 : 60.8 kg (134 lb)  06/16/18 : 61.6 kg (135 lb 11.2 oz)  06/04/18 : 63.5 kg (140 lb)  05/17/18 : 79.4 kg (175 lb)  04/16/18 : 63.5 kg (140 lb)  04/02/18 : 63.5 kg (140 lb)      GASTROINTESTINAL: constipation PRN miralax and MOM- RN to Body Fat/Muscle Mass: well nourished per visual exam.    - Fluid Accumulation: lower extremity edema +1 and hands +2 per visual exam    - Skin Integrity: RN documentation reviewed and New Deep Tissue injury. buttocks.    - Rosalino score 11    NUTRITION PRESC

## 2018-07-30 NOTE — PLAN OF CARE
Problem: Patient Centered Care  Goal: Patient preferences are identified and integrated in the patient's plan of care  Interventions:  - What would you like us to know as we care for you? \"My left foot really hurts. \"  - Provide timely, complete, and accu touching left foot. Problem: SAFETY ADULT - FALL  Goal: Free from fall injury  INTERVENTIONS:  - Assess pt frequently for physical needs  - Identify cognitive and physical deficits and behaviors that affect risk of falls.   - [place] fall precautions a needed  - Evaluate fluid balance   Outcome: Progressing    Goal: Maintains or returns to baseline bowel function  INTERVENTIONS:  - Assess bowel function  - Maintain adequate hydration with IV or PO as ordered and tolerated  - Evaluate effectiveness of GI Restraints  Goal: Patient will remain free from self-harm  INTERVENTIONS:  - Apply the least restrictive restraint to prevent harm  - Notify patient and family of reasons restraints applied  - Assess for any contributing factors to confusion (electrolyte d Consider collaborating with pharmacy to review patient's medication profile  - Implement strategies to promote bladder emptying   Outcome: Not Progressing  Arana in place with mely urine out.   Pt with low to mod urine output

## 2018-07-30 NOTE — SIGNIFICANT EVENT
ICU nocturnalist    Called by RN to evaluate patient in room 201. Patient intubated, sedated. As per RN worsening blood pressure and tachycardia for the past few hours. Patient has been on milrinone along with dobutamine drips.     On exam  Temperature

## 2018-07-30 NOTE — PROGRESS NOTES
07/30/18 0735   Vent Information   Interface Invasive   Vent Type PB   Vent ID [id number]   Vent Mode VC/AC   Settings   FiO2 (%) 40 %   Resp Rate (Set) 14   Vt (Set, mL) 500 mL   Waveform Decelerating ramp   PEEP/CPAP (cm H2O) 5 cm H20   Peak Flow LPM 60

## 2018-07-30 NOTE — PROGRESS NOTES
[facility]    Progress Note      Subjective:     Sister at bedside.   Discussed in detail about low sodium    Review of Systems:     Unable to obtain - intubated     Objective:   Temp:  [97.2 °F (36.2 °C)-98.8 °F (37.1 °C)] 98.8 °F (37.1 ° mcg/kg/min Intravenous Continuous   DOBUTamine in D5W (DOBUTREX) 500 mg/250 ml infusion 2.5-40 mcg/kg/min (Dosing Weight) Intravenous Continuous   DOPamine in D5W (INTROPIN) 400 mg/250 ml premix infusion 1.5-20 mcg/kg/min (Dosing Weight) Intravenous Contin mL 1 enema Rectal Once PRN   Metoclopramide HCl (REGLAN) injection 10 mg 10 mg Intravenous Q8H PRN   Acetaminophen-Codeine #3 (TYLENOL #3) 300-30 MG tab 1 tablet 1 tablet Oral Q6H PRN   Albuterol Sulfate  (90 Base) MCG/ACT inhaler 2 puff 2 puff Inha Date Value Ref Range Status   07/30/2018 1.9 (H) 0.9 - 1.2 Final   Comment:     Only the INR (not the Protime value) should be utilized for   the monitoring of oral anticoagulant therapy.      Recommended therapeutic ranges for anticoagulant therapy are LV systolic function   - currently on dobutamine and ap-synephrine gtt   - cardiology input noted     3.  KIMI:   - baseline creatinine 0.6 mg/dl and now rising with decline in urine output  - secondary to perfusion related ischemic injury in light of cardi

## 2018-07-31 NOTE — PLAN OF CARE
Problem: PAIN - ADULT  Goal: Verbalizes/displays adequate comfort level or patient's stated pain goal  INTERVENTIONS:  - Encourage pt to monitor pain and request assistance  - Assess pain using appropriate pain scale  - Administer analgesics based on type post-discharge preferences of patient/family/discharge partner  - Complete POLST form as appropriate  - Assess patient's ability to be responsible for managing their own health  - Refer to Case Management Department for coordinating discharge planning if t restriction as ordered  - Instruct patient on fluid and nutrition restrictions as appropriate   Outcome: Progressing  Pt labs are being monitored and covered per M.D order    Problem: Safety Risk - Non-Violent Restraints  Goal: Patient will remain free fro remains unresponsive    Problem: GENITOURINARY - ADULT  Goal: Absence of urinary retention  INTERVENTIONS:  - Assess patient’s ability to void and empty bladder  - Monitor intake/output and perform bladder scan as needed  - Follow urinary retention itzelo

## 2018-07-31 NOTE — PLAN OF CARE
RESPIRATORY - ADULT    • Achieves optimal ventilation and oxygenation Not Progressing        Safety Risk - Non-Violent Restraints    • Patient will remain free from self-harm Not Progressing          CARDIOVASCULAR - ADULT    • Maintains optimal cardiac ou secretions. Patient has remained hemodynamically stable and off vasopressors. Patient has +2 edema on all extremities with sluggish capillary refill. Patient has dobutamine running at 5mcg for low EF. Echocardiogram repeated this morning.  Patient has tube

## 2018-07-31 NOTE — PROGRESS NOTES
[place] [facility]    Progress Note      Subjective:     Sister at bedside. Patient off sedation .  Less pressor requirement     Review of Systems:     Unable to obtain - intubated     Objective:   Temp:  [97 °F (36.1 °C)-98.8 °F (37.1 °C)] 97 °F 400 mg/250 ml premix infusion 1.5-20 mcg/kg/min (Dosing Weight) Intravenous Continuous   Vancomycin HCl (FIRVANQ) 50 MG/ML oral solution 125 mg 125 mg Oral Daily   propofol (DIPRIVAN) infusion 5-50 mcg/kg/min (Dosing Weight) Intravenous Continuous   Pantop Oral Q6H PRN   Albuterol Sulfate  (90 Base) MCG/ACT inhaler 2 puff 2 puff Inhalation BID   ALPRAZolam (XANAX) tab 0.5 mg 0.5 mg Oral Nightly   cetirizine (ZYRTEC) tab 10 mg 10 mg Oral Daily   Fluticasone Furoate-Vilanterol (BREO ELLIPTA) 200-25 MCG/ anticoagulant therapy. Recommended therapeutic ranges for anticoagulant therapy are   as follows:   2.0 - 3.0 All indications except for mechanical prosthetic   cardiac valves. 2.5 - 3.5 Mechanical prosthetic cardiac valves.      07/10/2018 1.5 (A) cardiology input noted     3. KIMI: low urine output   - baseline creatinine 0.6 mg/dl   - creatinine improved today - monitor.  Urine output remain marginal   - secondary to perfusion related ischemic injury in light of cardiogenic shock and sepsis  - dose

## 2018-07-31 NOTE — PROGRESS NOTES
[facility]  Vascular Surgery Progress Note    Steven Way Patient Status:  Inpatient    10/13/1950 MRN [id number]   Location [facility] 2W/SW Attending Georganne Meckel, MD   [facility] Day # 7 PCP Roxana Estrada.  DO Chris     Objective:   Temp: 9 Glucose-Vitamin C (DEX-4) 4-0.006 g chewable tab 4 tablet 4 tablet Oral Q15 Min PRN   glucose (DEX4) oral liquid 15 g 15 g Oral Q15 Min PRN   dextrose 10 % infusion  Intravenous Continuous PRN   milrinone (PRIMACOR) 20mg in D5W 100 mL premix infusion 0.3 PEG 3350 (MIRALAX) powder packet 17 g 17 g Oral Daily PRN   magnesium hydroxide (MILK OF MAGNESIA) 400 MG/5ML suspension 30 mL 30 mL Oral Daily PRN   bisacodyl (DULCOLAX) rectal suppository 10 mg 10 mg Rectal Daily PRN   FLEET ENEMA (FLEET) 7-19 GM/118ML 7. 1*  7.3*  7.3*   MG  1.2*   --   2.1  2.1   --    PHOS  1.6*   --   2.7  3.5   --    GLU  136*  167*  107*  289*  310*       Recent Labs   Lab  07/28/18   0459  07/28/18   1146  07/29/18   0516  07/30/18   0551  07/31/18   0438   ALT  882*  741*  606*  5

## 2018-07-31 NOTE — PROGRESS NOTES
[facility]     Progress Note        Billy Tao Patient Status:  Inpatient    10/13/1950 MRN [id number]   Location [facility] 2W/SW Attending Nolan Ingram MD   [facility] Day # 7 PCP Winter Chris.  DO Chris       Subjective:   Ruddy (INTROPIN) 400 mg/250 ml premix infusion 1.5-20 mcg/kg/min (Dosing Weight) Intravenous Continuous   Vancomycin HCl (FIRVANQ) 50 MG/ML oral solution 125 mg 125 mg Oral Daily   propofol (DIPRIVAN) infusion 5-50 mcg/kg/min (Dosing Weight) Intravenous Continuo tablet Oral Q6H PRN   Albuterol Sulfate  (90 Base) MCG/ACT inhaler 2 puff 2 puff Inhalation BID   ALPRAZolam (XANAX) tab 0.5 mg 0.5 mg Oral Nightly   cetirizine (ZYRTEC) tab 10 mg 10 mg Oral Daily   Fluticasone Furoate-Vilanterol (BREO ELLIPTA) 200- in the amount of opacification behind the left heart.      Dictated by (CST): Bo Faye MD on 7/31/2018 at 7:30     Approved by (CST): Johan Keene MD on 7/31/2018 at 7:32          Xr Chest Ap Portable  (cpt=71045)    Result Date: 7 above-knee amputation from cardiology perspective if 2D echo improved.     Marcus Mir, DO  Pulmonary [address] [place] [address]

## 2018-07-31 NOTE — PROGRESS NOTES
[facility] AND [facility]  Progress Note    Shelly Fair Patient Status:  Inpatient    10/13/1950 MRN [id number]   [facility] 2W/SW Attending Solo Brooks MD   [address] Day # 7 PCP Lissy Hutchinson. DO Chris     Assessment:    1.   Shock syndrome imp normal, no murmur  Lungs: Clear     Abdomen: Soft   Extremities: Without edema. Toes of left foot blackened and gangrenous.       Labs:    Lab Results  Component Value Date   WBC 8.6 07/31/2018   HGB 9.9 07/31/2018   HCT 29.8 07/31/2018    07/31/201

## 2018-08-01 NOTE — PROGRESS NOTES
[facility] AND [facility]  Progress Note    Tate Hinojosa Patient Status:  Inpatient    10/13/1950 MRN [id number]   [facility] 2W/SW Attending Sparkle Carrington MD   [address] Day # 8 PCP Ilene Perez DO     Assessment:    1.  Shock syndrome res Exam:    General: Awake; somewhat lethargic. Aware of conversation and answers questions appropriately. HEENT: No focal deficits. Neck: No JVD  Cardiac: Regular rate and rhythm, S1, S2 normal, no murmur  Lungs: Clear without wheezes, rales, rhonchi.   On (08/01/18 0600)   • DOPamine Stopped (07/30/18 0110)   • propofol Stopped (07/29/18 2300)   • Nitroglycerin in D5W Stopped (07/27/18 0345)       Bsihnu Mcmillan MD  8/1/2018  8:39 AM

## 2018-08-01 NOTE — ANESTHESIA PREPROCEDURE EVALUATION
Anesthesia PreOp Note    HPI:     Kitty Lopes is a 78year old female who presents for preoperative consultation requested by: Tato Barone MD    Date of Surgery: 7/24/2018 - 8/1/2018    Procedure(s):  KNEE AMPUTATION ABOVE/BELOW  Indication: Gangrene Cellulitis and abscess of foot         Date Noted: 08/12/2017      Cellulitis of foot         Date Noted: 08/12/2017      Hyperglycemia         Date Noted: 08/12/2017      Mitral insufficiency         Date Noted: 03/15/2017      Mitral regurgitation hyperlipidemia         Date Noted: 04/25/2007      Hypertension, benign         Date Noted: 01/23/2007      Osteoporosis         Date Noted: 11/04/2005        Past Medical History:   Diagnosis Date   • Anesthesia complication    • Anxiety state    • Asthma CARDIAC DEFIBRILLATOR PLACEMENT  No date: CATARACT  7/13/09: CATARACT EXTRACTION W/  INTRAOCULAR LENS IMPLA* Right      Comment: JOSE  2/27/06: CATARACT EXTRACTION W/  INTRAOCULAR LENS IMPLA* Left      Comment: JOSE  No date: CATH DRUG ELUTING STENT  No date times daily with meals.  (Patient taking differently: Inject into the skin 3 (three) times daily with meals.  ) Disp: 1 pen Rfl: 0 7/24/2018 at Unknown time   Albuterol Sulfate HFA (PROAIR HFA) 108 (90 Base) MCG/ACT Inhalation Aero Soln Inhale 2 puffs into NON FORMULARY Focus attention 910 mcg daily Disp:  Rfl:  Past Week at Unknown time   Insulin Pen Needle (PEN NEEDLES) 31G X 6 MM Does not apply Misc To be used qid Disp: 400 each Rfl: 3 Past Week at Unknown time   Clopidogrel Bisulfate 75 MG Oral Tab DHARA Rfl:  Past Month at Unknown time   DESLORATADINE 5 MG Oral Tab TAKE 1 TABLET BY MOUTH EVERY DAY AS NEEDED Disp: 90 tablet Rfl: 11 7/24/2018 at Unknown time   Alcohol Swabs ([employer] PREP) 70 % Does not apply Pads  Disp:  Rfl:  Past Week at Unknown time   RANEXA Oral Q15 Min PRN Simba Maradiaga MD     glucose (DEX4) oral liquid 15 g 15 g Oral Q15 Min PRN Simba Maradiaga MD     dextrose 10 % infusion  Intravenous Continuous PRN Simba Maradiaga MD Last Rate: 45 mL/hr at 08/01/18 0000 1,000 mL at 08/01/18 0000   milr MD 2.5 mg at 07/26/18 2130    nitroGLYCERIN (NITROSTAT) SL tab 0.4 mg 0.4 mg Sublingual Q5 Min PRN Jovita Hebert MD     Meropenem [facility]) 500 mg in sodium chloride 0.9% 100 mL  mg Intravenous Q8H Jovita Hebert MD Last Rate: 33.3 mL/ cetirizine (ZYRTEC) tab 10 mg 10 mg Oral Daily Anca Jones MD 10 mg at 07/31/18 0818    Fluticasone Furoate-Vilanterol (BREO ELLIPTA) 200-25 MCG/INH inhaler 1 puff 1 puff Inhalation Daily Fely Jiang MD Stopped at 07/27/18 0930    Meclizine HCl Social History Main Topics   Smoking status: Never Smoker    Smokeless tobacco: Never Used    Alcohol use No    Drug use: No    Sexual activity: Not on file     Other Topics Concern    Caffeine Concern No    History of tanning No    Pt has a pacemaker at 21cm at lips  Dental      Pulmonary - normal exam   (+) COPD, asthma, shortness of breath,   Cardiovascular - normal exam  (+) hypertension, CAD, CHF,     ROS comment: Study Conclusions  1. Left ventricle: Systolic function was severely reduced.  The     the patient's questions were answered to the best of my ability. The patient desires the anesthetic management as planned.   DELMER RAMAN  8/1/2018 10:06 AM

## 2018-08-01 NOTE — PLAN OF CARE
Problem: Patient Centered Care  Goal: Patient preferences are identified and integrated in the patient's plan of care  Interventions:  - What would you like us to know as we care for you? \"My left foot really hurts. \"  - Provide timely, complete, and accu response  - Implement non-pharmacological measures as appropriate and evaluate response  - Consider cultural and social influences on pain and pain management  - Manage/alleviate anxiety  - Utilize distraction and/or relaxation techniques  - Monitor for op Progressing      Problem: GASTROINTESTINAL - ADULT  Goal: Maintains or returns to baseline bowel function  INTERVENTIONS:  - Assess bowel function  - Maintain adequate hydration with IV or PO as ordered and tolerated  - Evaluate effectiveness of GI medicat Smoking Cessation handout, if applicable  - Encourage broncho-pulmonary hygiene including cough, deep breathe, Incentive Spirometry  - Assess the need for suctioning and perform as needed  - Assess and instruct to report SOB or any respiratory difficulty AKA on 08/01, D10 administered at same rate. Pt on insulin gtt, adjusting per protocol. DTI / sm open area noted on buttocks. Patient turned at least q2 hr, skin care provided (venelex). Arana with diminished UO, nephrology aware per day shift RN.  Bed in l

## 2018-08-01 NOTE — PROGRESS NOTES
[facility] dosing service    Follow-up Pharmacokinetic Consult for Vancomycin Dosing     Tate Hinojosa is a 78year old female admitted on 7/24 who is being treated for sepsis. Patient is on day 7 of Vancomycin 750 mg IV Q 24 hours.   Goal trough is 15-20 CULTURE, ROUTINE Status: None   Collection Time: 07/27/18 1:47 AM   Result Value Ref Range   Urine Culture No Growth at 18-24 hrs. N/A       Based on the above:    1.  Increase Vancomycin at 1 gm IVPB Q 24 hours (based on Trough of 13.8 ug/mL, pharmacokinet

## 2018-08-01 NOTE — ANESTHESIA POSTPROCEDURE EVALUATION
Patient:  Aleksandr Bartholomew    Procedure Summary     Date:  08/01/18 Room / Location:  [address] [place] [address] MAIN OR 02 / [address] [place] [address] MAIN OR    Anesthesia Start:  4582 Anesthesia Stop:  1565    Procedure:  KNEE AMPUTATION ABOVE/BELOW (Left ) Diagnosis:       Gangrene of extremity ([facility] [address]

## 2018-08-01 NOTE — PROGRESS NOTES
[facility]  Nephrology Daily Progress Note    Stuart Zhang  [id number]  78year old      HPI:   Stuart Zhang is a 78year old female. Pt intubated but awake and alert. Follows commands.        ROS:     Constitutional:  Negative for decreased skills appropriate for age    Labs:    Lab Results  Component Value Date   WBC 6.8 08/01/2018   HGB 9.4 08/01/2018   HCT 28.5 08/01/2018    08/01/2018   CREATSERUM 0.64 08/01/2018   BUN 15 08/01/2018    08/01/2018   K 4.0 08/01/2018   CL 98 08 cardiomegaly. Coronary disease/vascular stent. 2. Bibasilar atelectatic changes and or parenchymal opacification most pronounced in the retrocardiac region, slight improvement. Small bilateral effusions 3.  Support tubes and catheters are satisfactory     D Chloride 0.9 % 10 mL IV push, 40 mg, Intravenous, Daily  •  Normal Saline Flush 0.9 % injection 10 mL, 10 mL, Intravenous, PRN  •  Heparin Sodium (Porcine) 5000 UNIT/ML injection 5,000 Units, 5,000 Units, Subcutaneous, Q8H STEPHEN  •  Normal Saline Flush 0.9 % mg, Oral, Daily  •  Fluticasone Furoate-Vilanterol (BREO ELLIPTA) 200-25 MCG/INH inhaler 1 puff, 1 puff, Inhalation, Daily  •  Meclizine HCl (ANTIVERT) tab 25 mg, 25 mg, Oral, TID PRN  •  methocarbamol (ROBAXIN) tab 500 mg, 500 mg, Oral, QID PRN  •  Rosuva ([facility] [address]     Acute on chronic congestive heart failure (HCC)     Acute on chronic congestive heart failure, unspecified congestive heart failure type     Seborrheic keratoses     Acute chest pain     PVD (peripheral vascular disease) (HCC)     Mitral regurgit

## 2018-08-01 NOTE — OPERATIVE REPORT
[facility]    PATIENT'S NAME: Inna Russell   ATTENDING PHYSICIAN: Lucia Live MD   OPERATING PHYSICIAN: Chloe Ley MD   PATIENT ACCOUNT#:   [id number]    LOCATION:  [address] [place] [address] #:   [id number]       DATE OF BIRTH:  [birth year], death, infection, nonhealing wound complications, among other complications. She understood and signed an informed consent and expressed wish to proceed. FINDINGS:  Significant and copious third-space edema fluid.   The muscles appeared of a tolerated the procedure well. She remained intubated. Her blood pressure remained stable. There were no apparent complications. Dictated By Suyapa Gary.  Jaqueline Warner MD  d: 08/01/2018 11:55:54  t: 08/01/2018 12:21:02  [facility] [id number]  KQT/

## 2018-08-01 NOTE — PROGRESS NOTES
[facility]     Progress Note        Loel End Patient Status:  Inpatient    10/13/1950 MRN [id number]   Location [facility] 2W/SW Attending Estuardo Cummings MD   [facility] Day # 8 PCP Janina Perez DO       Subjective:   Ruddy (PRIMACOR) 20mg in D5W 100 mL premix infusion 0.375-0.75 mcg/kg/min Intravenous Continuous   DOBUTamine in D5W (DOBUTREX) 500 mg/250 ml infusion 2.5-40 mcg/kg/min (Dosing Weight) Intravenous Continuous   DOPamine in D5W (INTROPIN) 400 mg/250 ml premix infu 17 g 17 g Oral Daily PRN   [MAR Hold] magnesium hydroxide (MILK OF MAGNESIA) 400 MG/5ML suspension 30 mL 30 mL Oral Daily PRN   [MAR Hold] bisacodyl (DULCOLAX) rectal suppository 10 mg 10 mg Rectal Daily PRN   [MAR Hold] FLEET ENEMA (FLEET) 7-19 GM/118ML e auscultation bilaterally  GI: abdomen soft, non tender  Extremities: no clubbing, left foot cold to touch with evidence of cyanosis and mottling  Neurologic: moves extremities  Skin: warm, dry      Results:       Lab Results  Component Value Date   WBC 6.8 with ejection fraction 10%. Repeat echo on 7/31/2018 with ejection fraction 15%. -Continue low-dose dobutamine  -Continue IV antibiotic therapy at this time  -Full ventilatory support at this time  -Significant hyponatremia noted on presentation.   Glen Suazo

## 2018-08-01 NOTE — PROGRESS NOTES
The patient is essentially the same today. Her EF has remained low but I agree with Dr Amanda Stock that she may never become more stable as long as she has ischemic leg causing acidosis and worsening her ejection fraction.   The patient is more awake and respon Normal vision: sees adequately in most situations; can see medication labels, newsprint

## 2018-08-01 NOTE — PROGRESS NOTES
Chart review done to this point. Pt remains in the hospital on a vent. She is possibly pending an amputation. Will continue to monitor pt's progress as her CCM.

## 2018-08-01 NOTE — ANESTHESIA PROCEDURE NOTES
Airway  Urgency: elective      General Information and Staff    Patient location during procedure: OR  Anesthesiologist: DELMER RAMAN  Performed: anesthesiologist     Indications and Patient Condition  Indications for airway management: anesthesia  Sedation

## 2018-08-01 NOTE — ANESTHESIA PROCEDURE NOTES
Arterial Line  Performed by: Cole Murcia  Authorized by: Cole Murcia     Site Identification: real time ultrasound guided and surface landmarks    Patient Location:  OR  Indication: continuous blood pressure monitoring and blood sampling needed    Anesth

## 2018-08-01 NOTE — BRIEF OP NOTE
Patients Name: Derek Hyunyovani  Attending Physician: Alec Waters MD  Operating Physician: Olaf Oliveros MD  CSN: [id number]     Location:  OR  MRN: [id number]    YOB: 1950  Admission Date: 7/24/2018  Operation Date: 8/1/2018    Brief Ope

## 2018-08-02 NOTE — PLAN OF CARE
Paged on call cardiologist Dr. Cheyenne Mehta regarding HR sustained 120s post op. BP low 90s. Order to draw CBC and start levophed if SBP drops into the 80s. Also order for stat EKG if HR sustains 150s. No new orders regarding HR 120s at this time.  Will continue

## 2018-08-02 NOTE — PLAN OF CARE
Problem: Patient Centered Care  Goal: Patient preferences are identified and integrated in the patient's plan of care  Interventions:  - What would you like us to know as we care for you? \"My left foot really hurts. \"  - Provide timely, complete, and accu response  - Implement non-pharmacological measures as appropriate and evaluate response  - Consider cultural and social influences on pain and pain management  - Manage/alleviate anxiety  - Utilize distraction and/or relaxation techniques  - Monitor for op Progressing      Problem: Safety Risk - Non-Violent Restraints  Goal: Patient will remain free from self-harm  INTERVENTIONS:  - Apply the least restrictive restraint to prevent harm  - Notify patient and family of reasons restraints applied  - Assess for Monitor for bleeding, hypotension and signs of decreased cardiac output  - Evaluate effectiveness of vasoactive medications to optimize hemodynamic stability  - Monitor arterial and/or venous puncture sites for bleeding and/or hematoma  - Assess quality of

## 2018-08-02 NOTE — PROGRESS NOTES
[facility]     Progress Note        Lyla Jones Patient Status:  Inpatient    10/13/1950 MRN [id number]   [facility] 2W/SW Attending Clarita Winter MD   [address] Day # 9 PCP Nelly Guy.  DO Chris       Subjective:   Ruddy Intravenous Continuous PRN   milrinone (PRIMACOR) 20mg in D5W 100 mL premix infusion 0.375-0.75 mcg/kg/min Intravenous Continuous   DOPamine in D5W (INTROPIN) 400 mg/250 ml premix infusion 1.5-20 mcg/kg/min (Dosing Weight) Intravenous Continuous   Vancomyc Rectal Once PRN   Metoclopramide HCl (REGLAN) injection 10 mg 10 mg Intravenous Q8H PRN   Acetaminophen-Codeine #3 (TYLENOL #3) 300-30 MG tab 1 tablet 1 tablet Oral Q6H PRN   Albuterol Sulfate  (90 Base) MCG/ACT inhaler 2 puff 2 puff Inhalation BID AST 44 08/02/2018    08/02/2018   MG 2.0 08/02/2018   PHOS 3.0 08/02/2018       Xr Chest Ap Portable  (cpt=71045)    Result Date: 8/2/2018  CONCLUSION:  1. Mild cardiomegaly. Coronary artery vascular stent. Mitraclip visualized 2.  Bilateral perihi diabetes management  -DVT prophylaxis: Heparin      Jessika Reyes, DO  Pulmonary [facility]

## 2018-08-02 NOTE — WOUND PROGRESS NOTE
WOUND CARE NOTE      PLAN   Recommendations:  Dr. Ana Ley, Pulmonary, [facility], Nephrology are  following the pt.    Dietary consult for recommendations for nutrition to optimize wound healing  Turn schedules- frequent turning  Specialty bed: 1st step ove Augmentin [Amoxicillin-Pot Clavulanate]; Dilaudid [Hydromorphone]; Scopolamine; Sulfa Antibiotics; Aminoglycosides;  Potassium Clavulanate [Clavulanic Acid]; Sulfanilamide    Labs:     Lab Results  Component Value Date   WBC 9.2 08/02/2018   HGB 8.8 (L) 08/

## 2018-08-02 NOTE — CM/SW NOTE
Pt underwent left Above Knee Amputation yesterday. Pt remains on vent support. Prior to procedure, pt had requested an evaluation for [facility] acute rehab.  Once pt is appropriate, PT/OT evaluations will be requested, and then the PMR consult, if appro

## 2018-08-02 NOTE — PLAN OF CARE
Problem: Patient Centered Care  Goal: Patient preferences are identified and integrated in the patient's plan of care  Interventions:  - What would you like us to know as we care for you? \"My left foot really hurts. \"  - Provide timely, complete, and accu pain scale  - Administer analgesics based on type and severity of pain and evaluate response  - Implement non-pharmacological measures as appropriate and evaluate response  - Consider cultural and social influences on pain and pain management  - Manage/all coordinating discharge planning if the patient needs post-hospital services based on physician/LIP order or complex needs related to functional status, cognitive ability or social support system   Outcome: Progressing      Problem: GASTROINTESTINAL - ADULT replacements, including rhythm and repeat lab results as appropriate  - Fluid restriction as ordered  - Instruct patient on fluid and nutrition restrictions as appropriate   Outcome: Progressing      Problem: HEMATOLOGIC - ADULT  Goal: Free from bleeding i indicated  - Manage/alleviate anxiety  - Monitor for signs/symptoms of CO2 retention   Outcome: Progressing  Pt remains on full vent support. SBT attempted around 1200, pt became tachycardic in 130s, BP and respirations increased, became visibly anxious.  D monitor vital signs, obtain 12 lead EKG if indicated  - Evaluate effectiveness of antiarrhythmic and heart rate control medications as ordered  - Initiate emergency measures for life threatening arrhythmias  - Monitor electrolytes and administer replacemen

## 2018-08-02 NOTE — PROGRESS NOTES
[facility]  Progress Note    Elvie Dougherty Patient Status:  Inpatient    10/13/1950 MRN [id number]   [facility] 2W/SW Attending Pablo Rodriguez MD   [facility] Day # 9 PCP Bandar Varma. DO Chris     Assessment:    1.   Status post lef 08/02/2018   BUN 18 08/02/2018   CREATSERUM 0.76 08/02/2018    08/02/2018   MG 2.0 08/02/2018   CA 7.6 08/02/2018    08/02/2018   AST 44 08/02/2018   ALB 2.2 08/02/2018          Lab Results  Component Value Date   TROP 0.38 ([facility]) 07/27/2018

## 2018-08-02 NOTE — PLAN OF CARE
Problem: Patient Centered Care  Goal: Patient preferences are identified and integrated in the patient's plan of care  Interventions:  - What would you like us to know as we care for you? \"My left foot really hurts. \"  - Provide timely, complete, and accu scale  - Administer analgesics based on type and severity of pain and evaluate response  - Implement non-pharmacological measures as appropriate and evaluate response  - Consider cultural and social influences on pain and pain management  - Manage/alleviat discharge planning if the patient needs post-hospital services based on physician/LIP order or complex needs related to functional status, cognitive ability or social support system   Outcome: Progressing  Above knee amputation performed today with Dr. Yolette Cid as ordered  - Monitor response to electrolyte replacements, including rhythm and repeat lab results as appropriate  - Fluid restriction as ordered  - Instruct patient on fluid and nutrition restrictions as appropriate   Outcome: Progressing      Problem: H the need for suctioning and perform as needed  - Assess and instruct to report SOB or any respiratory difficulty  - Respiratory Therapy support as indicated  - Manage/alleviate anxiety  - Monitor for signs/symptoms of CO2 retention   Outcome: Progressing monitoring, monitor vital signs, obtain 12 lead EKG if indicated  - Evaluate effectiveness of antiarrhythmic and heart rate control medications as ordered  - Initiate emergency measures for life threatening arrhythmias  - Monitor electrolytes and administe

## 2018-08-02 NOTE — PROGRESS NOTES
[facility]    Progress Note      Subjective:     Brother at bedside . Off sedation . Awake.  S/p left AKA     Review of Systems:     Unable to obtain - intubated     Objective:   Temp:  [96.8 °F (36 °C)-97.7 °F (36.5 °C)] 97.4 °F (36.3 °C) Intravenous Continuous PRN   milrinone (PRIMACOR) 20mg in D5W 100 mL premix infusion 0.375-0.75 mcg/kg/min Intravenous Continuous   DOPamine in D5W (INTROPIN) 400 mg/250 ml premix infusion 1.5-20 mcg/kg/min (Dosing Weight) Intravenous Continuous   Vancomyc Rectal Once PRN   Metoclopramide HCl (REGLAN) injection 10 mg 10 mg Intravenous Q8H PRN   Acetaminophen-Codeine #3 (TYLENOL #3) 300-30 MG tab 1 tablet 1 tablet Oral Q6H PRN   Albuterol Sulfate  (90 Base) MCG/ACT inhaler 2 puff 2 puff Inhalation BID 07/31/2018 1.9 (H) 0.9 - 1.2 Final   Comment:     Only the INR (not the Protime value) should be utilized for   the monitoring of oral anticoagulant therapy.      Recommended therapeutic ranges for anticoagulant therapy are   as follows:   2.0 - 3.0 All i Cardiogenic shock :  -CAD s/p CABG, CMP, mitral valve clip.    - Echo with further deterioration of LV systolic function   - on dobutamine and phenylephrine   - ECF expanded and CXR +ve chf. Give 20 mg IV lasix and will repeat dose later today or in am

## 2018-08-02 NOTE — PROGRESS NOTES
08/02/18 0800   Readings   Total RR 19   Minute Ventilation (L/min) 10.1 L/min   Expiratory Tidal Volume 520 mL   PIP Observed (cm H2O) 29 cm H2O   MAP (cm H2O) 12   Auto PEEP Observed (cm H2O) 4 cm H2O   Plateau Pressure (cm H2O) 22 cm H2O

## 2018-08-02 NOTE — PROGRESS NOTES
Patient is awake and responsive. She did require some pressor support yesterday but her blood pressure has been stable. She is still intubated but was able to motion that her pain was mild.   The left above-knee amputation dressing is dry  Change the dres

## 2018-08-02 NOTE — RESPIRATORY THERAPY NOTE
Patient placed on cpap/ps 5,40%,  ML,RR 26,. Spontaneous  Parameter-NIF -20,RSBI 85, ML. Placed back on full support at 1255 due to -RR 35,,Patient C/O SOB.

## 2018-08-03 NOTE — PROGRESS NOTES
RESPIRATORY THERAPY MECHANICAL VENTILATION PROGRESS NOTE    Ventilator Weaning:  Patient meets criteria for weaning? yes Weaning was attempted yes using pressure support 10 cmH2O + PEEP 5 cmH2O. The patient tolerated poorly for 15 minutes.  The patient was

## 2018-08-03 NOTE — PROGRESS NOTES
[facility] HOSP - [facility]    Progress Note    Amber Headley Patient Status:  Inpatient    10/13/1950 MRN [id number]   Location [facility] 2W/SW Attending Kevin Leon MD   Hosp Day # 10 PCP Radha Miranda.  DO Chris         Assessment and Plan: Daily   • pantoprazole (PROTONIX) IV push  40 mg Intravenous Daily   • Heparin Sodium (Porcine)  5,000 Units Subcutaneous Q8H [facility]   • ALPRAZolam  0.25 mg Oral Daily   • Clopidogrel Bisulfate  75 mg Oral Daily   • meropenem  500 mg Intravenous Q8H   • Teresa 8/2/2018  CONCLUSION:  1. Mild cardiomegaly. Coronary artery vascular stent. Mitraclip visualized 2. Bilateral perihilar and lower lobe pulmonary congestive changes with superimposed left basilar consolidation retrocardiac region.  Suspect small left effusi

## 2018-08-03 NOTE — PROGRESS NOTES
[place] [facility] [place] [facility] - [facility]    Progress Note      Subjective:     Patient awake and alert.  Tolerating breathing trial well     Review of Systems:     Unable to obtain - intubated     Objective:   Temp:  [96.8 °F (36 °C)-97.7 °F (36.5 °C)] 97.1 °F (36.2 °C C (DEX-4) 4-0.006 g chewable tab 4 tablet 4 tablet Oral Q15 Min PRN   glucose (DEX4) oral liquid 15 g 15 g Oral Q15 Min PRN   dextrose 10 % infusion  Intravenous Continuous PRN   milrinone (PRIMACOR) 20mg in D5W 100 mL premix infusion 0.375-0.75 mcg/kg/min mL Oral Daily PRN   bisacodyl (DULCOLAX) rectal suppository 10 mg 10 mg Rectal Daily PRN   FLEET ENEMA (FLEET) 7-19 GM/118ML enema 133 mL 1 enema Rectal Once PRN   Metoclopramide HCl (REGLAN) injection 10 mg 10 mg Intravenous Q8H PRN   Acetaminophen-Codein etc.  Clinical correlation is recommended.      03/15/2017 34.2 (H) 25.0 - 34.0 seconds Final   ----------  INR   Date Value Ref Range Status   07/31/2018 1.9 (H) 0.9 - 1.2 Final   Comment:     Only the INR (not the Protime value) should be utilized for   t with parasternal lead.      Dictated by (CST): Judith Orellana MD on 8/02/2018 at 7:19     Approved by (CST): Judith Orellana MD on 8/02/2018 at 7:27            Assessment and Plan:       1–hyponatremia:  –Chronic hyponatremia with serum sodium betw

## 2018-08-03 NOTE — PLAN OF CARE
Safety Risk - Non-Violent Restraints    • Patient will remain free from self-harm Progressing        Pt dependent on ventilator, bilat soft wrist restraints to prevent accidental self extubation

## 2018-08-03 NOTE — CM/SW NOTE
Care Management     Progression of Care Note: Hospital Day #10  Pt discussed in am rounds, currently remains intubated on full vent support. S/P left AKA on 8/1/18.  Pt continues to require vasopressors for support, continuing low dose dobutamine and weanin

## 2018-08-03 NOTE — PLAN OF CARE
CARDIOVASCULAR - ADULT    • Maintains optimal cardiac output and hemodynamic stability Progressing    • Absence of cardiac arrhythmias or at baseline Progressing        Delirium    • Minimize duration of delirium Progressing        Diabetes/Glucose Control restraints to prevent self extubation before ready, sr up x4

## 2018-08-03 NOTE — PLAN OF CARE
GASTROINTESTINAL - ADULT    • Minimal or absence of nausea and vomiting Completed        HEMATOLOGIC - ADULT    • Free from bleeding injury Completed          DISCHARGE PLANNING    • Discharge to home or other facility with appropriate resources Not Progre Lasix given x1 dose today per cardiology. Attempted to wean patient this afternoon, lasted 5 minutes.  Patient's lips became cyanotic, face was pale, heart rate increased to 125, and patient was moving her hands around in a panicky fashion and was asked if

## 2018-08-03 NOTE — DIETARY NOTE
ADULT NUTRITION INITIAL ASSESSMENT    Pt is at high nutrition risk. Pt does not meet malnutrition criteria. RECOMMENDATIONS TO MD:  See Nutrition Intervention for tf nutrient intake.  Adjusted tf rate down slightly for L AKA/maintain protein intake provider: monitor plans    ADMITTING DIAGNOSIS:   Left Lower Extremity Gangrene  Ischemia of foot  Ischemia    PERTINENT PAST MEDICAL HISTORY:   Past Medical History:   Diagnosis Date   • Anesthesia complication    • Anxiety state    • Asthma    • Asthma, wt.   BMI CLASSIFICATION: 30-34.9 kg/m2 - obesity class I  IBW: 89 lbs (adjusted now for L. AKA)       152% IBW using admit/dry wt. Usual Body Wt: 135-140  lbs       100% UBW on admit.      WEIGHT HISTORY:  Patient Weight(s) for the past 336 hrs:   Weight mg Oral Daily   • Fluticasone Furoate-Vilanterol  1 puff Inhalation Daily   • Rosuvastatin Calcium  20 mg Oral Nightly   • saccharomyces boulardii  250 mg Oral BID   • Saline Nasal Spray  3 spray Nasal Nightly       LABS: reviewed Low K+ on lasix- replaced

## 2018-08-03 NOTE — PROGRESS NOTES
[facility] dosing service    Follow-up Pharmacokinetic Consult for Vancomycin Dosing     Reina lOivo is a 78year old female admitted on 7/24 who is being treated for l foot gangrene . Patient is on day 9 of Vancomycin 1 gm IV Q 24 hours.   Goal trough hrs. N/A   Tissue Smear 1+ WBCs seen N/A   Tissue Smear No organisms seen N/A   2. ANAEROBIC CULTURE Status: None (Preliminary result)   Collection Time: 08/01/18 11:26 AM   Result Value Ref Range   Anaerobic Culture Pending N/A   3.  BLOOD CULTURE Status:

## 2018-08-03 NOTE — PROGRESS NOTES
[facility]  Vascular Surgery Progress Note    Derek Dunaway Patient Status:  Inpatient    10/13/1950 MRN [id number]   Location [facility] 2W/SW Attending Alec Waters MD   Hosp Day # 10 PCP Christy Perez DO     Objective:   Temp: Intravenous Continuous   DOPamine in D5W (INTROPIN) 400 mg/250 ml premix infusion 1.5-20 mcg/kg/min (Dosing Weight) Intravenous Continuous   Vancomycin HCl (FIRVANQ) 50 MG/ML oral solution 125 mg 125 mg Oral Daily   propofol (DIPRIVAN) infusion 5-50 mcg/kg #3 (TYLENOL #3) 300-30 MG tab 1 tablet 1 tablet Oral Q6H PRN   Albuterol Sulfate  (90 Base) MCG/ACT inhaler 2 puff 2 puff Inhalation BID   ALPRAZolam (XANAX) tab 0.5 mg 0.5 mg Oral Nightly   cetirizine (ZYRTEC) tab 10 mg 10 mg Oral Daily   Fluticaso 112*  61*  44*  33   ALB  2.3*  2.1*  2.1*  2.2*  2.0*       No results for input(s): TROP in the last 168 hours.     Luis Fernando Dailey MD  8/3/2018  8:38 AM

## 2018-08-03 NOTE — PLAN OF CARE
Problem: ALTERED NUTRIENT INTAKE - ADULT  Goal: Nutrient intake appropriate for improving, restoring or maintaining nutritional needs  INTERVENTIONS:  - Assess nutritional status and recommend course of action  - Monitor oral intake when po intake resumed,

## 2018-08-04 NOTE — PLAN OF CARE
Safety Risk - Non-Violent Restraints    • Patient will remain free from self-harm Not Progressing          Removed restraint briefly while pt's brother holding arm. She reached up and had hand almost near Et tube. Restraints reapplied. Family educated.

## 2018-08-04 NOTE — PROGRESS NOTES
[facility]    Progress Note    Billy Tao Patient Status:  Inpatient    10/13/1950 MRN [id number]   [facility] 2W/SW Attending Nolan Ingram MD   [address] Day # 11 PCP Winter Chris. DO Chris       Subjective:    [address] (cpt=71045)    Result Date: 8/4/2018  CONCLUSION:  1. This is an expiratory phase portable chest radiograph.   There is central pulmonary vascular congestion which may be related to expiratory phase technique, however there are findings that suggests this

## 2018-08-04 NOTE — PLAN OF CARE
RESPIRATORY - ADULT    • Achieves optimal ventilation and oxygenation Not Progressing        Safety Risk - Non-Violent Restraints    • Patient will remain free from self-harm Not Progressing          CARDIOVASCULAR - ADULT    • Absence of cardiac arrhythmi

## 2018-08-04 NOTE — PROGRESS NOTES
Patient's left above-knee amputation dressing is intact. I plan to change this dressing tomorrow. The patient remains intubated. She appears somewhat confused. She does not appear in any distress. Pressors have been reinitiated.   Her systolic blood pr

## 2018-08-04 NOTE — PROGRESS NOTES
Pulmonary/Critical Care Follow Up Note    HPI:   Shelly Wilkinson is a 78year old female with No chief complaint on file. PCP Lissy Hutchinson.  Yanet Liu DO  Admission Attending Sunita King MD    Hospital Day #11    Back on  pressors and inotrope  Failed SBT Facility-Administered Medications:   •  furosemide (LASIX) injection 20 mg, 20 mg, Intravenous, Daily  •  insulin detemir (LEVEMIR) 100 UNIT/ML flextouch 20 Units, 20 Units, Subcutaneous, Daily  •  Insulin Regular Human (NOVOLIN R) 100 UNIT/ML injection 6 Intravenous, Daily  •  Normal Saline Flush 0.9 % injection 10 mL, 10 mL, Intravenous, PRN  •  Heparin Sodium (Porcine) 5000 UNIT/ML injection 5,000 Units, 5,000 Units, Subcutaneous, Q8H STEPHEN  •  Normal Saline Flush 0.9 % injection 10 mL, 10 mL, Intravenous, Furoate-Vilanterol (BREO ELLIPTA) 200-25 MCG/INH inhaler 1 puff, 1 puff, Inhalation, Daily  •  Meclizine HCl (ANTIVERT) tab 25 mg, 25 mg, Oral, TID PRN  •  methocarbamol (ROBAXIN) tab 500 mg, 500 mg, Oral, QID PRN  •  Rosuvastatin Calcium (CRESTOR) tab 20 from post polio  H/o asthma  Failed SBT again  Plan       MV/full support   No SBT today given HD instability   Nebs   Rec trach       3.  Acute encephalopathy  Follows commands off sedation  Plan daily sedation hold    4.  Cardiomyopathy  On norepi and do

## 2018-08-04 NOTE — PROGRESS NOTES
[facility]    Progress Note    Steven Way Patient Status:  Inpatient    10/13/1950 MRN [id number]   [facility] 2W/SW Attending Georganne Meckel, MD   [address] Day # 11 PCP Roxana Estrada.  DO Chris         Assessment and Plan: • insulin detemir  20 Units Subcutaneous Daily   • Insulin Regular Human  6 Units Subcutaneous 4 times per day   • Insulin Regular Human  1-7 Units Subcutaneous 4 times per day   • vancomycin  1,000 mg Intravenous Q24H   • docusate sodium  100 mg Per G T pacing device/ICD). 2.  Satisfactory position of lines and tubes.    Dictated by (CST): Prem Horn MD on 8/04/2018 at 7:42     Approved by (CST): Prem Horn MD on 8/04/2018 at 7:46          Xr Chest Ap Portable  (cpt=71045)    Result Date: 8/3/2018  CON

## 2018-08-04 NOTE — PROGRESS NOTES
Pulmonary/Critical Care Follow Up Note    HPI:   Terrell Jones is a 78year old female with No chief complaint on file. PCP Excell Hammans.  Elliott Hayes DO  Admission Attending Carmen Marcial MD    Hospital Day #10      Still on pressor and inotrope  Failed SBT Facility-Administered Medications:   •  furosemide (LASIX) injection 20 mg, 20 mg, Intravenous, Daily  •  insulin detemir (LEVEMIR) 100 UNIT/ML flextouch 20 Units, 20 Units, Subcutaneous, Daily  •  Insulin Regular Human (NOVOLIN R) 100 UNIT/ML injection 6 Intravenous, Daily  •  Normal Saline Flush 0.9 % injection 10 mL, 10 mL, Intravenous, PRN  •  Heparin Sodium (Porcine) 5000 UNIT/ML injection 5,000 Units, 5,000 Units, Subcutaneous, Q8H STEPHEN  •  Normal Saline Flush 0.9 % injection 10 mL, 10 mL, Intravenous, Furoate-Vilanterol (BREO ELLIPTA) 200-25 MCG/INH inhaler 1 puff, 1 puff, Inhalation, Daily  •  Meclizine HCl (ANTIVERT) tab 25 mg, 25 mg, Oral, TID PRN  •  methocarbamol (ROBAXIN) tab 500 mg, 500 mg, Oral, QID PRN  •  Rosuvastatin Calcium (CRESTOR) tab 20  Ischemic left foot  S/p AKA  Plan  Will need rehab/PT/OT if survives    2.  Acute respiratory failure  Good oxygenation  Weakness from post polio  H/o asthma  Failed SBT  Plan       MV/full support   SBT in AM   nebs       3.  Acute encephalopathy  Follow

## 2018-08-05 NOTE — PROGRESS NOTES
Pulmonary/Critical Care Follow Up Note    HPI:   Lynnette Remy is a 78year old female with No chief complaint on file. PCP Eleazar Lee.  Alberto Cortes DO  Admission Attending Jordan Schultz MD    Hospital Day #12    Back on  pressors and inotrope and no SBT (cerebrum) ([facility] [address] 2005         Medications:    Current Facility-Administered Medications:   •  furosemide (LASIX) injection 20 mg, 20 mg, Intravenous, Daily  •  insulin detemir (LEVEMIR) 100 UNIT/ML flextouch 20 Units, 20 Units, Subcutaneous, Daily  •  Insu Sodium Chloride 0.9 % 10 mL IV push, 40 mg, Intravenous, Daily  •  Normal Saline Flush 0.9 % injection 10 mL, 10 mL, Intravenous, PRN  •  Heparin Sodium (Porcine) 5000 UNIT/ML injection 5,000 Units, 5,000 Units, Subcutaneous, Q8H STEPHEN  •  Normal Saline Flus mg, 10 mg, Oral, Daily  •  Fluticasone Furoate-Vilanterol (BREO ELLIPTA) 200-25 MCG/INH inhaler 1 puff, 1 puff, Inhalation, Daily  •  Meclizine HCl (ANTIVERT) tab 25 mg, 25 mg, Oral, TID PRN  •  methocarbamol (ROBAXIN) tab 500 mg, 500 mg, Oral, QID PRN  • pain    2.  Acute respiratory failure  Good oxygenation  Weakness from post polio  H/o asthma  Passed SBT  Parameters were acceptable  Plan       extubate   NIV at night   Nebs   Trach if fails       3.  Acute encephalopathy  Follows commands off sedation

## 2018-08-05 NOTE — PLAN OF CARE
Problem: Patient Centered Care  Goal: Patient preferences are identified and integrated in the patient's plan of care  Interventions:  - What would you like us to know as we care for you? \"My left foot really hurts. \"  - Provide timely, complete, and accu family.     Problem: PAIN - ADULT  Goal: Verbalizes/displays adequate comfort level or patient's stated pain goal  INTERVENTIONS:  - Encourage pt to monitor pain and request assistance  - Assess pain using appropriate pain scale  - Administer analgesics bas needed  - Consider post-discharge preferences of patient/family/discharge partner  - Complete POLST form as appropriate  - Assess patient's ability to be responsible for managing their own health  - Refer to Case Management Department for coordinating disc ordered  - Instruct patient on fluid and nutrition restrictions as appropriate   Outcome: Progressing      Problem: Safety Risk - Non-Violent Restraints  Goal: Patient will remain free from self-harm  INTERVENTIONS:  - Apply the least restrictive restraint questions or providing instructions  - Encourage use of eye glasses   Outcome: Progressing  Patient has been off sedation, now awake, following some commands correctly, has periods of confusion, re-oriented prn.     Problem: GENITOURINARY - ADULT  Goal: Abs reorientation  - Promote wakefulness i.e. lights on, blinds open  - Promote sleep, encourage patient's normal rest cycle i.e. lights off, TV off, minimize noise and interruptions  - Encourage family to assist in orientation and promotion of home routines

## 2018-08-05 NOTE — PROGRESS NOTES
The patient has been extubated and is able to communicate with some speech, although not using vocal cords. The left leg above-knee amputation dressing was changed. Her leg is healing perfectly without evidence for infection or any other complication.   Ravindra Ibrahim

## 2018-08-05 NOTE — PROGRESS NOTES
RESPIRATORY THERAPY MECHANICAL VENTILATION PROGRESS NOTE    Ventilator Weaning:  Patient meets criteria for weaning? Yes   Weaning was attempted using pressure support 5 cmH2O + PEEP 5 cmH2O. The patient tolerated well for ~1 hour (11:30-12:45).  Patient ex

## 2018-08-05 NOTE — PROGRESS NOTES
Pt extubated at 12:52 pm and placed on 6 L nc. Pt tolerated spontaneous breathing trial previous to extubation. Brother in room, call light at bedside, VSS, pt anxious.

## 2018-08-05 NOTE — PLAN OF CARE
Problem: Patient/Family Goals  Goal: Patient/Family Long Term Goal  Patient's Long Term Goal: \"maintain pain free\"     Interventions:  - take medications as prescribed  - ambulate as tolerated  - encourage verbalization of needs/pain  - See additional Ca Refer to Case Management Department for coordinating discharge planning if the patient needs post-hospital services based on physician/LIP order or complex needs related to functional status, cognitive ability or social support system   Outcome: Not Progre distress noted.      Problem: CARDIOVASCULAR - ADULT  Goal: Maintains optimal cardiac output and hemodynamic stability  INTERVENTIONS:  - Monitor vital signs, rhythm, and trends  - Monitor for bleeding, hypotension and signs of decreased cardiac output  - E

## 2018-08-05 NOTE — PROGRESS NOTES
[facility]    Progress Note    Bashir Bennett Patient Status:  Inpatient    10/13/1950 MRN [id number]   [facility] 2W/SW Attending Zeb Kendrick MD   [facility] Day # 12 PCP Phu Berger.  DO Chris         Assessment and Plan: Regular Human  1-7 Units Subcutaneous 4 times per day   • vancomycin  1,000 mg Intravenous Q24H   • docusate sodium  100 mg Per G Tube BID   • Vancomycin HCl  125 mg Oral Daily   • pantoprazole (PROTONIX) IV push  40 mg Intravenous Daily   • Heparin Sodium (cpt=71045)    Result Date: 8/4/2018  CONCLUSION:  1. This is an expiratory phase portable chest radiograph.   There is central pulmonary vascular congestion which may be related to expiratory phase technique, however there are findings that suggests this

## 2018-08-05 NOTE — PROGRESS NOTES
[facility]    Progress Note    Rebeca Thrasher Patient Status:  Inpatient    10/13/1950 MRN [id number]   [facility] 2W/SW Attending Radu Douglass MD   [address] Day # 12 PCP Tony Mcpherson. DO Chris       Subjective:    [address] deformities  Extremities: 2+ edema  Neurological:  Grossly normal    Results:     Laboratory Data:    Lab Results  Component Value Date   WBC 6.5 08/05/2018   HGB 8.6 (L) 08/05/2018   HCT 26.7 (L) 08/05/2018    08/05/2018   CREATSERUM 0.61 08/05/201 conversant  2  Left bka  Stab le  3. Hyponatremia  132 today  4. janett  Resolved   spoke w family they requested urine culture will do   continue present ab  Disc w dr Ryan Turner             8/5/2018  Michelle Lacey.  Frieda Sicard, MD

## 2018-08-06 NOTE — SLP NOTE
ADULT SWALLOWING EVALUATION    ASSESSMENT    ASSESSMENT/OVERALL IMPRESSION:  Orders rec'd, chart reviewed. Pt with prolonged intubation this admission with extubation 8/5 afternoon.  Pt remains with aphonic vocal attempts with very minimal audible phonation pharyngeal dysfunction/aspiration? FCM score of 1/7.     RECOMMENDATIONS   Diet Recommendations - Solids: NPO  Diet Recommendations - Liquid: NPO     Medication Administration Recommendations: Non-oral  Treatment Plan/Recommendations: Aspiration precautions conditions(799.89) 2010    Post Polio   • PE (pulmonary embolism)    • Peripheral vascular disease ([facility] [address] 2008   • Pneumonia due to organism    • Polio 1950    As Infant   • Pulmonary embolism ([facility] [address] 1987   • [facility]    • Stented coronary artery Follow-up Date: 08/07/18    Thank you for your referral.   Tae Miles MA, Andreas N Shira Ordaz Pathologist  [address]. [phone]    If you have any questions, please contact Tae Miles

## 2018-08-06 NOTE — PROGRESS NOTES
[facility]    Progress Note      Assessment and Plan:   1. Left foot ischemia–status post above-the-knee amputation–doing well clinically. Recommendations: Physical therapy evaluation and as per vascular surgery.      2.  Respiratory archie 2.2 08/06/2018   PHOS 3.6 08/06/2018     Chest x-ray–mild edema with small bilateral pleural effusions, poor inspiration    Lee Guevara MD  Medical Director, [address], [facility] Director, [address] [place] [address]. [phone]  Pager: [phone]

## 2018-08-06 NOTE — PLAN OF CARE
Problem: Diabetes/Glucose Control  Goal: Glucose maintained within prescribed range  INTERVENTIONS:  - Monitor Blood Glucose as ordered  - Assess for signs and symptoms of hyperglycemia and hypoglycemia  - Administer ordered medications to maintain glucose Outcome: Not Progressing  Medicated for pain prn. Problem: SAFETY ADULT - FALL  Goal: Free from fall injury  INTERVENTIONS:  - Assess pt frequently for physical needs  - Identify cognitive and physical deficits and behaviors that affect risk of falls. of electrolyte imbalances  - Administer electrolyte replacement as ordered  - Monitor response to electrolyte replacements, including rhythm and repeat lab results as appropriate  - Fluid restriction as ordered  - Instruct patient on fluid and nutrition re

## 2018-08-06 NOTE — OCCUPATIONAL THERAPY NOTE
OCCUPATIONAL THERAPY EVALUATION - INPATIENT      Room Number: 201/201-A  Evaluation Date: 8/6/2018  Type of Evaluation: Initial  Presenting Problem: s/p L above knee amputation; cardiac     Physician Order: IP Consult to Occupational Therapy  Reason for Th function. Pt presents below prior level of function --pt previously Mod I with ADLs, mobility, driving. Recommend acute rehab to maximize pt's independence, safety, and participation in daily activities.      DISCHARGE RECOMMENDATIONS  OT Discharge Recomme 2006   • Menometrorrhagia    • Muscle weakness    • Myocardial infarction [facility]) 2015    x2   • Osteopenia    • Other ill-defined conditions(799.89) 2010    Post Polio   • PE (pulmonary embolism)    • Peripheral vascular disease ([facility] [address] 2008   • Pneumonia due alert - right;Bed/chair alarm  Fall Risk: High fall risk    WEIGHT BEARING RESTRICTION  Weight Bearing Restriction: L lower extremity;R lower extremity        R Lower Extremity: Other (Comment) (hx of polio with trace contractions only noted )  L Lower Ext reach;RN aware of session/findings; Family present    OT Goals  Patient self-stated goal is: did not state     Patient will complete bed mobility with Min A  Comment:     Patient will complete EOB sitting while completing OFH with SBA  Comment:     Patient

## 2018-08-06 NOTE — PROGRESS NOTES
[facility]  Progress Note    Debbra Officer Patient Status:  Inpatient    10/13/1950 MRN [id number]   [facility] 2W/SW Attending Carly Russell MD   [address] Day # 15 PCP Khaidianlori Grandchild. DO Chris     Assessment:    1.   Status post left Date   PT 18.7 (H) 09/19/2016   INR 1.1 08/06/2018       Lab Results  Component Value Date    08/06/2018   K 3.4 08/06/2018   CL 94 08/06/2018   CO2 29 08/06/2018   BUN 15 08/06/2018   CREATSERUM 0.60 08/06/2018    08/06/2018   MG 2.2 08/06/20

## 2018-08-06 NOTE — PROGRESS NOTES
[facility]    Progress Note      Subjective:     Receiving breathing treatment. +ve wheezing. Family at bedside     Review of Systems:     Limited. Denies any cp, abdominal pain.  +ve sob    Objective:   Temp:  [97 °F (36.1 °C)-97.6 °F mcg/min Intravenous Continuous   docusate sodium (COLACE) liquid 100 mg 100 mg Per G Tube BID   dextrose 50 % injection 50 mL 50 mL Intravenous PRN   Glucose-Vitamin C (DEX-4) 4-0.006 g chewable tab 4 tablet 4 tablet Oral Q15 Min PRN   glucose (DEX4) oral Daily PRN   magnesium hydroxide (MILK OF MAGNESIA) 400 MG/5ML suspension 30 mL 30 mL Oral Daily PRN   bisacodyl (DULCOLAX) rectal suppository 10 mg 10 mg Rectal Daily PRN   FLEET ENEMA (FLEET) 7-19 GM/118ML enema 133 mL 1 enema Rectal Once PRN   Metoclopra --    --    --    AST  61*  44*  33   --    --    --    --    ALT  246*  153*  112*   --    --    --    --    BILT  0.7  0.7  0.6   --    --    --    --    TP  4.9*  5.1*  5.0*   --    --    --    --     < > = values in this interval not displayed. (cpt=71045)    Result Date: 8/5/2018  CONCLUSION:  1. Bibasilar lung consolidation/atelectasis similar to August 4, 2018. 2. Cardiomegaly. 3. Postop changes. 4. Atherosclerosis. 5. Defibrillator. 6. Endotracheal tube in good position.  7. Nasogastric tube i

## 2018-08-06 NOTE — PHYSICAL THERAPY NOTE
PHYSICAL THERAPY EVALUATION - INPATIENT     Room Number: 201/201-A  Evaluation Date: 8/6/2018  Type of Evaluation: Initial   Physician Order: PT Eval and Treat    Presenting Problem: prolonged admission for ischemia left foot with need for AKA/vasc sx tea and RN ok to initate PT    PMH sign for polio and w/c bound status on admission with left LE being weaker leg. Pt resting supine in bed stable vitals. Note pt with thorpe in place.  Pt with right LE held at rest in knee flexion , hip ER and ABD posit experience in past at [facility] and would like pt to return to [facility] for rehab as next level of care. .     SW to work with pt and family on d/c planning pending progress ---acute vs TATIANA .             Patient will benefit from continued IP PT services to address these (baseline).     2.  Known underlying severe CAD and ischemic cardiomyopathy. Adequate cardiac output clinically. Chest x-ray suggests early CHF.        Plan:     EKG to rule out new A.  Fib  Increase Lasix to twice daily   Once able to take p.o., resume b type 2) ([facility] 3851    W/O Complication  Pill, then Insulin added 2003   • Femoral artery stenosis [facility])     Right Superficial   • Floaters 2007   • Greater trochanteric bursitis    • Heart disease    • High blood pressure    • High cholesterol    • History  )  Drives: Yes  Patient Owned Equipment:  (w/c  shower chair  )  Patient Regularly Uses:  (w/c dependent )    Prior Level of [place]: per sister  Pt with indep ADLS    indep transfers surface to surface   Pt with Indep w/c mobility    Pt was a MOBILITY  How much difficulty does the patient currently have. ..  -   Turning over in bed (including adjusting bedclothes, sheets and blankets)?: A Lot   -   Sitting down on and standing up from a chair with arms (e.g., wheelchair, bedside commode, etc.):

## 2018-08-06 NOTE — PLAN OF CARE
CARDIOVASCULAR - ADULT    • Absence of cardiac arrhythmias or at baseline Not Progressing        DISCHARGE PLANNING    • Discharge to home or other facility with appropriate resources Not Progressing        GASTROINTESTINAL - ADULT    • Maintains adequate

## 2018-08-07 NOTE — PROGRESS NOTES
[facility]  Progress Note    Marvia Samples Patient Status:  Inpatient    10/13/1950 MRN [id number]   [facility] 2W/SW Attending Lorenzo Corbin MD   [facility] Day # 15 PCP Mariposa Miles.  DO Chris     Assessment:    1.  Status post left 08/06/2018       Lab Results  Component Value Date    08/07/2018   K 3.4 08/07/2018   K 3.4 08/07/2018   CL 90 08/07/2018   CO2 29 08/07/2018   BUN 16 08/07/2018   CREATSERUM 0.70 08/07/2018    08/07/2018   CA 7.7 08/07/2018          Lab Resul

## 2018-08-07 NOTE — WOUND PROGRESS NOTE
Pt seen for follow up. Bilateral DTIs are improving. Left upper lateral thigh has a new scratch/shallow skin tear. Bilateral groin is improving and remains pink, no longer bright red. Left stump is elevated on a pillow, pt is on a first step air mattress.

## 2018-08-07 NOTE — PROGRESS NOTES
[facility]    Progress Note      Assessment and Plan:   1. Left foot ischemia–status post above-the-knee amputation–doing well clinically. The wound looks good. Recommendations: Physical therapy evaluation and as per vascular surgery. 08/07/2018    08/07/2018   K 3.4 08/07/2018   K 3.4 08/07/2018   CL 90 08/07/2018   CO2 29 08/07/2018    08/07/2018   CA 7.7 08/07/2018     Chest x-ray–mild edema with small bilateral pleural effusions, poor inspiration    Keena Hastings MD

## 2018-08-07 NOTE — SLP NOTE
Pt remains with increased lethargy and confusion at time of PM attempt. Not appropriate/safe for PO trials. Will follow 8/8 as able when patient alert and able to participate safely.      Thank you,  Bogdan Tariq MA, [address] [facility]  Speech-Language Pathologi

## 2018-08-07 NOTE — PHYSICAL THERAPY NOTE
PHYSICAL THERAPY TREATMENT NOTE - INPATIENT     Room Number: 606/603-U       Presenting Problem: prolonged admission for ischemia left foot with need for AKA/vasc sx team following ---    hosp course complicated  by decline in cardio status with questionab for supine to from sit . Pt required max supported sit . Pt with significant symptomatic drop in SBP  To  ( 37/24 ) in sitting   From a resting bP of  98/74 (84 ) in supine .      Pt returned promptly to supine  RN Notified and then present in room for limb sensitive to touch   Management Techniques:  Activity promotion;Breathing techniques;Relaxation;Repositioning    BALANCE                                                                                                                     Static Sitting: minimum assistance      Goal #1   Current Status Max of assist of 2     Goal #2 Pt able to sit at EOB with close CGA 15 min good tolerance and stable vitals in prep for OOB activity to chair    Goal #2  Current Status  max assist of 1 dependent sit  Poor t

## 2018-08-07 NOTE — OCCUPATIONAL THERAPY NOTE
OCCUPATIONAL THERAPY TREATMENT NOTE - INPATIENT        Room Number: 682/765-K           Presenting Problem: s/p L above knee amputation; cardiac    Problem List  Active Problems:    Ischemia of foot    Acute respiratory failure (HCC)    Metabolic acidosis currently need…  -   Putting on and taking off regular lower body clothing?: Total  -   Bathing (including washing, rinsing, drying)?: A Lot  -   Toileting, which includes using toilet, bedpan or urinal? : Total  -   Putting on and taking off regular upper

## 2018-08-07 NOTE — PROGRESS NOTES
[facility]    Progress Note      Subjective:     Confused this am - family at bedside     Review of Systems:     Unable to obtain     Objective:   Temp:  [97.4 °F (36.3 °C)-98.7 °F (37.1 °C)] 97.4 °F (36.3 °C)  Pulse:  [] 92  Resp: HCl (TANMAY-SYNEPHRINE) 50 mg in sodium chloride 0.9 % 250 mL infusion 100-200 mcg/min Intravenous Continuous   docusate sodium (COLACE) liquid 100 mg 100 mg Per G Tube BID   dextrose 50 % injection 50 mL 50 mL Intravenous PRN   Glucose-Vitamin C (DEX-4) 4-0. 30 mL Oral Daily PRN   bisacodyl (DULCOLAX) rectal suppository 10 mg 10 mg Rectal Daily PRN   FLEET ENEMA (FLEET) 7-19 GM/118ML enema 133 mL 1 enema Rectal Once PRN   Metoclopramide HCl (REGLAN) injection 10 mg 10 mg Intravenous Q8H PRN   Acetaminophen-Cod --    TP  4.9*  5.1*  5.0*   --    --    --    --     < > = values in this interval not displayed.        PTT   Date Value Ref Range Status   07/04/2018 80.2 (H) 23.2 - 35.3 seconds Final   Comment:     Elevations of the aPTT in patients not receiving  anti changes. 4. Atherosclerosis. 5. Defibrillator. 6. Catheter in superior vena cava. 7. Endotracheal tube and nasogastric tube have been removed. Dictated by (CST): Abisai Vera MD on 8/06/2018 at 8:11     Approved by (CST):  Abisai Vera MD on 8/

## 2018-08-07 NOTE — PLAN OF CARE
DISCHARGE PLANNING    • Discharge to home or other facility with appropriate resources Not Progressing        GASTROINTESTINAL - ADULT    • Maintains adequate nutritional intake (undernourished) Not Progressing        Impaired Cognition    • Patient will e and small dose of albumin, weaned levophed drip from 7mics to 3 mics; dobutamine added again for LV support by cardiology  Plan: monitor alertness and perform swallow evaluation when appropriate  Increase activity as tolerated  Support BP as tolerated, patric

## 2018-08-07 NOTE — PHYSICAL THERAPY NOTE
Chart reviewed     Noted lower BP , discussed pt with MATTHEW Serna     Pt on 6 mics levo  Plans for VS test this AM      PT to follow up this PM as appropriate for pt     Will check in with RN staff prior to attempt in PM     Attempt x 1 this AM

## 2018-08-07 NOTE — SLP NOTE
Attempted to see patient this AM for PO trials and Re-BSSE. However, pt appearing restless and unable to sustain attention to SLP for participation at this time.  Pt's sister, Haley Livingston, present for SLP visit and participation in SLP education for SLP POC and t

## 2018-08-08 NOTE — PLAN OF CARE
GASTROINTESTINAL - ADULT    • Maintains adequate nutritional intake (undernourished) Not Progressing        Impaired Cognition    • Patient will exhibit improved attention, thought processing and/or memory Not Progressing        Patient/Family Goals    • P

## 2018-08-08 NOTE — SLP NOTE
ADULT SWALLOWING Re-EVALUATION    ASSESSMENT    ASSESSMENT/OVERALL IMPRESSION:  Pt seen for re-evaluation and trial upgrade for PO nutrition. Pt with increased alertness and audible phonation with sustained phonation during conversational output.  Hoarse vo overlapping sites of right female breast ([facility] 10/23/2010   • Cataract 2005, 2009   • Cellulitis     R foot   • Cerebrovascular accident [facility])    • Congestive heart disease ([facility]    • COPD (chronic obstructive pulmonary disease) ([facility]    • Coronary atheros Presentation: Staff/Clinician assistance;Spoon  Patient Positioning: Upright    Oral Phase of Swallow:  Within Functional Limits    Pharyngeal Phase of Swallow: Impaired  Laryngeal Elevation Timing: WFL  Laryngeal Elevation Strength: Appears impaired  Malen Relic have any questions, please contact Tri [place]

## 2018-08-08 NOTE — PROGRESS NOTES
[facility]    Progress Note      Assessment and Plan:   1. Left foot ischemia–status post above-the-knee amputation–doing well clinically. The wound looks good. Recommendations: Physical therapy evaluation and as per vascular surgery. Extremities without clubbing cyanosis nor edema on the right, status post left AKA. Neurologic grossly intact with symmetric tone and strength and reflex.      Results:       Lab Results  Component Value Date   WBC 4.3 08/08/2018   HGB 8.2 08/08/2018

## 2018-08-08 NOTE — PROGRESS NOTES
[facility]  Progress Note    Munira Orozco Patient Status:  Inpatient    10/13/1950 MRN [id number]   [facility] 2W/SW Attending Flora Morrell MD   [facility] Day [facility] 13 PCP Milan Causey.  DO Chris     Assessment:    1.  Status post left or edema. .  Skin: Warm and dry.      Labs:    Lab Results  Component Value Date   WBC 4.3 08/08/2018   HGB 8.2 08/08/2018   HCT 25.0 08/08/2018    08/08/2018       Lab Results  Component Value Date   PT 18.7 (H) 09/19/2016   INR 1.1 08/06/2018

## 2018-08-08 NOTE — DIETARY NOTE
ADULT NUTRITION REASSESSMENT     Pt is at moderate nutrition risk. Pt does not meet malnutrition criteria.         RECOMMENDATIONS TO MD:  See Nutrition Intervention        NUTRITION DIAGNOSIS/PROBLEM:  Inadequate protein energy intake related to respirato HISTORY:   Past Medical History:   Diagnosis Date   • Anesthesia complication    • Anxiety state    • Asthma    • Asthma, moderate persistent     Last hosp '96   • BDR (background diabetic retinopathy) ([facility] 1/14/2016   • Blepharitis, both eyes 1/14/2016 Usual Body Wt: 135-140  lbs       100% UBW on admit.      WEIGHT HISTORY:  Patient Weight(s) for the past 336 hrs:   Weight   08/08/18 0435 78.4 kg (172 lb 12.8 oz)   08/06/18 0611 70.2 kg (154 lb 12.8 oz)   08/05/18 0400 69.3 kg (152 lb 12.8 oz)   08/04/ 250 mg Oral BID   • Saline Nasal Spray  3 spray Nasal Nightly       LABS: reviewed Low K+ on lasix- replaced. Gluc controlled with insulin rx.      Recent Labs   08/05/18  0511 08/06/18  0554 08/07/18  0507 08/07/18  1733 08/08/18  0324 08/08/18  1142   GLU

## 2018-08-08 NOTE — PROGRESS NOTES
[facility]  Nephrology Daily Progress Note    Terrell Jones  [id number]  78year old      HPI:   Terrell Jones is a 78year old female. MS better today. Awake and follows commands and answering simple questions. Just seems fatigued.   Still N skills appropriate for age    Labs:    Lab Results  Component Value Date   WBC 4.3 08/08/2018   HGB 8.2 08/08/2018   HCT 25.0 08/08/2018    08/08/2018   CREATSERUM 0.78 08/08/2018   BUN 15 08/08/2018    08/08/2018   K 3.6 08/08/2018   CL 91 08 congestive changes and/or superimposed on consolidation.      Dictated by (CST): Von Krishnamurthy MD on 8/07/2018 at 7:19     Approved by (CST): Von Krishnamurthy MD on 8/07/2018 at 7:24          Xr Chest Ap Portable  (cpt=71045)    Result Date: 8/6/20 tablet, Oral, Q15 Min PRN  •  glucose (DEX4) oral liquid 15 g, 15 g, Oral, Q15 Min PRN  •  dextrose 10 % infusion, , Intravenous, Continuous PRN  •  milrinone (PRIMACOR) 20mg in D5W 100 mL premix infusion, 0.375-0.75 mcg/kg/min, Intravenous, Continuous  • Metoclopramide HCl (REGLAN) injection 10 mg, 10 mg, Intravenous, Q8H PRN  •  Acetaminophen-Codeine #3 (TYLENOL #3) 300-30 MG tab 1 tablet, 1 tablet, Oral, Q6H PRN  •  ALPRAZolam (XANAX) tab 0.5 mg, 0.5 mg, Oral, Nightly  •  cetirizine (ZYRTEC) tab 10 mg, 1 vein thrombosis)     Cough     Family history of colon cancer     Hemorrhoids     Diverticulosis     History of colon polyps     Post-polio limb muscle weakness     Pseudophakia of both eyes     Blepharitis, both eyes     BDR (background diabetic retinopat

## 2018-08-08 NOTE — PLAN OF CARE
Problem: Patient Centered Care  Goal: Patient preferences are identified and integrated in the patient's plan of care  Interventions:  - What would you like us to know as we care for you? \"My left foot really hurts. \"  - Provide timely, complete, and accu safety including physical limitations  - Instruct pt to call for assistance with activity based on assessment  - Modify environment to reduce risk of injury  - Provide assistive devices as appropriate  - Consider OT/PT consult to assist with strengthening/ quality of pulses, skin color and temperature  - Assess for signs of decreased coronary artery perfusion - ex.  Angina  - Evaluate fluid balance, assess for edema, trend weights   Outcome: Not Progressing      Comments: Patient on room air, cleared by speec

## 2018-08-08 NOTE — PLAN OF CARE
Pt's family member is removing pillows post turning q2h. Pt is on a 1st step overlay bed and per family member the additional pillows makes the pt uncomfortable.

## 2018-08-09 NOTE — PROGRESS NOTES
[facility]    Progress Note      Subjective:     Mental status waxing and waning.  Confused this am     Review of Systems:     Unable to obtain     Objective:   Temp:  [97.2 °F (36.2 °C)-99.2 °F (37.3 °C)] 97.5 °F (36.4 °C)  Pulse:  [37-11 phenylephrine HCl (TANMAY-SYNEPHRINE) 50 mg in sodium chloride 0.9 % 250 mL infusion 100-200 mcg/min Intravenous Continuous   docusate sodium (COLACE) liquid 100 mg 100 mg Per G Tube BID   dextrose 50 % injection 50 mL 50 mL Intravenous PRN   Glucose-Vitami Daily PRN   FLEET ENEMA (FLEET) 7-19 GM/118ML enema 133 mL 1 enema Rectal Once PRN   Metoclopramide HCl (REGLAN) injection 10 mg 10 mg Intravenous Q8H PRN   Acetaminophen-Codeine #3 (TYLENOL #3) 300-30 MG tab 1 tablet 1 tablet Oral Q6H PRN   ALPRAZolam (XA --    --    --   0.9   TP  5.0*   --    --    --    --    --    --   5.8*    < > = values in this interval not displayed.        PTT   Date Value Ref Range Status   07/04/2018 80.2 (H) 23.2 - 35.3 seconds Final   Comment:     Elevations of the aPTT in patie fluctuating between 131 - 133 meq/l.  - urine sodium and osm consistent with low perfusion state given EF 10%  - uric acid, TSH wnl. Albumin 3.1   - off IVF     2. Cardiogenic shock :  -CAD s/p CABG, CMP, mitral valve clip.    - Echo with further deteriorat

## 2018-08-09 NOTE — PROGRESS NOTES
[facility]    Progress Note      Assessment and Plan:   1. Left foot ischemia–status post above-the-knee amputation–doing well clinically. The wound looks good. Recommendations: Physical therapy evaluation and as per vascular surgery. regular rate and rhythm no murmur. Abdomen nontender, without hepatosplenomegaly and no mass appreciable. Extremities without clubbing cyanosis nor edema on the right, status post left AKA.    Neurologic grossly intact with symmetric tone and strength a

## 2018-08-09 NOTE — PLAN OF CARE
Problem: Diabetes/Glucose Control  Goal: Glucose maintained within prescribed range  INTERVENTIONS:  - Monitor Blood Glucose as ordered  - Assess for signs and symptoms of hyperglycemia and hypoglycemia  - Administer ordered medications to maintain glucose adequate hydration with IV or PO as ordered and tolerated  - Evaluate effectiveness of GI medications  - Encourage mobilization and activity  - Obtain nutritional consult as needed  - Establish a toileting routine/schedule  - Consider collaborating with ph

## 2018-08-09 NOTE — PHYSICAL THERAPY NOTE
PT follow up for today     Discussed pt with OT who made attempt earlier in day     Pt was not appropriate for therapy due to low HR and lethargy     Therapy is following and will intiate care as appropriate    Pt not appropriate at this time for therapy v

## 2018-08-09 NOTE — SLP NOTE
Attempted to see patient for PO trials per 8/8 Re-BSSE. However, pt remains with increased lethargy and not appropriate for PO trials. Pt with intermittent wet quality and cough on secretions during SLP observation and attempt to wake patient for session.

## 2018-08-09 NOTE — PROGRESS NOTES
[facility] Pharmacy Note: Route Optimization for Pantoprazole (PROTONIX)    Patient is currently on Pantoprazole (PROTONIX) 40 mg IV every 24 hours.    The patient meets the criteria to convert to the oral equivalent as established by the IV to Oral conversion pro

## 2018-08-09 NOTE — PROGRESS NOTES
[facility]  Progress Note    Amber Headley Patient Status:  Inpatient    10/13/1950 MRN [id number]   [facility] 2W/SW Attending Kevin Leon MD   [facility] Day [facility] 12 PCP Verline Cancer.  DO Chris     Assessment:    1.  Status post left 08/09/2018   HCT 25.4 08/09/2018    08/09/2018       Lab Results  Component Value Date   PT 18.7 (H) 09/19/2016   INR 1.1 08/06/2018       Lab Results  Component Value Date    08/09/2018   K 3.2 08/09/2018   CL 93 08/09/2018   CO2 30 08/09/201

## 2018-08-09 NOTE — OCCUPATIONAL THERAPY NOTE
Attempted to see pt for OT session, however per RN pt is not medically appropriate at this time, too lethargic and HR decreasing. Will continue to follow.

## 2018-08-10 NOTE — SLP NOTE
SPEECH DAILY NOTE - INPATIENT    ASSESSMENT & PLAN   ASSESSMENT      Pt seen upright in bed with current diet of pureed/nectar-thick liquids via tsp (lunch tray) for monitoring of diet tolerance.  Swallowing precautions/strategies discussed and demonstrated Recommendations  Discharge Recommendations/Plan: Undetermined    Treatment Plan  Treatment Plan/Recommendations: Aspiration precautions;SLP to reassess    Interdisciplinary Communication: Discussed with RN            GOALS:  Goal #1 The patient/family/care

## 2018-08-10 NOTE — PROGRESS NOTES
[facility]     Progress Note        Levi Mcardle Patient Status:  Inpatient    10/13/1950 MRN [id number]   [facility] 2W/SW Attending Fernanda Stapleton MD   [facility] Day # 16 PCP Marcela George.  DO Chris       Subjective:   Kelsey dextrose 50 % injection 50 mL 50 mL Intravenous PRN   Glucose-Vitamin C (DEX-4) 4-0.006 g chewable tab 4 tablet 4 tablet Oral Q15 Min PRN   glucose (DEX4) oral liquid 15 g 15 g Oral Q15 Min PRN   dextrose 10 % infusion  Intravenous Continuous PRN   milri (TYLENOL #3) 300-30 MG tab 1 tablet 1 tablet Oral Q6H PRN   ALPRAZolam (XANAX) tab 0.5 mg 0.5 mg Oral Nightly   cetirizine (ZYRTEC) tab 10 mg 10 mg Oral Daily   Fluticasone Furoate-Vilanterol (BREO ELLIPTA) 200-25 MCG/INH inhaler 1 puff 1 puff Inhalation D (CST): Marcel Stephens MD on 8/10/2018 at 9:17     Approved by (CST): Marcel Stephens MD on 8/10/2018 at 9:25          Xr Chest Ap Portable  (cpt=71045)    Result Date: 8/9/2018  CONCLUSION:  1.  Slightly improved aeration of the lungs as compared to prior ch

## 2018-08-10 NOTE — OCCUPATIONAL THERAPY NOTE
Per RN pt somewhat anxious at this time and suggesting therapy re attempt tomorrow. Also, per Dr Francisco Prado they are currently attempting to  wean pt from  pressor requirements. Currently on Levophed.  Treatment deferred per RN recommendation

## 2018-08-10 NOTE — PLAN OF CARE
Problem: Diabetes/Glucose Control  Goal: Glucose maintained within prescribed range  INTERVENTIONS:  - Monitor Blood Glucose as ordered  - Assess for signs and symptoms of hyperglycemia and hypoglycemia  - Administer ordered medications to maintain glucose attempt to get oob. Does not use call light. Bed alarm on. Frequent rounding.     Problem: GASTROINTESTINAL - ADULT  Goal: Maintains or returns to baseline bowel function  INTERVENTIONS:  - Assess bowel function  - Maintain adequate hydration with IV or PO applicable  - Encourage broncho-pulmonary hygiene including cough, deep breathe, Incentive Spirometry  - Assess the need for suctioning and perform as needed  - Assess and instruct to report SOB or any respiratory difficulty  - Respiratory Therapy support threatening arrhythmias  - Monitor electrolytes and administer replacement therapy as ordered   Outcome: Progressing      Problem: Delirium  Goal: Minimize duration of delirium  Interventions:  - Encourage use of hearing aids, eye glasses  - Promote highes

## 2018-08-10 NOTE — PHYSICAL THERAPY NOTE
Pt was to be seen for PT treatment session. Consulted w/ RN. RN requested that therapy re-attempt tomorrow. Pt is very anxious right now. Per Dr. Luz Maria Rodriguez note, pt is currently on Levophed and staff are trying to wean pressor requirements.  Will re-attempt

## 2018-08-10 NOTE — PROGRESS NOTES
[facility]  Progress Note    Indira Fofana Patient Status:  Inpatient    10/13/1950 MRN [id number]   [facility] 2W/SW Attending Ida Guevara MD   [facility] Day # 16 PCP Destiny Living.  DO Chris     Assessment:    1.  Status post left Date   WBC 6.9 08/10/2018   HGB 8.6 08/10/2018   HCT 27.3 08/10/2018    08/10/2018       Lab Results  Component Value Date   PT 18.7 (H) 09/19/2016   INR 1.1 08/06/2018       Lab Results  Component Value Date    08/10/2018   K 3.5 08/10/2018

## 2018-08-10 NOTE — PROGRESS NOTES
[place] [facility]    Progress Note      Subjective:     Mental status waxing and waning. Confused this am . Had little BF.  Daughter at bedside     Review of Systems:     Unable to obtain     Objective:   Temp:  [97.1 °F (36.2 °C)-98.2 °F (36.8 ° (ATIVAN) injection 1 mg 1 mg Intravenous Q8H PRN   Miconazole Nitrate 2 % powder  Topical BID PRN   norepinephrine (LEVOPHED) 4 mg/250 ml premix infusion 0.5-8 mcg/min Intravenous Continuous PRN   docusate sodium (COLACE) liquid 100 mg 100 mg Per G Tube BI Daily PRN   bisacodyl (DULCOLAX) rectal suppository 10 mg 10 mg Rectal Daily PRN   FLEET ENEMA (FLEET) 7-19 GM/118ML enema 133 mL 1 enema Rectal Once PRN   Metoclopramide HCl (REGLAN) injection 10 mg 10 mg Intravenous Q8H PRN   Acetaminophen-Codeine #3 (TY --    --    --    --   5.8*   --    --     < > = values in this interval not displayed.        PTT   Date Value Ref Range Status   07/04/2018 80.2 (H) 23.2 - 35.3 seconds Final   Comment:     Elevations of the aPTT in patients not receiving  anticoagulant t 8/09/2018 at 7:19     Approved by (CST): Spencer Keene MD on 8/09/2018 at 7:20            Assessment and Plan:       1–hyponatremia:  –Chronic hyponatremia with serum sodium between 133-1 35 mg/L  –Admit sodium 123-> 121 mg/L.  And now fluctuating

## 2018-08-10 NOTE — PLAN OF CARE
DISCHARGE PLANNING    • Discharge to home or other facility with appropriate resources Not Progressing    REMAINS ON TWO PRESSORS      CARDIOVASCULAR - ADULT    • Maintains optimal cardiac output and hemodynamic stability Progressing    • Absence of cardia bedside, updated on plan of care. Bed in lowest position. Overlay mattress for skin healing.

## 2018-08-11 NOTE — PROGRESS NOTES
[place] [facility]    Progress Note      Subjective:     Mental status waxing and waning.  Feels better this am     Review of Systems:     Limited given confusion     Objective:   Temp:  [97.4 °F (36.3 °C)-100.4 °F (38 °C)] 97.4 °F (36.3 °C)  Puls 0.083% nebulizer solution 2.5 mg 2.5 mg Nebulization TID   Albuterol Sulfate  (90 Base) MCG/ACT inhaler 2 puff 2 puff Inhalation PRN   LORazepam (ATIVAN) injection 1 mg 1 mg Intravenous Q8H PRN   Miconazole Nitrate 2 % powder  Topical BID PRN   nore FLEET ENEMA (FLEET) 7-19 GM/118ML enema 133 mL 1 enema Rectal Once PRN   Metoclopramide HCl (REGLAN) injection 10 mg 10 mg Intravenous Q8H PRN   Acetaminophen-Codeine #3 (TYLENOL #3) 300-30 MG tab 1 tablet 1 tablet Oral Q6H PRN   ALPRAZolam (XANAX) tab 0 Comment:     Elevations of the aPTT in patients not receiving  anticoagulant therapy (Heparin, etc.), may be  seen in Factor deficiency, vitamin K deficiency,  factor inhibitors, liver disease, etc.  Clinical correlation is recommended.      03/15/2017 34 Cardiogenic shock :  -CAD s/p CABG, CMP, mitral valve clip.    - Echo with further deterioration of LV systolic function - 10 %  - volume overload - change bumex to 1 mg IV daily - responding well   - Dobutamine resumed which will improve diuresis  - remain

## 2018-08-11 NOTE — PLAN OF CARE
CARDIOVASCULAR - ADULT    • Maintains optimal cardiac output and hemodynamic stability Not Progressing        Delirium    • Minimize duration of delirium Not Progressing        Diabetes/Glucose Control    • Glucose maintained within prescribed range Not Pr placed for a few minutes until patient became more alert and she pulled the mask off.  She was restarted on levophed for hypotension and given IV albumin and bumex this morning which improved her blood pressure throughout the day and levophed was able to be

## 2018-08-11 NOTE — PROGRESS NOTES
[address]  Progress Note    Amber Headley Patient Status:  Inpatient    10/13/1950 MRN [id number]   [facility] 2W/SW Attending Kevin Leon MD   [facility] Day # 25 PCP Verline Cancer.  DO Chris     Assessment:    1.  Status post left Extremities: Left AKA wound well-healed. Clean, dry, intact. Skin: Warm and dry.      Labs:    Lab Results  Component Value Date   WBC 5.5 08/11/2018   HGB 8.3 08/11/2018   HCT 25.4 08/11/2018    08/11/2018       Lab Results  Component Value Date

## 2018-08-11 NOTE — SLP NOTE
SPEECH DAILY NOTE - INPATIENT    ASSESSMENT & PLAN   ASSESSMENT    Pt seen upright in bed (encouragement to maintain upright position during and after oral trails.  Observed oral trials of current diet (pureed/nectar-thick liquids) for monitoring of diet to Recommendations/Plan: Undetermined    Treatment Plan  Treatment Plan/Recommendations: Aspiration precautions;SLP to reassess    Interdisciplinary Communication: Discussed with RN            GOALS:      Goal #1 The patient will tolerate puree consistency an [facility]  #[id number]

## 2018-08-11 NOTE — PROGRESS NOTES
[facility]     Progress Note        Aleksandr Lockbourne Patient Status:  Inpatient    10/13/1950 MRN [id number]   [facility] 2W/SW Attending Steven Owens MD   [address] Day # 25 PCP Cleo Coates.  DO Chris       Subjective:   Kelsey 2 puff 2 puff Inhalation PRN   LORazepam (ATIVAN) injection 1 mg 1 mg Intravenous Q8H PRN   Miconazole Nitrate 2 % powder  Topical BID PRN   norepinephrine (LEVOPHED) 4 mg/250 ml premix infusion 0.5-8 mcg/min Intravenous Continuous PRN   docusate sodium (C 10 mg 10 mg Intravenous Q8H PRN   Acetaminophen-Codeine #3 (TYLENOL #3) 300-30 MG tab 1 tablet 1 tablet Oral Q6H PRN   ALPRAZolam (XANAX) tab 0.5 mg 0.5 mg Oral Nightly   cetirizine (ZYRTEC) tab 10 mg 10 mg Oral Daily   Fluticasone Furoate-Vilanterol (BREO pneumonia-unchanged. 3. Minimal left suprahilar and right perihilar atelectasis. 4. Subcutaneous defibrillator. 5. Post coronary artery bypass. 6. Mitral clip. 7. Right PICC line with tip in SVC.      Dictated by (CST): Michael Hutchinson MD on 8/10/2018 at 9:1

## 2018-08-12 NOTE — PHYSICAL THERAPY NOTE
Attempted to see patient for therapy this PM. Hold PT per RN- patient lethargic with labored breathing. Will check with nursing in AM if patient is able to participate in therapy.

## 2018-08-12 NOTE — PROGRESS NOTES
[facility]     Progress Note        Somers Roll Patient Status:  Inpatient    10/13/1950 MRN [id number]   [facility] 2W/SW Attending José Sheriff MD   [facility] Day # 23 PCP Micheline Shanks.  DO Chris       Subjective:   Kelsey albuterol sulfate (VENTOLIN) (2.5 MG/3ML) 0.083% nebulizer solution 2.5 mg 2.5 mg Nebulization TID   Albuterol Sulfate  (90 Base) MCG/ACT inhaler 2 puff 2 puff Inhalation PRN   LORazepam (ATIVAN) injection 1 mg 1 mg Intravenous Q8H PRN   Miconazol (FLEET) 7-19 GM/118ML enema 133 mL 1 enema Rectal Once PRN   Metoclopramide HCl (REGLAN) injection 10 mg 10 mg Intravenous Q8H PRN   Acetaminophen-Codeine #3 (TYLENOL #3) 300-30 MG tab 1 tablet 1 tablet Oral Q6H PRN   ALPRAZolam (XANAX) tab 0.5 mg 0.5 mg O disease  9. Prior pulmonary embolism  10. Asthma  11. Anxiety        Plan   -Patient with left ischemic foot status post AKA.   -Developed worsening encephalopathy, tachypnea requiring intubation.  -Initial chest x-ray with mild pulmonary edema preintub

## 2018-08-12 NOTE — PROGRESS NOTES
[facility]  Progress Note    Michell Duncan Patient Status:  Inpatient    10/13/1950 MRN [id number]   [facility] 2W/SW Attending Domingo Aguilar MD   [facility] Day # 23 PCP Oscar Perez DO     Assessment:    1.  Status post left Clean, dry, intact. Skin: Warm and dry.          Labs:    Lab Results  Component Value Date   WBC 3.8 08/12/2018   HGB 8.0 08/12/2018   HCT 25.1 08/12/2018    08/12/2018       Lab Results  Component Value Date   PT 18.7 (H) 09/19/2016   INR 1.1 08/0

## 2018-08-12 NOTE — PLAN OF CARE
CARDIOVASCULAR - ADULT    • Absence of cardiac arrhythmias or at baseline Not Progressing        Delirium    • Minimize duration of delirium Not Progressing        DISCHARGE PLANNING    • Discharge to home or other facility with appropriate resources Not P elevated, but receiving a sliding scale insulin with meals and levemir daily. Arana in place and making adequate urine output. Still tachypneic today, but now in the upper 20's vs. 30's yesterday. Sats well on RA. Less labored than yesterday.

## 2018-08-12 NOTE — SLP NOTE
Attempted to see pt for swallowing therapy. Collaborated with RN. RN requested for speech therapy to hold today secondary to pt having labored breathing.   Speech will following up with RN on 8/13/18, to determine if pt is appropriate for swallowing thera

## 2018-08-12 NOTE — OCCUPATIONAL THERAPY NOTE
Attempted OT treatment this afternoon. Spoke with RN Beth Tavares, who requested therapy hold off today as patient is lethargic and SOB. Will follow up 8/13.

## 2018-08-12 NOTE — PLAN OF CARE
Problem: Patient Centered Care  Goal: Patient preferences are identified and integrated in the patient's plan of care  Interventions:  - What would you like us to know as we care for you?  Patient has a left AKA  - Provide timely, complete, and accurate inf Implement non-pharmacological measures as appropriate and evaluate response  - Consider cultural and social influences on pain and pain management  - Manage/alleviate anxiety  - Utilize distraction and/or relaxation techniques  - Monitor for opioid side ef Progressing      Problem: GASTROINTESTINAL - ADULT  Goal: Maintains or returns to baseline bowel function  INTERVENTIONS:  - Assess bowel function  - Maintain adequate hydration with IV or PO as ordered and tolerated  - Evaluate effectiveness of GI medicat needed  - Assess and instruct to report SOB or any respiratory difficulty  - Respiratory Therapy support as indicated  - Manage/alleviate anxiety  - Monitor for signs/symptoms of CO2 retention   Outcome: Progressing      Problem: Impaired Cognition  Goal: Promote highest level of mobility daily  - Provide frequent reorientation  - Promote wakefulness i.e. lights on, blinds open  - Promote sleep, encourage patient's normal rest cycle i.e. lights off, TV off, minimize noise and interruptions  - Encourage fami

## 2018-08-12 NOTE — PROGRESS NOTES
[facility]    Progress Note    Elvie Dougherty Patient Status:  Inpatient    10/13/1950 MRN [id number]   [facility] 2W/SW Attending Lester Cristina MD   [facility] Day # 23 PCP Bandar Varma.  [address] [facility]     PCU transfer acceptanc Dr. Josette Antonio)  -monitor    Diabetes mellitus  -levemir, novolog    Prior pulmonary embolism  -now off coumadin    Asthma  -nebs    Anxiety  -Xanax prn for anxiety    Nutrition  Eating very little  -swallow eval once hemodynamically more stable    H/o polio

## 2018-08-13 NOTE — PROGRESS NOTES
[facility]    Progress Note      Assessment and Plan:   1. Left foot ischemia–status post above-the-knee amputation–doing well clinically. The wound looks good. The patient has been too groggy to participate in therapy.     Recommendation percussion. Cardiac regular rate and rhythm no murmur. Abdomen nontender, without hepatosplenomegaly and no mass appreciable. Extremities without clubbing cyanosis nor edema on the right, status post left AKA.    Neurologic grossly intact with symmetri

## 2018-08-13 NOTE — PROGRESS NOTES
[facility] CLIN[facility]  Progress Note    Ian Perez Patient Status:  Inpatient    10/13/1950 MRN [id number]   [facility] 2W/SW Attending Enrique Morrissey MD   Hosp Day # 21 PCP Merari Comment.  DO Chris     Assessment:    1.  Status post le 25.5 08/13/2018    08/13/2018       Lab Results  Component Value Date   PT 18.7 (H) 09/19/2016   INR 1.1 08/06/2018       Lab Results  Component Value Date   K 3.6 08/13/2018          Lab Results  Component Value Date   TROP 0.38 ([facility]) 07/27/2018   T

## 2018-08-13 NOTE — PROGRESS NOTES
[facility]    Progress Note    Tony Schulte Patient Status:  Inpatient    10/13/1950 MRN [id number]   [facility] 2W/SW Attending Vonnie Lucio MD   Hosp Day # 21 PCP Amaya Rose.  DO Chris       Subjective:     Ment mellitus  -levemir, novolog    Prior pulmonary embolism  -now off coumadin    Asthma  -nebs    Anxiety  -Xanax stopped because of sleepiness    Nutrition  Eating very little  -swallow eval once hemodynamically more stable    H/o polio  Wheelchair bound marjorie

## 2018-08-13 NOTE — SLP NOTE
SLP attempt for dysphagia therapy. RN reports pt lethargic and not appropriate for P.O trials at this time. SLP to f/u as pt able to safely participate. Thank you.     Marc Hyman M.S. [id number] [facility] [place]  Speech Language Pathologist   Phone Number [phone]

## 2018-08-13 NOTE — PHYSICAL THERAPY NOTE
PHYSICAL THERAPY TREATMENT NOTE - INPATIENT     Room Number: 238/238-A       Presenting Problem: prolonged admission for ischemia left foot with need for AKA/vasc sx team following ---    hosp course complicated by decline in cardio status with questionabl 65% on RA (unsure how accurate this was due to poor waveform, but pt was symptomatically SOB). RN was notified. Pt was assisted back to supine in bed. O2 saturation improved to normal limits. BP was 112/86 mmHg.   At the end of the session, pt was left sup lying on back to sitting on the side of the bed?: Unable   How much help from another person does the patient currently need. ..   -   Moving to and from a bed to a chair (including a wheelchair)?: Total   -   Need to walk in hospital room?: Total   -   Cli

## 2018-08-13 NOTE — PLAN OF CARE
CARDIOVASCULAR - ADULT    • Maintains optimal cardiac output and hemodynamic stability Progressing    • Absence of cardiac arrhythmias or at baseline Progressing    Remains on Dobutamine at 5mcg/kg/min;  Coreg and Enalapril reintroduced; awaiting repeat ech

## 2018-08-13 NOTE — SLP NOTE
SLP attempt for dysphagia therapy x2. RN reports pt on bipap and not appropriate for therapy today. Please f/u with swallow POC as pt able to safely participate. Thank you.     Ari Fried M.S. [address] [facility]  Speech Language Pathologist   Phone Number [phone]

## 2018-08-13 NOTE — PLAN OF CARE
Diabetes/Glucose Control    • Glucose maintained within prescribed range Not Progressing          Diabetes/Glucose Control    • Glucose maintained within prescribed range Not Progressing        GENITOURINARY - ADULT    • Absence of urinary retention Not Pr

## 2018-08-13 NOTE — OCCUPATIONAL THERAPY NOTE
OCCUPATIONAL THERAPY TREATMENT NOTE - INPATIENT    Room Number: 238/238-A         Presenting Problem: s/p L above knee amputation; cardiac     Problem List  Active Problems:    Ischemia of foot    Acute respiratory failure (HCC)    Metabolic acidosis    Ca previous data.)    WEIGHT BEARING RESTRICTION  Weight Bearing Restriction: R lower extremity;L lower extremity        R Lower Extremity: Other (Comment) (hx of polio trace contractions flaccid overall LE )  L Lower Extremity: Non-Weight Bearing    PAIN ASS completing OFH with SBA  Comment: dep    Patient will complete functional t/f with Min A  Comment: not appropriate this session.  Patient unable to tolerate      Comment:         Goals  on:   Frequency: 3-5x/wk    Gee Bello MOT/R  Occupational T

## 2018-08-14 NOTE — PLAN OF CARE
Problem: Patient Centered Care  Goal: Patient preferences are identified and integrated in the patient's plan of care  Interventions:  - What would you like us to know as we care for you? \"My left foot really hurts. \"  - Provide timely, complete, and accu evaluate response  - Implement non-pharmacological measures as appropriate and evaluate response  - Consider cultural and social influences on pain and pain management  - Manage/alleviate anxiety  - Utilize distraction and/or relaxation techniques  - Monit vasoactive medications to optimize hemodynamic stability  - Monitor arterial and/or venous puncture sites for bleeding and/or hematoma  - Assess quality of pulses, skin color and temperature  - Assess for signs of decreased coronary artery perfusion - ex.

## 2018-08-14 NOTE — PROGRESS NOTES
[facility]    Progress Note    Stuart Zhang Patient Status:  Inpatient    10/13/1950 MRN [id number]   [facility] 2W/SW Attending Karol Rudolph MD   [facility] Day # 21 PCP Latesha Berg.  DO Chris       Subjective:     leth chronic  Renal signed off (seen by Dr. Rafi Turner)  -monitor    Diabetes mellitus  -levemir, novolog    Prior pulmonary embolism  -now off coumadin    Asthma  -nebs    Anxiety  -Xanax stopped because of sleepiness    Nutrition  Eating very little  -swallow e

## 2018-08-14 NOTE — PROGRESS NOTES
[facility] - [facility]    Cardiology Progress Note  [facility]    Billy Tao Patient Status:  Inpatient    10/13/1950 MRN [id number]   Location [facility] 2W/SW Attending Cinthia Navarro MD   Hosp Day # 21 PCP [facility] Heart with regular rate and rhythm, no murmur, rub, extrasounds   Ext: No edema  Abd: Abdomen soft, nontender, nondistended, no organomegaly, bowel sounds present          Assessment and Plan:     Cardiomyopathy, low output state  Status marginal on dobuta

## 2018-08-14 NOTE — PROGRESS NOTES
[facility]    Progress Note      Assessment and Plan:   1. Left foot ischemia–status post above-the-knee amputation–doing well clinically. The wound looks good.   The patient is much more wakeful today and should be able to participate in Abdomen nontender, without hepatosplenomegaly and no mass appreciable. Extremities without clubbing cyanosis nor edema on the right, status post left AKA. Neurologic grossly intact with symmetric tone and strength and reflex.      Results:       Lab R

## 2018-08-14 NOTE — PLAN OF CARE
Problem: Patient Centered Care  Goal: Patient preferences are identified and integrated in the patient's plan of care  Interventions:  - What would you like us to know as we care for you? \"My left foot really hurts. \"  - Provide timely, complete, and accu pain scale  - Administer analgesics based on type and severity of pain and evaluate response  - Implement non-pharmacological measures as appropriate and evaluate response  - Consider cultural and social influences on pain and pain management  - Manage/all their own health  - Refer to Case Management Department for coordinating discharge planning if the patient needs post-hospital services based on physician/LIP order or complex needs related to functional status, cognitive ability or social support system oxygenation and minimize respiratory effort  - Oxygen supplementation based on oxygen saturation or ABGs  - Provide Smoking Cessation handout, if applicable  - Encourage broncho-pulmonary hygiene including cough, deep breathe, Incentive Spirometry  - Asses administer replacement therapy as ordered   Outcome: Progressing      Problem: Delirium  Goal: Minimize duration of delirium  Interventions:  - Encourage use of hearing aids, eye glasses  - Promote highest level of mobility daily  - Provide frequent reorie

## 2018-08-15 NOTE — PROGRESS NOTES
[facility] - [facility]  Hospitalist Progress  Note     Peter Enciso Patient Status:  Inpatient    10/13/1950  78year old CSN [id number]   Location [id number]-A Attending Rufina Little, [address] [facility] [address] Day # 25 PCP Pamella Arroyo.  Pleas Lot, DO     ASSESSMENT/PLAN diaphoresis. Psych: Normal mood and affect. Calm, cooperative    Labs:  Recent Labs   Lab  08/13/18   0937  08/14/18   0505  08/15/18   0533   RBC  3.02*  2.84*  2.88*   HGB  8.0*  7.6*  7.7*   HCT  25.5*  23.7*  23.7*   MCV  84.3  83.5  82.1   MCH  26. 5 cetirizine  10 mg Oral Daily   • Fluticasone Furoate-Vilanterol  1 puff Inhalation Daily   • Rosuvastatin Calcium  20 mg Oral Nightly   • saccharomyces boulardii  250 mg Oral BID   • Saline Nasal Spray  3 spray Nasal Nightly     Perflutren Lipid Microspher

## 2018-08-15 NOTE — PROGRESS NOTES
Reviewed pt's chart she is still inpt at this time. Family is looking at snf possibilities when clinically able to to move to one. P continue to monitor pt and her progress.

## 2018-08-15 NOTE — PHYSICAL THERAPY NOTE
PHYSICAL THERAPY TREATMENT NOTE - INPATIENT     Room Number: 238/238-A       Presenting Problem: prolonged admission for ischemia left foot with need for AKA/vasc sx team following ---    hosp course complicated by decline in cardio status with questionabl next session, consider initiating slide board transfer training. Pt has her slide board and wheelchair in the room. A lift will need to be used to then help her back in bed as the bed is too tall.     Pt has improved significantly since her initial evaluati Moving from lying on back to sitting on the side of the bed?: A Lot   How much help from another person does the patient currently need. ..   -   Moving to and from a bed to a chair (including a wheelchair)?: A Lot   -   Need to walk in hospital room?: Tota

## 2018-08-15 NOTE — SLP NOTE
ADULT VIDEOFLUOROSCOPIC SWALLOWING STUDY    Admission Date: 7/24/2018  Evaluation Date: 08/15/18  Radiologist: Dr. Girish Resendiz: Mechanical soft ground  Diet Recommendations - Liquid: Nectar thick    Further Fol clots    • History of blood transfusion    • Hyperlipidemia LDL goal <70    • Hypertension, essential, benign    • IBS (irritable bowel syndrome)    • Meibomian gland dysfunction 2006   • Menometrorrhagia    • Muscle weakness    • Myocardial infarction [facility] Thick Liquids):  Within Functional Limits  Triggered at: Base of tongue  Residue Severity, Location:  (none)  Laryngeal Penetration: Trace (shallow, flash)  Tracheal Aspiration: None  Cough/Throat Clear Response: No     PUREE  Oral Phase of Swallow (VFSS - consistency and nectar thick liquids without overt signs or symptoms of aspiration with 100 % accuracy over 2 session(s).   In Progress   Goal #2 The patient/family/caregiver will demonstrate understanding and implementation of aspiration precautions and sw

## 2018-08-15 NOTE — SLP NOTE
SPEECH DAILY NOTE - INPATIENT    ASSESSMENT & PLAN   ASSESSMENT  RN reports pt with overall improved status and appropriate for p.o trials. Pt on 3L of O2 in today's session.  Pt repositioned upright 90 degrees in bed for meal assessment and training of sw accuracy over 3 session(s). No clinical signs of aspiration (e.g., immediate/delayed throat clear, immediate/delayed cough, wet vocal quality, increased O2 effort) observed across all trials pureed solids/thin liquids.       In Progress   Goal #2 The pat

## 2018-08-15 NOTE — OCCUPATIONAL THERAPY NOTE
OCCUPATIONAL THERAPY TREATMENT NOTE - INPATIENT        Room Number: 238/238-A           Presenting Problem: s/p L above knee amputation; cardiac    Problem List  Active Problems:    Ischemia of foot    Acute respiratory failure (HCC)    Metabolic acidosis limb)  Management Techniques: Repositioning;Relaxation     ACTIVITY TOLERANCE  Pt denies SOB   Tolerates EOB activity with sats remaining 90s on 3 L high flow O2    ACTIVITIES OF DAILY LIVING ASSESSMENT  AM-PAC ‘6-Clicks’ Inpatient Daily Activity Short For

## 2018-08-15 NOTE — PROGRESS NOTES
[facility] AND CLINICS  Progress Note    Kenrick Clements Patient Status:  Inpatient    10/13/1950 MRN [id number]   Location [facility] 2W/SW Attending Shiraz Whitehead MD   [facility] Day # 25 PCP Mariana Victoria.  DO Chris     Assessment:    1.  Severe CAD and ische 08/06/2018       Lab Results  Component Value Date    08/15/2018   K 3.8 08/15/2018   CL 99 08/15/2018   CO2 27 08/15/2018   BUN 17 08/15/2018   CREATSERUM 0.88 08/15/2018    08/15/2018   CA 8.2 08/15/2018          Lab Results  Component Value

## 2018-08-15 NOTE — CONSULTS
[facility]    PATIENT'S NAME: Nazario Howell S   ATTENDING PHYSICIAN: Stephan Reyse MD   CONSULTING PHYSICIAN: Kendall Apodaca MD   PATIENT ACCOUNT#:   [id number]    LOCATION:  [address] [place] [address] #:   [id number]       DATE OF BIRTH:  [birth year] patient is resting, and brother is at bedside, is able to provide me some collateral information. At baseline, she is a modified independent and lives with her  in a ranch style house.   The patient's brother does not believe the  will be abl woman who appears older than stated age. VITAL SIGNS:  Temperature 98.3, heart rate 83, respirations 16, blood pressure 118/51, pulse oximetry 100%. HEENT:  Eyes:  Conjunctivae and lids intact. Pupils are equal and round.   ENMT:  External inspection of a prolonged hospitalization of 22 days status post left AKA.   I am recommending acute inpatient rehab so patient can get preprosthetic training and education, generalized strengthening, aerobic conditioning, transfer training, improve independence for self

## 2018-08-15 NOTE — TELEPHONE ENCOUNTER
----- Message from Brando Vora RN sent at 7/5/2018  9:40 AM CDT -----  Regarding: Recall colon in 3 months from 7/5/18  Recall colon in 3 months from date of 7/5/18 per Dr. Frank Herbert. Last Colon done 1/27/15.   Colon due 10/5/18

## 2018-08-15 NOTE — CONSULTS
Seen and examined. Recommend acute rehab for close medical monitoring and preprosthetic education/training. Overall rehab prognosis to achieve functional independence is guarded.     Thank you    Paulino Mayfield MD

## 2018-08-15 NOTE — PROGRESS NOTES
[facility]    Progress Note      Assessment and Plan:   1. Left foot ischemia–status post above-the-knee amputation– continues to do well. Recommendations: Physical therapy and as per vascular surgery.       2.  Respiratory failure–zeenat status post left AKA. Neurologic grossly intact with symmetric tone and strength and reflex.      Results:       Lab Results  Component Value Date   WBC 4.5 08/15/2018   HGB 7.7 08/15/2018   HCT 23.7 08/15/2018    08/15/2018   CREATSERUM 0.88 08/15

## 2018-08-15 NOTE — PLAN OF CARE
Problem: Patient Centered Care  Goal: Patient preferences are identified and integrated in the patient's plan of care  Interventions:  - What would you like us to know as we care for you? \"My left foot really hurts. \"  - Provide timely, complete, and accu evaluate response  - Implement non-pharmacological measures as appropriate and evaluate response  - Consider cultural and social influences on pain and pain management  - Manage/alleviate anxiety  - Utilize distraction and/or relaxation techniques  - Monit post-hospital services based on physician/LIP order or complex needs related to functional status, cognitive ability or social support system   Outcome: Progressing      Problem: GASTROINTESTINAL - ADULT  Goal: Minimal or absence of nausea and vomiting  IN Instruct patient on fluid and nutrition restrictions as appropriate   Outcome: Progressing      Problem: RESPIRATORY - ADULT  Goal: Achieves optimal ventilation and oxygenation  INTERVENTIONS:  - Assess for changes in respiratory status  - Assess for zamora stability  - Monitor arterial and/or venous puncture sites for bleeding and/or hematoma  - Assess quality of pulses, skin color and temperature  - Assess for signs of decreased coronary artery perfusion - ex.  Angina  - Evaluate fluid balance, assess for ed

## 2018-08-15 NOTE — DIETARY NOTE
ADULT NUTRITION REASSESSMENT     Pt is at moderate nutrition risk. Pt does not meet malnutrition criteria.         RECOMMENDATIONS TO MD:  See Nutrition Intervention   Liberalized diet in view of very poor po intake (1 week after refusing feeding tube repl Visted pt but asleep after VFSS. Family present states pt very tired now. Earlier episode of nausea resolved with meds and Bipap. Observed very little po intake from lunch items at bedside. Most meals have been rated as poor.  Fluid intake noted to be low a type 2 (diabetes mellitus, type 2) ([facility] [address] 3034    W/O Complication  Pill, then Insulin added 2003   • [facility]     Right Superficial   • Floaters 2007   • Greater trochanteric bursitis    • Heart disease    • High blood pressure    • Hi kg (135 lb 12.8 oz)  06/21/18 : 60.8 kg (134 lb)  06/16/18 : 61.6 kg (135 lb 11.2 oz)  06/04/18 : 63.5 kg (140 lb)  05/17/18 : 79.4 kg (175 lb)  04/16/18 : 63.5 kg (140 lb)  04/02/18 : 63.5 kg (140 lb)       GASTROINTESTINAL: BM x 1 soft today.      FOOD/NU 12    NUTRITION PRESCRIPTION:  Diet: zamora to NO added salt, 1800 kcal/CHO controlled, Puree, nectar thick liquids, 1200 ml fluid restricted (via tsp). Discussed with RN re: SLP recommendations to get diet liberalized.  Oral Supplements: (ONS): sugar free s

## 2018-08-15 NOTE — PLAN OF CARE
14:20 Patient states she \"feels cold and nauseated\" and is observed shivering, more pale, BM. Temporal temperature 98.3 F. Blankets and bipap applied, zophran given. BP stable. 1515 pm: Patient removed bipap, denies nausea, color improved.  HR 84, n

## 2018-08-15 NOTE — CM/SW NOTE
Care Management     Progression of Care Note: Hospital Day #22  Saw pt at bedside resting in bed, A/O X3, with daughter, Cathi Mary at bedside. Pt on 3LNC, denies any c/o now. States she worked with PT this morning and dangled at bedside.  Remains on low dose dobu

## 2018-08-16 NOTE — PROGRESS NOTES
[facility]    Progress Note      Assessment and Plan:   1. Left foot ischemia–status post above-the-knee amputation– continues to do well. Recommendations: Physical therapy and as per vascular surgery. OObed.     2.  Respiratory failure edema on the right, status post left AKA. Neurologic grossly intact with symmetric tone and strength and reflex.      Results:       Lab Results  Component Value Date   WBC 4.2 08/16/2018   HGB 7.5 08/16/2018   HCT 23.5 08/16/2018    08/16/2018   C

## 2018-08-16 NOTE — WOUND PROGRESS NOTE
Wound Care Services  Follow up on the pt's bilateral buttock cheeks, the DTI's are resolving, see wound documentation, the pt. is on the 1st step overlay air mattress.  The pt. was repositioned in bed, Left AKA dressing is intact, the pt's nurse is at the b

## 2018-08-16 NOTE — PROGRESS NOTES
[facility]  Hospitalist Progress  Note     Benson Torres Patient Status:  Inpatient    10/13/1950  78year old CSN [id number]   Location 238238-A Attending Marquis Meenu MD   Hosp Day # 21 PCP Leydi Blake.  Hannah Friend, DO     ASSESSMENT/PLAN Calm, [facility]    Labs:  Recent Labs   Lab  08/14/18   0505  08/15/18   0533  08/16/18   0509   RBC  2.84*  2.88*  2.83*   HGB  7.6*  7.7*  7.5*   HCT  23.7*  23.7*  23.5*   MCV  83.5  82.1  82.9   MCH  26.8*  26.6*  26.6*   MCHC  32.0  32.4  32.1   RDW Lipid Microsphere, morphINE sulfate **OR** morphINE sulfate **OR** morphINE sulfate, Albuterol Sulfate HFA, Miconazole Nitrate, norepinephrine, dextrose, Glucose-Vitamin C, glucose, dextrose, Normal Saline Flush, balsam peru-castor oil, albuterol sulfate,

## 2018-08-16 NOTE — OCCUPATIONAL THERAPY NOTE
OCCUPATIONAL THERAPY TREATMENT NOTE - INPATIENT    Room Number: 238/238-A         Presenting Problem: s/p L above knee amputation; cardiac     Problem List  Active Problems:    Ischemia of foot    Acute respiratory failure (HCC)    Metabolic acidosis    Ca Taking care of personal grooming such as brushing teeth?: None  -   Eating meals?: None    AM-PAC Score:  Score: 17  Approx Degree of Impairment: 50.11%  Standardized Score (AM-PAC Scale): 37.26  CMS Modifier (G-Code): CK    FUNCTIONAL TRANSFER ASSESSMENT

## 2018-08-16 NOTE — PLAN OF CARE
Problem: Patient Centered Care  Goal: Patient preferences are identified and integrated in the patient's plan of care  Interventions:  - What would you like us to know as we care for you? \"My left foot really hurts. \"  - Provide timely, complete, and accu evaluate response  - Implement non-pharmacological measures as appropriate and evaluate response  - Consider cultural and social influences on pain and pain management  - Manage/alleviate anxiety  - Utilize distraction and/or relaxation techniques  - Monit order or complex needs related to functional status, cognitive ability or social support system   Outcome: Progressing      Problem: GASTROINTESTINAL - ADULT  Goal: Minimal or absence of nausea and vomiting  INTERVENTIONS:  - Maintain adequate hydration wi nutrition restrictions as appropriate   Outcome: Progressing      Problem: RESPIRATORY - ADULT  Goal: Achieves optimal ventilation and oxygenation  INTERVENTIONS:  - Assess for changes in respiratory status  - Assess for changes in mentation and behavior hematoma  - Assess quality of pulses, skin color and temperature  - Assess for signs of decreased coronary artery perfusion - ex. Angina  - Evaluate fluid balance, assess for edema, trend weights   Outcome: Progressing  VSS.  Dobutamine gtt at 5mcg per orde

## 2018-08-16 NOTE — PROGRESS NOTES
[facility]  Progress Note    Maya Dorchester Patient Status:  Inpatient    10/13/1950 MRN [id number]   Location [facility] 2W/SW Attending Oli Storey MD   [facility] Day # 21 JET Garnett.  DO Chris     Assessment:    1.  Severe CAD and ische HGB 7.5 08/16/2018   HCT 23.5 08/16/2018    08/16/2018       Lab Results  Component Value Date   PT 18.7 (H) 09/19/2016   INR 1.1 08/06/2018       Lab Results  Component Value Date    08/16/2018   K 3.6 08/16/2018   K 3.6 08/16/2018

## 2018-08-16 NOTE — PLAN OF CARE
CARDIOVASCULAR - ADULT    • Maintains optimal cardiac output and hemodynamic stability Progressing    • Absence of cardiac arrhythmias or at baseline Progressing        Delirium    • Minimize duration of delirium Progressing        Diabetes/Glucose Control

## 2018-08-16 NOTE — PHYSICAL THERAPY NOTE
PHYSICAL THERAPY TREATMENT NOTE - INPATIENT     Room Number: 334/334-A       Presenting Problem: prolonged admission for ischemia left foot with need for AKA/vasc sx team following ---    hosp course complicated by decline in cardio status with questionabl overall LE )  L Lower Extremity: Non-Weight Bearing    PAIN ASSESSMENT   Rating:  (did not rate)  Location: L residual limb  Management Techniques: Activity promotion; Body mechanics;Repositioning    BALANCE minimal assistance   Goal #3   Current Status MAX A   Goal #4     Goal #4   Current Status     Goal #5 Patient to demonstrate min assist  With appropriate ther activity/exercise instructions provided to patient for improved fxn mobility    Goal #5   Laureano

## 2018-08-16 NOTE — PLAN OF CARE
DISCHARGE PLANNING    • Discharge to home or other facility with appropriate resources Not Progressing        METABOLIC/FLUID AND ELECTROLYTES - ADULT    • Electrolytes maintained within normal limits Not Progressing        Patient/Family Goals    • Patien started coughing. Patient was instructed to use a tea spoon when drinking her liquids and she did well with teaspoon. Blood pressure doing well. Dr. Maria Del Rosario Dey saw patient at bedside and dobutamine cut down to 2.5mcg/min from 5.  Patient able to turn well in be

## 2018-08-17 NOTE — SLP NOTE
SPEECH DAILY NOTE - INPATIENT    ASSESSMENT & PLAN   ASSESSMENT  Pt seen for ongoing dysphagia therapy to monitor diet tolerance and training of safe swallowing precautions per recommendations of VFSS on 8/15/18.  RN reports pt with limited appetite; luly with RN  [facility] - BREA Score: 5         GOALS  Goal #1 The patient will tolerate mech soft chopped consistency and nectar thick liquids without overt signs or symptoms of aspiration with 100 % accuracy over 2 session(s).   No clinical signs of aspiration (e.g., immed VFSS     If you have any questions, please contact Forest Mohan M.S. [id number] [facility]  Speech Language Pathologist   Phone Number [phone]

## 2018-08-17 NOTE — OCCUPATIONAL THERAPY NOTE
OCCUPATIONAL THERAPY TREATMENT NOTE - INPATIENT        Room Number: 334/334-A           Presenting Problem: s/p L above knee amputation; cardiac    Problem List  Active Problems:    Ischemia of foot    Acute respiratory failure (HCC)    Metabolic acidosis TOLERANCE  Room air    ACTIVITIES OF DAILY LIVING ASSESSMENT  AM-PAC ‘6-Clicks’ Inpatient Daily Activity Short Form  How much help from another person does the patient currently need…  -   Putting on and taking off regular lower body clothing?: A Lot  -

## 2018-08-17 NOTE — CM/SW NOTE
SW informed by Melanie Lockhart liaison [phone] that they will need to reevaluate pt next week as she is not appropriate at this time. / to remain available for support and/or discharge planning.      Gretchen Christie  [id number]

## 2018-08-17 NOTE — PROGRESS NOTES
[place] [facility] - [facility]  Hospitalist Progress  Note     Munira Orozco Patient Status:  Inpatient    10/13/1950  78year old CSN [id number]   Location 238/238-A Attending Vipul Parsons,  [facility] Se Day # 25 PCP Milan Causey.  Cedric Perez,      ASSESSMENT/PLAN edema. Incision clean dry and intact  Skin: Skin is warm and dry. No rashes, erythema, diaphoresis. Psych: Normal mood and affect.  Calm, cooperative    Labs:  Recent Labs   Lab  08/15/18   0533  08/16/18   0509  08/17/18   0434   RBC  2.88*  2.83*  3.03 Inhalation Daily   • Rosuvastatin Calcium  20 mg Oral Nightly   • saccharomyces boulardii  250 mg Oral BID   • Saline Nasal Spray  3 spray Nasal Nightly     Perflutren Lipid Microsphere, morphINE sulfate **OR** morphINE sulfate **OR** morphINE sulfate, Alb

## 2018-08-17 NOTE — PROGRESS NOTES
[place] [facility] - [facility]    Progress Note    Steven Way Patient Status:  Inpatient    10/13/1950 MRN [id number]   Location [facility] 3W/SW Attending Russell Alanis MD   [facility] Day # 25 PCP Roxana Estrada.  DO Chris        Subjective:     Constit 98 08/17/2018   CA 8.2 (L) 08/17/2018   ALB 2.7 (L) 08/11/2018   ALKPHO 36 08/11/2018   BILT 1.1 08/11/2018   TP 5.7 (L) 08/11/2018   AST 19 08/11/2018   ALT 25 08/11/2018   PTT 80.2 (H) 07/04/2018   INR 1.1 08/06/2018   PT 18.7 (H) 09/19/2016   TSH 1.24 0

## 2018-08-17 NOTE — PROGRESS NOTES
[facility]  Progress Note    Derek Dunaway Patient Status:  Inpatient    10/13/1950 MRN [id number]   Location [facility] 3W/SW Attending Mk Mustafa MD   [facility] Day # 25 PCP Christy Perez DO     Assessment:    1.  Severe CAD.   History excursions and effort. Abdomen: Soft, non-tender. Extremities: Left AKA. No edema right leg. Skin: Warm and dry.      Labs:    Lab Results  Component Value Date   WBC 5.2 08/17/2018   HGB 8.0 08/17/2018   HCT 24.8 08/17/2018    08/17/2018

## 2018-08-18 NOTE — SLP NOTE
SPEECH DAILY NOTE - INPATIENT    ASSESSMENT & PLAN   ASSESSMENT  Pt seen for ongoing dysphagia therapy to monitor diet tolerance and training of safe swallowing precautions. RN reports pt with limited appetite d/t nausea however, no difficulty swallowing. Recommendations  Discharge Recommendations/Plan: Undetermined    Treatment Plan  Treatment Plan/Recommendations: Dysphagia therapy; Aspiration precautions    Interdisciplinary Communication: Discussed with RN  [facility] Shayan GUIDRY Score: 5         GOALS  Goal #1 The patient Progress   Goal #5 Patient will complete laryngeal adduction exercises x10-20 and Mendelsohn Maneuver x5 to improve airway closure and reduce risk of aspiration with mild cues and 90% accuracy.   Laryngeal Exercises completed x20 exercises      In Progress

## 2018-08-18 NOTE — PROGRESS NOTES
[facility]    Progress Note    Michell Duncan Patient Status:  Inpatient    10/13/1950 MRN [id number]   Location [facility] 3W/SW Attending Ramos Gonzalez MD   Hosp Day # 25 PCP Oscar Lawson.  DO Chris        Subjective:     R reviewed and labs reviewed. Agree with above assessment and plan. Switch coreg to metop to avoid hypotension.      Adrienne Cameron MD  8/18/2018  Cardiology        Results:     Lab Results  Component Value Date   WBC 5.2 08/17/2018   HGB 8.0 (L) 08/17/2018

## 2018-08-19 NOTE — PROGRESS NOTES
[place] [facility]  Hospitalist Progress  Note     [place] Roll Patient Status:  Inpatient    10/13/1950  78year old CSN [id number]   Location 238/238-A Attending Schuyler Silva,  [facility] Se Day # 22 PCP Micheline Shanks.  Carla Conner, DO     ASSESSMENT/PLAN Incision clean dry and intact  Skin: Skin is warm and dry. No rashes, erythema, diaphoresis. Psych: Normal mood and affect.  Calm, cooperative    Labs:  Recent Labs   Lab  08/15/18   0533  08/16/18   0509  08/17/18   0434   RBC  2.88*  2.83*  3.03*   HGB Meclizine HCl, methocarbamol, Ketotifen Fumarate    >35min spent, >50% spent counseling and coordinating care in the form of educating pt/family and d/w consultants and staff.   Most the time spent discussing the plan for acute rehab upon discharge, plan to

## 2018-08-19 NOTE — PHYSICAL THERAPY NOTE
Attempted to see pt this afternoon for PT session. Ok'd by MATTHEW Delaney. Pt with quick breathing and difficulty speaking when therapist entered the room. Pt able to respond appropriately with inc time and difficulty speaking.  Pt also said she had just had a re

## 2018-08-19 NOTE — PROGRESS NOTES
[facility]    Progress Note      Assessment and Plan:   1. Left foot ischemia–status post above-the-knee amputation– continues to do well. Recommendations: Physical therapy and as per vascular surgery. OObed.     2.  Respiratory failure  08/18/2018   K 3.5 08/18/2018   CL 99 08/18/2018   CO2 28 08/18/2018    08/18/2018   CA 8.2 08/18/2018     Chest x-ray–mild edema with small bilateral pleural effusions, poor inspiration, slightly increased basilar atelectasis.     Fan Miller

## 2018-08-19 NOTE — PROGRESS NOTES
[facility]  Hospitalist Progress  Note     Mel Liu Patient Status:  Inpatient    10/13/1950  78year old CSN [id number]   Location 238/238-A Attending Sharona Stephenson MD   Hosp Day # 32 PCP Anthony Domingo.  Agustina Crawley,      ASSESSMENT/PLAN normal deficit. MS: No joint effusions. No peripheral edema. Incision clean dry and intact  Skin: Skin is warm and dry. No rashes, erythema, diaphoresis. Psych: Normal mood and affect.  Calm, cooperative    Labs:  Recent Labs   Lab  08/16/18   0509  0 Miconazole Nitrate, dextrose, Glucose-Vitamin C, glucose, dextrose, Normal Saline Flush, balsam peru-castor oil, albuterol sulfate, nitroGLYCERIN, Normal Saline Flush, acetaminophen, ondansetron HCl, PEG 3350, magnesium hydroxide, bisacodyl, FLEET ENEMA, M

## 2018-08-19 NOTE — PROGRESS NOTES
[facility] Heart Cardiology Progress Note      Deacon King Patient Status:  Inpatient    10/13/1950 MRN [id number]   Location [facility] 3W/SW Attending Biju Gamboa MD   [facility] Day # 32 PCP Annel Julien.  DO Chris : 134 lb (60.8 kg)  06/16/18 : 135 lb 11.2 oz (61.6 kg)       Exam  Gen: No acute distress, alert and oriented x3,   Neck:supple,no JVD  Pulm: Lungs clear, normal respiratory effort  CV:  Heart with regular rate and regular rhythm, Nl S1,S2 ,no S3 or murmu 17.1*  16.9*  17.5*   WBC  4.2  5.2  6.6   PLT  183  212  234         Lab Results  Component Value Date   AST 16 08/19/2018   ALT 12 (L) 08/19/2018   PTT 80.2 (H) 07/04/2018   INR 1.1 08/06/2018   PT 18.7 (H) 09/19/2016   TSH 1.24 07/26/2018   DDIMER 0.37

## 2018-08-19 NOTE — PROGRESS NOTES
[facility]    Progress Note      Assessment and Plan:   1. Left foot ischemia–status post above-the-knee amputation– continues to do well. Recommendations: Physical therapy and as per vascular surgery. OObed.     2.  Respiratory failure Value Date   WBC 6.6 08/19/2018   HGB 8.9 08/19/2018   HCT 27.9 08/19/2018    08/19/2018   CREATSERUM 0.79 08/19/2018   CREATSERUM 0.79 08/19/2018   BUN 14 08/19/2018   BUN 14 08/19/2018    08/19/2018    08/19/2018   K 3.8 08/19/2018   K

## 2018-08-20 NOTE — PROGRESS NOTES
Pt. Is most likely going to [facility] rehab at this point. Her encephalopathy appears to be clearing. She is still a high risk pt for readmission, but [facility]  Most likely dc tomorrow or

## 2018-08-20 NOTE — PROGRESS NOTES
[facility] Heart Cardiology Progress Note      Stuart Zhang Patient Status:  Inpatient    10/13/1950 MRN [id number]   Location [facility] 3W/SW Attending Madeline Keller MD   [facility] Day # 32 PCP Latesha Berg.  DO Chris Encounters:  08/20/18 : 159 lb 11.2 oz (72.4 kg)  07/24/18 : 138 lb (62.6 kg)  07/14/18 : 141 lb (64 kg)  07/03/18 : 135 lb 12.8 oz (61.6 kg)  06/21/18 : 134 lb (60.8 kg)  06/16/18 : 135 lb 11.2 oz (61.6 kg)       Exam  Gen: No acute distress, alert and or 101   --    CO2  29  28  26  26   --       Recent Labs   Lab  08/16/18   0509  08/17/18   0434  08/19/18   0521   RBC  2.83*  3.03*  3.41*   HGB  7.5*  8.0*  8.9*   HCT  23.5*  24.8*  27.9*   MCV  82.9  82.0  81.8   MCH  26.6*  26.4*  26.0*   MCHC  32.1  3

## 2018-08-20 NOTE — PROGRESS NOTES
RRT , PT WAS NOTED TO HAVE CHILLS AND RESPIRATORY DISTRESS, DESATURATING AT 77%,CODE BLUE WAS CALLED, PT WAS INTUBATED BY ANESTHESIA.    BLOOD [id number],   PT WILL BE TRANSFERED TO ICU, OBTAINLACTIC ACID, CBC, C\MP,CXR, STOOL FOR CDIFF, BLOOD AND URINE

## 2018-08-20 NOTE — PROGRESS NOTES
08/20/18 1739   Clinical Encounter Type   Visited With Family   Routine Visit Introduction   Continue Visiting Yes   Crisis Visit Critical care  (RRT/Code Blue)   Patient Spiritual Encounters   Spiritual Assessment Completed No   Family Spiritual Encoun

## 2018-08-20 NOTE — ANESTHESIA PROCEDURE NOTES
Airway  Urgency: emergent    Airway not difficult    General Information and Staff    Patient location during procedure: floor  Anesthesiologist: Rodney Goodson  Performed: anesthesiologist     Consent for Airway (if performed for an anesthetic, see related d

## 2018-08-20 NOTE — PROGRESS NOTES
RRT.. Patient s/p vomiting was noted to be in respiratory distress, o2 saturation 81% on room air.  Patient was desaturating  to low 70 %on nonrebreathing mask, code blue was called, patient intubated at bedside by anesthesia, than transferred to CCU room 2

## 2018-08-20 NOTE — CM/SW NOTE
AMANDEEP spoke with Lashawn Collins from [facility] [phone] who stated that Dr. Bret Chu will reevaluate this pt to ensure that she is still appropriate for aucte rehab.  AMANDEEP met with pt at bedside to discuss the need to choose back up shaquille in the event MJ is unable

## 2018-08-20 NOTE — PHYSICAL THERAPY NOTE
PHYSICAL THERAPY TREATMENT NOTE - INPATIENT     Room Number: 334/334-A       Presenting Problem: prolonged admission for ischemia left foot with need for AKA/vasc sx team following ---    hosp course complicated by decline in cardio status with questionabl care/supervision;Acute rehabilitation     PLAN  PT Treatment Plan: Bed mobility; Body mechanics; Patient education;Transfer training;Balance training;Strengthening    SUBJECTIVE  \"I can sit up, I feel okay\"    OBJECTIVE  Precautions:  (L AKA)    WEIGHT MALA Goals to be met by:  8/30/18   Patient Goal Patient's self-stated goal is: MJ    Goal #1 Assess  Bed mobility  : patient is able to demonstrate supine - sit EOB @ level: minimum assistance      Goal #1   Current Status Max A x 2    Goal #2 Pt able to sit

## 2018-08-20 NOTE — PROGRESS NOTES
Pulmonary and critical care note: The events have been reviewed. The patient had vomited and then was coughing extensively. She desaturated promptly. She was emergently intubated and is now transferred down to the ICU.   She was briefly hypotensive on

## 2018-08-20 NOTE — CONSULTS
Cc: reassessment for mobility deficits s/p L AKA, metabolic encephalopathy, severe cardiomyopathy     Subjective: seen and examined with family in the room. Patient in good spirits, she seems cognitively much clearer. Pain is under control.  Family states t Discussed that patient will need close medical supervision while in rehab program, as patient at increased risk for readmission due to severe cardiomyopathy, h/o respiratory failure, infection risk.  Goal to improve from  max asist to mod assist, improve in

## 2018-08-20 NOTE — OCCUPATIONAL THERAPY NOTE
OCCUPATIONAL THERAPY TREATMENT NOTE - INPATIENT        Room Number: 334/334-A           Presenting Problem: s/p L above knee amputation; cardiac    Problem List  Active Problems:    Ischemia of foot    Acute respiratory failure (HCC)    Metabolic acidosis TOLERANCE  On RA - unable to get accurate 02 reading - some audible wheezing     ACTIVITIES OF DAILY LIVING ASSESSMENT  AM-PAC ‘6-Clicks’ Inpatient Daily Activity Short Form  How much help from another person does the patient currently need…  -   Putting o 5x/wk

## 2018-08-20 NOTE — PROGRESS NOTES
[place] [facility] - [facility]  Hospitalist Progress  Note     Marvia Samples Patient Status:  Inpatient    10/13/1950  78year old CSN [id number]   Location -A Attending Ramo Duff, [address] [facility] Se Day # 32 PCP Mariposa Miles.  Cathy Pfeiffer,      ASSESSMENT/PLAN joint effusions. No peripheral edema. Incision clean dry and intact  Skin: Skin is warm and dry. No rashes, erythema, diaphoresis. Psych: Normal mood and affect.  Calm, cooperative    Labs:  Recent Labs   Lab  08/16/18   4790  08/17/18   0434  08/19/18 Spray  3 spray Nasal Nightly     Albuterol Sulfate HFA, Miconazole Nitrate, dextrose, Glucose-Vitamin C, glucose, dextrose, Normal Saline Flush, balsam peru-castor oil, albuterol sulfate, nitroGLYCERIN, Normal Saline Flush, acetaminophen, ondansetron HCl,

## 2018-08-20 NOTE — PROGRESS NOTES
[facility]    Progress Note      Assessment and Plan:   1. Left foot ischemia–status post above-the-knee amputation– continues to do well. The patient is having phantom limb pains.     Recommendations: Physical therapy and as per vascular reflex. Results:       Lab Results  Component Value Date   K 3.8 08/20/2018   MG 1.6 08/20/2018     Chest x-ray–mild edema with small bilateral pleural effusions, poor inspiration, slightly increased basilar atelectasis.     Ángela Hobbs MD  [facility]

## 2018-08-21 NOTE — PROGRESS NOTES
[place] [facility] - [facility]      Sepsis Reassessment Note    BP (!) 83/36   Pulse 90   Temp 97.4 °F (36.3 °C) (Temporal)   Resp 12   Ht 4' 8.5\" (1.435 m)   Wt 159 lb 11.2 oz (72.4 kg)   SpO2 100%   BMI 35.18 kg/m²      More recently (after modest decreas

## 2018-08-21 NOTE — CODE DOCUMENTATION
Code Blue was called for sustained v-tach lasting 51 sec. Pt maintained pulse and then converted to ST with -120 with frequent pvc's. at which time writer cancelled code. Dr. Madison Cabrera was at bedside to evaluate pt.  Labs ordered, Dr. Michele Tim with cards notif

## 2018-08-21 NOTE — PLAN OF CARE
Delirium    • Minimize duration of delirium Progressing          CARDIOVASCULAR - ADULT    • Maintains optimal cardiac output and hemodynamic stability Progressing    • Absence of cardiac arrhythmias or at baseline Progressing          CARDIOVASCULAR - SUNDAY sister and pt code status made DNR. pt is alert to name. open eyes and follow simple commands. reposition done. pt is on special mattress. blood sugar is been monitoring. no cardiac arrhythmias noted. we will continue to monitor.

## 2018-08-21 NOTE — PROGRESS NOTES
[facility]    CDI Prediction Tool Protocol (Vancomycin Initiated)    OVP (oral vancomycin prophylaxis) 125 mg PO Daily is being started in this patient based on a score of 16.       Score Breakdown:  High risk antibiotic use (5 points)  Hospital

## 2018-08-21 NOTE — PROGRESS NOTES
08/21/18 0750   Vent Information   Interface Invasive   Vent Type AV   Vent ID [id number]   Vent Mode VC/AC   Settings   FiO2 (%) 60 %  (reduced to 50%)   Resp Rate (Set) 12   Vt (Set, mL) 500 mL   Waveform Decelerating ramp   PEEP/CPAP (cm H2O) 5 cm H20

## 2018-08-21 NOTE — PROGRESS NOTES
120 [facility] dosing service    Initial Pharmacokinetic Consult for Vancomycin Dosing     Bashir Bennett is a 78year old female admitted on 8/20/2018 who is being treated for sepsis.   Pharmacy has been asked to dose Vancomycin by Dr. Sari Aschoff    She is aller Ref Range   Tissue Culture Result No Growth 3 Days N/A   Tissue Smear 1+ WBCs seen N/A   Tissue Smear No organisms seen N/A   3.  ANAEROBIC CULTURE Status: None   Collection Time: 08/01/18 11:26 AM   Result Value Ref Range   Anaerobic Culture No Anaerobes i

## 2018-08-21 NOTE — PLAN OF CARE
Safety Risk - Non-Violent Restraints    • Patient will remain free from self-harm Completed          CARDIOVASCULAR - ADULT    • Maintains optimal cardiac output and hemodynamic stability Not Progressing    • Absence of cardiac arrhythmias or at baseline N

## 2018-08-21 NOTE — PHYSICAL THERAPY NOTE
Pt was to be seen for PT treatment session. On 8/20 RRT and then Code Blue was called for respiratory distress. Pt was intubated and transferred to CCU. Early this morning, another code blue was called for sustained v-tach. Pt will be placed on hold.  Nitin

## 2018-08-21 NOTE — PROGRESS NOTES
[facility] AND [facility]  [facility] Cardiology Progress Note    Lexie German Patient Status:  Inpatient    10/13/1950 MRN [id number]   Location [facility] 2W/SW Attending Rowena Chavez MD   [facility] Day # 28 PCP Rocío Perez,      77 yo female with repeated shocks required she would want us to stop ACLS. Sonal 42.   [facility] Cardiology    --------------------------------------------------------------------------------------------------------------------------------  ROS 10 systems reviewed, per Surgical History:  No date: ANGIOGRAM  No date: ANGIOPLASTY (CORONARY)  3/4/16: AUTOMATIC EXTERNAL DEFIBRILLATOR, WITH INTEGRA* Left  No date: CARDIAC DEFIBRILLATOR PLACEMENT  No date: CATARACT  7/13/09: CATARACT EXTRACTION W/  INTRAOCULAR LENS IMPLA* Amishah No murmur, pericardial rub, S3.  Lungs: Clear without wheezes, rales, rhonchi or dullness. Normal excursions and effort. Abdomen: Soft, non-tender. BS-present. Extremities: Without clubbing, cyanosis or edema. Peripheral pulses are 2+.   Neurologic: int

## 2018-08-21 NOTE — SLP NOTE
Pt transferred to ICU and intubated. Will place on HOLD at this time. SLP to follow if/when new orders rec'd.     Thank you,  Gisselle Bhatt MA, [address] Pathologist  [place]. [phone]

## 2018-08-21 NOTE — PROGRESS NOTES
[facility]    Progress Note      Assessment and Plan:   1. Recurrent shock state–the etiology is uncertain. She had an event yesterday as outlined per prior notes. Cause is uncertain.   There was concern regarding possible aspiration and temperature source Temporal, resp. rate (!) 28, height 4' 8.5\" (1.435 m), weight 165 lb 14.4 oz (75.3 kg), SpO2 98 %, not currently breastfeeding.     Physical Exam very lethargic white female  HEENT examination is unremarkable with pupils equal round and

## 2018-08-21 NOTE — PROGRESS NOTES
Received patient from floor,RRT,and called code Blue due to SOB and lethargy. .pt is orally intubated . orderd blood work done. Tomas Keep evaluated patient,post intubation xray shows ETT need to be pulled. RT adjusted ETT and repeated the xray. brother at bed si

## 2018-08-22 NOTE — PROGRESS NOTES
08/22/18 0730   Vent Information   Interface Invasive   Vent Type AV   Vent ID [id number]   Vent Mode VC/AC   Settings   FiO2 (%) 40 %   Resp Rate (Set) 12   Vt (Set, mL) 500 mL   Waveform Decelerating ramp   PEEP/CPAP (cm H2O) 5 cm H20   Bias Flow 2   Pe

## 2018-08-22 NOTE — PLAN OF CARE
DISCHARGE PLANNING    • Discharge to home or other facility with appropriate resources Not Progressing        GASTROINTESTINAL - ADULT    • Maintains adequate nutritional intake (undernourished) Not Progressing        Patient/Family Goals    • Patient/Fami harm noted Please see Epic flowsheet for further assessments and interventions.

## 2018-08-22 NOTE — PLAN OF CARE
Problem: Diabetes/Glucose Control  Goal: Glucose maintained within prescribed range  INTERVENTIONS:  - Monitor Blood Glucose as ordered  - Assess for signs and symptoms of hyperglycemia and hypoglycemia  - Administer ordered medications to maintain glucose Oxygen supplementation based on oxygen saturation or ABGs  - Provide Smoking Cessation handout, if applicable  - Encourage broncho-pulmonary hygiene including cough, deep breathe, Incentive Spirometry  - Assess the need for suctioning and perform as needed

## 2018-08-22 NOTE — CONSULTS
[facility] ID CONSULT NOTE    Tate Micaela Patient Status:  Inpatient    10/13/1950 MRN [id number]   [facility] 2W/SW Attending Sparkle Carrington MD   [facility] Day # 34 PCP Ilene Davila.  DO Indira Perez fo blood cx with Klebsiella oxytoca. Arana placed 7/27 and PICC placed 7/27. Patient now DNR. Wbc up to 20.6 this AM, 40% on ventilator, remains intubated. Pressors being weaned, on 16 mcg of levophed. ID consulted.      History:  Past Medical History:   Diagn (CORONARY)  3/4/16: AUTOMATIC EXTERNAL DEFIBRILLATOR, WITH INTEGRA* Left  No date: CARDIAC DEFIBRILLATOR PLACEMENT  No date: CATARACT  7/13/09: CATARACT EXTRACTION W/  INTRAOCULAR LENS IMPLA* Right      Comment: JOSE  2/27/06: CATARACT EXTRACTION W/  Kwan Older 1,000 mg in sodium chloride 0.9 % 250 mL IVPB add-vantage, 1,000 mg, Intravenous, Q24H  •  Pantoprazole Sodium (PROTONIX) 40 mg in Sodium Chloride 0.9 % 10 mL IV push, 40 mg, Intravenous, Daily  •  vasopressin (PITRESSIN) 20 Units in sodium chloride 0.9 % Intravenous, Continuous PRN  •  Normal Saline Flush 0.9 % injection 10 mL, 10 mL, Intravenous, PRN  •  Heparin Sodium (Porcine) 5000 UNIT/ML injection 5,000 Units, 5,000 Units, Subcutaneous, Q8H [address]  •  balsam peru-castor oil (VENELEX) ointment, , Topical, solution, , , PRN    Review of Systems:  Unable to obtain as patient is intubated. Physical Exam:  Vital signs: Blood pressure 111/37, pulse 78, temperature 98.7 °F (37.1 °C), temperature source Temporal, resp.  rate 10, height 4' 8.5\" (1.435 m), weight s/p mastectomy 3/2004, severe MR s/p mitral clip, CAD s/p CABG and multiple PCI, ischemic cardiomyopathy with AICD, EF 6/12/18 with EF 35%, HLD, HTN, h/o pulmonary embolus on coumadin, h/o R fifth toe amputation, h/o severe poliomyelitis, essentially wheel failure    -s/p intubation 7/26, extubated 8/5, completed empiric course of vancomycin and meropenem 7/26-8/5   -s/p reintubation 8/20  # Ischemic cardiomyopathy s/o AICD, TTE 6/12/18 with EF 35%   -TTE 7/27 with EF 10%   -TTE 8/13 with EF 10-15%  # Candid

## 2018-08-22 NOTE — PROGRESS NOTES
[facility]    Progress Note      Assessment and Plan:   1. Recurrent shock state associated with bacteremia. The patient has Klebsiella in her blood. .  She had an event yesterday as outlined per prior notes.   There was concern regarding p continue on ventilator temporarily and she can be shocked by the AICD. Will not perform CPR. Subjective: Billy Tao is a(n) 78year old female who slightly sedated on ventilator.     Objective:   Blood pressure (!) 81/54, pulse 81, temperature 98 °F

## 2018-08-22 NOTE — OCCUPATIONAL THERAPY NOTE
Patient was scheduled to be seen today for OT treatment session;    Patient is on ventilator and is currently on pressors; 8/20 RRT and code blue for respiratory distress; 8/21 code blue for sustained v-tach; patient is not appropriate to be seen at this ti

## 2018-08-22 NOTE — PROGRESS NOTES
[facility] AND [facility]  [facility]HS/[facility]G Cardiology Progress Note    Aleksandr Bartholomew Patient Status:  Inpatient    10/13/1950 MRN [id number]   [facility] 2W/SW Attending Steven Owens MD   [address] Day # 34 PCP Cleo Coates.  DO Chris     77 yo female with Date   • Anesthesia complication    • Anxiety state    • Asthma    • Asthma, moderate persistent     Last hosp '96   • [facility] 1/14/2016   • Blepharitis, both eyes 1/14/2016   • BPPV (benign paroxysmal positional vertigo Left      Comment: JOSE  No date: CATH DRUG ELUTING STENT  No date: COLONOSCOPY  2006: COLONOSCOPY & POLYPECTOMY  No date: HYSTERECTOMY  2004: MASTECTOMY RIGHT  2004: NEEDLE BIOPSY LEFT      Comment: x 2  No date: OTHER SURGICAL HISTORY      Comment: Orthop AM

## 2018-08-22 NOTE — DIETARY NOTE
ADULT NUTRITION REASSESSMENT     Pt is at high nutrition risk. Pt does not meet malnutrition criteria. RECOMMENDATIONS TO MD:  See Nutrition Intervention Nutrition support initiated for extended poor nutrition intake.  TPN initiated as pt still on h meals have been rated as poor. Fluid intake noted to be low at ave 200-700 ml/day. Pt dislikes the puree foods- has had some soups from home per PCT but took only few tsp.  Diet advancement to [facility] soft-hopeful intake improves but if not need to explore f atherosclerosis    • Deep vein thrombosis (HCC)    • Diabetes ([facility]    • DM type 2 (diabetes mellitus, type 2) ([facility] 0989    W/O Complication  Pill, then Insulin added 2003   • Femoral artery stenosis [facility])     Right Superficial   • Floaters 2007   • [facility] oz)   08/11/18 0600 70.2 kg (154 lb 11.2 oz)   08/10/18 0600 71 kg (156 lb 9.6 oz)   08/09/18 0600 74.5 kg (164 lb 4.8 oz)     Wt Readings from Last 10 Encounters:  08/22/18 : 77.2 kg (170 lb 4.8 oz)  07/24/18 : 62.6 kg (138 lb)  07/14/18 : 64 kg (141 lb) 1. 02 0.84  --  1.12   CA 7.8* 7.7*  --  7.1*   MG  --  1.4* 1.7* 2.0   * 134*  --  135*   K 4.5 3.6  3.6 5.9* 4.5    100  --  105   CO2 21* 26  --  18*   OSMOCALC 291 284  --  285       NUTRITION RELATED PHYSICAL FINDINGS:  - Body Fat/Muscle Ma

## 2018-08-22 NOTE — PROGRESS NOTES
120 [facility] dosing service    Follow-up Pharmacokinetic Consult for Vancomycin Dosing     Shelly Wilkinson is a 78year old female admitted on 7/24 who is being treated for sepsis. Patient is on day 3 of Vancomycin 1 gm IV Q 12 hours.   Goal trough is 15-20 u CFU/ML Candida albicans (A) N/A   2.  BLOOD CULTURE Status: Abnormal (Preliminary result)   Collection Time: 08/20/18 6:49 PM   Result Value Ref Range   Blood Culture Result Gram Negative Paul (A) N/A   Blood Smear Positive Blood Culture N/A   Blood Smear Gr

## 2018-08-23 NOTE — PROGRESS NOTES
[facility] AND CLINICS  MHS/AMG Cardiology Progress Note    Tate Hinojosa Patient Status:  Inpatient    10/13/1950 MRN [id number]   Location [facility] 2W/SW Attending Sparkle Carrington MD   [facility] Day # 27 PCP Ilene Perez DO     77 yo female with moderate persistent     Last hosp '96   • BDR (background diabetic retinopathy) ([facility] 1/14/2016   • Blepharitis, both eyes 1/14/2016   • BPPV (benign paroxysmal positional vertigo)    • Breast cancer (HCC)     DCIS   • Breast cancer of upper-outer quadrant COLONOSCOPY  2006: COLONOSCOPY & POLYPECTOMY  No date: HYSTERECTOMY  2004: MASTECTOMY RIGHT  2004: NEEDLE BIOPSY LEFT      Comment: x 2  No date: OTHER SURGICAL HISTORY      Comment: Orthopedic surgeries X8  No date: OTHER SURGICAL HISTORY      Comment: St

## 2018-08-23 NOTE — SLP NOTE
Pt extubated this AM. Given known hx of dysphagia and prolonged intubation with multiple intubations, rec SLP follow-up 8/24 when patient with audible phonation and safe for PO trials. Collaborated with RN. Will follow 8/24 as appropriate.   Thank you,  Lupe Gonzalez

## 2018-08-23 NOTE — PROGRESS NOTES
Notified Dr. Bret Spurling via telephone that patient tolerating spontaneous breathing trial well. RSBI 25, rate of 22 breaths/min, , and NIF -40. Received orders to extubate patient. Respiratory at bedside. Patient extubated to nasal cannula at 3L/min.  Kelsey

## 2018-08-23 NOTE — PROGRESS NOTES
[facility]    Progress Note      Assessment and Plan:   1. Recurrent shock state associated with bacteremia. Shock state is resolved and the leukocytosis is better. Klebsiella is grown from the blood.     Recommendations: Ongoing antibiot Date   WBC 8.8 08/23/2018   HGB 9.0 08/23/2018   HCT 28.6 08/23/2018    08/23/2018   CREATSERUM 0.96 08/23/2018   BUN 21 08/23/2018    08/23/2018   K 3.6 08/23/2018    08/23/2018   CO2 25 08/23/2018    08/23/2018   CA 7.1 08/23/20

## 2018-08-23 NOTE — RESPIRATORY THERAPY NOTE
Pt was put on a CPAP breathing trial per Dr Lea Kendall at 0740, tolerated well for 35 minutes. Per Dr Lea Kendall the patient was extubated at 7271.

## 2018-08-23 NOTE — PLAN OF CARE
CARDIOVASCULAR - ADULT    • Maintains optimal cardiac output and hemodynamic stability Progressing    • Absence of cardiac arrhythmias or at baseline Progressing        Delirium    • Minimize duration of delirium Progressing        Diabetes/Glucose Control patient. Patient out of bed with overhead lift/sling to recliner, tolerating well x 2 today. Extubated today at 0830 to 3L/min nasal cannula. Patient able to expectorate strong moist secretions; educated and able to suction herself with Pervis Shown.  Downgrade

## 2018-08-23 NOTE — PLAN OF CARE
Problem: Diabetes/Glucose Control  Goal: Glucose maintained within prescribed range  INTERVENTIONS:  - Monitor Blood Glucose as ordered  - Assess for signs and symptoms of hyperglycemia and hypoglycemia  - Administer ordered medications to maintain glucose Progressing      Problem: DISCHARGE PLANNING  Goal: Discharge to home or other facility with appropriate resources  INTERVENTIONS:  - Identify barriers to discharge w/pt and caregiver  - Include patient/family/discharge partner in discharge planning  - Ritu Walton intake (undernourished)  INTERVENTIONS:  - Monitor percentage of each meal consumed  - Identify factors contributing to decreased intake, treat as appropriate  - Assist with meals as needed  - Monitor I&O, WT and lab values  - Obtain nutritional consult as hypotension and signs of decreased cardiac output  - Evaluate effectiveness of vasoactive medications to optimize hemodynamic stability  - Monitor arterial and/or venous puncture sites for bleeding and/or hematoma  - Assess quality of pulses, skin color an

## 2018-08-23 NOTE — PROGRESS NOTES
Spontaneous breathing trial initiated by Dr. Corazon Adler, pulmonology, at bedside this morning. CPAP 10/5, 40% FiO2. Patient tolerating well at this time.  RN notified to call respiratory to update plan and to notify Dr. Corazon Adler within a half hour if patient contin

## 2018-08-23 NOTE — PLAN OF CARE
Safety Risk - Non-Violent Restraints    • Patient will remain free from self-harm Completed    • Patient will remain free from self-harm Completed            Restraints D/C'd at 0830.

## 2018-08-24 NOTE — PROGRESS NOTES
Pt was recently extubated and the left AKA still has staples. The incision has healed well. The staples were removed and SS placed. Will sign off.    Please call me if there is any new concern

## 2018-08-24 NOTE — PROGRESS NOTES
[facility]    Progress Note    Reina Olivo Patient Status:  Inpatient    10/13/1950 MRN [id number]   [facility] 2W/SW Attending Joe Gupta MD   [address] Day # 32 PCP Pam Meraz.  DO Chris        Subjective:     Cons 128 (L) 08/24/2018   CREATSERUM 0.75 08/24/2018   BUN 26 (H) 08/24/2018    08/24/2018   K 3.3 08/24/2018    08/24/2018   CO2 27 08/24/2018    (H) 08/24/2018   CA 7.5 (L) 08/24/2018   ALB 2.4 (L) 08/21/2018   ALKPHO 36 08/21/2018   BILT 0

## 2018-08-24 NOTE — PROGRESS NOTES
[facility] ID PROGRESS NOTE    Patricia Never Patient Status:  Inpatient    10/13/1950 MRN [id number]   [facility] 2W/SW Attending Waleska Quiroga MD   [facility] Day # 32 PCP Kuhshi Espino.  DO Chris     Subjective:  Awake, placed back on pressors las cancer     Hemorrhoids     Diverticulosis     History of colon polyps     Post-polio limb muscle weakness     Pseudophakia of both eyes     Blepharitis, both eyes     BDR (background diabetic retinopathy) (HCC)     Acute on chronic congestive heart failure to L anterior tibial bypass requiring multiple interventions, with recent hospitalization 7/3-7/3 with increased L foot pain, treated for R hand cellulitis, discharged on keflex, vascular with plans to consider AKA as outpatient, who presented to the [address] [place] [address] patient with thorpe, thorpe placed 7/26  # Severe PAD with failed L SFA to L anterior tibial bypass graft, 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   # H/o breast

## 2018-08-24 NOTE — PROGRESS NOTES
[facility] AND [facility]  MHS/AMG Cardiology Progress Note    Elvie Dougherty Patient Status:  Inpatient    10/13/1950 MRN [id number]   [facility] 2W/SW Attending Pablo Rodriguez MD   [facility] Day # 32 PCP Bandar Varma.  DO Chris     77 yo female with • Asthma    • Asthma, moderate persistent     Last hosp '96   • BDR (background diabetic retinopathy) ([facility] 1/14/2016   • Blepharitis, both eyes 1/14/2016   • BPPV (benign paroxysmal positional vertigo)    • Breast cancer (HCC)     DCIS   • Breast cance STENT  No date: COLONOSCOPY  2006: COLONOSCOPY & POLYPECTOMY  No date: HYSTERECTOMY  2004: MASTECTOMY RIGHT  2004: NEEDLE BIOPSY LEFT      Comment: x 2  No date: OTHER SURGICAL HISTORY      Comment: Orthopedic surgeries X8  No date: OTHER SURGICAL HISTORY

## 2018-08-24 NOTE — SLP NOTE
ADULT SWALLOWING EVALUATION    ASSESSMENT    ASSESSMENT/OVERALL IMPRESSION:    This current evaluation of swallow function was ordered secondary to Pt's extubation on 8/23/18 (Pt with change in medical status with reintubation on 8/20/18).  Pt well-known to Plan/Recommendations: SLP to reassess  Discharge Recommendations/Plan: Undetermined    HISTORY   MEDICAL HISTORY  Reason for Referral: R/O aspiration    Problem List  Active Problems:    Ischemia of foot    Acute respiratory failure (HCC)    Metabolic acid coronary artery    • Stroke (cerebrum) ([facility] 2005       Prior Living Situation: Home with support  Diet Prior to Admission: Regular; Thin liquids  Precautions: Aspiration        SWALLOWING HISTORY  Current Diet Consistency:  (NPO S/P reintubation on 8/20/18 contact       Hugh Amanda M.S. [address] [facility]  #[id number]

## 2018-08-24 NOTE — PROGRESS NOTES
Vascular Access Note  Inserted by Teri CAMP RN  Vascular Access Screening:   Allergies to Lidocaine: no  Allergies to Latex: no  Presence of Pacemaker/Defibrillator: No  Mastectomy with Lymph Node Dissection: Right Side without LND  AV Fistula / AV Graft

## 2018-08-24 NOTE — PHYSICAL THERAPY NOTE
PHYSICAL THERAPY RE-EVALUATION - INPATIENT     Room Number: 217/217-A  Evaluation Date: 8/24/2018  Type of Evaluation: Re-evaluation   Physician Order: PT Eval and Treat    Presenting Problem: prolonged admission (currently day 31 - 8/24/18) for ischemia is able to participate. She is seen for AAROM of BUE/BLE and slight side to side rolling but unable to tolerate a full roll or attempt at sitting to EOB.  She is able to assist some with RLE but LLE is essentially PROM and patient reports some phantom limb • BPPV (benign paroxysmal positional vertigo)    • Breast cancer (HCC)     DCIS   • Breast [facility] of upper-outer quadrant of left female breast [facility] 10/23/2010   • Cancer of overlapping sites of right female breast ([facility] 10/23/2010   • Cataract 2005, 20 Comment: x 2  No date: OTHER SURGICAL HISTORY      Comment: Orthopedic surgeries X8  No date: OTHER SURGICAL HISTORY      Comment: Stent and Angioplasty  Dr. Jack Hernández  No date: OTHER SURGICAL HISTORY      Comment: Arthrocentesis of L shoulder acromioclaviular RLE in hip abd/ER/hip and knee flexion so is repositioned to neutral with leg elevated at end of session.       BALANCE  Static Sitting: Fair -  Dynamic Sitting: Poor +  Static Standing: Not tested  Dynamic Standing: Not tested    ACTIVITY TOLERANCE  Very l shifting when sitting at EOB or in chair with minimal assistance   Goal #3   Current Status    Goal #4 Patient will assist with donning/doffing left AKA  and verbalize understanding of use and wear   Goal #4   Current Status    Goal #5 Patient to p

## 2018-08-24 NOTE — PLAN OF CARE
-Patient remained in PCU for close monitoring today due to need for norepinephrine drip at a low rate to support blood pressure as well as monitoring oxygenation status.     -PT/OT working with patient.    -Patient remains NPO, on TPN, per SLP due to dyspha Progressing    • Patient/Family Short Term Goal Progressing        RESPIRATORY - ADULT    • Achieves optimal ventilation and oxygenation Progressing        SAFETY ADULT - FALL    • Free from fall injury Progressing

## 2018-08-25 NOTE — PLAN OF CARE
CARDIOVASCULAR - ADULT    • Absence of cardiac arrhythmias or at baseline Not Progressing        Delirium    • Minimize duration of delirium Not Progressing        GENITOURINARY - ADULT    • Absence of urinary retention Not Progressing        Impaired Cogn

## 2018-08-25 NOTE — PROGRESS NOTES
[facility]  MHS/AMG Cardiology Progress Note    Lucas Roll Patient Status:  Inpatient    10/13/1950 MRN [id number]   [facility] 2W/SW Attending José Sheriff MD   [facility] Day # 28 PCP Micheline Shanks.  DO Chris     77 yo female with Last hosp '96   • [facility] (background diabetic retinopathy[facility] 1/14/2016   • Blepharitis, both eyes 1/14/2016   • BPPV (benign paroxysmal positional vertigo)    • Breast cancer (HCC)     DCIS   • Breast cancer of upper-outer quadrant of left female breast ( POLYPECTOMY  No date: HYSTERECTOMY  2004: MASTECTOMY RIGHT  2004: NEEDLE BIOPSY LEFT      Comment: x 2  No date: OTHER SURGICAL HISTORY      Comment: Orthopedic surgeries X8  No date: OTHER SURGICAL HISTORY      Comment: Stent and Angioplasty  Dr. Diana Morales  No

## 2018-08-25 NOTE — PROGRESS NOTES
[facility]    Progress Note    Jessika Perry Patient Status:  Inpatient    10/13/1950 MRN [id number]   [facility] 2W/SW Attending Sarthak Mccallum MD   [facility] Day # 28 PCP Marguerite Good.  DO Chris        Subjective:     Constitutio Results  Component Value Date   WBC 4.6 08/25/2018   HGB 8.1 (L) 08/25/2018   HCT 26.0 (L) 08/25/2018    (L) 08/25/2018   CREATSERUM 0.63 08/25/2018   BUN 24 (H) 08/25/2018    08/25/2018   K 3.8 08/25/2018    08/25/2018   CO2 26 08/25/20

## 2018-08-25 NOTE — SLP NOTE
ADULT SWALLOWING RE-EVALUATION    ASSESSMENT    ASSESSMENT/OVERALL IMPRESSION:    This current re-evaluation of swallow function was completed d/t BSE 8/24/18 (following extubation on 8/23/18) recommending NPO.  Pt well-known to this dept with prior BSEs an closely. Diet to be upgrade to mechanical soft/thin liquids as tolerated. RECOMMENDATIONS   Diet Recommendations - Solids: Puree  Diet Recommendations - Liquid: Nectar thick                     Compensatory Strategies Recommended: No straws; Felipe Trimble Hyperlipidemia LDL goal <70    • Hypertension, essential, benign    • IBS (irritable bowel syndrome)    • Meibomian gland dysfunction 2006   • Menometrorrhagia    • Muscle weakness    • Myocardial infarction [facility]) 2015    x2   • Osteopenia    • Other ill-d impaired  Laryngeal Elevation Coordination: Appears impaired  (Please note: Silent aspiration cannot be evaluated clinically.  Videofluoroscopic Swallow Study is required to rule-out silent aspiration.)    Esophageal Phase of Swallow: No complaints consiste

## 2018-08-25 NOTE — OCCUPATIONAL THERAPY NOTE
OCCUPATIONAL THERAPY ReEVALUATION - INPATIENT     Room Number: 217/217-A  Evaluation Date: 8/25/2018  Type of Evaluation: Initial  Presenting Problem:  (aka, re-eval after intubation)    Physician Order: IP Consult to Occupational Therapy  Reason for ABBI R [facility] Cardiogenic shock (HCC)    KIMI (acute kidney injury) ([facility] [address]    Anasarca      Past Medical History  Past Medical History:   Diagnosis Date   • Anesthesia complication    • Anxiety state    • Asthma    • Asthma, moderate persistent     Last hosp '96   • BDR ( date:  CATARACT  7/13/09: CATARACT EXTRACTION W/  INTRAOCULAR LENS IMPLA* Right      Comment: JOSE  2/27/06: CATARACT EXTRACTION W/  INTRAOCULAR LENS IMPLA* Left      Comment: JOSE  No date: CATH DRUG ELUTING STENT  No date: COLONOSCOPY  2006: COLONOSCOPY & P functional limits     STRENGTH ASSESSMENT  LUE    strength: 4/5  Elbow flexion: 4/5  Elbow extension: 4/5  Shoulder flexion: 3+/5    RUE:    strength: 5/5  Elbow flexion: 5/5  Elbow extension: 5/5  Shoulder flexion: 4/5    COORDINATION  Gross Motor Becky Monroy MOT/R  [address]  [id number]

## 2018-08-25 NOTE — PROGRESS NOTES
[facility]  Progress Note     Mel Lairdn  : 10/13/1950    Status: Inpatient  Day #: 28    Attending: Charly Pacheco MD  PCP: Anthony Domingo.  DO Chris      Assessment and Plan     Septic Shock with klebsiella bacteremia  -con't IV rocephin  -ID Net           256.33 ml         Recent Labs   Lab  08/23/18   0519  08/24/18   0501  08/25/18   0555   WBC  8.8  5.4  4.6   HGB  9.0*  8.6*  8.1*   HCT  28.6*  27.6*  26.0*   PLT  162  128*  104*   RBC  3.56*  3.44*  3.26*   MCV  80.4  80.3  79.7*   MCH Otherwise little change from August 23, 2018. 3. Cardiomegaly. 4. Postop changes. 5. Congestive heart failure with bibasilar lung consolidation/atelectasis. 6. Blunting left costophrenic angle. 7. Catheter in superior vena cava. 8. Defibrillator.     Dictat

## 2018-08-26 NOTE — PROGRESS NOTES
[facility]    Progress Note    Kitty Lopes Patient Status:  Inpatient    10/13/1950 MRN [id number]   [facility] 2W/SW Attending Arabella Dsouza, [address] [facility] [address] Day # 35 PCP Ginna Wright.  DO Chris       Subjective:     Pt has s aldactone     Acute encephalopathy  - due to prolonged hospitalization, medications, infection, respiratory compromise  - improving  - minimize sedating meds     Acute ischemia of the left foot  - s/p AKA  - wound healing well however there was some dehisc

## 2018-08-26 NOTE — OPERATIVE REPORT
Preop diagnosis: Left leg above-knee amputation wound dehiscence    Operative diagnosis: Same    Operation: Primary wound closure of left leg above-knee amputation wound dehiscence    Anesthesia: 1% Xylocaine local    Procedure:   The patient's left leg deh

## 2018-08-26 NOTE — PROGRESS NOTES
[facility]    Progress Note    Mel Liu Patient Status:  Inpatient    10/13/1950 MRN [id number]   [facility] 2W/SW Attending Ebenezer Walton MD   [address] Day # 35 PCP Anthony Domingo.  DO Chris        Subjective:     Constitutio 08/26/2018    (H) 08/26/2018   CA 8.2 (L) 08/26/2018   ALB 2.4 (L) 08/21/2018   ALKPHO 36 08/21/2018   BILT 0.7 08/21/2018   TP 5.8 (L) 08/21/2018   AST 22 08/21/2018   ALT 14 08/21/2018   PTT 32.7 08/20/2018   INR 1.4 (H) 08/20/2018   PT 18.7 (H) 0

## 2018-08-26 NOTE — PROGRESS NOTES
The patient developed a spontaneous wound dehiscence of the medial one half of her above-knee amputation wound of the left leg. The lateral aspect of the wound remained intact.   There is no evidence for any infection or any significant hematoma or wound f

## 2018-08-26 NOTE — SLP NOTE
Attempted to see patient for dysphagia session. However, pt reportedly with wound need and surgeon in room to complete wound care to surgical sight of L stump at time of SLP attempt.  Will re-attempt in PM for diet fabricio per BSSE results and recommendations a

## 2018-08-27 NOTE — PROGRESS NOTES
Patient is doing well overall. She denies any pain. Her left above-knee amputation incision that was closed yesterday by Dr. Keon Myles appears to be holding. There is minimal drainage on her dressing. We will check on her periodically.

## 2018-08-27 NOTE — PLAN OF CARE
Problem: Patient Centered Care  Goal: Patient preferences are identified and integrated in the patient's plan of care  Interventions:  - What would you like us to know as we care for you? \"My left foot really hurts. \"  - Provide timely, complete, and accu response  - Implement non-pharmacological measures as appropriate and evaluate response  - Consider cultural and social influences on pain and pain management  - Manage/alleviate anxiety  - Utilize distraction and/or relaxation techniques  - Monitor for op Progressing      Problem: GASTROINTESTINAL - ADULT  Goal: Minimal or absence of nausea and vomiting  INTERVENTIONS:  - Maintain adequate hydration with IV or PO as ordered and tolerated  - Nasogastric tube to low intermittent suction as ordered  - Evaluate Achieves optimal ventilation and oxygenation  INTERVENTIONS:  - Assess for changes in respiratory status  - Assess for changes in mentation and behavior  - Position to facilitate oxygenation and minimize respiratory effort  - Oxygen supplementation based o Evaluate fluid balance, assess for edema, trend weights   Outcome: Progressing    Goal: Absence of cardiac arrhythmias or at baseline  INTERVENTIONS:  - Continuous cardiac monitoring, monitor vital signs, obtain 12 lead EKG if indicated  - Evaluate effecti

## 2018-08-27 NOTE — PHYSICAL THERAPY NOTE
Pt was to be seen for PT treatment session. Consulted w/ RN prior to seeing pt. Nursing staff used lift to transfer pt to chair earlier today. Visited pt in room. Pt was received supine in bed.  Pt declined participating with therapy at this time as she was

## 2018-08-27 NOTE — PROGRESS NOTES
[facility]    Progress Note      Assessment and Plan:   1. Recurrent shock state associated with bacteremia. Shock state is resolved and the leukocytosis is better. Klebsiella is grown from the blood.     Recommendations: Ongoing antibiot 08/27/2018     Chest x-ray– poor inspiration but otherwise clear. Blood cultures–Klebsiella.     Ángela Hobbs MD  Medical Director, Critical Care, [address] [place] [address]  Medical Director, [facility]  Pager: [phone]

## 2018-08-27 NOTE — SLP NOTE
Attempted to see patient for dysphagia therapy, however, patient refused because she wasn't feeling well. Will attempt to see tomorrow.   Dorota Zaman MA/[facility]-SLP  Speech Language Pathologist  Marshal. [phone]

## 2018-08-27 NOTE — PROGRESS NOTES
INFECTIOUS DISEASE PROGRESS NOTE    Reina Olivo Patient Status:  Inpatient    10/13/1950 MRN [id number]   [facility] 2W/SW Attending Roula Jose, [address] [facility] [address] Day # 29 PCP Pam Meraz. DO Chris     Subjective:  No acute events o/n.   Af cancer     Hemorrhoids     Diverticulosis     History of colon polyps     Post-polio limb muscle weakness     Pseudophakia of both eyes     Blepharitis, both eyes     BDR (background diabetic retinopathy) (HCC)     Acute on chronic congestive heart failure to L anterior tibial bypass requiring multiple interventions, with recent hospitalization 7/3-7/3 with increased L foot pain, treated for R hand cellulitis, discharged on keflex, vascular with plans to consider AKA as outpatient, who presented to the [address] [place] [address] 10-15%  # Candiduria in patient with thorpe, thorpe placed 7/26  # Severe PAD with failed L SFA to L anterior tibial bypass graft, now s/p L AKA 8/1/18, now active s/s of infection  # H/o severe poliomyelitis, essentially WC bound  # HTN  # Severe MR with mi

## 2018-08-27 NOTE — PROGRESS NOTES
[facility] CLIN[facility]  Progress Note    Michell Duncan Patient Status:  Inpatient    10/13/1950 MRN [id number]   [facility] 2W/SW Attending Taran Vasquez, [address] [facility] [address] Day # 29 PCP Oscar Lawson. DO Chris     Assessment:    1.   Status post re-sta non-tender. Extremities: Left AKA. Right leg pale but warm with normal neural function. No edema. Skin: Warm and dry.      Labs:    Lab Results  Component Value Date   WBC 6.9 08/27/2018   HGB 7.9 08/27/2018   HCT 25.8 08/27/2018   PLT 89 08/27/2018

## 2018-08-27 NOTE — PROGRESS NOTES
[facility]    Progress Note    Tony Schulte Patient Status:  Inpatient    10/13/1950 MRN [id number]   [facility] 2W/SW Attending Negro Villegas, [address] [facility] [address] Day # 29 PCP Amaya Rose. DO Chris       Subjective:     No SOB. to prolonged hospitalization, medications, infection, respiratory compromise  - improving  - minimize sedating meds    Poor PO intake.   - wean TPN     Acute ischemia of the left foot  - s/p AKA  - wound healing well however there was some dehiscence and pt

## 2018-08-28 NOTE — CM/SW NOTE
Care Management     Progression of Care Note: Hospital Day # 28  Saw pt at bedside to assess her progress. She is resting in bed, awake, alert, orientated X2, person/place, a little forgetful, but is making progress. C/o butt soreness, but feels better.  St

## 2018-08-28 NOTE — PROGRESS NOTES
[facility] AND [facility]  Progress Note    Peter Enciso Patient Status:  Inpatient    10/13/1950 MRN [id number]   [facility] 2W/SW Attending Antoni Rosales, [address] [facility] [address] Day # 28 PCP Pamella Arroyo. Chris,      Assessment:     1.   Stable respirator HCT 24.9 08/28/2018   PLT 89 08/28/2018       Lab Results  Component Value Date   PT 18.7 (H) 09/19/2016   INR 1.4 (H) 08/28/2018       Lab Results  Component Value Date    08/28/2018   K 4.0 08/28/2018    08/28/2018   CO2 26 08/28/2018   BUN

## 2018-08-28 NOTE — PHYSICAL THERAPY NOTE
PHYSICAL THERAPY TREATMENT NOTE - INPATIENT     Room Number: 217/217-A       Presenting Problem: prolonged admission for ischemia left foot with need for AKA/vasc sx team following ---    hosp course complicated by decline in cardio status with questionabl Sub-acute rehabilitation     PLAN  PT Treatment Plan: Bed mobility; Endurance; Patient education; Energy conservation; Family education;Range of motion;Strengthening;Transfer training;Balance training    SUBJECTIVE  Pt cannot find her phone.  She wants to call Patient End of Session: Up in chair;Needs met;Call light within reach;RN aware of session/findings; All patient questions and concerns addressed    CURRENT GOALS   Goals to be met by: 9/5/18  Patient Goal Patient's self-stated goal is: to get to rehab

## 2018-08-28 NOTE — PROGRESS NOTES
[place] [facility]    Progress Note    Indira Fofana Patient Status:  Inpatient    10/13/1950 MRN [id number]   Location [facility] 3W/SW Attending Candance Lewis, [address] [facility] Day # 28 PCP [place].  DO Chris       Subjective:     Less pha 8/28/2018 at 8:51     Approved by (CST): Bety Melgar MD on 8/28/2018 at 8:55                Assessment and Plan:     Septic Shock with klebsiella bacteremia  - cont IV rocephin  - ID following  - off pressors     Acute hypoxemic respiratory failure.

## 2018-08-28 NOTE — DIETARY NOTE
ADULT NUTRITION REASSESSMENT     Pt is at moderate nutrition risk. Pt does not meet malnutrition criteria.         RECOMMENDATIONS TO MD:  See Nutrition Intervention TPN last bag today- will need tube feeds if no improvement in po intake- currently remains lunch items at bedside. Most meals have been rated as poor. Fluid intake noted to be low at ave 200-700 ml/day. Pt dislikes the puree foods- has had some soups from home per PCT but took only few tsp.  Diet advancement to [facility] soft-hopeful intake improves Blepharitis, both eyes 1/14/2016   • BPPV (benign paroxysmal positional vertigo)    • Breast cancer [facility])     DCIS   • Breast cancer of upper-outer quadrant of left female breast ([facility] 10/23/2010   • Cancer of overlapping sites of right female breast ([facility] 336 hrs:   Weight   08/28/18 0500 73 kg (160 lb 15 oz)   08/27/18 0600 71 kg (156 lb 8.4 oz)   08/25/18 0300 71.5 kg (157 lb 10.1 oz)   08/24/18 0400 77.1 kg (169 lb 15.6 oz)   08/23/18 0600 77.7 kg (171 lb 4.8 oz)   08/22/18 0400 77.2 kg (170 lb 4.8 oz) puff Inhalation Daily   • Rosuvastatin Calcium  20 mg Oral Nightly   • saccharomyces boulardii  250 mg Oral BID   • Saline Nasal Spray  3 spray Nasal Nightly         LABS: reviewed . Gluc controlled with insulin rx. POC: 190, 180 today.      Recent Labs   0

## 2018-08-28 NOTE — PROGRESS NOTES
INFECTIOUS DISEASE PROGRESS NOTE    [facility] Patient Status:  Inpatient    10/13/1950 MRN [id number]   [facility] 2W/SW Attending Beverly Flores, [address] [facility] [address] Day # 28 PCP Jayson Granddereck. DO Chris     Subjective:  No acute events o/n.   Af Cough     Family history of colon cancer     Hemorrhoids     Diverticulosis     History of colon polyps     Post-polio limb muscle weakness     Pseudophakia of both eyes     Blepharitis, both eyes     BDR (background diabetic retinopathy) ([address]     Acute failed left superficial femoral artery to L anterior tibial bypass requiring multiple interventions, with recent hospitalization 7/3-7/3 with increased L foot pain, treated for R hand cellulitis, discharged on keflex, vascular with plans to consider AKA as with EF 10%               -TTE 8/13 with EF 10-15%  # Candiduria in patient with thorpe, thorpe placed 7/26  # Severe PAD with failed L SFA to L anterior tibial bypass graft, now s/p L AKA 8/1/18, now active s/s of infection  # H/o severe poliomyelitisshelli

## 2018-08-28 NOTE — OCCUPATIONAL THERAPY NOTE
OCCUPATIONAL THERAPY TREATMENT NOTE - INPATIENT    Room Number: 217/217-A         Presenting Problem:  (aka, re-eval after intubation)     Problem List  Active Problems:    Ischemia of foot    Acute respiratory failure (HCC)    Metabolic acidosis    Cardio off regular lower body clothing?: Total  -   Bathing (including washing, rinsing, drying)?: A Lot  -   Toileting, which includes using toilet, bedpan or urinal? : Total  -   Putting on and taking off regular upper body clothing?: A Lot  -   Taking care of

## 2018-08-28 NOTE — PLAN OF CARE
Maintains adequate nutritional intake (undernourished) Not Progressing    Poor po intake - swallowing precautions maintained and complete feed. Continues on TPN but per Dietitian the plan is to stop TPN after the present bag is completed.   Maintains optima Norma Santo here - patient in stable condition and transferred to Room 314 per bed.

## 2018-08-28 NOTE — PROGRESS NOTES
[facility]    Progress Note      Assessment and Plan:   1. Recurrent shock state associated with bacteremia. Shock state is resolved and the leukocytosis is better. Klebsiella is grown from the blood.   The catheter tip culture was negati reflex.      Results:       Lab Results  Component Value Date   WBC 6.4 08/28/2018   HGB 7.7 08/28/2018   HCT 24.9 08/28/2018   PLT 89 08/28/2018   CREATSERUM 0.59 08/28/2018   BUN 27 08/28/2018    08/28/2018   K 4.0 08/28/2018    08/28/2018   C

## 2018-08-28 NOTE — PLAN OF CARE
Problem: Patient Centered Care  Goal: Patient preferences are identified and integrated in the patient's plan of care  Interventions:  - What would you like us to know as we care for you? \"My left foot really hurts. \"  - Provide timely, complete, and accu response  - Implement non-pharmacological measures as appropriate and evaluate response  - Consider cultural and social influences on pain and pain management  - Manage/alleviate anxiety  - Utilize distraction and/or relaxation techniques  - Monitor for op related to functional status, cognitive ability or social support system   Outcome: Progressing      Problem: GASTROINTESTINAL - ADULT  Goal: Minimal or absence of nausea and vomiting  INTERVENTIONS:  - Maintain adequate hydration with IV or PO as ordered patient on fluid and nutrition restrictions as appropriate   Outcome: Progressing      Problem: RESPIRATORY - ADULT  Goal: Achieves optimal ventilation and oxygenation  INTERVENTIONS:  - Assess for changes in respiratory status  - Assess for changes in men cardiac monitoring, monitor vital signs, obtain 12 lead EKG if indicated  - Evaluate effectiveness of antiarrhythmic and heart rate control medications as ordered  - Initiate emergency measures for life threatening arrhythmias  - Monitor electrolytes and a

## 2018-08-28 NOTE — CONSULTS
Pt chart reviewed in this critically ill patient who has been hospitalized for 35 days. Last seen 8/20/18 and had been progressing.  One concern was patient's severe ischemic cardiomyopathy with EF 10-15%, but patient had been progressing in therapy, and

## 2018-08-28 NOTE — SLP NOTE
SPEECH DAILY NOTE - INPATIENT    ASSESSMENT & PLAN   ASSESSMENT  Patient alert, but easily fatigues. Sister present at [facility]. Patient very confused and disoriented, stated \"I had a big Mother's Day lunch and I'm not hungry. \"  RN reports patient with poor tolerate pureed consistency and nectar-thick liquids without overt signs or symptoms of aspiration with 100 % accuracy over 3 session(s).      Patient tolerated limited sample of pureed, nectar-thick liquids without s/s of aspiration.     In Progress   Goal

## 2018-08-29 NOTE — PROGRESS NOTES
INFECTIOUS DISEASE PROGRESS NOTE    Deacon King Patient Status:  Inpatient    10/13/1950 MRN [id number]   Location [facility] 3W/SW Attending Vasu Alejo MD   [facility] Day # 39 PCP Annel Julien. DO Chris     Subjective:  No acute events o/n.   Remains benign     Coronary atherosclerosis     Major depressive disorder, single episode, moderate (HCC)     Cancer of overlapping sites of right female breast ([facility] [address]     Mixed hyperlipidemia     Osteoporosis     Peripheral vascular disease (HCC)     Vitamin D def 3/2004, severe MR s/p mitral clip, CAD s/p CABG and multiple PCI, ischemic cardiomyopathy with AICD, EF 6/12/18 with EF 35%, HLD, HTN, h/o pulmonary embolus on coumadin, h/o R fifth toe amputation, h/o severe poliomyelitis, essentially wheelchair bound, se Acute hypoxic respiratory failure                -s/p intubation 7/26, extubated 8/5, completed empiric course of vancomycin and meropenem 7/26-8/5               -s/p reintubation 8/20 s/p extubation  # Ischemic cardiomyopathy s/o AICD, TTE 6/12/18 with EF

## 2018-08-29 NOTE — DIETARY NOTE
ADULT NUTRITION BRIEF NOTE    Visited with patient and sister Cecilio Fagan briefly today. Sister reports improvements in pt's mental status and PO intake over the past 24 hours.  Per medical record, only consumed ~5% of breakfast and lunch trays on 8/28 with no re

## 2018-08-29 NOTE — CM/SW NOTE
Met w/ sister Wendall Shone at bedside to f/u on a SNF preference. She states they have not come to a decision yet. Informed patient that MD notes state possible DC in 1-2 days and referrals need to be sent prior. Wendall Shone verbalized understanding. SW/CM to follow.

## 2018-08-29 NOTE — PROGRESS NOTES
[facility] Pharmacy Note: Route Optimization for Pantoprazole (PROTONIX)    Patient is currently on Pantoprazole (PROTONIX) 40 mg IV every 24 hours.    The patient meets the criteria to convert to the oral equivalent as established by the IV to Oral conversion pro

## 2018-08-29 NOTE — WOUND PROGRESS NOTE
Wound Care Services  Follow up on the pt's buttock cheeks, the pt. is on a 1st step overlay air mattress, the pt. assists with turning in bed, no family here at this time.  The pt's nurse is at the bedside, see wound documentation,  bilateral buttock cheeks

## 2018-08-29 NOTE — PROGRESS NOTES
[facility] AND [facility]  Progress Note    Charla Green Patient Status:  Inpatient    10/13/1950 MRN [id number]   [facility] 3W/SW Attending Vale Clements MD   [facility] Day # 39 PCP Hina Milligan. DO Chris     Assessment:    1.   Stable blood pressure an Lab Results  Component Value Date   TRIG 65 08/29/2018       Lab Results  Component Value Date   TROP 0.06 () 08/20/2018   TROP 0.38 () 07/27/2018   TROP 0.39 () 07/26/2018        Medications:    • Insulin Aspart Pen  1-7 Units Subcutaneous TID

## 2018-08-29 NOTE — SLP NOTE
SPEECH DAILY NOTE - INPATIENT    ASSESSMENT & PLAN   ASSESSMENT  Patient seen for swallowing therapy to monitor swallowing tolerance of puree and nectar thick diet and train swallowing precautions that were recommended from previous VFSS.   Pt is alert with care is completed the water is drained from the sponge so it is damp and not dripping wet. Will continue to monitor for tolerance, train swallowing precautions, and train dysphagia exercises. No diet upgrade at this time.     Diet Recommendations - Solids sips, alternate consistencies, and use of chin tuck to improve consistency for possible upgrade to thin liquids.   No straws were used.     In Progress   Goal #4 The patient will tolerate trial upgrade of mechanical soft ground consistency and thin liquids

## 2018-08-29 NOTE — PROGRESS NOTES
The patient's above-knee amputation dressing on the left side was changed by wound care. It is doing well. She has little drainage but still has significant swelling. No pain.

## 2018-08-29 NOTE — CM/SW NOTE
SW attempted to see pt regarding MDO for polst. Pt out of room for testing. / to remain available for support and/or discharge planning.      Alec Fleischer  [id number]

## 2018-08-29 NOTE — PROGRESS NOTES
[facility]  Progress Note     Charla Green  : 10/13/1950    Status: Inpatient  Day #: 39    Attending: Chance Terrazas MD  PCP: Hina Milligan.  DO Chris      Assessment and Plan     Septic Shock with klebsiella bacteremia - possibly from line inf rhythm  Pulmonary:  Clear to auscultation bilaterally, respirations unlabored  Gastrointestinal:  Soft, non-tender, normal bowel sounds  Musculoskeletal:  No joint swelling  Extremities:  No edema, no cyanosis, no clubbing  Neurologic:  nonfocal  Psychiatr Continued cardiomegaly with increased density at the left lung base consistent with fluid and underlying infiltrate or edema not significantly changed. . Slight clearing of the left perihilar edema though it is still present. 2. Low lung volumes.  Some minim time counseling coordinating care. Discussed wound, plan for culture and US, discharge plans.       Vance Dumont MD

## 2018-08-29 NOTE — PROGRESS NOTES
[place] [facility]    Progress Note      Assessment and Plan:   1. Recurrent shock state associated with bacteremia. Shock state is resolved and the leukocytosis is better. Klebsiella is grown from the blood.   The catheter tip culture was negati and reflex.      Results:       Lab Results  Component Value Date   WBC 5.3 08/29/2018   HGB 7.8 08/29/2018   HCT 25.4 08/29/2018   PLT 94 08/29/2018   CREATSERUM 0.69 08/29/2018   BUN 26 08/29/2018    08/29/2018   K 3.8 08/29/2018    08/29/2018

## 2018-08-30 NOTE — PROGRESS NOTES
[facility]  Progress Note    Jessika Perry Patient Status:  Inpatient    10/13/1950 MRN [id number]   [facility] 3W/SW Attending Sarthak Mccallum MD   [facility] Day # 40 PCP Marguerite Perez DO     Assessment:    1.   Severe ischemic cardiomy INR 1.6 (H) 08/30/2018       Lab Results  Component Value Date    08/30/2018   K 3.9 08/30/2018   K 3.9 08/30/2018    08/30/2018   CO2 25 08/30/2018   BUN 26 08/30/2018   CREATSERUM 0.80 08/30/2018    08/30/2018   CA 7.9 08/30/2018

## 2018-08-30 NOTE — DIETARY NOTE
NUTRITION NOTE:    Visited pt and sister Janett Read to review/obtain PO and supplement intake record for 8/29/18. Per pt & sister diet upgraded now from Pureed to Chopped, sister hopeful this will improve PO intake.     Estimated Kcal/Pro intake for 3 meals/supp

## 2018-08-30 NOTE — PROGRESS NOTES
[place] [facility]    Progress Note      Assessment and Plan:   1. Recurrent shock state associated with bacteremia. Shock state is resolved and the leukocytosis is better. Klebsiella is grown from the blood.   The catheter tip culture was negati regular rate and rhythm no murmur. Abdomen nontender, without hepatosplenomegaly and no mass appreciable. Extremities without clubbing cyanosis nor edema on the right, status post left AKA.    Neurologic grossly intact with symmetric tone and strength a

## 2018-08-30 NOTE — SLP NOTE
SPEECH DAILY NOTE - INPATIENT    ASSESSMENT & PLAN   ASSESSMENT      Pt positioned upright in bed with current diet of pureed/nectar-thick liquids for monitoring of diet tolerance.  Swallowing precautions/strategies discussed and demonstrated with Pt; Pt co Strategies Recommended: No straws; Alternate consistencies;Small bites and sips  Aspiration Precautions: Upright position; Slow rate;Small bites and sips; No straw  Medication Administration Recommendations: One pill at a time; Whole in puree    Patient Experi signs or symptoms of aspiration with 100 % accuracy over 4 session(s). Diet upgraded to mechanical soft ground.  Pt to remain on nectar-thick liquids; unable to follow strategies necessary for safe intake of thin liquids.       In Progress     FOLLOW UP

## 2018-08-30 NOTE — PROGRESS NOTES
[place] [facility] - [facility]  Progress Note     Terrell Counter  : 10/13/1950    Status: Inpatient  Day #: 40    Attending: Rin Langston MD  PCP: Excell Hammans.  Chris, DO      Assessment and Plan     Septic Shock with klebsiella bacteremia - possibly from line inf atraumatic  Neck:  Supple, symmetrical  Cardiac:  Regular rate, regular rhythm  Pulmonary:  Clear to auscultation bilaterally, respirations unlabored  Gastrointestinal:  Soft, non-tender, normal bowel sounds  Musculoskeletal:  No joint swelling  Extremitie 16: 11     Approved by (CST): Elroy Valles MD on 8/29/2018 at 16:13          Xr Chest Ap Portable  (cpt=71045)    Result Date: 8/29/2018  CONCLUSION:  1. Slightly more haziness over the right chest which may reflect some increasing edema and/or fluid.  2.

## 2018-08-30 NOTE — PROGRESS NOTES
INFECTIOUS DISEASE PROGRESS NOTE    Elisa Dent Patient Status:  Inpatient    10/13/1950 MRN [id number]   Location [facility] 3W/SW Attending Umer Weaver MD   [facility] Day # 40 PCP Power Contreras. DO Chris     Subjective:  No acute events o/n.   Afebrile disorder, single episode, moderate (HCC)     Cancer of overlapping sites of right female breast ([address]     Mixed hyperlipidemia     Osteoporosis     Peripheral vascular disease ([address]     Vitamin D deficiency     History of pulmonary embolism     History of PCI, ischemic cardiomyopathy with AICD, EF 6/12/18 with EF 35%, HLD, HTN, h/o pulmonary embolus on coumadin, h/o R fifth toe amputation, h/o severe poliomyelitis, essentially wheelchair bound, severe PAD s/p failed left superficial femoral artery to L ante intubation 7/26, extubated 8/5, completed empiric course of vancomycin and meropenem 7/26-8/5               -s/p reintubation 8/20 s/p extubation  # Ischemic cardiomyopathy s/o AICD, TTE 6/12/18 with EF 35%               -TTE 7/27 with EF 10%

## 2018-08-30 NOTE — CM/SW NOTE
SW received an order for POLST form. SW met with pt and her sister who is also pt's [address] [phone]) at bedside to discuss POLST and discharge planning. Rachael Koyanagi will bring in her [facility] paperwork tomorrow. Rachael Koyanagi and pt completed the POLST form.  Benjamin Woodard

## 2018-08-30 NOTE — PLAN OF CARE
Problem: Patient Centered Care  Goal: Patient preferences are identified and integrated in the patient's plan of care  Interventions:  - What would you like us to know as we care for you? \"My left foot really hurts. \"  - Provide timely, complete, and accu response  - Implement non-pharmacological measures as appropriate and evaluate response  - Consider cultural and social influences on pain and pain management  - Manage/alleviate anxiety  - Utilize distraction and/or relaxation techniques  - Monitor for op Progressing      Problem: GASTROINTESTINAL - ADULT  Goal: Minimal or absence of nausea and vomiting  INTERVENTIONS:  - Maintain adequate hydration with IV or PO as ordered and tolerated  - Nasogastric tube to low intermittent suction as ordered  - Evaluate and oxygenation  INTERVENTIONS:  - Assess for changes in respiratory status  - Assess for changes in mentation and behavior  - Position to facilitate oxygenation and minimize respiratory effort  - Oxygen supplementation based on oxygen saturation or ABGs monitor vital signs, obtain 12 lead EKG if indicated  - Evaluate effectiveness of antiarrhythmic and heart rate control medications as ordered  - Initiate emergency measures for life threatening arrhythmias  - Monitor electrolytes and administer replacemen

## 2018-08-31 NOTE — PROGRESS NOTES
[facility]  Progress Note      [facility] Patient Status:  Inpatient    10/13/1950 MRN [id number]   Location [facility] Attending Kenya Larkin, [address] [facility] [address] Day # 45 PCP Micheline Shanks.  Carla Conner,      Patient schedule

## 2018-08-31 NOTE — CM/SW NOTE
KEANU informed by RN that pt is medically stable for discharge today at  2:00pm pm. KEANU spoke with [facility] [phone] confirmed that they are able to accept pt at that time.  Keanu spoke with Russell Mark [phone] and she is aware of d/c plan and

## 2018-08-31 NOTE — PROGRESS NOTES
Procedure was cancelled by Dr Russell Alicea. Notified Jose Armando Cooley RN of the procedure not being done. Dr Russell Alicea spoke with pt and family regarding the reason for cancellation.

## 2018-08-31 NOTE — DISCHARGE SUMMARY
[facility]  Discharge Summary     Elisa Dent  : 10/13/1950    Status: Inpatient  Day #: 45    Attending: Vance Dumont MD  PCP: Power Contreras.  DO Chris     Date of Admission: 2018  Date of Discharge: 2018     [facility] Rosamaria ischemia on August 1, 2018. Following the surgery, the patient continued to require pressors. She gradually improved, was extubated, and was downgraded to PCU status on August 12, 2018, then to telemetry on August 15.   As the patient was improving, plan Consulting Physician  SURGERY, VASCULAR    Michelle Davila MD Consulting Physician  INFECTIOUS DISEASES    Martine Singh, [address] [facility] Physician  Internal Medicine    Thuy Hyde MD Consulting Physician  Physical Medicine    Marina Oro MD C needed (reddness).    Quantity:  1 Bottle  Refills:  0     Pantoprazole Sodium 40 MG Tbec  Commonly known as:  PROTONIX  Start taking on:  9/1/2018      Take 1 tablet (40 mg total) by mouth every morning before breakfast.   Quantity:  30 tablet  Refills:  0 take this  · additional instructions      Take 1.5 tablets (7.5 mg total) by mouth every evening. OR AS DIRECTED BY COUMADIN CLINIC.    Quantity:  135 tablet  Refills:  0     Warfarin Sodium 7.5 MG Tabs  Commonly known as:  COUMADIN  What changed:  · how mu (2.5 mg total) by mouth daily. Quantity:  30 tablet  Refills:  0     magnesium 250 MG Tabs      Take 500 mg by mouth daily.    Refills:  0     Meclizine HCl 25 MG Tabs  Commonly known as:  ANTIVERT      Take 1 tablet (25 mg total) by mouth 3 (three) times SALINE MIST      3 sprays by Nasal route nightly. Refills:  0     THERA/BETA-CAROTENE Tabs      Take 1 tablet by mouth daily. She is now taking tablets not gummies. Refills:  0     Vitamin B Complex Tabs      Take 1 tablet by mouth daily.    Refills: Risk  29-58 Medium Risk  0-28   Low Risk. TCM Follow-Up Recommendation:  LACE > 58: High Risk of readmission after discharge from the hospital.        I spent >30 minutes on this discharge, counseling patient and discussing discharge plans.  All question

## 2018-08-31 NOTE — PROGRESS NOTES
INFECTIOUS DISEASE PROGRESS NOTE    Terrell Counter Patient Status:  Inpatient    10/13/1950 MRN [id number]   Location [facility] 3W/SW Attending Miki Mathews MD   [address] Day # 45 PCP Excell Hammans. DO Chris     Subjective:  Feels well. Tolerating abx.  No Hemorrhoids     Diverticulosis     History of colon polyps     Post-polio limb muscle weakness     Pseudophakia of both eyes     Blepharitis, both eyes     BDR (background diabetic retinopathy) ([address]     Acute on chronic congestive heart failure ([facility] [address] hospitalization 7/3-7/3 with increased L foot pain, treated for R hand cellulitis, discharged on keflex, vascular with plans to consider AKA as outpatient, who presented to the [address] [place] [address] ED on 7/24 after being seen by PCP's office with worsening L foot ischemia, placed 7/26  # Severe PAD with failed L SFA to L anterior tibial bypass graft, 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   # H/o breast cancer s/p mastectomy 3/2

## 2018-08-31 NOTE — PROGRESS NOTES
[facility]  Progress Note     Shelly Wilkinson  : 10/13/1950    Status: Inpatient  Day #: 45    Attending: Naresh Zeng MD  PCP: Lissy Hutchinson.  Chris,       Assessment and Plan     Septic Shock with klebsiella bacteremia - possibly from line inf Alert, no distress  HEENT:  Normocephalic, atraumatic  Neck:  Supple, symmetrical  Cardiac:  Regular rate, regular rhythm  Pulmonary:  Clear to auscultation bilaterally, respirations unlabored  Gastrointestinal:  Soft, non-tender, normal bowel sounds  Musc (CST): Ezra Sherwood MD on 8/29/2018 at 16:11     Approved by (CST): Ezra Sherwood MD on 8/29/2018 at 16:13               Medications     • metoprolol succinate  25 mg Oral 2x Daily(Beta Blocker)   • Insulin Aspart Pen  1-7 Units Subcutaneous TID CC

## 2018-08-31 NOTE — PROGRESS NOTES
[facility]  Progress Note    Michell Duncan Patient Status:  Inpatient    10/13/1950 MRN [id number]   [facility] 3W/SW Attending Alexander Abreu MD   [facility] Day # 45 PCP Oscar Lawson.  DO Chris     Assessment:    1.  Severe ischemic cardiomy 7.6 08/31/2018   HCT 24.7 08/31/2018    08/31/2018       Lab Results  Component Value Date   PT 18.7 (H) 09/19/2016   INR 1.8 (H) 08/31/2018       Lab Results  Component Value Date    08/31/2018   K 3.8 08/31/2018    08/31/2018   CO2 25

## 2018-08-31 NOTE — PROGRESS NOTES
Pt discharged via [facility] ambulance stable. MICAHCHARISMA Moon Peyton (sister) was at bedside.      Report given to Bernice Hilton RN,

## 2018-09-01 NOTE — PLAN OF CARE
Problem: Patient Centered Care  Goal: Patient preferences are identified and integrated in the patient's plan of care  Interventions:  - What would you like us to know as we care for you? \"My left foot really hurts. \"  - Provide timely, complete, and accu response  - Implement non-pharmacological measures as appropriate and evaluate response  - Consider cultural and social influences on pain and pain management  - Manage/alleviate anxiety  - Utilize distraction and/or relaxation techniques  - Monitor for op Progressing      Problem: GASTROINTESTINAL - ADULT  Goal: Minimal or absence of nausea and vomiting  INTERVENTIONS:  - Maintain adequate hydration with IV or PO as ordered and tolerated  - Nasogastric tube to low intermittent suction as ordered  - Evaluate ventilation and oxygenation  INTERVENTIONS:  - Assess for changes in respiratory status  - Assess for changes in mentation and behavior  - Position to facilitate oxygenation and minimize respiratory effort  - Oxygen supplementation based on oxygen saturati monitoring, monitor vital signs, obtain 12 lead EKG if indicated  - Evaluate effectiveness of antiarrhythmic and heart rate control medications as ordered  - Initiate emergency measures for life threatening arrhythmias  - Monitor electrolytes and administe

## 2018-09-05 NOTE — PROGRESS NOTES
Maya Sunshine  : 10/13/1950  Age 78year old  female patient is admitted to [place] [address] for strengthening and rehab on 18    CODE STATUS: DNR    Chief complaint: Septic shock with Klebsiella bacteremia, suspected secondary to PICC line infection,Ac 9/6 for surgical site eval.  Will have cards/RT/pulm follow while in rehab, admission labs due tomorrow in facility. Remains on high aspiration precautions puree diet. PT is extremely high risk for re-admission.      Past Medical History:   Diagnosis Date AUTOMATIC EXTERNAL DEFIBRILLATOR, WITH INTEGRA* Left  No date: CARDIAC DEFIBRILLATOR PLACEMENT  No date: CATARACT  7/13/09: CATARACT EXTRACTION W/  INTRAOCULAR LENS IMPLA* Right      Comment: JOSE  2/27/06: CATARACT EXTRACTION W/  INTRAOCULAR LENS IMPLA* Sameera Caldera plan    Therapy reporting probably 4 week rehab stay    CURRENT MEDICATIONS: Reviewed     Current Outpatient Prescriptions:  sodium chloride 0.9 % SOLN 100 mL with CefTRIAXone Sodium 2 g SOLR 2 g Inject 2 g into the vein daily.  Disp: 1 Bag Rfl: 0   Vancomy tablet Rfl: 11   Glucose Blood (ONETOUCH TEST) In Vitro Strip Use to check blood glucose 4 times daily Disp: 100 strip Rfl: 11   Warfarin Sodium 5 MG Oral Tab Take 1.5 tablets (7.5 mg total) by mouth every evening. OR AS DIRECTED BY COUMADIN CLINIC.  (Ruddy needed for Pain.  Disp: 30 tablet Rfl: 0   METHOCARBAMOL 500 MG Oral Tab TAKE 1 TABLET BY MOUTH FOUR TIMES DAILY AS NEEDED Disp: 360 tablet Rfl: 11   Meclizine HCl 25 MG Oral Tab Take 1 tablet (25 mg total) by mouth 3 (three) times daily as needed for Dizzi deficits  HENT: denies nasal congestion, sinus pain or sore throat;  RESPIRATORY: C/O SOB non productive cough  CARDIOVASCULAR:denies chest pain, no palpitations   GI: denies nausea, vomiting, constipation, diarrhea; no rectal bleeding; no heartburn  :no drainage from wound and US is showing a 2.5 cm fluid collection-not drained in hospital  -Going to see vascular 9/6  - cont IV rocephin until 9/7  -Oral vanco till 9/14 (125 mg QD)  - ID followed in hospital   -Contact iso in rehab  -VS Q shift     2.  Acut

## 2018-09-06 PROBLEM — I83.893 VARICOSE VEINS OF BOTH LOWER EXTREMITIES WITH COMPLICATIONS: Status: ACTIVE | Noted: 2018-01-01

## 2018-09-06 PROBLEM — Z89.612 S/P AKA (ABOVE KNEE AMPUTATION) UNILATERAL, LEFT (HCC): Status: ACTIVE | Noted: 2018-01-01

## 2018-09-06 NOTE — CONSULTS
[facility]    PATIENT'S NAME: Justinelara Terrence   ATTENDING PHYSICIAN: Millie Conner MD   CONSULTING PHYSICIAN: Kali Marie.  Marcella Back MD   PATIENT ACCOUNT#:   [id number]    LOCATION:  [address] [place] [address] #:   [id number]       DATE OF BIRTH:  [birth year] x[birth year].  VITAL SIGNS:  Pending. NECK:  Veins are distended. Carotids normal.   LUNGS:  Breath sounds diminished in the left base and a few crackles in the right. HEART:  S1, S2 normal.  Grade 3 holosystolic murmur. ABDOMEN:  Benign.   EXTREMITIES:  Pitting

## 2018-09-06 NOTE — CONSULTS
Cardiology (consult dictated)    Assessment:  1. Recurrent volume expansion, manifested by mostly right heart failure.     2.  Multiple chronic cardiac problems: severe CAD (status post complex, Impella supported PCI of LAD 3 months ago), severe ischemic c

## 2018-09-06 NOTE — HISTORICAL OFFICE NOTE
Cardiology      []Manual[]Template  []Copied  Brittany Corona  : 10/13/1950  ACCOUNT:  [id number]  [phone]  PCP:      TODAY'S DATE: 2018  DICTATED BY:  GIN Parmar]        CHIEF COMPLAINT: [hospital follow up.]     HPI:    [ CONS: no fever, chills, or recent weight changes. EYES: denies significant visual changes. ENMT: denies difficulties with hearing, otherwise negative. CV: no chest pain, heart pounding daily. RESP: dyspnea on exertion and improved.  GI: denies melena, hemat CONS: well developed, well nourished. WEIGHT: BMI parameters reviewed and discussed. HEAD/FACE: no trauma and normocephalic. EYES: conjunctivae not injected and no xanthelasma. ENT: mucosa pink and moist. NECK: jugular venous pressure not elevated.  RESP: r 18. Presence Of Other Cardiac And Vascular Implants And Grafts, Mitralclip 3/2017  19.  Shortness of breath        PLAN:  [0 #1 continue same medications see Dr. Sharri Park in 1 month call if questions 0]     PRESCRIPTIONS:   12/01/17 *Clopidogrel [id number] 03/07/14 Patanol               0.1%      qtts                                     03/07/14 Plavix                75MG      1 daily                                  03/07/14 Warfarin Sodium       2.5MG     as directed per PCP                            JOEY CONS: fatigue. EYES: denies significant visual changes. ENMT: denies difficulties with hearing, otherwise negative. CV: Denies chest pain, dizziness, palpitations. RESP: asthma, dyspnea on exertion and on inhalers. GI: denies melena, hematochezia.  : freq 18. Presence Of Other Cardiac And Vascular Implants And Grafts, Mitralclip 3/2017  19. Shortness of breath        PLAN:  [1.  Continue same cardiac medications  2. Follow-up in 4 months  3.   Hepatic panel in 2 months]     PRESCRIPTIONS:   04/05/18 *Carved 03/07/14 Albuterol Sulfate     (2.5 MG/  PRN                                      03/07/14 Clarinex              5MG       prn                                      03/07/14 Patanol               0.1%      qtts                                     03/07/14 P The patient was seen by vascular surgery. Left AKA was planned, but the evening before the anticipated surgery, the patient went into acute respiratory failure with worsening lethargy and was intubated for airway protection.   She also went into cardiogenic was done per infectious disease recommendation and showed a very small 2.5 cm fluid collection. There was scant amount of drainage from this. After discussion with Dr. Edith Ponce, I asked interventional radiology to see if they can aspirate this.   Upon repea

## 2018-09-06 NOTE — H&P
[address] [facility] Patient Status:  Inpatient    10/13/1950 MRN [id number]   Location [facility] 5SW/SE Attending Chastity Romeo MD   Hosp Day # 0 JET Perez DO     Date:  2018  Date • High cholesterol    • History of blood clots    • History of blood transfusion    • Hyperlipidemia LDL goal <70    • Hypertension, essential, benign    • IBS (irritable bowel syndrome)    • Meibomian gland dysfunction 2006   • Menometrorrhagia    • Mus degeneration Neg      Social History:  Smoking status: Never Smoker                                                              Smokeless tobacco: Never Used                      Alcohol use: No              Allergies/Medications:    Allergies:   Clindamyc Meclizine HCl 25 MG Oral Tab Take 1 tablet (25 mg total) by mouth 3 (three) times daily as needed for Dizziness. Insulin Aspart Pen 100 UNIT/ML Subcutaneous Solution Pen-injector Inject 6 Units into the skin 3 (three) times daily with meals.  (Patient t Rosuvastatin Calcium 20 MG Oral Tab Take 20 mg by mouth nightly. albuterol Sulfate (5 MG/ML) 0.5% Inhalation Nebu Soln TAKE 0.5 ML BY NEBULIZATION EVERY 6 (SIX) HOURS AS NEEDED FOR WHEEZING.    ascorbic acid 1000 MG Oral Tab Take 4,000 mg by mouth daily intact. Sclera was anicteric. Pupils were equally reactive to light. Ears: There were no lesions. Nose:  No lesions were noted. Throat: There was no thrush, no exudate, no erythema. There was no evidence of gum bleeding. NECK:  Supple.   There was continue IV antibiotics   - cx grew positive for VRE  -follow up ID recs     DM2  - resume home dose of insulin   - pureed diet     History of PE  On coumadin   Follow INR daily     CAD  Continue home meds     DVT proph- coumadin     DNR    70 minutes spen

## 2018-09-06 NOTE — PROGRESS NOTES
Pharmacy Note: Dietary Supplement Discontinuation Per Policy    OZL92 Caps has been discontinued on Tinnie Pata per policy. This supplement may be restarted upon discharge using the medication reconciliation process.     Thank you,   Alice Monsalve, Ruslanr

## 2018-09-07 NOTE — SLP NOTE
ADULT SWALLOWING EVALUATION    ASSESSMENT    ASSESSMENT/OVERALL IMPRESSION:  SLP BSSE orders received and acknowledged. A swallow evaluation warranted as pt re-admitted to [address] [place] [address] with bacterial edema. Pt well known to SLP department.  Pt underwent a VFSS on 8/ rate;Small bites and sips; No straw  Medication Administration Recommendations: Crushed in puree  Treatment Plan/Recommendations: Dysphagia therapy  Discharge Recommendations/Plan: Undetermined    HISTORY   MEDICAL HISTORY  Reason for Referral: Altered diet Stented coronary artery    • Stroke (cerebrum) ([facility] [address] 2005       Prior Living Situation: Home with support  Diet Prior to Admission: Puree;Nectar thick liquids  Precautions: Aspiration    Patient/Family Goals:  Tolerate least restrictive diet consistencies Intact  Bilabial Seal: Intact  Bolus Formation: Impaired  Bolus Propulsion: Impaired  Mastication: Impaired  Retention: Impaired    Pharyngeal Phase of Swallow: Impaired  Laryngeal Elevation Timing: Appears impaired  Laryngeal Elevation Strength: Appears i

## 2018-09-07 NOTE — DIETARY NOTE
ADULT NUTRITION INITIAL ASSESSMENT    Pt is at moderate nutrition risk. Pt does not meet malnutrition criteria.       RECOMMENDATIONS TO MD:  See Nutrition Intervention     NUTRITION DIAGNOSIS/PROBLEM:  Increased nutrient needs of protein related to increa 10/23/2010   • Cataract 2005, 2009   • Cellulitis     R foot   • Cerebrovascular accident [facility])    • Congestive heart disease ([facility] [address]    • COPD (chronic obstructive pulmonary disease) (HCC)    • Coronary atherosclerosis    • Deep vein thrombosis ([facility])    • D oz)  06/21/18 : 60.8 kg (134 lb)  06/16/18 : 61.6 kg (135 lb 11.2 oz)  06/04/18 : 63.5 kg (140 lb)  05/17/18 : 79.4 kg (175 lb)    GASTROINTESTINAL: difficulty chewing, dysphagia and monitor swallow eval    FOOD/NUTRITION RELATED HISTORY:  Appetite: Fair exam.  - Fluid Accumulation: upper extremity edema and lower extremity edema per visual exam  - Skin Integrity: reddened, surgical wounds, breakdown and RN documentation reviewed.   - Rosalino score 13 high risk    NUTRITION PRESCRIPTION:  Diet: Carbohydrate

## 2018-09-07 NOTE — CM/SW NOTE
SW confirmed that pt is from [facility]. Pt d/c on 8/31 from hospital to [facility]. SW sent clinical updates to facility.     Unruly Vences, [address]

## 2018-09-07 NOTE — CONSULTS
INFECTIOUS DISEASE CONSULT NOTE    Maya [place] Patient Status:  Inpatient    10/13/1950 MRN [id number]   [facility] 5SW/SE Attending Kleber Meyer MD   Hosp Day # 1 PCP Thaddeus Garnett. colonization. Started on diuresis. Denies fevers, chills, dysuria.     History:  Past Medical History:   Diagnosis Date   • Anesthesia complication    • Anxiety state    • Asthma    • Asthma, moderate persistent     Last hosp '96   • BDR (background diabeti W/  INTRAOCULAR LENS IMPLA* Right      Comment: JOSE  2/27/06: CATARACT EXTRACTION W/  INTRAOCULAR LENS IMPLA* Left      Comment: JOSE  No date: CATH DRUG ELUTING STENT  No date: COLONOSCOPY  2006: COLONOSCOPY & POLYPECTOMY  No date: HYSTERECTOMY  2004: MAST mg, Oral, Daily  •  aspirin EC tab 81 mg, 81 mg, Oral, Daily  •  azithromycin (ZITHROMAX) tab 500 mg, 500 mg, Oral, Daily  •  STRESS FORMULA/ZINC (STRESS FORMULA) tab 1 tablet, 1 tablet, Oral, Daily  •  Cholecalciferol (VITAMIN D) 1000 units tab 4,000 Unit Daily  •  vitamin E cap 400 Units, 400 Units, Oral, Daily  •  bumetanide (BUMEX) injection 1 mg, 1 mg, Intravenous, BID (Diuretic)  •  CefTRIAXone Sodium (ROCEPHIN) 1 g in sodium chloride 0.9 % 100 mL MBP/ADD-vantage, 1 g, Intravenous, Q24H    Facility-Adm 0343   GLU  243*   BUN  25*   CREATSERUM  0.72   GFRAA  >60   GFRNAA  >60   CA  7.9*   NA  139   K  3.8   CL  107   CO2  25       Microbiology    Reviewed in EMR    Radiology: Reviewed      Antibiotics: IV ceftriaxone, OVP; (Azithromycin)      ASSESSMENT: significant edema and +ttp - no clear cellulitis  # Klebsiella oxytoca septicemia 2/2 likely PICC line source (placed 7/27) - s/p removal and ceftriaxone x2 weeks - EOT 9/7/18  # SOB related to fluid overload  # Ischemic cardiomyopathy s/o AICD, TTE 6/12/1

## 2018-09-07 NOTE — WOUND PROGRESS NOTE
WOUND CARE NOTE      PLAN   Recommendations:  Dietary consult for recommendations for nutrition to optimize wound healing  Turn schedules  Heels elevated using pillows, heel wedge or heel boots to offload heels  Prompt incontinence care  Moisture barrier Augmentin [Amoxicillin-Pot Clavulanate]; Dilaudid [Hydromorphone]; Scopolamine; Sulfa Antibiotics; Aminoglycosides;  Potassium Clavulanate [Clavulanic Acid]; Sulfanilamide    Labs:     Lab Results  Component Value Date   WBC 5.3 08/31/2018   HGB 7.6 (L) 08/

## 2018-09-07 NOTE — PROGRESS NOTES
[facility]    Cardiology Progress Note  [facility]    Mel Liu Patient Status:  Inpatient    10/13/1950 MRN [id number]   Location [facility] 5SW/SE Attending Amber Spain, [address] [facility] Day # 1 PCP Ana Alvarado Oral Daily   • bumetanide  1 mg Intravenous BID (Diuretic)   • cefTRIAXone  1 g Intravenous Q24H       Continuous Infusions:     Exam    Pulm: Lungs clear, normal respiratory effort  CV: Heart with regular rate and rhythm,   Ext: 2 plus edema rle, left nadeem

## 2018-09-07 NOTE — PROGRESS NOTES
[facility] ID PROGRESS NOTE    Derek Dunaway Patient Status:  Inpatient    10/13/1950 MRN [id number]   [facility] 5SW/SE Attending Kapil Doe MD   Hosp Day # 1 PCP Christy Perez DO     Subjective:  Awake, afebrile.  No complaints this (coronary artery disease), native coronary artery     Mitral insufficiency     Cellulitis and abscess of foot     Cellulitis of foot     Hyperglycemia     Vaginal irritation     Arterial insufficiency of lower extremity (HCC)     Cellulitis of left foot repeat TTE 7/27 with EF 10%, started on pressors as well as IV vancomycin, meropenem, and OVP, cardiology with no plans for intervention. Underwent L AKA on 8/1 by Dr. Claudetta Sexton. Clinically improved and extubated with pressors discontinued.  On 8/20, RRT was c [facility]  [phone]  9/7/2018

## 2018-09-07 NOTE — PROGRESS NOTES
[facility] HOSP - [facility]    Progress Note    Deacon King Patient Status:  Inpatient    10/13/1950 MRN [id number]   [facility] 5SW/SE Attending Sarabjit Colon MD   Hosp Day # 1 PCP Annel Julien.  DO Chris        Subjective:     Clara pureed diet with nectar thick- speech for possible upgrade      History of PE  On coumadin   Follow INR daily   Elevated INR 4.4, hold coumadin      CAD  Continue home meds      DVT proph- coumadin      DNR    Dispo: pending       Results:     Lab Results

## 2018-09-08 NOTE — CONSULTS
Chloe Crockett, [address] [facility]  [address] [facility]     VASCULAR SURGERY CONSULT NOTE      Name: Terrell Counter   :   10/13/1950  [id number]     REFERRING PHYSICIAN: Leda Quinn. • Femoral artery stenosis (HCC)     Right Superficial   • Floaters 2007   • Greater trochanteric bursitis    • Heart disease    • High blood pressure    • High cholesterol    • History of blood clots    • History of blood transfusion    • Hyperlipidemia LD •  Warfarin Sodium (COUMADIN) tab 4 mg, 4 mg, Oral, Nightly  •  ipratropium-albuterol (DUONEB) nebulizer solution 3 mL, 3 mL, Nebulization, 2 times daily  •  ondansetron HCl (ZOFRAN) injection 4 mg, 4 mg, Intravenous, Q6H PRN  •  ALPRAZolam (XANAX) tab 0.5 •  spironolactone (ALDACTONE) tab 12.5 mg, 12.5 mg, Oral, Daily  •  Vancomycin HCl (FIRVANQ) 50 MG/ML oral solution 125 mg, 125 mg, Oral, Daily  •  vitamin E cap 400 Units, 400 Units, Oral, Daily  •  bumetanide (BUMEX) injection 1 mg, 1 mg, Intravenous, BI ABDOMEN: soft, non-tender with no palpable aneurysm or masses  BACK: normal, no tenderness  SKIN: no rashes, warm and dry  EXTREMITIES: no tenderness  VASCULAR:   Left above-knee amputation stump has a healed incision along the medial aspect and staples st PROCEDURE: XR VIDEO SWALLOW (CPT=74230)  COMPARISON: [facility], [facility]. [facility], XR VIDEO SWALLOW (TFD=87386), 4/26/2018, 11:28.   INDICATIONS: Dysphagia  TECHNIQUE: A swallowing evaluation was performed in the Radiology Department in conjunction w Xr Chest Ap Portable  (cpt=71045)    Result Date: 8/29/2018  PROCEDURE: XR CHEST AP PORTABLE (CPT=71010) TIME: 6:24 AM.   COMPARISON: [facility], XR CHEST AP PORTABLE (CPT=71045), 8/03/2018, 6:10.   [facility], XR CHEST AP PO PROCEDURE: XR CHEST AP PORTABLE (CPT=71010) TIME: 6:28 AM.   COMPARISON: [facility], XR CHEST AP PORTABLE (CPT=71045), 8/24/2018, 6:16. INDICATIONS: Follow up respiratory failure.  History of breast cancer and cardiogenic shock  TECHNIQUE: PROCEDURE: XR CHEST AP PORTABLE (CPT=71010) TIME: 6:16  COMPARISON: [facility], XR CHEST AP PORTABLE (CPT=71045), 8/23/2018, 6:03. INDICATIONS: Follow up respiratory failure. History of breast cancer and cardiogenic shock.   TECHNIQUE:   Si PROCEDURE: XR CHEST AP PORTABLE (CPT=71010) TIME: 0605 hrs. COMPARISON: [facility], XR CHEST AP PORTABLE (CPT=71045), 8/21/2018, 6:31. [facility], XR CHEST AP PORTABLE (CPT=71045), 8/20/2018, 17:58.   [facility] CONCLUSION:  1. Cardiomegaly. Post cardiac surgery changes. Mild central vascular congestion with persistent opacity at the left base left lingula with left effusion.  Findings consistent with chronic CHF with resolving acute exacerbation and persistent lef Result Date: 8/21/2018  PROCEDURE: XR CHEST AP PORTABLE (CPT=71010) TIME: 6.31  COMPARISON: [facility], XR CHEST AP PORTABLE (CPT=71045), 8/20/2018, 18:53. INDICATIONS: Flu respiratory failure.  History of breast cancer and cardiogenic shoc PROCEDURE: XR CHEST AP PORTABLE (CPT=71010) TIME: 1854 hrs. .   COMPARISON: [facility], XR CHEST AP PORTABLE (CPT=71045), 8/20/2018, 17:58. INDICATIONS: Followup abnormal chest x-ray. Malpositioned endotracheal tube.  ETT repositioned  [facility] PROCEDURE: XR CHEST AP PORTABLE (CPT=71010) TIME: 1800 hrs. COMPARISON: [facility], XR CHEST AP PORTABLE (CPT=71045), 8/17/2018, 6:26. INDICATIONS: Shortness of breath. Followup postintubation. TECHNIQUE:   Single view.    FINDINGS:  CARD PROCEDURE: XR CHEST AP PORTABLE (CPT=71010) TIME: 06:26. COMPARISON: [facility], XR CHEST AP PORTABLE (CPT=71045), 8/10/2018, 5:11. [facility], XR CHEST AP PORTABLE (CPT=71045), 8/09/2018, 5:48.   [facility] PROCEDURE: XR CHEST AP PORTABLE (CPT=71010) TIME: 0541 hours  COMPARISON: [facility], XR CHEST AP PORTABLE (CPT=71045), 8/15/2018, 6:36. INDICATIONS: Follow up respiratory failure. hypertension. TECHNIQUE:   Single view.    FINDINGS:  CARD PROCEDURE: XR CHEST AP PORTABLE (CPT=71010) TIME: 6:36  COMPARISON: [facility], XR CHEST AP PORTABLE (CPT=71045), 8/14/2018, 6:22. INDICATIONS: Follow up respiratory failure. hypertension. TECHNIQUE:   Single view.    FINDINGS:  CARDIAC/VA CONCLUSION:  1. Essentially unchanged chest with redemonstration of mild cardiomegaly and mild pulmonary congestion. Subcutaneous defibrillator with right paramedian lead unchanged. Persistent left base consolidation and possible left pleural effusion.  Mil ASSESSMENT AND PLAN:     The patient is a 78year old female who has CHF and a low ejection fraction who had presented with fluid overload and drainage from her left above-knee stump.   Her drainage was from between the staple line and did not have

## 2018-09-08 NOTE — RESPIRATORY THERAPY NOTE
Patient refused   CPAP,educated the patient on the purpose of and need for this therapy as well as potential negative outcomes associated with deferring treatment.  Despite education, patient maintains refusal.

## 2018-09-08 NOTE — PLAN OF CARE
The EMR was down for 5 hours on 9/8/2018. This RN was responsible for completing the paper charting during this time period.      The following information was re-entered into the system by Daysi S:  N/A    The following information will remain in the pap

## 2018-09-08 NOTE — PROGRESS NOTES
[facility] HOSP - [facility]    Cardiology Progress Note    Marvia Samples Patient Status:  Inpatient    10/13/1950 MRN [id number]   [facility] 5SW/SE Attending Jeramy Goddard, [address] [facility] Day # 2 PCP Mariposa Miles.  Chris,      Primary Cardiolo Senna-Docusate Sodium  2 tablet Oral Nightly   • spironolactone  12.5 mg Oral Daily   • Vancomycin HCl  125 mg Oral Daily   • vitamin E  400 Units Oral Daily   • bumetanide  1 mg Intravenous BID (Diuretic)       Continuous Infusions:     Exam  Gen: No acut Cardiology  09/08/18    ---------------------------------------  Agree with findings and plan, as revised with my updates.     Chema Rosas MD  Cardiology, [facility]

## 2018-09-08 NOTE — PROGRESS NOTES
[place] [facility] - [facility]    Progress Note    Lyla Jones Patient Status:  Inpatient    10/13/1950 MRN [id number]   Location [facility] 5SW/SE Attending Jules Luke MD   [facility] Day # 2 PCP Nelly Guy.  DO Chris        Subjective:     Con possible upgrade      History of PE  On coumadin   Follow INR daily   Elevated INR 2.3  Will resume coumadin      CAD  Continue home meds      Nausea, vomiting  Unclear etiology  Will cont PPI at this time  PRN zofran  If persistent may need GI eval  No ab

## 2018-09-09 NOTE — PROGRESS NOTES
[facility] Heart Cardiology Progress Note      Rebeca Thrasher Patient Status:  Inpatient    10/13/1950 MRN [id number]   Location [facility] 5SW/SE Attending Lexi Flynn MD   Hosp Day # 3 PCP Tony Mcpherson.  Angeli Bianchi effort  CV:  Heart with regular rate and regular rhythm, Nl S1,S2 ,no S3 or murmur  Abd: Abdomen soft, nontender, nondistended, no organomegaly, bowel sounds present  Ext:  no clubbing, no cyanosis, no edema  Neuro: no focal deficits  Skin: no rashes or le 78.7*   MCH  23.6*  23.2*   MCHC  30.2*  29.5*   RDW  20.3*  20.4*   WBC  6.4  8.4   PLT  174  213       Lab Results   Component Value Date    AST 22 08/21/2018    ALT 14 08/21/2018    PTT 32.7 08/20/2018    INR 2.3 (H) 09/08/2018    PT 18.7 (H) 09/19/2016

## 2018-09-09 NOTE — PROGRESS NOTES
[facility]    Progress Note    Aleksandrroxann Bartholomew Patient Status:  Inpatient    10/13/1950 MRN [id number]   Location [facility] 5SW/SE Attending Ivett Workman MD   [facility] Day # 3 PCP Cleo Coates.  DO Chris        Subjective:     Con with nectar thick- speech for possible upgrade      History of PE  On coumadin   Follow INR daily   Refused coumadin last night, advised to restart tonight     CAD  Continue home meds      Nausea, vomiting  Unclear etiology but improved today  Will cont PP

## 2018-09-09 NOTE — PLAN OF CARE
Problem: Diabetes/Glucose Control  Goal: Glucose maintained within prescribed range  INTERVENTIONS:  - Monitor Blood Glucose as ordered  - Assess for signs and symptoms of hyperglycemia and hypoglycemia  - Administer ordered medications to maintain glucose consult to assist with strengthening/mobility  - Encourage toileting schedule   Outcome: Progressing  All fall precautions in place, frequent rounds by nursing staff

## 2018-09-10 NOTE — PROGRESS NOTES
[facility]    Progress Note    Charla Green Patient Status:  Inpatient    10/13/1950 MRN [id number]   Location [facility] 5SW/SE Attending Allen Damian MD   [address] Day # 4 PCP Hina Milligan.  DO Chris        Subjective:     Con home dose of insulin   pureed diet with nectar thick- speech for possible upgrade      History of PE  On coumadin   Follow INR daily   Refused coumadin last night, advised to restart tonight     CAD  Continue home meds      Nausea, vomiting  Unclear etiolo

## 2018-09-10 NOTE — PROGRESS NOTES
[facility] Heart Cardiology Progress Note      Kenrick Clements Patient Status:  Inpatient    10/13/1950 MRN [id number]   Location [facility] 5SW/SE Attending Leno Boo MD   Hosp Day # 4 PCP Mariana Victoria.  Anastasia Cabrera ipratropium-albuterol  3 mL Nebulization 2 times daily   • ALPRAZolam  0.5 mg Oral Nightly   • ascorbic acid  1,000 mg Oral Daily   • STRESS FORMULA/ZINC  1 tablet Oral Daily   • Cholecalciferol  4,000 Units Oral Daily   • Clopidogrel Bisulfate  75 mg Oral MG 2.1 08/26/2018    PHOS 3.1 08/26/2018    TROP 0.06 (HH) 08/20/2018    CK 29 (L) 02/14/2018                 GIN Vargas  S Cardiology  09/10/18

## 2018-09-10 NOTE — BH PROGRESS NOTE
Behavioral Health Note  CHIEF COMPLAINT  Acute on chronic systolic CHF    REASON FOR ADMISSION  Acute on chronic systolic CHF    SOCIAL HISTORY  Leeroy Ramirez lives at home with her ex  and is w/c bound. Leeroy Ramirez does not smoke, drink alcohol or  use drugs.   PAS disorder, r/o delirium    PLAN  1. Psych consult pending. 2. No referrals at this time.    Adeline Stafford LCSW

## 2018-09-10 NOTE — PROGRESS NOTES
[facility] ID PROGRESS NOTE    Lorean Thompson Patient Status:  Inpatient    10/13/1950 MRN [id number]   [facility] 5SW/SE Attending Kleber Meyer MD   Hosp Day # 4 PCP Thaddeus Garnett. DO Chris     Subjective:  Awake, afebrile.  No acute overnight Diverticulosis     History of colon polyps     Post-polio limb muscle weakness     Pseudophakia of both eyes     Blepharitis, both eyes     BDR (background diabetic retinopathy) (HCC)     Acute on chronic congestive heart failure (HCC)     CHF (congestive anterior tibial bypass requiring multiple interventions, with recent hospitalization 7/3-7/3 with increased L foot pain, treated for R hand cellulitis, discharged on keflex. Admitted 7/24/18 with worsening L foot ischemia, nausea and vomiting.  Seen by vasc Severe MR with mitral clip   # H/o breast cancer s/p mastectomy 3/2004  # H/o pulmonary embolism on coumadin     PLAN:  -  Continue OVP until 9/14. Can remove PICC prior to discharge as completed IV course of ceftriaxone. -  Monitor mental status.  No ezekiel

## 2018-09-11 NOTE — PROGRESS NOTES
[place] [facility] - [facility]    Progress Note    Lyla Jones Patient Status:  Inpatient    10/13/1950 MRN [id number]   Location [facility] 5SW/SE Attending Jules Luke MD   [facility] Day # 5 PCP Nelly Guy.  DO Chris        Subjective:     Con dose of insulin   pureed diet with nectar thick- speech for possible upgrade      History of PE  On coumadin   Follow INR daily   Refused coumadin last night, advised to restart tonight     CAD  Continue home meds      Nausea, vomiting  Unclear etiology bu

## 2018-09-11 NOTE — CONSULTS
[facility]    Report of Consultation    Charla Green Patient Status:  Inpatient    10/13/1950 MRN [id number]   Location [facility] 5SW/SE Attending Allen Mcrae MD   Hosp Day # 4 PCP Hina Milligan.  DO Chris     Date of Admi protecting her. Brain CT scan 6/4/18:  1. Small chronic 6 mm right pontine lacunar infarct  2. Cerebral cortical atrophy. Chronic white matter microvascular ischemic changes. 3. Chronic microvascular ischemic changes basal ganglia regions  4.  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   • Pneumonia due to organism    • Polio 1950    As Infant   • Pulmonary embolism ([facility] [address] 1987   • Sacroiliitis ([facility] [address]    • Stented coronary artery    • Stroke (cerebrum) ([facility] [address] 2005       Past Surgical History  Past Surgical History:  No da Sodium (COUMADIN) tab 4 mg 4 mg Oral Nightly   ipratropium-albuterol (DUONEB) nebulizer solution 3 mL 3 mL Nebulization 2 times daily   ondansetron HCl (ZOFRAN) injection 4 mg 4 mg Intravenous Q6H PRN   Vitamin C tab 1,000 mg 1,000 mg Oral Daily   STRESS F injection 1 mg 1 mg Intravenous BID (Diuretic)     Facility-Administered Medications Ordered in Other Encounters:  succinylcholine chloride (ANECTINE) injection  Intravenous PRN   etomidate (AMIDATE) solution   PRN       Medications Prior to Admission:  so Oral Tab Take 0.5 tablets (2.5 mg total) by mouth daily. Metoprolol Succinate ER 25 MG Oral Tablet 24 Hr Take 1 tablet (25 mg total) by mouth daily. potassium chloride 20 MEQ Oral Powd Pack Take 20 mEq by mouth daily.      Meclizine HCl 25 MG Oral Tab T Cholecalciferol (VITAMIN D3) 2000 units Oral Tab Take 4,000 Units by mouth daily. Take 4000 mg T/W/TH/S/S. Take 2000 units on M/F.    B Complex Vitamins (VITAMIN B COMPLEX) Oral Tab Take 1 tablet by mouth daily.    SACCHAROMYCES BOULARDII OR Take 1 capsu 1.24 07/26/2018    DDIMER 0.37 06/12/2018    ESRML 9 06/14/2018    MG 2.1 08/26/2018    PHOS 3.1 08/26/2018    TROP 0.06 (HH) 08/20/2018    CK 29 (L) 02/14/2018         Imaging:        Vital Signs:     Blood pressure (!) 87/37, pulse 99, temperature 98.4 ° this admission.     Orders This Visit:  Orders Placed This Encounter      Hemoglobin A1C      Prothrombin Time (PT)      Basic Metabolic Panel (8)      Prothrombin Time (PT)      Potassium      CBC With Differential With Platelet      Prothrombin Time (PT)

## 2018-09-11 NOTE — PROGRESS NOTES
[facility]    Cardiology Progress Note    Deacon King Patient Status:  Inpatient    10/13/1950 MRN [id number]   Location [facility] 5SW/SE Attending Olaf Steven MD   Hosp Day # 5 PCP Annel Julien.  Simba Leon,      Primary Card spironolactone  12.5 mg Oral Daily   • Vancomycin HCl  125 mg Oral Daily   • vitamin E  400 Units Oral Daily   • bumetanide  1 mg Intravenous BID (Diuretic)       Continuous Infusions:     Exam  Gen: No acute distress, alert and oriented x1  Pulm: Lungs cl 08/20/2018    CK 29 (L) 02/14/2018                       GIN Vargas  [facility] Cardiology  09/11/18

## 2018-09-11 NOTE — PROGRESS NOTES
[facility] ID PROGRESS NOTE    Burke Frank Patient Status:  Inpatient    10/13/1950 MRN [id number]   [facility] 5SW/SE Attending Chastity Romeo MD   Hosp Day # 5 PCP Roxane Perez DO     Subjective:  Awake, afebrile.  Per RN, much more a History of pulmonary embolism     History of DVT (deep vein thrombosis)     Cough     Family history of colon cancer     Hemorrhoids     Diverticulosis     History of colon polyps     Post-polio limb muscle weakness     Pseudophakia of both eyes     Blepha PCI, ischemic cardiomyopathy with AICD, EF 6/12/18 with EF 35%, HLD, HTN, h/o PE on coumadin, h/o R fifth toe amputation, h/o severe poliomyelitis, essentially wheelchair bound, severe PAD s/p failed left superficial femoral artery to L anterior tibial byp EF 10%               -TTE 8/13 with EF 10-15%  # Candiduria in patient with thorpe, thorpe placed 7/26  # Severe PAD with failed L SFA to L anterior tibial bypass graft, now s/p L AKA 8/1/18, now active s/s of infection  # H/o severe poliomyelitis, essential

## 2018-09-11 NOTE — PLAN OF CARE
DISCHARGE PLANNING    • Discharge to home or other facility with appropriate resources Progressing          Diabetes/Glucose Control    • Glucose maintained within prescribed range Progressing          Impaired Activities of Daily Living    • Achieve highe

## 2018-09-11 NOTE — PROGRESS NOTES
[facility] Heart Cardiology Progress Note      Charolett Fair Patient Status:  Inpatient    10/13/1950 MRN [id number]   Location [facility] 5SW/SE Attending Az Summers MD   Hosp Day # 5 PCP Lissy Hutchinson.  Gin Mccall Intravenous Once   • Metoprolol Succinate ER  12.5 mg Oral Daily   • mirtazapine  15 mg Oral Nightly   • Warfarin Sodium  4 mg Oral Nightly   • ipratropium-albuterol  3 mL Nebulization 2 times daily   • ascorbic acid  1,000 mg Oral Daily   • STRESS FORMULA 09/11/2018    PT 18.7 (H) 09/19/2016    TSH 1.24 07/26/2018    DDIMER 0.37 06/12/2018    ESRML 9 06/14/2018    MG 2.1 08/26/2018    PHOS 3.1 08/26/2018    TROP 0.06 (HH) 08/20/2018    CK 29 (L) 02/14/2018

## 2018-09-11 NOTE — SLP NOTE
Attempted to see pt for swallowing therapy x2 today. The pt was unavailable in the am secondary to a blood draw and then in the pm was off the floor at CT. Cata CEDENO [address] [place] [address] MS/CCC-SLP  Speech Language Pathologist  [facility]  EXT.  [address] [facility]

## 2018-09-11 NOTE — PROGRESS NOTES
The patient seen for over 35 minute, over 50% consulting and managing treatment plan.     Record reviewed, communication with attending, communication with RN and patient seen face to face evaluation.     History of Present Illness:   Patient is a 78 year o patient admitted that she notice her concentration has been declined and exhibited cognitive impairment as an evidenced by her distracted history and 1/3 recent memory.   Judgment insight are appropriate and patient has been follow-up the medical treatment

## 2018-09-12 NOTE — PROGRESS NOTES
[facility] Note:  Renal Adjustment for cefepime (MAXIPIME)    Lexie German is a 78year old female who has been prescribed cefepime (MAXIPIME) 1g every 12 hrs. CrCl is estimated creatinine clearance is 61.8 mL/min (based on SCr of 0.96 mg/dL).  so the [facility]

## 2018-09-12 NOTE — PROGRESS NOTES
[facility] dosing service    Initial Pharmacokinetic Consult for Vancomycin Dosing     Shelly Wilkinson is a 78year old female admitted on 9/6 who is being treated for sepsis. Pharmacy has been asked to dose Vancomycin by Maryanne Etienne Cultures:  No results found for this visit on 09/06/18. Based on the above:    1.  This patient will receive a loading dose of Vancomycin  1.75 gm IVPB (25mg/kg, capped at 2g) x 1 dose, followed by 1 gm Q 12 hours  based upon, actual weight, akanksha

## 2018-09-12 NOTE — CONSULTS
[facility]    Report of Consultation    Mel Liu Patient Status:  Inpatient    10/13/1950 MRN [id number]   Location [facility] 5SW/SE Attending Zach Zelaya MD   Hosp Day # 6 PCP Anthony Domingo.  DO Chris     Date of Admissi • Blepharitis, both eyes 1/14/2016   • BPPV (benign paroxysmal positional vertigo)    • Breast cancer [facility])     DCIS   • Breast cancer of upper-outer quadrant of left female breast ([facility] [address] 10/23/2010   • Cancer of overlapping sites of right female breast ( POLYPECTOMY  No date: HYSTERECTOMY  2004: MASTECTOMY RIGHT  2004: NEEDLE BIOPSY LEFT      Comment:  x 2  No date: OTHER SURGICAL HISTORY      Comment:  Orthopedic surgeries X8  No date: OTHER SURGICAL HISTORY      Comment:  Stent and Angioplasty  Dr. Bert Nguyen Intravenous PRN   Warfarin Sodium (COUMADIN) tab 4 mg 4 mg Oral Nightly   ipratropium-albuterol (DUONEB) nebulizer solution 3 mL 3 mL Nebulization 2 times daily   ondansetron HCl (ZOFRAN) injection 4 mg 4 mg Intravenous Q6H PRN   Vitamin C tab 1,000 mg 1,0 PRN   etomidate (AMIDATE) solution   PRN       Medications Prior to Admission:  sodium chloride 0.9 % SOLN 100 mL with CefTRIAXone Sodium 2 g SOLR 2 g Inject 2 g into the vein daily. aspirin 81 MG Oral Tab EC Take 81 mg by mouth daily.    Sodium Chloride 20 MEQ Oral Powd Pack Take 20 mEq by mouth daily. Meclizine HCl 25 MG Oral Tab Take 1 tablet (25 mg total) by mouth 3 (three) times daily as needed for Dizziness.    Insulin Aspart Pen 100 UNIT/ML Subcutaneous Solution Pen-injector Inject 6 Units into t Tab Take 1 tablet by mouth daily. SACCHAROMYCES BOULARDII OR Take 1 capsule by mouth 2 (two) times daily.  Probiotic     DESLORATADINE 5 MG Oral Tab TAKE 1 TABLET BY MOUTH EVERY DAY AS NEEDED   NITROSTAT 0.4 MG Sublingual SL Tab Place 0.4 mg under the ton woke up. But otherwise appeared to be quite confused and still lethargic and easily falling back to sleep. She did move both of her arms independently, but was lowering of the ground within a few seconds bilaterally. 's face appeared to be symmetric, pupi Benny  9/12/2018

## 2018-09-12 NOTE — PLAN OF CARE
Problem: SAFETY ADULT - FALL  Goal: Free from fall injury  INTERVENTIONS:  - Assess pt frequently for physical needs  - Identify cognitive and physical deficits and behaviors that affect risk of falls.   - [place] fall precautions as indicated by assessme

## 2018-09-12 NOTE — PLAN OF CARE
Problem: Patient Centered Care  Goal: Patient preferences are identified and integrated in the patient's plan of care  Interventions:  - What would you like us to know as we care for you?  \"keep me updated\"  - Provide timely, complete, and accurate inform anxiety  - Utilize distraction and/or relaxation techniques  - Monitor for opioid side effects  - Notify MD/LIP if interventions unsuccessful or patient reports new pain  - Anticipate increased pain with activity and pre-medicate as appropriate   Outcome: based on physician/LIP order or complex needs related to functional status, cognitive ability or social support system   Outcome: Progressing      Problem: SKIN/TISSUE INTEGRITY - ADULT  Goal: Skin integrity remains intact  INTERVENTIONS  - Assess and docu

## 2018-09-12 NOTE — PROGRESS NOTES
[facility] ID PROGRESS NOTE    Reina Olivo Patient Status:  Inpatient    10/13/1950 MRN [id number]   [facility] 5SW/SE Attending Oanh Hogan, [address] [facility]   Day # 6 PCP Pam Meraz. DO Chris     Subjective:  Awake, afebrile.  Was lethargic this Diverticulosis     History of colon polyps     Post-polio limb muscle weakness     Pseudophakia of both eyes     Blepharitis, both eyes     BDR (background diabetic retinopathy) (HCC)     Acute on chronic congestive heart failure (HCC)     CHF (congestive poliomyelitis, essentially wheelchair bound, severe PAD s/p failed left superficial femoral artery to L anterior tibial bypass requiring multiple interventions, with recent hospitalization 7/3-7/3 with increased L foot pain, treated for R hand cellulitis, Severe PAD with failed L SFA to L anterior tibial bypass graft, 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   # H/o breast cancer s/p mastectomy 3/2004  # H/o pulm

## 2018-09-12 NOTE — PROGRESS NOTES
[facility] Heart Cardiology Progress Note      Jessika Perry Patient Status:  Inpatient    10/13/1950 MRN [id number]   Location [facility] 5SW/SE Attending Rin Riley MD   Hosp Day # 6 PCP Marguerite Good.  Shae Palmer LE        Scheduled Meds:   • potassium chloride  20 mEq Intravenous Once   • Metoprolol Succinate ER  12.5 mg Oral Daily   • bumetanide  1 mg Oral BID (Diuretic)   • mirtazapine  15 mg Oral Nightly   • Warfarin Sodium  4 mg Oral Nightly   • ipratropium-al 14 08/21/2018    PTT 32.7 08/20/2018    INR 2.1 (H) 09/12/2018    PT 18.7 (H) 09/19/2016    TSH 1.24 07/26/2018    DDIMER 0.37 06/12/2018    ESRML 9 06/14/2018    MG 2.1 08/26/2018    PHOS 3.1 08/26/2018    TROP 0.06 (HH) 08/20/2018    CK 29 (L) 02/14/2018

## 2018-09-12 NOTE — PROGRESS NOTES
[facility] - [facility]    Progress Note    Michell Duncan Patient Status:  Inpatient    10/13/1950 MRN [id number]   Location [facility] 5SW/SE Attending Mery Fletcher MD   [address] Day # 6 PCP Oscar Lawson.  DO Chris        Subjective:     Con OVP until 9/14.     DM2  resumed home dose of insulin   pureed diet with nectar thick- speech for possible upgrade      History of PE  On coumadin   Follow INR daily   Refused coumadin last night, advised to restart tonight     CAD  Continue home meds     Cerebral atrophy. 5. Intracranial atherosclerosis cavernous carotid arteries and vertebral arteries.  6. Interval development of a small left mastoid effusion and trace right mastoid effusion     Dictated by (CST): Luci Gloria MD on 9/11/2018 at 13:45

## 2018-09-13 NOTE — PLAN OF CARE
Problem: Patient Centered Care  Goal: Patient preferences are identified and integrated in the patient's plan of care  Interventions:  - What would you like us to know as we care for you?  \"keep me updated\"  - Provide timely, complete, and accurate inform anxiety  - Utilize distraction and/or relaxation techniques  - Monitor for opioid side effects  - Notify MD/LIP if interventions unsuccessful or patient reports new pain  - Anticipate increased pain with activity and pre-medicate as appropriate   Outcome: cognitive ability or social support system   Outcome: Progressing      Problem: SKIN/TISSUE INTEGRITY - ADULT  Goal: Skin integrity remains intact  INTERVENTIONS  - Assess and document risk factors for pressure ulcer development  - Assess and document skin

## 2018-09-13 NOTE — PROGRESS NOTES
[facility] ID PROGRESS NOTE    Charla Green Patient Status:  Inpatient    10/13/1950 MRN [id number]   [facility] 5SW/SE Attending Inna Resendiz MD   [facility] Day # 7 PCP Hina Milligan. DO Chris     Subjective:  Awake, afebrile.  Talking more this A of colon cancer     Hemorrhoids     Diverticulosis     History of colon polyps     Post-polio limb muscle weakness     Pseudophakia of both eyes     Blepharitis, both eyes     BDR (background diabetic retinopathy) ([address]     Acute on chronic congestive hear coumadin, h/o R fifth toe amputation, h/o severe poliomyelitis, essentially wheelchair bound, severe PAD s/p failed left superficial femoral artery to L anterior tibial bypass requiring multiple interventions, with recent hospitalization 7/3-7/3 with incre 10-15%  # Candiduria in patient with thorpe, thorpe placed 7/26  # Severe PAD with failed L SFA to L anterior tibial bypass graft, now s/p L AKA 8/1/18, now active s/s of infection  # H/o severe poliomyelitis, essentially WC bound  # HTN  # Severe MR with mi

## 2018-09-13 NOTE — PLAN OF CARE
Problem: Patient Centered Care  Goal: Patient preferences are identified and integrated in the patient's plan of care  Interventions:  - What would you like us to know as we care for you?  \"keep me updated\"  - Provide timely, complete, and accurate inform management  - Manage/alleviate anxiety  - Utilize distraction and/or relaxation techniques  - Monitor for opioid side effects  - Notify MD/LIP if interventions unsuccessful or patient reports new pain  - Anticipate increased pain with activity and pre-medi discharge planning if the patient needs post-hospital services based on physician/LIP order or complex needs related to functional status, cognitive ability or social support system   Outcome: Progressing  When medically clear for discharge    Problem: Pablo Alejandro saline bag and then change the rate at 100 mL/hr and then finish the bag. RN to assess BP after bolus has been completed.

## 2018-09-13 NOTE — SLP NOTE
Attempted to see patient to monitor tolerance of PO diet, however, she refused all PO intake. She reported she vomited this morning. Her nurse reported only that she spit it out and wasn't doing well with swallowing.   Will follow up with patient tomorrow

## 2018-09-13 NOTE — WOUND PROGRESS NOTE
Pt seen for follow up, alert but not oriented. Dr. Gabriela Gary present to assess buttock wounds which are bilateral stage 2. Area cleansed with Pampers wipe, Sensicare applied. Rupa wound is red and intact. Pillow placed under her left hip.  Left AKA stump has

## 2018-09-13 NOTE — PLAN OF CARE
Problem: Patient Centered Care  Goal: Patient preferences are identified and integrated in the patient's plan of care  Interventions:  - What would you like us to know as we care for you?  \"keep me updated\"  - Provide timely, complete, and accurate inform Manage/alleviate anxiety  - Utilize distraction and/or relaxation techniques  - Monitor for opioid side effects  - Notify MD/LIP if interventions unsuccessful or patient reports new pain  - Anticipate increased pain with activity and pre-medicate as approp services based on physician/LIP order or complex needs related to functional status, cognitive ability or social support system   Outcome: Progressing      Problem: SKIN/TISSUE INTEGRITY - ADULT  Goal: Skin integrity remains intact  INTERVENTIONS  - Assess

## 2018-09-13 NOTE — PROGRESS NOTES
[facility]  Neurology Progress Note    Patricia Monroy Patient Status:  Inpatient    10/13/1950 MRN [id number]   [facility] 5SW/SE Attending Calli Wheat MD   Hosp Day # 7 PCP Khushi Espino. DO Chris     Subjective:   Patricia Monroy (ANTIVERT) tab 25 mg 25 mg Oral TID PRN   methocarbamol (ROBAXIN) tab 500 mg 500 mg Oral TID   Miconazole Nitrate 2 % powder 1 Application 1 Application Topical BID PRN   Ketotifen Fumarate (ZADITOR) 0.025 % ophthalmic solution 1 drop 1 drop Both Eyes BID She appeared to move both of her arms equally.     Lab Results   Component Value Date    WBC 7.0 09/13/2018    HGB 8.0 (L) 09/13/2018    HCT 26.5 (L) 09/13/2018     09/13/2018    CREATSERUM 0.99 09/13/2018    BUN 27 (H) 09/13/2018     (H) 09/13 of left foot     Floater, vitreous, left     Peripheral vascular disease, unspecified ([facility] [address]     Long term (current) use of anticoagulants     PAD (peripheral artery disease) (HCC)     Dyspnea on exertion     Dyspnea     Unstable angina ([facility] [address]     Poliomyeli

## 2018-09-13 NOTE — PROGRESS NOTES
[facility]    Progress Note    Levi Mcardle Patient Status:  Inpatient    10/13/1950 MRN [id number]   Location [facility] 5SW/SE Attending Jonah Cruz MD   [facility] Day # 7 PCP Marcela George.  DO Chris        Subjective:   Henry Fajardo cont PPI at this time  PRN zofran  If persistent may need GI eval  No abd pain  Resolved    Withdrawn/possible depression  Consider metabolic encephalopathy as possible etiology   - psych to see  - continue Remeron  - encourage po intake  - CT brain- no ac APN  9/13/2018        Attending Addendum:      Seen and examined independently from APN  Agree with plan as outlined above   MRI pending     Aide Graff MD

## 2018-09-13 NOTE — PROCEDURES
EEG report    REFERRING PHYSICIAN: Wanda Lang MD    PCP and phone number:  Power Contreras. Lita Fagan DO  [phone]    TECHNIQUE: 21 channels of EEG, 2 channels of EOG, and 1 channel of EKG were recorded utilizing the International 10/20 System.  The Nitrate    ACTIVATION:  Hyperventilation: Not done  Photic stimulation: Not done  Sleep: No clear sleep architecture was seen. BACKGROUND    Background slow activity was seen.  Throughout the record, a marked amount of low to medium voltage, polymorphic,

## 2018-09-13 NOTE — PROGRESS NOTES
The patient seen for over 25 minute, over 50% consulting and managing treatment plan.     Record reviewed, communication with attending, communication with RN and patient seen face to face evaluation.     History of Present Illness:   Patient is a 78 year o general medical condition. Major depressive disorder, single episode, severe.           Discussed risk and benefit, acknowledging the current symptom and severity.   The patient has been exhibiting some cognitive impairment due to her medical condition, va

## 2018-09-13 NOTE — PHYSICAL THERAPY NOTE
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  Lay me [facility]    PHYSICAL THERAPY EXAMINATION     OBJECTIVE     Fall Risk: High fall risk    WEIGHT BEARING RESTR addressed    CURRENT GOALS    Goals to be met by: 9/20/18  Patient Goal Patient's self-stated goal is: get stronger    Goal #1 Patient is able to demonstrate supine - sit EOB @ level: moderate assistance     Goal #1   Current Status    Goal #2 Patient to d

## 2018-09-14 NOTE — DIETARY NOTE
ADULT NUTRITION REASSESSMENT     Pt is at high nutrition risk. Pt meets malnutrition criteria.       RECOMMENDATIONS TO MD:  Consider Adjunctive Nutrition Support (TF) given prolonged poor po and cognitive status (if consistent with Medical plans and pt/fa thick consistency). 9/14 Update: Pt still lethargic. Remains with poor po. Noted 8 days of inadequate po intake. Admit wt 149#, current Wt 148#-- not likely accurate. Per MD, encephalopathy likely d/t infection, Acute on Chronic CHF, on Bumex.  However, obesity class I    IBW: 89 lbs (adjusted for left AKA)       Now 168%  Of adjusted IBW  Usual Body Wt: 135-140 lbs(pre-left AKA)   Patient was 160 lbs 15 oz lbs on 8/28/18      WEIGHT HISTORY:  Patient Weight(s) for the past 336 hrs:   Weight   09/14/18 05 adjusted to 1475 calories/day (25 calories per kg Estimated dry wt 59 kg) --adjust pending accurate wt.    Protein: adjusted to 61-68  grams protein/day (1.5- 1.7 grams protein per kg adjusted IBW for AKA)  Fluid needs: ~ 7764-6260 ml ( 20-25 ml/kg est dry

## 2018-09-14 NOTE — IMAGING NOTE
[address] PT TO MRI VIA BED PER TRANSPORT. Conjunct REP PRESENT TO ADJUST DEFIB FOR MRI.   VS  88/45 HR 92   1020 93/60 HR 96 MRI SCAN ONGOING  1030 94/56 HR 88 MRI SCAN ONGIONG  1038 SCAN COMPLETE  1045 DEFIB PROGRAMMED TO ORIGINAL MODE PER [place]

## 2018-09-14 NOTE — SLP NOTE
SPEECH DAILY NOTE - INPATIENT    ASSESSMENT & PLAN   ASSESSMENT  Patient alert upon entry. Nursing reports patient with minimal PO intake, also with difficulty getting patient to accept medications with applesauce. PCT reports bolus holding at times.   No straws  Aspiration Precautions: Upright position; Slow rate;Small bites and sips; No straw  Medication Administration Recommendations: Crushed in puree    Patient Experiencing Pain: No      Discharge Recommendations  Discharge Recommendations/Plan: Undetermi

## 2018-09-14 NOTE — PROGRESS NOTES
[facility] ID PROGRESS NOTE    Lyla Jones Patient Status:  Inpatient    10/13/1950 MRN [id number]   [facility] 5SW/SE Attending Rebeca Kuhn MD   [facility] Day # 8 PCP Nelly Guy. DO Chris     Subjective:  Afebrile. Still lethargic.  Poor appe limb muscle weakness     Pseudophakia of both eyes     Blepharitis, both eyes     BDR (background diabetic retinopathy) ([facility])     Acute on chronic congestive heart failure ([facility])     CHF (congestive heart failure) ([facility])     Seborrheic keratoses     Acute ch severe PAD s/p failed left superficial femoral artery to L anterior tibial bypass requiring multiple interventions, with recent hospitalization 7/3-7/3 with increased L foot pain, treated for R hand cellulitis, discharged on keflex.  Admitted 7/24/18 with w to L anterior tibial bypass graft, 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   # H/o breast cancer s/p mastectomy 3/2004  # H/o pulmonary embolism on coumadin

## 2018-09-14 NOTE — OCCUPATIONAL THERAPY NOTE
OCCUPATIONAL THERAPY TREATMENT NOTE - INPATIENT        Room Number: 545/545-A                Problem List  Active Problems:    Type II diabetes mellitus ([facility] [address]    Major depressive disorder, single episode, severe ([facility] [address]    Delirium due to another medical con Dependent assistance  Sit to Stand: Not tested  Toilet Transfer: NT  Shower Transfer: NT  Chair Transfer: NT    Bedroom Mobility: NT    BALANCE ASSESSMENT  Static Sitting: Supported sitting SBA  Dynamic Sitting: NT  Static Standing: NT  Dynamic Standing: N

## 2018-09-14 NOTE — PROGRESS NOTES
The patient seen for over 35 minute, over 50% consulting and managing treatment plan. Record reviewed, communication with attending, communication with RN and patient seen face to face evaluation.   Communicate with sister over the phone.     History of her concentration has been declined and exhibited cognitive impairment as an evidenced by her delirium and cognitive alternation.   Judgment insight are appropriate and patient has been follow-up the medical treatment           Impression:   Impression:

## 2018-09-14 NOTE — PROGRESS NOTES
[facility] dosing service    Follow-up Pharmacokinetic Consult for Vancomycin Dosing     Lexie German is a 78year old female admitted on 9/6 who is being treated for bacteremia. Patient is on day 3 of Vancomycin 1 gm IV Q 12 hours.   Goal trough is 15-2 Range    Blood Culture Result No Growth 1 Day N/A    Blood Culture Result Pending (A) N/A    Blood Smear Positive Blood Culture (A) N/A    Blood Smear Gram positive cocci in clusters (A) N/A       Radiology:  Based on the above:    1.  Decrease Vancomycin a

## 2018-09-14 NOTE — PROGRESS NOTES
[facility] Heart Cardiology Progress Note      Steven Way Patient Status:  Inpatient    10/13/1950 MRN [id number]   Location [facility] 5SW/SE Attending Jasper Cabrera MD   [facility] Day # 8 PCP Roxana Estrada.  Alfredito Damian S1,S2 ,no S3 or murmur  Abd: Abdomen soft, nontender, nondistended, no organomegaly, bowel sounds present  Ext:  Trace edema to LE        Scheduled Meds:   • vancomycin  1,000 mg Intravenous Q24H   • escitalopram  5 mg Oral Nightly   • Metoprolol Succinate Lab Results   Component Value Date    AST 22 08/21/2018    ALT 14 08/21/2018    PTT 32.7 08/20/2018    INR 3.1 (H) 09/14/2018    PT 18.7 (H) 09/19/2016    TSH 1.24 07/26/2018    DDIMER 0.37 06/12/2018    ESRML 9 06/14/2018    MG 2.1 08/26/2018    DIRK

## 2018-09-14 NOTE — PROGRESS NOTES
The left above-knee amputation stump area was examined and the staple line is still intact but the patient still has significant peripheral edema bilaterally.   The staples will not be removed anteriorly her edema is significantly less but she does appear t

## 2018-09-14 NOTE — PROGRESS NOTES
[facility]  Neurology Progress Note    Foreign Hewitt Patient Status:  Inpatient    10/13/1950 MRN [id number]   [facility] 5SW/SE Attending Chely Noel MD   Hosp Day # 8 PCP Daronboo Elizabeth. DO Chris     Subjective:   Foreign Hewitt 25 mg Oral TID PRN   methocarbamol (ROBAXIN) tab 500 mg 500 mg Oral TID   Miconazole Nitrate 2 % powder 1 Application 1 Application Topical BID PRN   Ketotifen Fumarate (ZADITOR) 0.025 % ophthalmic solution 1 drop 1 drop Both Eyes BID   Pantoprazole Sodium 09/13/2018     09/13/2018    CREATSERUM 1.15 09/14/2018    BUN 30 (H) 09/14/2018     (H) 09/14/2018    K 4.2 09/14/2018     (H) 09/14/2018    CO2 24 09/14/2018     (H) 09/14/2018    CA 8.5 09/14/2018    ALB 2.4 (L) 08/21/2018    A heart failure (HCC)     CHF (congestive heart failure) (HCC)     Seborrheic keratoses     Acute chest pain     PVD (peripheral vascular disease) (HCC)     Mitral regurgitation     CAD (coronary artery disease), native coronary artery     Mitral insufficien

## 2018-09-14 NOTE — PROGRESS NOTES
[facility]    [facility]    Derek Dunaway Patient Status:  Inpatient    10/13/1950 MRN [id number]   Location [facility] 5SW/SE Attending Vero Hughes MD   [facility] Day # 8 PCP Christy Perez DO        Subjective:   Jill Hassan pain  Resolved    Withdrawn/possible depression  Consider metabolic encephalopathy as possible etiology   - psych to see  - continue Remeron  - encourage po intake  - CT brain- no acute finding, see below for report   - Neuro consult  - chronic thorpe excha NG tube and supp feeding.   Vega JONES sister    Hever Becerril MD

## 2018-09-14 NOTE — PLAN OF CARE
Problem: Patient Centered Care  Goal: Patient preferences are identified and integrated in the patient's plan of care  Interventions:  - What would you like us to know as we care for you?  \"keep me updated\"  - Provide timely, complete, and accurate inform Utilize distraction and/or relaxation techniques  - Monitor for opioid side effects  - Notify MD/LIP if interventions unsuccessful or patient reports new pain  - Anticipate increased pain with activity and pre-medicate as appropriate   Outcome: Progressing infection  INTERVENTIONS:  - Assess and document risk factors for pressure ulcer development  - Assess and document skin integrity  - Assess and document dressing/incision, wound bed, drain sites and surrounding tissue  - Implement wound care per orders  -

## 2018-09-15 NOTE — PROGRESS NOTES
[place] [facility]    Progress Note    Indira Fofana Patient Status:  Inpatient    10/13/1950 MRN [id number]   Location [facility] 5SW/SE Attending Becky Craig MD   [facility] Day # 9 PCP Destiny Living.  DO Chris        Subjective:   Herman Fisher board   Cont diuretics per cardiology recs- po bumex BID   Monitor urine output     PAD status post left AKA   fluid drainage from left stump   cx grew positive for VRE  Monitor off abx per ID  follow up ID recs   wound care- much improved, no drainage   A 08/21/2018    TP 5.8 (L) 08/21/2018    AST 22 08/21/2018    ALT 14 08/21/2018    PTT 32.7 08/20/2018    INR 2.6 (H) 09/15/2018    PT 18.7 (H) 09/19/2016    TSH 1.24 07/26/2018    DDIMER 0.37 06/12/2018    ESRML 9 06/14/2018    MG 1.6 (L) 09/15/2018    PHOS

## 2018-09-15 NOTE — PROGRESS NOTES
The patient seen for over 25 minute, over 50% consulting and managing treatment plan. Record reviewed, communication with attending, communication with RN and patient seen face to face evaluation.   Communicate with sister over the phone.     History of medical condition, vascular ischemic changes, anemia and metabolic imbalance.   Patient also has been exhibiting depressive symptom presented by depressed mood, impairment in her sleep, lack of appetite and fatigue  At this point, I would recommend the foll

## 2018-09-16 PROBLEM — E46 MALNUTRITION (HCC): Status: ACTIVE | Noted: 2018-01-01

## 2018-09-16 NOTE — PLAN OF CARE
Problem: Patient Centered Care  Goal: Patient preferences are identified and integrated in the patient's plan of care  Interventions:  - What would you like us to know as we care for you?  \"keep me updated\"  - Provide timely, complete, and accurate inform Manage/alleviate anxiety  - Utilize distraction and/or relaxation techniques  - Monitor for opioid side effects  - Notify MD/LIP if interventions unsuccessful or patient reports new pain  - Anticipate increased pain with activity and pre-medicate as approp functional status, cognitive ability or social support system   Outcome: Not Progressing      Problem: SKIN/TISSUE INTEGRITY - ADULT  Goal: Skin integrity remains intact  INTERVENTIONS  - Assess and document risk factors for pressure ulcer development  - A Progressing

## 2018-09-16 NOTE — PROGRESS NOTES
Patient NG tube was placed at approximately 1115, Chest Xray Confirmation, Dr Balwinder Mcneal confirmed okay to use. Patient tolerated well. Will Monitor. Tube feeding started as ordered.      At Approximately [phone] Patient was evaluated by Nura when patient

## 2018-09-16 NOTE — PROGRESS NOTES
MD ordered to attempt at [facility] reinsertion. New 10fr Keofeed obtained, soft limb restraints on bilateral arms, Keofeed inserted via right nares and secured at 50cm. CXR pending for placement verification.      1400: Per CXR report, tip of feeding tu

## 2018-09-16 NOTE — PROGRESS NOTES
Attempted to start NG tube feedings per orders. NG tube verified by Dr. Manjit Hayes and re-verified by Dr. Arturo Siddiqui per CXR. NG tube wouldn't flush. Contacted Dr. Arturo Siddiqui. Order for 5 cm to be pulled back and reinserted. Unable to flush NG tube again.  Dr. Jc Lewis

## 2018-09-16 NOTE — PHYSICAL THERAPY NOTE
PHYSICAL THERAPY TREATMENT NOTE - INPATIENT     Room Number: 545/545-A       Presenting Problem: Pitting edema    Problem List  Active Problems:    Type II diabetes mellitus (HCC)    Major depressive disorder, single episode, severe (HCC)    Delirium due t Lot   -   Sitting down on and standing up from a chair with arms (e.g., wheelchair, bedside commode, etc.): Unable   -   Moving from lying on back to sitting on the side of the bed?: A Lot   How much help from another person does the patient currently need

## 2018-09-16 NOTE — PLAN OF CARE
[facility]    [address]    [place] [address]    Phone:  [phone]       Thank You for choosing us for your health care visit. We are glad to serve you and happy to provide you with this summary of your visit. Please help us to ensure we have accurate records. If you find anything that needs to be changed, please let our staff know as soon as possible.          Your Demographic Information     Patient Name Sex     Mario Beltran Male 1964       Ethnic Group Patient Race    Not of  or  Origin White      Your Visit Details     Date & Time Provider Department    2017 11:00 AM Jim Mccauley MD [facility]      We Ordered or Performed the Following     SERVICE TO ORTHOPEDICS       Conditions Discussed Today or Order-Related Diagnoses        Comments    Muscle strain vs tear left forearm at elbow    -  Primary doi 17       Your Vitals Were     BP Pulse Temp Weight SpO2 BMI    156/80 69 97.7 °F (36.5 °C) (Oral) 200 lb (90.7 kg) 94% 30.41 kg/m2    Smoking Status                   Never Smoker           Medications Prescribed or Re-Ordered Today     None      Your Current Medications Are        Disp Refills Start End    naproxen (NAPROSYN) 500 MG tablet 10 tablet 0 2017     Sig - Route: Take 1 tablet by mouth 2 times daily (with meals). - Oral    cyclobenzaprine (FLEXERIL) 10 MG tablet 15 tablet 0 2017     Sig - Route: Take 1 tablet by mouth 3 times daily as needed for Muscle spasms. - Oral    metoPROLOL (TOPROL-XL) 50 MG 24 hr tablet 90 tablet 1 2017     Sig - Route: Take 1 tablet by mouth daily. - Oral    fenofibric acid (TRILIPIX) 135 MG delayed-release capsule 90 capsule 1 2017     Sig - Route: Take 1 capsule by mouth daily. - Oral    Class: Eprescribe    omeprazole (PRILOSEC) 40 MG capsule 90 capsule 1 2017     Sig: TAKE 1 CAPSULE BY MOUTH DAILY    Class: Eprescribe    Problem: Safety Risk - Non-Violent Restraints  Goal: Patient will remain free from self-harm  INTERVENTIONS:  - Apply the least restrictive restraint to prevent harm  - Notify patient and family of reasons restraints applied  - Assess for any contribut budesonide-formoterol (SYMBICORT) 160-4.5 MCG/ACT inhaler 1 Inhaler 0 12/24/2016     Sig - Route: Inhale 2 puffs into the lungs 2 times daily. - Inhalation    sertraline (ZOLOFT) 50 MG tablet 90 tablet 1 9/28/2016     Sig - Route: Take 1 tablet by mouth daily. - Oral    Class: Eprescribe    montelukast (SINGULAIR) 10 MG tablet 90 tablet 1 9/28/2016     Sig - Route: Take 1 tablet by mouth Every evening. - Oral    Class: Eprescribe    budesonide-formoterol (SYMBICORT) 160-4.5 MCG/ACT inhaler 1 Inhaler 1 1/26/2016     Sig - Route: Inhale 2 puffs into the lungs 2 times daily. TAKE EVERY DAY - Inhalation    Class: Eprescribe    albuterol 108 (90 BASE) MCG/ACT inhaler 1 Inhaler 5 10/14/2015     Sig - Route: Inhale 2 puffs into the lungs every 4 hours as needed for Shortness of Breath or Wheezing. - Inhalation    Class: Eprescribe      Allergies     No Known Allergies      Immunizations History as of 6/13/2017     Name Date    Influenza 9/23/2013    PPD 2/9/2006    Td:Adult type tetanus/diphtheria 8/1/2001    Tdap 9/23/2013    Toradol 7/16/2010, 5/2/2004, 9/1/2003      Problem List as of 6/13/2017     Hidradenitis    Meniere's disease, unspecified    Heartburn    Closed fracture of one or more phalanges of foot    Hypertriglyceridemia    Family history of early CAD    Asthma, mild intermittent              Patient Instructions    Ice x 15 minutes 4 -5 times a day  Ace wrap for compression comfort  Naproxen twice a day with food    Orthopedics and podiatry at [address]: [phone]  Dr Nii Patrick    Off work til evaluated by orthopedist

## 2018-09-17 NOTE — PROGRESS NOTES
[facility] Pharmacy Note: Antimicrobial Weight Dose Adjustment for: daptomycin     Ramón Haji is a 78year old female who has been prescribed daptomycin  6 mg/kg every 24 hours based on actual wt 67.2 kg.   CrCl is estimated creatinine clearance is 47.3 mL/min (b

## 2018-09-17 NOTE — PLAN OF CARE
Problem: Patient Centered Care  Goal: Patient preferences are identified and integrated in the patient's plan of care  Interventions:  - What would you like us to know as we care for you?  \"keep me updated\"  - Provide timely, complete, and accurate inform Manage/alleviate anxiety  - Utilize distraction and/or relaxation techniques  - Monitor for opioid side effects  - Notify MD/LIP if interventions unsuccessful or patient reports new pain  - Anticipate increased pain with activity and pre-medicate as approp to functional status, cognitive ability or social support system   Outcome: Not Progressing      Problem: SKIN/TISSUE INTEGRITY - ADULT  Goal: Skin integrity remains intact  INTERVENTIONS  - Assess and document risk factors for pressure ulcer development needs   - Reorient and redirection as needed  - Assess for the need to continue restraints  Outcome: Not Progressing      Comments: Wrist restraint order renewed. Must be reordered tomorrow around 0900 if needed.  Patient attempted to pull out NG tube sever

## 2018-09-17 NOTE — PLAN OF CARE
Problem: Patient Centered Care  Goal: Patient preferences are identified and integrated in the patient's plan of care  Interventions:  - What would you like us to know as we care for you?  \"keep me updated\"  - Provide timely, complete, and accurate inform Manage/alleviate anxiety  - Utilize distraction and/or relaxation techniques  - Monitor for opioid side effects  - Notify MD/LIP if interventions unsuccessful or patient reports new pain  - Anticipate increased pain with activity and pre-medicate as approp related to functional status, cognitive ability or social support system   Outcome: Not Progressing      Problem: SKIN/TISSUE INTEGRITY - ADULT  Goal: Skin integrity remains intact  INTERVENTIONS  - Assess and document risk factors for pressure ulcer devel Reorient and redirection as needed  - Assess for the need to continue restraints  Outcome: Not Progressing      Comments: Sister at the bedside. Patient has NG tube in place and maintained. Wrist restraints maintained.  Patient attempts to remove NG tube an

## 2018-09-17 NOTE — PROGRESS NOTES
[facility] ID PROGRESS NOTE    Haledon Roll Patient Status:  Inpatient    10/13/1950 MRN [id number]   [facility] 5SW/SE Attending Xi Love, [address] Day # 6 PCP Micheline Shanks. DO Chris     Subjective:  Afebrile.  Still fatigued, states fe History of colon polyps     Post-polio limb muscle weakness     Pseudophakia of both eyes     Blepharitis, both eyes     BDR (background diabetic retinopathy) (HCC)     Acute on chronic congestive heart failure (HCC)     CHF (congestive heart failure) (H amputation, h/o severe poliomyelitis, essentially wheelchair bound, severe PAD s/p failed left superficial femoral artery to L anterior tibial bypass requiring multiple interventions, with recent hospitalization 7/3-7/3 with increased L foot pain, treated 8/13 with EF 10-15%  # Candiduria in patient with thorpe, thorpe placed 7/26  # Severe PAD with failed L SFA to L anterior tibial bypass graft, now s/p L AKA 8/1/18, now active s/s of infection  # H/o severe poliomyelitis, essentially WC bound  # HTN  # Hiwot

## 2018-09-17 NOTE — CM/SW NOTE
SW asked JENNIFER/Olena to send clinical updates to Devin/Elm via [employer].      Timmy Crum

## 2018-09-17 NOTE — PLAN OF CARE
Problem: Patient Centered Care  Goal: Patient preferences are identified and integrated in the patient's plan of care  Interventions:  - What would you like us to know as we care for you?  \"keep me updated\"  - Provide timely, complete, and accurate inform

## 2018-09-17 NOTE — WOUND PROGRESS NOTE
Pt seen on Thursday by wound services. Pt has bilateral buttock stage 2 pressure ulcers. The bed the pt is on at present time can accommodate up to stage 4 pressure ulcers.  Spoke to bedside RN and she did not get report that pt has any new or more serious

## 2018-09-17 NOTE — PROGRESS NOTES
[facility] AND [facility]  Progress Note    Jessika Perry Patient Status:  Inpatient    10/13/1950 MRN [id number]   [facility] 5SW/SE Attending Jesse Bray MD   [facility] Day # 6 PCP Marguerite Perez DO     Assessment:    1.   Ischemic cardio 09/17/2018        Lab Results   Component Value Date    TROP 0.06 () 08/20/2018    TROP 0.38 () 07/27/2018    TROP 0.39 () 07/26/2018        Medications:    • DAPTOmycin (CUBICIN) IV syringe 50mg/mL  325 mg Intravenous Q24H   • pantoprazole (PROTONIX

## 2018-09-17 NOTE — PROGRESS NOTES
[address] [facility] dosing service    Follow-up Pharmacokinetic Consult for Vancomycin Dosing     Lynnette Remy is a 78year old female who is being treated for bacteremia. Patient is on day 4 of Vancomycin 1 gm IV Q 24 hours. Goal trough is 15-20 ug/mL.     She Result Value Ref Range    Urine Culture 10,000 - 50,000 CFU/ML Candida albicans (A) N/A       Radiology:    Based on the above:    1. Hold vancomycin for now due to high trough of 33.5 mcg/ml. Will order random level in am ON 9/18.      2.   Goal trough

## 2018-09-18 NOTE — PLAN OF CARE
Problem: Patient Centered Care  Goal: Patient preferences are identified and integrated in the patient's plan of care  Interventions:  - What would you like us to know as we care for you?  \"keep me updated\"  - Provide timely, complete, and accurate inform response  - Consider cultural and social influences on pain and pain management  - Manage/alleviate anxiety  - Utilize distraction and/or relaxation techniques  - Monitor for opioid side effects  - Notify MD/LIP if interventions unsuccessful or patient rep coordinating discharge planning if the patient needs post-hospital services based on physician/LIP order or complex needs related to functional status, cognitive ability or social support system   Outcome: Progressing      Problem: SKIN/TISSUE INTEGRITY - confusion (electrolyte disturbances, delirium, medications)  - Discontinue any unnecessary medical devices as soon as possible  - Assess the patient's physical comfort, circulation, skin condition, hydration, nutrition and elimination needs   - Reorient an

## 2018-09-18 NOTE — PROGRESS NOTES
[facility]    Cardiology Progress Note    Loel End Patient Status:  Inpatient    10/13/1950 MRN [id number]   [facility] 5SW/SE Attending Mayelin Adams MD   Hosp Day # 12 PCP Janina West.  Heather Ramos,      Primary Card Murmur  Abd: Abdomen soft, nontender, nondistended  Extremities: RLE with 1+ edema    Objective:    Assessment and Plan:   Acute on chronic systolic HF 2/2 to ICM,  EF 15% on echo 8/13/18, mildly hypervolemic on exam    - continue BB toprol 25 daily and sp compressive atelectasis left lower lobe.      Dictated by (CST): Kat Nguyễn MD on 9/16/2018 at 13:36     Approved by (CST): Kat Nguyễn MD on 9/16/2018 at 13:42                      [facility], GIN  S Cardiology  09/18/18

## 2018-09-18 NOTE — SIGNIFICANT EVENT
Late Entry  S  Patient seen about 130 am for cold swollen L hand. Per RN IV infiltrated earlier while patient was receiving a liter bolus. Patient was sleepy but arousable and tells me she feels better. She denies any pain in her L arm or hand.   Medical

## 2018-09-18 NOTE — PROGRESS NOTES
[facility] ID PROGRESS NOTE    Loel End Patient Status:  Inpatient    10/13/1950 MRN [id number]   [facility] 5SW/SE Attending Alisa Ruiz,  Wealthy   Day # 15 PCP Janina West. DO Chris     Subjective:  Afebrile.  Still fatigued, in soft r Diverticulosis     History of colon polyps     Post-polio limb muscle weakness     Pseudophakia of both eyes     Blepharitis, both eyes     BDR (background diabetic retinopathy) (HCC)     Acute on chronic congestive heart failure (HCC)     CHF (congestive R fifth toe amputation, h/o severe poliomyelitis, essentially wheelchair bound, severe PAD s/p failed left superficial femoral artery to L anterior tibial bypass requiring multiple interventions, with recent hospitalization 7/3-7/3 with increased L foot pa 10%               -TTE 8/13 with EF 10-15%  # Candiduria in patient with thorpe, thorpe placed 7/26  # Severe PAD with failed L SFA to L anterior tibial bypass graft, now s/p L AKA 8/1/18, now active s/s of infection  # H/o severe poliomyelitis, essentially

## 2018-09-18 NOTE — PLAN OF CARE
Problem: Patient Centered Care  Goal: Patient preferences are identified and integrated in the patient's plan of care  Interventions:  - What would you like us to know as we care for you?  \"keep me updated\"  - Provide timely, complete, and accurate inform anxiety  - Utilize distraction and/or relaxation techniques  - Monitor for opioid side effects  - Notify MD/LIP if interventions unsuccessful or patient reports new pain  - Anticipate increased pain with activity and pre-medicate as appropriate   Outcome: cognitive ability or social support system   Outcome: Progressing      Problem: SKIN/TISSUE INTEGRITY - ADULT  Goal: Skin integrity remains intact  INTERVENTIONS  - Assess and document risk factors for pressure ulcer development  - Assess and document skin Patient is max assist, turned q 2 hours. NG running at 25 mL/hr, tolerating well, no emesis noted. Arana catheter is in place, patent, draining clear, yellow urine. Accuchecks performed q 6 hours. Sister is at the bedside.  Soft wrist restraints are on, rou

## 2018-09-18 NOTE — PROGRESS NOTES
[place] [facility]    Progress Note    Foreign Hewitt Patient Status:  Inpatient    10/13/1950 MRN [id number]   Location [facility] 5SW/SE Attending Benjamin Perrin MD   [facility] Day # 15 PCP Daron Clara.  DO Chris        Subjective:   Subj HGB 7.4 09/18/2018    HCT 25.8 09/18/2018     09/18/2018    CREATSERUM 0.84 09/18/2018    BUN 32 09/18/2018     09/18/2018    K 2.9 09/18/2018     09/18/2018    CO2 24 09/18/2018     09/18/2018    CA 7.2 09/18/2018    INR 3.0 09/1 recs  - chronic thorpe exchanged 9/12 and replaced, u/a and culture from new catheter  - EEG normal  - MRI brain reviewed     DVT proph- coumadin    Lab Results   Component Value Date    PT 18.7 (H) 09/19/2016    PT 36.9 (H) 05/05/2016    PT 14.9 (H) 04/22/

## 2018-09-18 NOTE — PROGRESS NOTES
The patient seen for over 25 minute, over 50% consulting and managing treatment plan.     Record reviewed, communication with attending, communication with RN and patient seen face to face evaluation.       History of Present Illness:   Patient is a 78 year

## 2018-09-19 NOTE — PROGRESS NOTES
[facility] ID PROGRESS NOTE    Kitty Lopes Patient Status:  Inpatient    10/13/1950 MRN [id number]   [facility] 5SW/SE Attending Randall Link, [address]   Day # 15 PCP Ginna Wright. ChrisDO     Subjective:  Afebrile. Remains in restraints.  Felipe Cramp thrombosis)     Cough     Family history of colon cancer     Hemorrhoids     Diverticulosis     History of colon polyps     Post-polio limb muscle weakness     Pseudophakia of both eyes     Blepharitis, both eyes     BDR (background diabetic retinopathy) ( cardiomyopathy with AICD, EF 6/12/18 with EF 35%, HLD, HTN, h/o PE on coumadin, h/o R fifth toe amputation, h/o severe poliomyelitis, essentially wheelchair bound, severe PAD s/p failed left superficial femoral artery to L anterior tibial bypass requiring  cardiomyopathy s/o AICD, TTE 6/12/18 with EF 35%               -TTE 7/27 with EF 10%               -TTE 8/13 with EF 10-15%  # Candiduria in patient with thorpe, thorpe placed 7/26  # Severe PAD with failed L SFA to L anterior tibial bypass graft, now s/p L

## 2018-09-19 NOTE — PLAN OF CARE
Problem: Diabetes/Glucose Control  Goal: Glucose maintained within prescribed range  INTERVENTIONS:  - Monitor Blood Glucose as ordered  - Assess for signs and symptoms of hyperglycemia and hypoglycemia  - Administer ordered medications to maintain glucose injury  INTERVENTIONS:  - Assess pt frequently for physical needs  - Identify cognitive and physical deficits and behaviors that affect risk of falls.   - Hammond fall precautions as indicated by assessment.  - Educate pt/family on patient safety includin ulcer development  - Assess and document skin integrity  - Assess and document dressing/incision, wound bed, drain sites and surrounding tissue  - Implement wound care per orders  - Initiate isolation precautions as appropriate  - Initiate Pressure Ulcer p

## 2018-09-19 NOTE — PROGRESS NOTES
[facility]  Progress Note    Indira Fofana Patient Status:  Inpatient    10/13/1950 MRN [id number]   Location [facility] 5SW/SE Attending Becky Whitley MD   [facility] Day # 15 PCP Destiny Patino. DO Chris     Assessment:    1.   Severe LV dysf Date    PT 18.7 (H) 09/19/2016    INR 3.5 (H) 09/19/2018     Lab Results   Component Value Date     09/19/2018    K 3.2 09/19/2018     09/19/2018    CO2 27 09/19/2018    BUN 35 09/19/2018    CREATSERUM 1.03 09/19/2018     09/19/2018    C

## 2018-09-19 NOTE — WOUND PROGRESS NOTE
Pt seen sitting in bed, bilateral wrist restraints, ng tube feeds. Pt remains confused. Right lateral foot has a bordered aquacel foam present covering a resolving callous. The left stump has staples present, marcin wound is pink, redness is resolving.  The d

## 2018-09-19 NOTE — DIETARY NOTE
[address] [facility]     Pt is at high nutrition risk. Pt meets malnutrition criteria.       RECOMMENDATIONS TO MD:  Consider Adjunctive Nutrition Support (TF) given prolonged poor po and cognitive status (if consist 149#, current Wt 148#-- not likely accurate. Per MD, encephalopathy likely d/t infection, Acute on Chronic CHF, on Bumex. However, labs appears to suggest dehydration. Currently pt has on IV fluids. Could develop possible refeeding syndrome.  Swallow eval n H&P. ANTHROPOMETRICS:  HT:  4' 8.5\"  WT: 67.2 kg (148 lb 3.2 oz)  9/6; --true wt masked by fluid/edema. Est dry wt 125-130# ( used 130#--59 kg for calculations). Admit wt 149# (9/6)  BMI: Body mass index is 32.64 kg/m².  using Left AKA  BMI CLASSIFIC NUTRITION RELATED PHYSICAL FINDINGS:  - Body Fat/Muscle Mass: well nourished per visual exam.  - Fluid Accumulation: lower extremity edema and generalized edema non-pitting  - Skin Integrity: reddened, surgical wounds, breakdown, RN documentation revie

## 2018-09-19 NOTE — PROGRESS NOTES
I have examined the patient's upper extremity circulation today by Doppler examination. The patient is cool hands and forearms. The left hand is somewhat swollen. The patient has good motor and sensory function bilaterally.   She has triphasic radial art

## 2018-09-19 NOTE — PLAN OF CARE
Problem: Patient Centered Care  Goal: Patient preferences are identified and integrated in the patient's plan of care  Interventions:  - What would you like us to know as we care for you?  \"keep me updated\"  - Provide timely, complete, and accurate inform social influences on pain and pain management  - Manage/alleviate anxiety  - Utilize distraction and/or relaxation techniques  - Monitor for opioid side effects  - Notify MD/LIP if interventions unsuccessful or patient reports new pain  - Anticipate increa

## 2018-09-19 NOTE — OCCUPATIONAL THERAPY NOTE
OCCUPATIONAL THERAPY TREATMENT NOTE - INPATIENT        Room Number: 545/545-A           Presenting Problem: (Pitting edema)    Problem List  Active Problems:    Type II diabetes mellitus ([facility] [address]    Major depressive disorder, single episode, severe (HCC)    D education;Equipment eval/education; Compensatory technique education    SUBJECTIVE  \"IKC\" (re: where she is currently at)     OBJECTIVE  Precautions: (NG tube; B wrist restraints)    WEIGHT BEARING RESTRICTION  Weight Bearing Restriction: None A     Patient will complete log roll with SBA  Comment: Progressing-max. A x2     Patient will complete bed mobility with Min A   Comment: progressing-max.  A x2             Goals  on:   Frequency:  3-5x/wk    Dayana Goyal OTR/L

## 2018-09-19 NOTE — PROGRESS NOTES
[place] [facility] - [facility]    Progress Note    Charolett Fair Patient Status:  Inpatient    10/13/1950 MRN [id number]   Location [facility] 5SW/SE Attending Az Alaniz MD   [address] Day # 15 PCP Lissy Hutchinson.  DO Chris        Subjective:   Subj HGB 7.4 09/19/2018    HCT 25.1 09/19/2018     09/19/2018    CREATSERUM 1.03 09/19/2018    BUN 35 09/19/2018     09/19/2018    K 3.2 09/19/2018     09/19/2018    CO2 27 09/19/2018     09/19/2018    CA 7.9 09/19/2018    INR 3.5 09/1 encourage po intake - start NG feeding  - CT brain- no acute finding, see below for report   - Neuro consulted - appreciate recs  - chronic thorpe exchanged 9/12 and replaced, u/a and culture from new catheter  - EEG normal  - MRI brain reviewed     DVT pro

## 2018-09-20 NOTE — PROGRESS NOTES
[place] [facility] - [facility]    Progress Note    Steven Way Patient Status:  Inpatient    10/13/1950 MRN [id number]   Location [facility] 5SW/SE Attending Jasper Maxwell MD   [facility] Day # 15 PCP Roxana Estrada.  DO Chris        Subjective:   Subj 7.7 09/20/2018    HCT 27.3 09/20/2018     09/20/2018    CREATSERUM 1.00 09/20/2018    BUN 37 09/20/2018     09/20/2018    K 3.7 09/20/2018     09/20/2018    CO2 27 09/20/2018     09/20/2018    CA 7.9 09/20/2018    INR 3.3 09/20/20 encourage po intake - start NG feeding  - CT brain- no acute finding, see below for report   - Neuro consulted - appreciate recs  - chronic thorpe exchanged 9/12 and replaced, u/a and culture from new catheter  - EEG normal  - MRI brain reviewed     DVT pro FAMILY, Drake Reno Myrna Osler, MD  9/20/18

## 2018-09-20 NOTE — PROGRESS NOTES
[facility]    Cardiology Progress Note    Steven Way Patient Status:  Inpatient    10/13/1950 MRN [id number]   [facility] 5SW/SE Attending Warner Farnsworth MD   [address] Day # 15 PCP Roxana Estrada.  DO Chris       CARDIOLOGY spray Nasal Nightly   • Senna-Docusate Sodium  2 tablet Oral Nightly   • spironolactone  12.5 mg Oral Daily   • Vancomycin HCl  125 mg Oral Daily   • vitamin E  400 Units Oral Daily       Continuous Infusions:   • dextrose         Exam  Gen: No acute distr CO2 27 09/20/2018     (H) 09/20/2018    CA 7.9 (L) 09/20/2018    ALB 2.4 (L) 08/21/2018    ALKPHO 36 08/21/2018    BILT 0.7 08/21/2018    TP 5.8 (L) 08/21/2018    AST 22 08/21/2018    ALT 14 08/21/2018    PTT 32.7 08/20/2018    INR 3.3 (H) 09/20/

## 2018-09-20 NOTE — DIETARY NOTE
Nutrition Note Update    Followed up. Reviewed labs--elevated Na, BUN and Osm. Appears pt with poor hydration   Current Enteral Nutrition via Keofeed. · TF Jevity 1.2 @ goal rate of 55 ml/hr. (may continue Puree diet for pleasure feeding).   ·

## 2018-09-20 NOTE — PHYSICAL THERAPY NOTE
PHYSICAL THERAPY TREATMENT NOTE - INPATIENT     Room Number: 545/545-A       Presenting Problem: Pitting edema    Problem List  Active Problems:    Type II diabetes mellitus (HCC)    Major depressive disorder, single episode, severe (HCC)    Delirium due t 3-5 steps with a railing?: Total     AM-PAC Score:  Raw Score: 8   PT Approx Degree of Impairment Score: 86.62%   Standardized Score (AM-PAC Scale): 28.58   CMS Modifier (G-Code): CM    FUNCTIONAL ABILITY STATUS  Gait Assessment   Gait Assistance: Not test

## 2018-09-20 NOTE — PROGRESS NOTES
[facility] ID PROGRESS NOTE    Kitty Lopes Patient Status:  Inpatient    10/13/1950 MRN [id number]   [facility] 5SW/SE Attending Randall Link, [address] [facility]  Day # 15 PCP Ginna Wright. ChrisDO     Subjective:  Afebrile. Remains in restraints.  Re Diverticulosis     History of colon polyps     Post-polio limb muscle weakness     Pseudophakia of both eyes     Blepharitis, both eyes     BDR (background diabetic retinopathy) (HCC)     Acute on chronic congestive heart failure (HCC)     CHF (congestive coumadin, h/o R fifth toe amputation, h/o severe poliomyelitis, essentially wheelchair bound, severe PAD s/p failed left superficial femoral artery to L anterior tibial bypass requiring multiple interventions, with recent hospitalization 7/3-7/3 with incre 35%               -TTE 7/27 with EF 10%               -TTE 8/13 with EF 10-15%  # Candiduria in patient with thorpe, thorpe placed 7/26  # Severe PAD with failed L SFA to L anterior tibial bypass graft, now s/p L AKA 8/1/18, now active s/s of infection  # H/

## 2018-09-21 NOTE — TELEPHONE ENCOUNTER
Dr Santos Melvin, please note. Miri CASTRO from [employer] ID called to report elevated glucose for the patient. Upon further review, she realized the patient was readmitted to [facility]. Her chart states currently admitted as of 9/6/18.

## 2018-09-21 NOTE — PROGRESS NOTES
[facility] ID PROGRESS NOTE    Marvia Samples Patient Status:  Inpatient    10/13/1950 MRN [id number]   [facility] 5SW/SE Attending Jeramy Goddard MD   [facility] Day # 13 PCP Mariposa Miles. DO Chris     Subjective:  Afebrile. Remains in restraints.  Fa cancer     Hemorrhoids     Diverticulosis     History of colon polyps     Post-polio limb muscle weakness     Pseudophakia of both eyes     Blepharitis, both eyes     BDR (background diabetic retinopathy) (HCC)     Acute on chronic congestive heart failure 35%, HLD, HTN, h/o PE on coumadin, h/o R fifth toe amputation, h/o severe poliomyelitis, essentially wheelchair bound, severe PAD s/p failed left superficial femoral artery to L anterior tibial bypass requiring multiple interventions, with recent hospitali EF 35%               -TTE 7/27 with EF 10%               -TTE 8/13 with EF 10-15%  # Candiduria in patient with thorpe, thorpe placed 7/26  # Severe PAD with failed L SFA to L anterior tibial bypass graft, now s/p L AKA 8/1/18, now active s/s of infection  #

## 2018-09-21 NOTE — CONSULTS
[address] Patient Status:  Inpatient    10/13/1950 MRN [id number]   Location [facility] 3W/SW Attending Warren Hogan MD   [facility] Day # 13 PCP Phu Berger.  Chris,      Date of [facility]. The intention was to administer IV diuretic treatment. History was obtained from [facility] and Dr Luisa Nguyễn and her sister Iram King who is also her [de-identified].   Our palliative care service was asked by Dr. Luisa Nguyễn to evaluate the patient for a goals of care stenosis [facility])      Comment:  Right Superficial  2007: Floaters  No date: Greater trochanteric bursitis  No date: Heart disease  No date: High blood pressure  No date: High cholesterol  No date: History of blood clots  No date: History of blood transfusion HISTORY      Comment:  Arthrocentesis of L shoulder acromioclaviular bursa  No date: OTHER SURGICAL HISTORY;  Right      Comment:  Amputation of 5th toe  No date: TUBAL LIGATION    Palliative Care Social History:   Marital Status:  ( on ADT sheet ) Extensive Disease    Total Care      20   Bed Bound       Can't do any work      Max Assist        Minimal                      Drowsy/confused                                  Extensive Disease    Total Care      10  Bed Bound        Can't do a injection 1-5 Units, 1-5 Units, Subcutaneous, 4 times per day  •  ipratropium-albuterol (DUONEB) nebulizer solution 3 mL, 3 mL, Nebulization, Q4H PRN  •  morphINE sulfate (PF) 2 MG/ML injection 2 mg, 2 mg, Intravenous, Q2H PRN  •  methocarbamol (ROBAXIN) t PRN  •  etomidate (AMIDATE) solution, , , PRN    No current outpatient medications on file.     Review of Systems:    Unable to obtain ROS     Hematology:  Lab Results   Component Value Date    WBC 5.3 09/20/2018    HGB 7.7 (L) 09/20/2018    HCT 27.3 (L) 09 Appointed: Yes  Healthcare Agent's Name: [address] Agent's Phone Number: [phone]  Pre-existing DNR/DNI Order: No                   DNR CODE STATUS    Assessment/Recommendations:    [facility]        Major depressive

## 2018-09-21 NOTE — PROGRESS NOTES
[place] [facility] - [facility]    Progress Note    Patricia Porfirio Patient Status:  Inpatient    10/13/1950 MRN [id number]   Location [facility] 5SW/SE Attending Serina Rosario MD   [facility] Day # 13 PCP Khushi Espino.  DO Chris        Subjective:   Subj currently on hold per cards  Monitor urine output- thorpe in place      PAD status post left AKA   fluid drainage from left stump   cx grew positive for VRE  Monitor off abx per ID  follow up ID recs   wound care- much improved, now with some weeping from s WBC 5.3 09/20/2018    HGB 7.7 (L) 09/20/2018    HCT 27.3 (L) 09/20/2018     (L) 09/20/2018    CREATSERUM 1.00 09/20/2018    BUN 37 (H) 09/20/2018     (H) 09/20/2018    K 3.7 09/20/2018     (H) 09/20/2018    CO2 27 09/20/2018    GLU 32

## 2018-09-21 NOTE — PLAN OF CARE
Around 10:30 AM patient transfer to [facility] CV d/t  order for Milrinone drip. Report given to Abhijit León. Patient transfer with all belongings, being on RA, continuing her NG tube feeding-Jevity 1.2 at 55 ml/hr with 170 ml water flushes q 4 hrs. Family n

## 2018-09-21 NOTE — PROGRESS NOTES
[facility] AND CLINICS  Progress Note    Levi Mcardle Patient Status:  Inpatient    10/13/1950 MRN [id number]   Location [facility] 5SW/SE Attending Jonah Haywood MD   [facility] Day # 13 PCP Marcela Perez,      Assessment:    1.   Severe cardiom Clear anterolaterally without wheezes, rales, rhonchi. Normal excursions and effort. Abdomen: Soft, non-tender. Extremities: 2+edema. Skin: Warm and dry.      Labs:     Lab Results   Component Value Date    PT 18.7 (H) 09/19/2016    INR 3.3 (H) 09/20

## 2018-09-22 NOTE — PROGRESS NOTES
[facility] AND CLINICS  Progress Note    Reina Olivo Patient Status:  Inpatient    10/13/1950 MRN [id number]   Location [facility] 5SW/SE Attending Nickie Mejia MD   [facility] Day # 12 PCP Pam Meraz. DO Chris     Assessment:    1.   Severe cardiom HSM  Lungs: Clear anterolaterally without wheezes, rales, rhonchi. Normal excursions and effort. Abdomen: Soft, non-tender. Extremities: 2+edema. Skin: Warm and dry.      Labs:     Lab Results   Component Value Date    PT 18.7 (H) 09/19/2016    INR 3

## 2018-09-22 NOTE — PLAN OF CARE
Problem: Patient/Family Goals  Goal: Patient/Family Long Term Goal  Patient's Long Term Goal: go home    Interventions:  - follow POC  - See additional Care Plan goals for specific interventions    Outcome: Not Progressing    Goal: Patient/Family Short Ter

## 2018-09-22 NOTE — PROGRESS NOTES
Pt has generalized anasarca. The left AKA incision is draining minimally and only serous fluid. There is no erythema  We can consider removing the staples after a decision regarding hospice is made.

## 2018-09-22 NOTE — PROGRESS NOTES
[facility]    Progress Note    Lynnette Remy Patient Status:  Inpatient    10/13/1950 MRN [id number]   Location [facility] 5SW/SE Attending Suman Goyal MD   [facility] Day # 12 PCP Eleazar Lee.  DO Chris        Subjective:   Subj Plan:   Acute on chronic systolic CHF  Cardiology on board   Cont diuretics per cardiology recs- po bumex BID- currently on hold per cards  Monitor urine output- thorpe in place   On milrinone gtt over the weekend     PAD status post left AKA   fluid draina thorpe exchanged 9/12 and replaced, u/a and culture from new catheter  - EEG normal  - MRI brain reviewed     DVT proph- coumadin       DNR    Dispo: Palliative care meeting on Monday, her prognosis remains guarded. Appreciate Dr. Evgeny Jeffers evaluation.

## 2018-09-22 NOTE — PLAN OF CARE
Diabetes/Glucose Control    • Glucose maintained within prescribed range Not Progressing        DISCHARGE PLANNING    • Discharge to home or other facility with appropriate resources Not Progressing        Impaired Activities of Daily Living    • Achieve h

## 2018-09-23 NOTE — PROGRESS NOTES
[facility]    Progress Note    [facility] Patient Status:  Inpatient    10/13/1950 MRN [id number]   Location [facility] 5SW/SE Attending Petra Regan MD   [facility] Day # 16 PCP Merari Comment.  DO Chris        Subjective:   Subj Assessment and Plan:   Acute on chronic systolic CHF  Cardiology on board   Cont diuretics per cardiology recs- po bumex BID- currently on hold per cards  Monitor urine output- thorpe in place   On milrinone gtt - will cont over weekend     PAD status po exchanged 9/12 and replaced, u/a and culture from new catheter  - EEG normal  - MRI brain reviewed     DVT proph- INR 2.5     DNR    Dispo: Palliative care meeting tomorrow, her prognosis remains guarded.       Results:     Lab Results   Component Value Taco

## 2018-09-23 NOTE — PHYSICAL THERAPY NOTE
Attempted tx; Pt unable to get engaged in meaningful conversation, often w/ closed eyes, not following commands; will re-attempt asap; communicated w/ RN.

## 2018-09-23 NOTE — PROGRESS NOTES
[facility] AND CLINICS  Progress Note    Tony Schulte Patient Status:  Inpatient    10/13/1950 MRN [id number]   Location [facility] 5SW/SE Attending Luz Hill MD   [facility] Day # 16 PCP Amaya Rose. DO Chris     Assessment:    1.   Severe cardiom distress. HEENT: No focal deficits. Neck: 1+ JVD  Cardiac: Regular rate and rhythm, S1, S2 normal, 3/6 high-pitched HSM  Lungs: Clear anterolaterally without wheezes, rales, rhonchi. Normal excursions and effort. Abdomen: Soft, non-tender.    Extremitie

## 2018-09-24 PROBLEM — M54.50 ACUTE MIDLINE LOW BACK PAIN: Status: ACTIVE | Noted: 2018-01-01

## 2018-09-24 NOTE — PROGRESS NOTES
[facility] AND CLINICS  Progress Note    Peter Enciso Patient Status:  Inpatient    10/13/1950 MRN [id number]   Location [facility] 3W/SW Attending Theresa Haywood., MD   [address] Day # 25 PCP Pamella Arroyo. DO Chris     Assessment:    1.   Severe cardiomy Soft, non-tender. Extremities: Mild edema  Skin: Warm and dry.      Labs:  Lab Results   Component Value Date    WBC 6.5 09/23/2018    HGB 7.1 09/23/2018    HCT 24.8 09/23/2018     09/23/2018     Lab Results   Component Value Date    PT 18.7 (H) 09

## 2018-09-24 NOTE — PROGRESS NOTES
[facility] ID PROGRESS NOTE    Tate Micaela Patient Status:  Inpatient    10/13/1950 MRN [id number]   [facility] 5SW/SE Attending Hany Virgen, [address] [facility]  Day # 25 PCP Ilene Perez DO     Subjective:  Afebrile. Off restraints.  Per RN st Pseudophakia of both eyes     Blepharitis, both eyes     BDR (background diabetic retinopathy) ([facility] [address]     Acute on chronic congestive heart failure (HCC)     CHF (congestive heart failure) ([facility])     Seborrheic keratoses     Acute chest pain     PVD (perip poliomyelitis, essentially wheelchair bound, severe PAD s/p failed left superficial femoral artery to L anterior tibial bypass requiring multiple interventions, with recent hospitalization 7/3-7/3 with increased L foot pain, treated for R hand cellulitis, EF 10-15%  # Candiduria in patient with thorpe, thorpe placed 7/26  # Severe PAD with failed L SFA to L anterior tibial bypass graft, now s/p L AKA 8/1/18, now active s/s of infection  # H/o severe poliomyelitis, essentially WC bound  # HTN  # Severe MR with

## 2018-09-24 NOTE — PHYSICAL THERAPY NOTE
Attempted to see patient for PT but per RN Zakiya Dean, pt is not feeling well at this time and there is Palliative care meeting at [address] [facility]. Per RN, please hold PT at this time. Will f/u for PT later today or tomorrow as appropriate.  Thank You,

## 2018-09-24 NOTE — PLAN OF CARE
Diabetes/Glucose Control    • Glucose maintained within prescribed range Not Progressing        Patient/Family Goals    • Patient/Family Long Term Goal Not Progressing    • Patient/Family Short Term Goal Not Progressing          DISCHARGE PLANNING    • Dis

## 2018-09-24 NOTE — CONSULTS
[place] [facility] - [facility]  Palliative Care Follow Up    Foreign Hewitt Patient Status:  Inpatient    10/13/1950 MRN [id number]   Location [facility] 3W/SW Attending Benjamin Cho MD   [facility] Day # 25 PCP Daron Perez DO     Date of Consu crestor and florastor  We will continue plavix    Review of Systems:  Pertinent items are noted in subjective.     Allergies:    Ativan [Lorazepam]      CONFUSION    Comment:Causes paradoxical agitation  Clindamycin             Tightness in Throat  Levoflox Nebulization, Q4H PRN  •  methocarbamol (ROBAXIN) tab 500 mg, 500 mg, Oral, TID PRN  •  dextrose 10 % infusion, , Intravenous, Continuous PRN  •  ALPRAZolam (XANAX) tab 0.5 mg, 0.5 mg, Oral, Nightly PRN  •  Normal Saline Flush 0.9 % injection 10 mL, 10 mL, 09/24/2018    ALB 2.1 (L) 09/24/2018    ALKPHO 36 08/21/2018    BILT 0.7 08/21/2018    TP 5.8 (L) 08/21/2018    AST 22 08/21/2018    ALT 14 08/21/2018    DDIMER 0.37 06/12/2018    MG 2.0 09/16/2018    PHOS 4.2 09/16/2018    TROP 0.06 (HH) 08/20/2018 Yes    Palliative Care Follow Up:    A total of 90 minutes were spent on this consult, which included all of the following:direct face to face contact, history taking, physical examination, and >50% was spent counseling and coordinating care.     After Priya Mohan

## 2018-09-24 NOTE — PLAN OF CARE
Safety Risk - Non-Violent Restraints    • Patient will remain free from self-harm Completed        Restraints discontinued

## 2018-09-24 NOTE — DIETARY NOTE
[address]     Pt is at high nutrition risk. Pt meets malnutrition criteria.       RECOMMENDATIONS TO MD:    · 9/24/18- None at present time, noted plans for Palliative care with Pleasure feeds      CRITERI possible refeeding syndrome.  Swallow eval noted and recommended pleasure feeds of puree is appropriate and consider long term nutrition Spoke with MD, provided Nutrition recommendation for adjunctive TF in view of prolonged poor po.   9/19 Update: RD visit --true wt masked by fluid/edema. Est dry wt 125-130# ( used 130#--59 kg for calculations). Admit wt 149# (9/6)  BMI: Body mass index is 38.33 kg/m².  using Left AKA  BMI CLASSIFICATION: 30-34.9 kg/m2 - obesity class I    IBW: 89 lbs (adjusted for left AK TID    ESTIMATED NUTRITION NEEDS:  Calories: adjusted to 1475 calories/day (25 calories per kg Estimated dry wt 59 kg) --adjust pending accurate wt.    Protein: adjusted to 61-68  grams protein/day (1.5- 1.7 grams protein per kg adjusted IBW for AKA)  Fluid

## 2018-09-24 NOTE — PLAN OF CARE
Report given to MATTHEW rojo, all questions answered. Patient sent to room 453 with all belongings, including wheelchair. Patients sister Xochitl Calderon notified about patients new room assignment.

## 2018-09-24 NOTE — PROGRESS NOTES
[facility]    Progress Note    Burke Monika Patient Status:  Inpatient    10/13/1950 MRN [id number]   Location [facility] 5SW/SE Attending Ameena Kinney MD   [facility] Day # [facility] PCP Roxane Perez DO        Subjective:   Subj -260 ml     Lab Results   Component Value Date    WBC 6.5 09/23/2018    HGB 7.1 09/23/2018    HCT 24.8 09/23/2018     09/23/2018    CREATSERUM 1.11 09/24/2018    BUN 40 09/24/2018     09/24/2018    K 3.2 09/24/2018     09/24/2018    CO2 note  POA  -specialty mattress    Hypernatremia  rec'd FWF via NG, now dc'd  Improving     Withdrawn/possible depression  Consider metabolic encephalopathy as possible etiology   - psych following  - continue Remeron- now stopped  - encourage po intake as 09/24/18

## 2018-09-25 NOTE — PROGRESS NOTES
[facility] Heart Cardiology Progress Note      Michell Duncan Patient Status:  Inpatient    10/13/1950 MRN [id number]   Location [facility] 4W/SW/SE Attending Mery Sears MD   [facility] Day # 23 PCP Oscar Perez and regular rhythm, Nl S1,S2 ,no S3 or murmur    Scheduled Meds:   • insulin detemir  5 Units Subcutaneous Daily   • ivabradine  2.5 mg Oral BID   • carvedilol  3.125 mg Oral BID with meals   • pantoprazole (PROTONIX) IV push  40 mg Intravenous Daily   • I

## 2018-09-25 NOTE — ICD/PM
Pt has a [employer] scientific subQ ICD. This has been reprogrammed not to shock the patient by the [facility]. There will also be no pacing, as the only pacing available on this device is post shock pacing.      This is per Family/pt request, disc

## 2018-09-25 NOTE — PROGRESS NOTES
[place] [facility] - [facility]    Progress Note    Charolett Fair Patient Status:  Inpatient    10/13/1950 MRN [id number]   Location [facility] 5SW/SE Attending Az Alaniz MD   [facility] Day # 23 PCP Lissy Hutchinson.  DO Chris        Subjective:   Subj -400 ml     Lab Results   Component Value Date    WBC 6.0 09/25/2018    HGB 7.6 09/25/2018    HCT 26.8 09/25/2018     09/25/2018    CREATSERUM 1.10 09/25/2018    BUN 35 09/25/2018     09/25/2018    K 4.5 09/25/2018     09/25/2018    CO2 note  POA  -specialty mattress  - lidocaine gel     Hypernatremia  rec'd FWF via NG, now dc'd  Improving     Withdrawn/possible depression  Consider metabolic encephalopathy as possible etiology   - psych following  - continue Remeron- now stopped  - alondra

## 2018-09-25 NOTE — PROGRESS NOTES
This afternoon, patient's bp 75/36, O2 sat dropped to 77. Patient was on 2 L nasal canula, brought up to 4 L nasal canula. O2 sat still in low 80s, Patient now on 6 L high flow. O2 sats high 80s/low 90s.      Notified Dr. Brandan Crespo of patient condition, told

## 2018-09-25 NOTE — CONSULTS
[place] [facility] - [facility]  Palliative Care Follow Up    Drew Sandoval Patient Status:  Inpatient    10/13/1950 MRN [id number]   Location [facility] 4W/SW/SE Attending Clarke Llanes MD   [address] Day # 23 PCP Mariposa Miles.  DO Chris     Date of Co their mother and told her not to force feedings upon her  She told me that they understand the concept of 'pleasure feeds'    She stated appreciation for our help    Review of Systems:  Pertinent items are noted in subjective    Allergies:    Kai Cade Real Oral, Nightly PRN  •  Normal Saline Flush 0.9 % injection 10 mL, 10 mL, Intravenous, PRN  •  ondansetron HCl (ZOFRAN) injection 4 mg, 4 mg, Intravenous, Q6H PRN  •  Clopidogrel Bisulfate (PLAVIX) tab 75 mg, 75 mg, Oral, Daily  •  collagenase (SANTYL) 250 U Objective:  Vital Signs:  Blood pressure (!) 75/36, pulse 89, temperature 97.4 °F (36.3 °C), temperature source Oral, resp. rate 18, weight 174 lb (78.9 kg), SpO2 98 %, not currently breastfeeding. Body mass index is 38.33 kg/m².   Present Level of nita following:direct face to face contact, history taking, physical examination, and >50% was spent counseling and coordinating care. After review of medical chart, Parisa history, physical, diagnosis and treatment plans, she is palliative care appropriate.

## 2018-09-25 NOTE — PROGRESS NOTES
[facility] ID PROGRESS NOTE    Charla Green Patient Status:  Inpatient    10/13/1950 MRN [id number]   [facility] 5SW/SE Attending Inna Resendiz, [address] [facility]  Day # 23 PCP Hina Milligan. DO Chris     Subjective:  Afebrile.  Refusing to take some PO of both eyes     Blepharitis, both eyes     BDR (background diabetic retinopathy) ([facility] [address]     Acute on chronic congestive heart failure (HCC)     CHF (congestive heart failure) ([facility])     Seborrheic keratoses     Acute chest pain     PVD (peripheral vascular essentially wheelchair bound, severe PAD s/p failed left superficial femoral artery to L anterior tibial bypass requiring multiple interventions, with recent hospitalization 7/3-7/3 with increased L foot pain, treated for R hand cellulitis, discharged on k Candiduria in patient with thorpe, thorpe placed 7/26  # Severe PAD with failed L SFA to L anterior tibial bypass graft, 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

## 2018-09-26 NOTE — CONSULTS
[place] [facility] - [facility]  Palliative Care Follow Up    Burke Frank Patient Status:  Inpatient    10/13/1950 MRN [id number]   Location [facility] 4W/SW/SE Attending Ameena Basilio MD   Hosp Day # 21 PCP Roxane Perez,      Date of Co tablet, 4 tablet, Oral, Q15 Min PRN  •  glucose (DEX4) oral liquid 15 g, 15 g, Oral, Q15 Min PRN  •  Insulin Regular Human (NOVOLIN R) 100 UNIT/ML injection 1-5 Units, 1-5 Units, Subcutaneous, 4 times per day  •  ipratropium-albuterol (DUONEB) nebulizer so  (H) 09/26/2018    K 3.7 09/26/2018     (H) 09/26/2018    CO2 24 09/26/2018     (H) 09/26/2018    CA 8.8 09/26/2018    ALB 2.1 (L) 09/24/2018    ALKPHO 36 08/21/2018    BILT 0.7 08/21/2018    TP 5.8 (L) 08/21/2018    AST 22 08/21/2018 which included all of the following:direct face to face contact, history taking, physical examination, and >50% was spent counseling and coordinating care.     After review of medical chart, Rebecca's history, physical, diagnosis and treatment plans, she is pa

## 2018-09-26 NOTE — PROGRESS NOTES
[facility] Pharmacy Note: Route Optimization for Pantoprazole (PROTONIX)    Patient is currently on Pantoprazole (PROTONIX) 40 mg IV every 24 hours.    The patient meets the criteria to convert to the oral equivalent as established by the IV to Oral conversion pro

## 2018-09-26 NOTE — PROGRESS NOTES
[facility]    Progress Note    Kitty Lopes Patient Status:  Inpatient    10/13/1950 MRN [id number]   Location [facility] 5SW/SE Attending Randall Sloan MD   [facility] Day # 21 PCP Ginna Wright.  DO Chris        Subjective:   Subj -295.06 ml     Lab Results   Component Value Date    WBC 4.9 09/26/2018    HGB 7.7 09/26/2018    HCT 27.0 09/26/2018     09/26/2018    CREATSERUM 1.04 09/26/2018    BUN 34 09/26/2018     09/26/2018    K 3.7 09/26/2018     09/26/2018    C Hypernatremia  rec'd FWF via NG, now dc'd  Improving     Withdrawn/possible depression  Consider metabolic encephalopathy as possible etiology   - psych following  - continue Remeron- now stopped  - encourage po intake as NG has now been DC'd  - CT bra

## 2018-09-26 NOTE — PLAN OF CARE
Impaired Activities of Daily Living    • Achieve highest/safest level of independence in self care Not Progressing        RISK FOR INFECTION - ADULT    • Absence of fever/infection during anticipated neutropenic period Not Progressing          PAIN - ADULT

## 2018-09-26 NOTE — PHYSICAL THERAPY NOTE
Per RN Caridad Biggs, pt is not doing well medically at this time and family is considering palliative care at this time. Per RN, pt is not appropriate for skilled PT at this time due to her medical condition.  Will hold PT at this time per RN request.  RN is karen

## 2018-09-26 NOTE — PROGRESS NOTES
[facility]  Progress Note    Bashir Bennett Patient Status:  Inpatient    10/13/1950 MRN [id number]   Location [facility] 4W/SW/SE Attending Warren Stallings., MD   Hosp Day # 21 PCP Phu Berger. DO Chris     Assessment:     1.   Severe card Results   Component Value Date     09/26/2018    K 3.7 09/26/2018     09/26/2018    CO2 24 09/26/2018    BUN 34 09/26/2018    CREATSERUM 1.04 09/26/2018     09/26/2018    CA 8.8 09/26/2018        Lab Results   Component Value Date    TRO

## 2018-09-27 PROBLEM — Z71.89 GOALS OF CARE, COUNSELING/DISCUSSION: Status: ACTIVE | Noted: 2018-01-01

## 2018-09-27 PROBLEM — I50.9 CONGESTIVE HEART FAILURE (CHF) (HCC): Status: ACTIVE | Noted: 2018-01-01

## 2018-09-27 NOTE — HOSPICE RN NOTE
Hospice MAYELA Birch and Hospice RN Lyndon Blank met with patient's sister Russ Chappell, in which an information session was held and consents were signed. Patient will be admitted to hospice as of today 9/27/18 with a diagnosis of CHF.   Patient admitted as [facility] level of

## 2018-09-27 NOTE — WOUND PROGRESS NOTE
Pt seen for wound care follow up. Pt appeared to be hallucinating when I entered the room. Her right leg was \"wedged\" into the bed rail and she was reaching towards the ceiling.  I spoke with her for a few minutes and she appeared to recognize me and act

## 2018-09-27 NOTE — CM/SW NOTE
MD order received regarding hospice. [facility]. [facility] will meet with the pt. And family to discuss options.       Candace [facility] ext [phone]

## 2018-09-27 NOTE — PROGRESS NOTES
[facility]    Progress Note    Kitty Lopes Patient Status:  Inpatient    10/13/1950 MRN [id number]   Location [facility] 5SW/SE Attending Randall Sloan MD   [facility] Day # 21 PCP Ginna Wright.  DO Chris        Subjective:   Subj Lab Results   Component Value Date    K 3.6 09/27/2018           Assessment and Plan:   Acute on chronic systolic CHF  Cardiology on board   Cont diuretics per cardiology recs- po bumex BID- currently on hold per cards  Monitor urine output- thorpe in p report   - Neuro consulted - appreciate recs  - chronic thorpe exchanged 9/12 and replaced, u/a and culture from new catheter  - EEG normal  - MRI brain reviewed     DVT proph- INR 2.5     DNR    Dispo: Palliative Care consult appreciated - will have reside

## 2018-09-27 NOTE — PLAN OF CARE
DISCHARGE PLANNING    • Discharge to home or other facility with appropriate resources Not Progressing        Patient/Family Goals    • Patient/Family Long Term Goal Not Progressing    • Patient/Family Short Term Goal Not Progressing        RISK FOR INFECT

## 2018-09-27 NOTE — CONSULTS
[place] [facility] - [facility]  Palliative Care Follow Up    Lyla Jones Patient Status:  Inpatient    10/13/1950 MRN [id number]   Location [facility] 4W/SW/SE Attending Charmayne Cranker, MD   Hosp Day # 21 PCP Nelly Guy.  DO Chris     Date of Con obtain ROS due to confusion      Allergies:    Ativan [Lorazepam]      CONFUSION    Comment:Causes paradoxical agitation  Clindamycin             Tightness in Throat  Levofloxacin [Quixi*    NAUSEA AND VOMITING  Augmentin [Amoxicil*    RASH  Dilaudid [facility] collagenase (SANTYL) 250 UNIT/GM ointment 1 Application, 1 Application, Topical, Daily  •  Fluticasone Furoate-Vilanterol (BREO ELLIPTA) 100-25 MCG/INH inhaler 1 puff, 1 puff, Inhalation, Daily  •  Meclizine HCl (ANTIVERT) tab 25 mg, 25 mg, Oral, TID PRN NO    Physical Exam:  General: Drowsy and in no apparent distress. Chronically-ill appearing  HEENT: No focal deficits. +dry mucous membranes  Cardiac: Regular rate and rhythm, S1, S2 normal, + murmur, rub or gallop. Lungs: Diminished bilaterally.   Normal continue    Goals of care, counseling/discussion  -See subjective above for details  -Pt is DNR, DNI, no feeding tubes and comfort focused care  -Comfort meds ordered prn  -Recommend dc all non-comfort meds under hospice care  -Family agreeable to Resident

## 2018-09-28 NOTE — H&P
[facility] Patient Status:  Inpatient    10/13/1950 MRN [id number]   Location [facility] 4W/SW/SE Attending Jerald Schmidt MD   Hosp Day # 1 PCP Juan Miguel Martinez.  DO Chris     Date:  2018 [facility]) 2008   • Pneumonia due to organism    • Polio 1950    As Infant   • Pulmonary embolism ([facility] [address] 1987   • Sacroiliitis ([facility] [address]    • Stented coronary artery    • Stroke (cerebrum) ([facility] [address] 2005     Past Surgical History:  No date: ANGIOGRAM  No date: Romaine Dietz Never Used    Alcohol use: No    Drug use: No    Allergies/Medications:    Allergies:   Ativan [Lorazepam]      CONFUSION    Comment:Causes paradoxical agitation  Clindamycin             Tightness in Throat  Levofloxacin [Quixi*    NAUSEA AND VOMITING  Augm 08/21/2018    AST 22 08/21/2018    ALT 14 08/21/2018    PTT 32.7 08/20/2018    INR 2.5 (H) 09/26/2018    PT 18.7 (H) 09/19/2016    TSH 1.20 09/17/2018    DDIMER 0.37 06/12/2018    ESRML 9 06/14/2018    MG 2.0 09/16/2018    PHOS 4.2 09/16/2018    TROP 0.06

## 2018-09-28 NOTE — HOSPICE RN NOTE
Patient is currently GIP Day 2. She appears to be in significant pain, holding her head, grimaced, trying to shift body. Patient moaning when right foot palpated during hospice RN assessment.   Morphine drip was not initiated yesterday, due to family Stephany Kevon

## 2018-09-28 NOTE — CM/SW NOTE
MAYELA eval 9/28/18. The pt was awake, albeit drowsy. She exhibited signs of distress via, furrowed brow and shifting her body weight to find comfort. Her sister was present, advocating strongly for lowered doses of morphine.  MSW provided EOL eduaction, valid

## 2018-09-28 NOTE — PLAN OF CARE
COPING    • Pt/Family able to verbalize concerns and demonstrate effective coping strategies Progressing        DEATH & DYING    • Pt/Family communicate acceptance of impending death and feel psychological comfort and peace Clau

## 2018-09-29 NOTE — PROGRESS NOTES
[facility] Pharmacy Note:  Age Based Dose Adjustment    [facility] 30 mg IV every 6 hours as needed for pain. Patient is >71 years old therefore the dose has been adjusted to 15 mg IV every 6 hours as needed for pain.

## 2018-09-29 NOTE — CM/SW NOTE
MSW touched base with pt and family today. They had no needs at this time.   Hector Baca, MAYELA  [facility]  [phone]

## 2018-09-29 NOTE — HOSPICE RN NOTE
GIP day 3  RR 22, shallow. Output concentrated, about 100cc in thorpe this AM.  Patient awake, drowsy and smiled when sister Arely Stapleton came.   Patient grimacing and when asked about pain, she nodded her head yes and spoke \"yes\" indicating general area of lower

## 2018-09-29 NOTE — PROGRESS NOTES
[facility]    Progress Note    Elisa Dent Patient Status:  Inpatient    10/13/1950 MRN [id number]   Location [facility] 4W/SW/SE Attending Wanda Lang MD   Hosp Day # 2 PCP Power Contreras. DO Chris       Subjective:    Leida Marroquin 09/26/2018    HGB 7.7 (L) 09/26/2018    HCT 27.0 (L) 09/26/2018     (L) 09/26/2018    CREATSERUM 1.04 09/26/2018    BUN 34 (H) 09/26/2018     (H) 09/26/2018    K 3.6 09/27/2018     (H) 09/26/2018    CO2 24 09/26/2018     (H) 09/26 malnutrition     Scant hematemesis       Stage II pressure sores buttocks POA  Turn on her side, patient preference         -admitted to hospice care  -will use toradol prn pain.   Family ok with morphine either IV or oral prn especially at night as needed

## 2018-09-30 NOTE — HOSPICE RN NOTE
GIP d. 4.  Resp. 20, shallow w minimal congestion present. Family present at bedside; support offered. Output decreasing; 40cc concentrated urine since 0300 today. Unresponsive, frowning at times.   Family requests to be called before pain med given; vincenzo

## 2018-09-30 NOTE — PROGRESS NOTES
[facility]    Progress Note    Elisa Dent Patient Status:  Inpatient    10/13/1950 MRN [id number]   Location [facility] 4W/SW/SE Attending Wanda Lang MD   Hosp Day # 3 PCP Power Contreras. DO Chris       Subjective:    Leida Marroquin 7.7 (L) 09/26/2018    HCT 27.0 (L) 09/26/2018     (L) 09/26/2018    CREATSERUM 1.04 09/26/2018    BUN 34 (H) 09/26/2018     (H) 09/26/2018    K 3.6 09/27/2018     (H) 09/26/2018    CO2 24 09/26/2018     (H) 09/26/2018    CA 8.8 09 patient preference         -admitted to hospice care  -will use toradol prn pain.   Family ok with morphine either IV or oral prn especially at night as needed  -remains inpatient hospice appropriate                       Greater than 35 minutes spent, >50%

## 2018-10-01 NOTE — DISCHARGE SUMMARY
[facility]    Discharge Summary    Levi Mcardle Patient Status:  Inpatient    10/13/1950 MRN [id number]   Location [facility] 4W/SW/SE Attending No att. providers found   [facility] Day # 21 PCP Marcela Perez,      Date of Admis note  POA  -specialty mattress  - lidocaine gel      Hypernatremia  rec'd FWF via NG, now dc'd  Improving      Withdrawn/possible depression  Consider metabolic encephalopathy as possible etiology   - psych following  - continue Remeron- now stopped  - enc 3        ASK your doctor about these medications      Instructions Prescription details   sodium chloride 0.9 % SOLN 100 mL with CefTRIAXone Sodium 2 g SOLR 2 g  Ask about: Should I take this medication? Inject 2 g into the vein daily.    Stop taking

## 2018-10-01 NOTE — HOSPICE RN NOTE
Patient is currently GIP Day 5, patient is dyspneic and congested. Hospital RN talked with family (sister Ana Flores) and was agreeable to give IV Morphine (0.25mg only) and Robinul.   Patient is minimally responsive, opens eyes half way and unable to make needs

## 2018-10-01 NOTE — PROGRESS NOTES
[place] [facility] - [facility]    Progress Note    Charolett Fair Patient Status:  Inpatient    10/13/1950 MRN [id number]   Location [facility] 4W/SW/SE Attending Roosvelt Habermann, MD   Hosp Day # 4 PCP Lissy Hutchinson. DO Chris       Subjective:    Michael Goes 09/26/2018     (L) 09/26/2018    CREATSERUM 1.04 09/26/2018    BUN 34 (H) 09/26/2018     (H) 09/26/2018    K 3.6 09/27/2018     (H) 09/26/2018    CO2 24 09/26/2018     (H) 09/26/2018    CA 8.8 09/26/2018    ALB 2.1 (L) 09/24/2018 while we wait for pharmacy to send up drip  -secretion management with atropine drops, lasix and robinul prn                   Greater than 35 minutes spent, >50% spent counseling re: treatment plan and workup  Leonel Calderón MD

## 2018-10-02 NOTE — DISCHARGE SUMMARY
[place] [facility] - [facility]    Discharge Summary    Stuart Zhang Patient Status:  Inpatient    10/13/1950 MRN [id number]   Location [facility] 4W/SW/SE Attending No att. providers found   Hosp Day # 5 PCP Latesha Berg.  Chris,      Date of Admiss

## 2019-02-28 VITALS
BODY MASS INDEX: 26.9 KG/M2 | RESPIRATION RATE: 18 BRPM | HEART RATE: 90 BPM | DIASTOLIC BLOOD PRESSURE: 70 MMHG | OXYGEN SATURATION: 100 % | HEIGHT: 60 IN | WEIGHT: 137 LBS | SYSTOLIC BLOOD PRESSURE: 120 MMHG

## 2019-02-28 VITALS
BODY MASS INDEX: 26.31 KG/M2 | HEART RATE: 88 BPM | SYSTOLIC BLOOD PRESSURE: 108 MMHG | HEIGHT: 60 IN | WEIGHT: 134 LBS | DIASTOLIC BLOOD PRESSURE: 60 MMHG

## 2019-02-28 VITALS
HEIGHT: 60 IN | WEIGHT: 138 LBS | SYSTOLIC BLOOD PRESSURE: 118 MMHG | OXYGEN SATURATION: 95 % | HEART RATE: 72 BPM | BODY MASS INDEX: 27.09 KG/M2 | DIASTOLIC BLOOD PRESSURE: 70 MMHG

## 2019-02-28 VITALS
BODY MASS INDEX: 27.29 KG/M2 | WEIGHT: 139 LBS | HEIGHT: 60 IN | DIASTOLIC BLOOD PRESSURE: 60 MMHG | SYSTOLIC BLOOD PRESSURE: 120 MMHG | RESPIRATION RATE: 18 BRPM | HEART RATE: 92 BPM

## 2019-02-28 VITALS
HEART RATE: 61 BPM | SYSTOLIC BLOOD PRESSURE: 104 MMHG | WEIGHT: 140 LBS | BODY MASS INDEX: 27.48 KG/M2 | HEIGHT: 60 IN | OXYGEN SATURATION: 97 % | DIASTOLIC BLOOD PRESSURE: 60 MMHG

## 2019-02-28 VITALS
HEIGHT: 60 IN | WEIGHT: 136 LBS | HEART RATE: 80 BPM | OXYGEN SATURATION: 96 % | SYSTOLIC BLOOD PRESSURE: 100 MMHG | BODY MASS INDEX: 26.7 KG/M2 | DIASTOLIC BLOOD PRESSURE: 58 MMHG

## 2019-03-01 VITALS
DIASTOLIC BLOOD PRESSURE: 62 MMHG | HEART RATE: 78 BPM | WEIGHT: 134 LBS | HEIGHT: 69 IN | OXYGEN SATURATION: 98 % | SYSTOLIC BLOOD PRESSURE: 110 MMHG | BODY MASS INDEX: 19.85 KG/M2 | RESPIRATION RATE: 18 BRPM

## 2019-03-01 VITALS
DIASTOLIC BLOOD PRESSURE: 60 MMHG | RESPIRATION RATE: 16 BRPM | WEIGHT: 134 LBS | SYSTOLIC BLOOD PRESSURE: 112 MMHG | HEIGHT: 59 IN | HEART RATE: 68 BPM | BODY MASS INDEX: 27.01 KG/M2

## 2019-03-01 VITALS
WEIGHT: 132 LBS | HEART RATE: 93 BPM | DIASTOLIC BLOOD PRESSURE: 60 MMHG | HEIGHT: 59 IN | SYSTOLIC BLOOD PRESSURE: 110 MMHG | OXYGEN SATURATION: 98 % | BODY MASS INDEX: 26.61 KG/M2

## 2019-03-01 VITALS
HEART RATE: 60 BPM | DIASTOLIC BLOOD PRESSURE: 70 MMHG | WEIGHT: 130 LBS | RESPIRATION RATE: 18 BRPM | BODY MASS INDEX: 26.21 KG/M2 | SYSTOLIC BLOOD PRESSURE: 112 MMHG | HEIGHT: 59 IN | OXYGEN SATURATION: 97 %

## 2019-03-01 VITALS
WEIGHT: 130 LBS | SYSTOLIC BLOOD PRESSURE: 110 MMHG | HEIGHT: 59 IN | HEART RATE: 82 BPM | DIASTOLIC BLOOD PRESSURE: 62 MMHG | BODY MASS INDEX: 26.21 KG/M2

## 2019-03-18 NOTE — PROGRESS NOTES
Mari Ayala  [id number]  03/18/19    The [facility] physicians located in the [facility] [address], share daily and weekend call coverage.  The following is a list of these providers:    Dr. Wilman Hooks     In general, one of the above mentioned physicians covers the whole group for daily and weekend call rotation. In rare circumstances, call coverage may be provided by an [facility] physician outside of this group.    For this reason, please understand that we cannot guarantee which physician will be present at the time of your delivery. You could be delivered by any one of the physicians (male or female) on call.    While in the hospital, your care from other physicians (anesthesia, neonatology, etc.) will be provided by the physician (male or female) that is on call.    I have read and fully understand the above information and agree to the same.      ______________________________________  Mari Ayala    ______________________________________      [facility] ID PROGRESS NOTE    Tony Schulte Patient Status:  Inpatient    10/13/1950 MRN [id number]   [facility] 2W/SW Attending Deondre Melvin MD   [facility] Day # 27 PCP Amaya Rose. DO Chris     Subjective:  Awake, afebrile.  TPN started yeste History of pulmonary embolism     History of DVT (deep vein thrombosis)     Cough     Family history of colon cancer     Hemorrhoids     Diverticulosis     History of colon polyps     Post-polio limb muscle weakness     Pseudophakia of both eyes     Blepha h/o severe poliomyelitis, essentially wheelchair bound, severe PAD s/p failed left superficial femoral artery to L anterior tibial bypass requiring multiple interventions, with recent hospitalization 7/3-7/3 with increased L foot pain, treated for R hand c with EF 35%               -TTE 7/27 with EF 10%               -TTE 8/13 with EF 10-15%  # Candiduria in patient with thorpe, thorpe placed 7/26  # Severe PAD with failed L SFA to L anterior tibial bypass graft, now s/p L AKA 8/1/18, now active s/s of infecti

## 2019-08-15 NOTE — PROGRESS NOTES
[facility] AND [facility]  [facility] Cardiology Progress Note    Benson Torres Patient Status:  Inpatient    10/13/1950 MRN [id number]   [facility] 2W/SW Attending Carley Salinas MD   [address] Day # 35 PCP Leydi Perez,      77 yo female with Spoke with pharmacist, advised of plan per OV note patient instructions. Verbalized understanding. diabetic retinopathy) ([facility] 1/14/2016   • Blepharitis, both eyes 1/14/2016   • BPPV (benign paroxysmal positional vertigo)    • Breast cancer (HCC)     DCIS   • Breast cancer of upper-outer quadrant of left female breast ([facility] 10/23/2010   • Cancer of over HYSTERECTOMY  2004: MASTECTOMY RIGHT  2004: NEEDLE BIOPSY LEFT      Comment: x 2  No date: OTHER SURGICAL HISTORY      Comment: Orthopedic surgeries X8  No date: OTHER SURGICAL HISTORY      Comment: Stent and Angioplasty  Dr. Smith Six  No date: OTHER SURGICAL

## 2019-09-16 NOTE — OCCUPATIONAL THERAPY NOTE
OCCUPATIONAL THERAPY EVALUATION - INPATIENT     Room Number: 545/545-A  Evaluation Date: 9/13/2018  Type of Evaluation: Initial       Physician Order: IP Consult to Occupational Therapy  Reason for Therapy: ADL/IADL Dysfunction and Discharge Planning    OC pt's scalp. Returned to supine with Max A x 2. RN Jaiden present upon departure. DISCHARGE RECOMMENDATIONS  OT Discharge Recommendations: Sub-acute rehabilitation       PLAN  OT Treatment Plan: Balance activities; ADL training;Functional transfer training Osteopenia    • Other ill-defined conditions(799.89) 2010    Post Polio   • PE (pulmonary embolism)    • Peripheral vascular disease ([facility] 2008   • Pneumonia due to organism    • Polio 1950    As Infant   • [facility]   • Sacroiliitis (H Equipment: Grab bar;Comfort height toilet  Shower/Tub and Equipment: Other (Comment) (walk-in tub)  Other Equipment: None     Drives: Yes  Stairs in Home: ramp entrance  Use of assistive Device(s): WC, lateral t/fs  Prior Level of [place]:  Mod I w/ AD CM    FUNCTIONAL TRANSFER ASSESSMENT  Supine to Sit : Dependent assistance  Sit to Stand: Not tested  Toilet Transfer: NT  Shower Transfer: NT  Chair Transfer: NT    Bedroom Mobility: NT    Bed mobility: total A    BALANCE ASSESSMENT  Static Sitting:  Max A [General Appearance - Alert] : alert [Oriented To Time, Place, And Person] : oriented to person, place, and time [Impaired Insight] : insight and judgment were intact [General Appearance - In No Acute Distress] : in no acute distress [Affect] : the affect was normal [Person] : oriented to person [Place] : oriented to place [Time] : oriented to time [Concentration Intact] : normal concentrating ability [Visual Intact] : visual attention was ~T not ~L decreased [Writing A Sentence] : no difficulty writing a sentence [Naming Objects] : no difficulty naming common objects [Repeating Phrases] : no difficulty repeating a phrase [Comprehension] : comprehension intact [Fluency] : fluency intact [Past History] : adequate knowledge of personal past history [Reading] : reading intact [Cranial Nerves Oculomotor (III)] : extraocular motion intact [Cranial Nerves Optic (II)] : visual acuity intact bilaterally,  visual fields full to confrontation, pupils equal round and reactive to light [Cranial Nerves Facial (VII)] : face symmetrical [Cranial Nerves Trigeminal (V)] : facial sensation intact symmetrically [Cranial Nerves Vestibulocochlear (VIII)] : hearing was intact bilaterally [Cranial Nerves Glossopharyngeal (IX)] : tongue and palate midline [Cranial Nerves Accessory (XI - Cranial And Spinal)] : head turning and shoulder shrug symmetric [Cranial Nerves Hypoglossal (XII)] : there was no tongue deviation with protrusion [Motor Strength] : muscle strength was normal in all four extremities [No Muscle Atrophy] : normal bulk in all four extremities [Sensation Tactile Decrease] : light touch was intact [Balance] : balance was intact [2+] : Ankle jerk left 2+ [Involuntary Movements] : no involuntary movements were seen [Motor Handedness Right-Handed] : the patient is right hand dominant [Sensation Pain / Temperature Decrease] : pain and temperature was intact [Proprioception] : proprioception was intact [Sensation Vibration Decrease] : vibration was intact [Sclera] : the sclera and conjunctiva were normal [PERRL With Normal Accommodation] : pupils were equal in size, round, reactive to light, with normal accommodation [Extraocular Movements] : extraocular movements were intact [No APD] : no afferent pupillary defect [No CATRACHITA] : no internuclear ophthalmoplegia [Full Visual Field] : full visual field [Neck Appearance] : the appearance of the neck was normal [Jugular Venous Distention Increased] : there was no jugular-venous distention [Neck Cervical Mass (___cm)] : no neck mass was observed [Thyroid Diffuse Enlargement] : the thyroid was not enlarged [Thyroid Nodule] : there were no palpable thyroid nodules [Auscultation Breath Sounds / Voice Sounds] : lungs were clear to auscultation bilaterally [Heart Rate And Rhythm] : heart rate was normal and rhythm regular [Heart Sounds] : normal S1 and S2 [Heart Sounds Gallop] : no gallops [Murmurs] : no murmurs [Arterial Pulses Carotid] : carotid pulses were normal with no bruits [Heart Sounds Pericardial Friction Rub] : no pericardial rub [Full Pulse] : the pedal pulses are present [Edema] : there was no peripheral edema [Abdomen Soft] : soft [Bowel Sounds] : normal bowel sounds [Abdomen Tenderness] : non-tender [Abdomen Mass (___ Cm)] : no abdominal mass palpated [No CVA Tenderness] : no ~M costovertebral angle tenderness [No Spinal Tenderness] : no spinal tenderness [Abnormal Walk] : normal gait [Nail Clubbing] : no clubbing  or cyanosis of the fingernails [Musculoskeletal - Swelling] : no joint swelling seen [Motor Tone] : muscle strength and tone were normal [Skin Turgor] : normal skin turgor [Skin Color & Pigmentation] : normal skin color and pigmentation [] : no rash [Paresis Pronator Drift Right-Sided] : no pronator drift on the right [Paresis Pronator Drift Left-Sided] : no pronator drift on the left [Motor Strength Upper Extremities Bilaterally] : strength was normal in both upper extremities [Motor Strength Lower Extremities Bilaterally] : strength was normal in both lower extremities [Romberg's Sign] : Romberg's sign was negtive [Allodynia] : no ~T allodynia present [Dysesthesia] : no dysesthesia [Hyperesthesia] : no hyperesthesia [Tremor] : no tremor present [Past-pointing] : there was no past-pointing [Plantar Reflex Right Only] : normal on the right [Plantar Reflex Left Only] : normal on the left [FreeTextEntry4] : SAC: -1 IM; -2 digits; -1 recall. 26/30. \par mBESS: intact.

## 2020-01-03 NOTE — TELEPHONE ENCOUNTER
Patients mom is calling stating that her son is having pain  She is refusing appointments being offered since they are too far out  Told her I would send a message but the doctor would want them to come in  Pt sees Dr. Dorothea Jeffery . Dr. Nitin Espinoza on vacation next week. Pt does have an appointment on Tues with Dr. Katrin Ann. Pt has history of right 5th toe amputated about 4 years ago.  Pt has a small pinhole like wound to the lateral aspect of the stump of th

## 2020-01-22 NOTE — PROGRESS NOTES
----- Message from Sunitha Quintanilla MD sent at 1/22/2020  8:06 AM CST -----  pls call - labs are great. Cholesterol controlled. [facility]    Progress Note    Charolett Fair Patient Status:  Inpatient    10/13/1950 MRN [id number]   Location [facility] 2W/SW Attending Praveen Mayfield MD   Hosp Day # 6 PCP Lissy Hutchinson. DO Chris     Subjective:  Pt.  Braydon Perez 93, temperature 97.6 °F (36.4 °C), temperature source Temporal, resp. rate 16, height 4' 8.5\" (1.435 m), weight 141 lb 15.6 oz (64.4 kg), SpO2 99 %, not currently breastfeeding.   General: NAD  Neck: No JVD  Lungs: Clear bilaterally anteriorly  Heart: RRR,

## 2020-03-09 PROBLEM — Z79.01 LONG TERM (CURRENT) USE OF ANTICOAGULANTS: Status: RESOLVED | Noted: 2017-01-01 | Resolved: 2020-03-09

## 2020-03-09 PROBLEM — I73.9 PERIPHERAL VASCULAR DISEASE, UNSPECIFIED (HCC): Status: RESOLVED | Noted: 2017-01-01 | Resolved: 2020-03-09

## 2020-05-07 NOTE — PLAN OF CARE
Problem: ALTERED NUTRIENT INTAKE - ADULT  Goal: Nutrient intake appropriate for improving, restoring or maintaining nutritional needs  INTERVENTIONS:  - Assess nutritional status and recommend course of action  - Monitor oral intake, labs, and treatment pl 97.8

## 2021-06-16 NOTE — SPIRITUAL CARE NOTE
" Diana Coburn, JOHN     Chief Complaint   Patient presents with   • Ear Problem          HPI  Marti Pugh is a  70 y.o. female who is here for follow up. She reports right sided intermittent \"funny feeling\" in her ear.  She has not been using any nasal sprays regularly.              Vital Signs:   Temp:  [98.4 °F (36.9 °C)] 98.4 °F (36.9 °C)  Heart Rate:  [83] 83  BP: (144)/(82) 144/82    Physical Exam  Constitutional:       Appearance: She is normal weight.   HENT:      Head: Normocephalic.      Right Ear: Tympanic membrane, ear canal and external ear normal.      Left Ear: Tympanic membrane, ear canal and external ear normal.      Nose: Mucosal edema and congestion present.   Neurological:      Mental Status: She is alert.                  Assessment/Plan    Assessment:   1. Ear itching    2. Otalgia of right ear    3. Seasonal allergic rhinitis, unspecified trigger        Plan:        For the best response, use your nasal sprays every day without skipping doses. It may take several weeks before the full effect is acheived.      New Medications Ordered This Visit   Medications   • azithromycin (ZITHROMAX) 250 MG tablet     Sig: Take 2 tablets the first day, then 1 tablet daily for 4 days.     Dispense:  6 tablet     Refill:  0   • fluticasone (FLONASE) 50 MCG/ACT nasal spray     Si sprays into the nostril(s) as directed by provider Daily.     Dispense:  16 g     Refill:  11          Return if symptoms worsen or fail to improve.         JOHN Velasco  21  13:34 CDT      " Patient is under a hospice care. Pt. Was not comfortable. Family asked for prayers with laying on hands.

## 2022-10-13 NOTE — PROGRESS NOTES
[facility]    Cardiology Progress Note    Tony Schulte Patient Status:  Inpatient    10/13/1950 MRN [id number]   Location [facility] 2W/SW Attending Deondre Melvin MD   [facility] Day # 5 PCP Amaya Rose.  Chris,      2018  Subj 98  97   CO2  15*  19*  22  16*  22   BUN  11  11  13  11  11   CREATSERUM  0.67  0.67  0.82  0.81  0.81   CA  8.2*  8.1*  7.8*  6.6*  7.1*   MG   --    --   1.2*   --   2.1   PHOS   --    --   1.6*   --   2.7   GLU  283*  224*  136*  167*  107*       Rece Subcutaneous Q8H [facility]   Normal Saline Flush 0.9 % injection 10 mL 10 mL Intravenous PRN   ALPRAZolam (XANAX) tab 0.25 mg 0.25 mg Oral Daily   balsam peru-castor oil (VENELEX) ointment  Topical BID PRN   Clopidogrel Bisulfate (PLAVIX) tab 75 mg 75 mg Oral Da ELLIPTA) 200-25 MCG/INH inhaler 1 puff 1 puff Inhalation Daily   Meclizine HCl (ANTIVERT) tab 25 mg 25 mg Oral TID PRN   methocarbamol (ROBAXIN) tab 500 mg 500 mg Oral QID PRN   Metoprolol Succinate ER (Toprol XL) 24 hr tab 25 mg 25 mg Oral Daily   nitroGL unspecified ([facility]     Long term (current) use of anticoagulants     PAD (peripheral artery disease) (HCC)     Dyspnea on exertion     Dyspnea     Unstable angina (HCC)     Poliomyelitis     Aneurysm (arteriovenous) of coronary vessels     Dyspnea, unspecif No

## 2022-10-19 NOTE — ADDENDUM NOTE
Addended by: Elham Perry on: 9/28/2017 02:57 PM     Modules accepted: Orders Likely related to hyperglycemia and CKD   · Continue low potassium diet  · Breanne Jonathan was discontinued   · Resolved with hyperkalemia medications   · Nephrology input appreciated -- will need outpt f/u

## 2023-02-16 NOTE — CM/SW NOTE
Stephon 78 orders obtained and provided to [facility]/Juliana [id number] who is aware of the next INR draw. Plan for dc to home today.     KUMAR merritt [id number] Detail Level: Simple Price (Do Not Change): 0.00 Instructions: This plan will send the code FBSE to the PM system.  DO NOT or CHANGE the price.

## 2023-02-17 NOTE — PROGRESS NOTES
[facility]    Progress Note    Elvie Dougherty Patient Status:  Inpatient    10/13/1950 MRN [id number]   Location [facility] 5SW/SE Attending Liza Ramos, MD   [facility] Day # 6 PCP Chadwick.  DO Chris        Subjective:   Subj Diagnostic wire inserted. CREATSERUM 1.24 09/17/2018    BUN 36 09/17/2018     09/17/2018    K 4.0 09/17/2018     09/17/2018    CO2 25 09/17/2018     09/17/2018    CA 8.1 09/17/2018    INR 2.4 09/17/2018    TSH 1.20 09/17/2018           Assessment and Plan:   Acut brain reviewed    DVT proph- coumadin    Lab Results   Component Value Date    PT 18.7 (H) 09/19/2016    PT 36.9 (H) 05/05/2016    PT 14.9 (H) 04/22/2016    INR 2.4 (H) 09/17/2018    INR 2.1 (H) 09/16/2018    INR 2.6 (H) 09/15/2018          DNR    Dispo: p stomach with slight redundancy directed towards the gastroesophageal junction. Consider repositioning as clinically warranted. 2. Left pleural effusion and compressive atelectasis, with or without superimposed pneumonia.   3. Postthoracotomy chest demonstr

## 2023-03-24 NOTE — TELEPHONE ENCOUNTER
LMTCB please transfer to [phone]  Pharmacy contacted, they did fill the prescription, the patient only wanted the saline solution.    Check with the patient to see if she wants to continue ordering saline and albuterol separtely, it is now available premixed Detail Level: Detailed

## 2023-05-03 NOTE — RESPIRATORY THERAPY NOTE
Patient intubated at 0340 by [address] [place] [address],[facility] due to unresponsiveness and for airway protection. Mrs. Jayant Wallace has a size 7. 5ETT secured at [email or web address] and was placed on vent settings of AC14/500/50/+5. Will continue to monitor patient closely. Bexarotene Pregnancy And Lactation Text: This medication is Pregnancy Category X and should not be given to women who are pregnant or may become pregnant. This medication should not be used if you are breast feeding.

## 2023-10-03 NOTE — LETTER
[address], [place]  Authorization for Invasive Procedures  1.  I hereby authorize Dr. Edith Garsia, my physician and whomever may be designated as the doctor's assistant, to perform the following operation and/or procedure:  Left leg flu performed for the purposes of advancing medicine, science, and/or education, provided my identity is not revealed. If the procedure has been videotaped, the physician/surgeon will obtain the original videotape.  The hospital will not be responsible for stor My signature below affirms that prior to the time of the procedure, I have explained to the patient and/or her legal representative, the risks and benefits involved in the proposed treatment and any reasonable alternative to the proposed treatment.  I have Olumiant Pregnancy And Lactation Text: Based on animal studies, Olumiant may cause embryo-fetal harm when administered to pregnant women.  The medication should not be used in pregnancy.  Breastfeeding is not recommended during treatment.

## 2023-11-29 NOTE — TELEPHONE ENCOUNTER
----- Message from OgemaRosita Venegas sent at 1/18/2018 10:21 AM CST -----  Regarding: Prescription Question  Contact: [phone]  Pls contact [place] [phone] and give them an ok to refill my Wafarin 7.5 mg 3x a week. (Other 4 days 5 mg- don't need yet. )
One touch ultra sent to pharmacy
PHYSICAL / OCCUPATIONAL THERAPY - DAILY TREATMENT NOTE (updated )    Patient Name: Rosa Lara    Date: 2023    : 1983  Insurance: Payor: Eyal / Plan: Eyal / Product Type: *No Product type* /      Patient  verified Yes     Visit #   Current / Total 1 10   Time   In / Out 935 1010   Pain   In / Out 2 2   Subjective Functional Status/Changes: See POC     TREATMENT AREA =  Pain in right shoulder [M25.511]    OBJECTIVE    35 min [x]Eval - untimed                          Therapeutic Procedures: Tx Min Billable or 1:1 Min (if diff from Tx Min) Procedure, Rationale, Specifics          Details if applicable:         [facility] Totals Reminder: bill using total billable min of TIMED therapeutic procedures (example: do not include dry needle or estim unattended, both untimed codes, in totals to left)  8-22 min = 1 unit; 23-37 min = 2 units; 38-52 min = 3 units; 53-67 min = 4 units; 68-82 min = 5 units   Total Total     [x]  Patient Education billed concurrently with other procedures   [x] Review HEP    [] Progressed/Changed HEP, detail:    [] Other detail:       Objective Information/Functional Measures/Assessment    See POC    Patient will continue to benefit from skilled PT / OT services to modify and progress therapeutic interventions, analyze and address functional mobility deficits, analyze and address ROM deficits, analyze and address strength deficits, analyze and address soft tissue restrictions, analyze and cue for proper movement patterns, analyze and modify for postural abnormalities, analyze and address imbalance/dizziness, and instruct in home and community integration to address functional deficits and attain remaining goals.     Progress toward goals / Updated goals:  []  See Progress Note/Recertification    See POC    Next PN/ RC due 23  Auth due auth needed    PLAN  - Continue Plan of [address] [place], [place]    2023    11:47
Pharmacy calling because One touch test strips were sent over and needs to be one touch ultra.      Please resend
Spoke with patient and informed her rx for Wafarin 5mg was sent to pharmacy today. Patient is requesting for provider to send an rx to pharmacy for the 7.5mg tabs. Rx also for the test strips were pended for provider.  Patient reports she checks her blood s
or participation in recreation  ;Presentation:  MEDIUM Complexity : Evolving with changing characteristics  ; Clinical Decision Making:  MEDIUM Complexity : FOTO score of 26-74 FOTO score = an established functional score where 100 = no disability  Overall Complexity Rating: MEDIUM  Problem List: pain affecting function, decrease ROM, decrease strength, edema affection function, decrease ADL/functional abilities, decrease activity tolerance, decrease flexibility/joint mobility, and decrease transfer abilities    Treatment Plan may include any combination of the followin Therapeutic Exercise, 09922 Neuromuscular Re-Education, 89758 Manual Therapy, 36714 Therapeutic Activity, 57679 Self Care/Home Management, and 98766 Electrical Stim unattended; Hot/Cold Modalities PRN. Patient / Family readiness to learn indicated by: asking questions, trying to perform skills, interest, return verbalization , and return demonstration   Persons(s) to be included in education: patient (P)  Barriers to Learning/Limitations: none  Patient Goal (s): \"To be able to lift her arm without pain. \"   Patient Self Reported Health Status: good  Rehabilitation Potential: fair    Short Term Goals: To be accomplished in 2 weeks    Pt will be able to report a 8/10 pain rating at worst to improve patient's ability to tolerate prolonged functional activities at home. Eval: 10/10 at worst    Pt will be independent with HEP to facilitate carry-over of functional gains made in PT at home & community. Eval: Established at eval      Long Term Goals: To be accomplished in 10 treatments    Pt will be able to improve strength in right shoulder flexion & ER/IR to at least 4+/5 to improve patient's ability to her ability to lift objects overhead at home & community.     Eval:  Flexion = 3/5 pain, ER/IR at neutral = 4-/5 with pain    Pt will improve FOTO score to 65 to demonstrate improvement in patient's ability to perform unrestricted ADLs/IADLs

## 2023-12-26 NOTE — PROGRESS NOTES
Rx Refill Note  Requested Prescriptions      No prescriptions requested or ordered in this encounter      Last office visit with prescribing clinician: 3/29/2023   Last telemedicine visit with prescribing clinician: Visit date not found   Next office visit with prescribing clinician: Visit date not found                         Would you like a call back once the refill request has been completed: [] Yes [] No    If the office needs to give you a call back, can they leave a voicemail: [] Yes [] No    Yue Womack MA  12/26/23, 14:59 EST     S/P AXILLARY ARTERY IMPELLA INSERTION AND REMOVAL on 5/29  Left hand numbness continues to improve, patient states just her finger tips are numb at this point  Working with PT/OT; encouraged increase activity as fabricio. Pt.  Wheelchair bound  SCDs and IV Hepar

## 2024-01-25 NOTE — PROGRESS NOTES
Blood pressure 126/72, pulse 80, height 4' 8.5\" (1.435 m), weight 138 lb (62.6 kg), not currently breastfeeding. Patient presents today complaining of left lower extremity pain for the past several weeks.   Had been taking tramadol for pain has not take N/A

## 2025-07-07 NOTE — IMAGING NOTE
12:25pm Pt arrived from floor to mri for left lower extremity scan. Meghan Jovel Pt pacemaker placed in safe mode by Herberth Dennis from [employer]. Pt HR 81 97%RA, pt has no complaints while starting MRI.    12:35: Pt HR 87 , 98% RA, irregular rhythm , unpaced  12:45: P  used

## (undated) DEVICE — BARD® PTFE FELT PLEDGETS, (OVAL), 4.8 MM X 6 MM: Brand: BARD® PTFE FELT PLEDGETS

## (undated) DEVICE — CV LAP PACK-LF: Brand: MEDLINE INDUSTRIES, INC.

## (undated) DEVICE — TRANSPOSAL ULTRAFLEX DUO/QUAD ULTRA CART MANIFOLD

## (undated) DEVICE — SUTURE POLYDEK 2-0

## (undated) DEVICE — PROXIMATE RH ROTATING HEAD SKIN STAPLERS (35 WIDE) CONTAINS 35 STAINLESS STEEL STAPLES: Brand: PROXIMATE

## (undated) DEVICE — SUTURE VICRYL 2-0 CT-1

## (undated) DEVICE — SUTURE SILK 0

## (undated) DEVICE — SUCTION CANISTER, 3000CC,SAFELINER: Brand: DEROYAL

## (undated) DEVICE — GLOVE SURG TRIUMPH SZ 8

## (undated) DEVICE — LARGE BONE BLADE SAGITTAL

## (undated) DEVICE — STERILE POLYISOPRENE POWDER-FREE SURGICAL GLOVES: Brand: PROTEXIS

## (undated) DEVICE — MEGADYNE E-Z CLEAN 2.5\" BLADE

## (undated) DEVICE — VIOLET BRAIDED (POLYGLACTIN 910), SYNTHETIC ABSORBABLE SUTURE: Brand: COATED VICRYL

## (undated) DEVICE — SUTURE SILK 2-0 SA85H

## (undated) DEVICE — SUTURE SILK 3-0 SA64H

## (undated) DEVICE — GAUZE SPONGES,12 PLY: Brand: CURITY

## (undated) DEVICE — 3M™ IOBAN™ 2 ANTIMICROBIAL INCISE DRAPE 6651EZ: Brand: IOBAN™ 2

## (undated) DEVICE — BANDAGE ROLL,100% COTTON, 6 PLY, LARGE: Brand: KERLIX

## (undated) DEVICE — INTENDED FOR TISSUE SEPARATION, AND OTHER PROCEDURES THAT REQUIRE A SHARP SURGICAL BLADE TO PUNCTURE OR CUT.: Brand: BARD-PARKER ® STAINLESS STEEL BLADES

## (undated) DEVICE — BONE WAX W31G

## (undated) DEVICE — LOWER EXTREMITY: Brand: MEDLINE INDUSTRIES, INC.

## (undated) DEVICE — SOL  .9 1000ML BTL

## (undated) DEVICE — TRAY SRGPRP PVP IOD WT SCRB SM

## (undated) DEVICE — SUTURE VICRYL 3-0 PS-1

## (undated) DEVICE — BLADE SW 29MM 58MM THK.64MM LG

## (undated) DEVICE — SOLUTION SURG DURA PREP HAZMAT

## (undated) DEVICE — SUTURE PROLENE 3-0 8558H

## (undated) DEVICE — BATTERY

## (undated) DEVICE — GLOVE SRG BIOGEL 8.0

## (undated) DEVICE — SUPER SPONGES,MEDIUM: Brand: KERLIX

## (undated) DEVICE — SUTURE WIRE SINGLE STERNOTOMY

## (undated) NOTE — IP AVS SNAPSHOT
[facility]            (For Outpatient Use Only) Initial Admit Date: 7/24/2018   Inpt/Obs Admit Date: Inpt: 7/24/18 / Obs: N/A   Discharge Date:    Erin Deleon:  [de-identified]   MRN: [de-identified]   CSN: [id number]        ENCOUNTER  Patient Class Subscriber ID:  Pt Rel to Subscriber:    Hospital Account Financial Class: Medicare    August 31, 2018

## (undated) NOTE — MR AVS SNAPSHOT
[facility]  [address] [place] [address], [address] [place] [address]  Karley Ahn               Thank you for choosing us for your health care visit with Roxane Perez DO.   We are glad to serve you and happy to provide you with this summary TMVR with HEART FAILURE APN [facility])    [place]  [address]  [place] [address]   [phone]            Jun 21, 2017  9:30 AM   ECHO 2D W DOPPLER with [facility] CARD ECHO  [facility] What changed:  Another medication with the same name was changed. Make sure you understand how and when to take each.    Commonly known as:  VENTOLIN           * Albuterol Sulfate  (90 Base) MCG/ACT Aers   Inhale 2 puffs into the lungs every 4 (four) Commonly known as:  LANTUS SOLOSTAR           Insulin Lispro 100 UNIT/ML Sopn   5-10 u as instructed tid   What changed:  additional instructions   Commonly known as:  HUMALOG KWIKPEN           Insulin Pen Needle 31G X 8 MM Misc   Use as directed.    Common medications prescribed for you. Read the directions carefully, and ask your doctor or other care provider to review them with you.          Where to Get Your Medications      These medications were sent to [facility] [address] [place] [address], [address] [place] [address]

## (undated) NOTE — ED AVS SNAPSHOT
Billy Tao   MRN: [id number]    Department:  [facility] Emergency Department   Date of Visit:  10/8/2017           Disclosure     Insurance plans vary and the physician(s) referred by the ER may not be covered by your plan.  Please contact your CARE PHYSICIAN AT ONCE OR RETURN IMMEDIATELY TO THE EMERGENCY DEPARTMENT. If you have been prescribed any medication(s), please fill your prescription right away and begin taking the medication(s) as directed.   If you believe that any of the medications

## (undated) NOTE — LETTER
[address], [place]  Authorization for Invasive Procedures  1.  I hereby authorize                               , my physician and whomever may be designated as the doctor's assistant, to perform the following operation and/or 5. I consent to the photographing of the operations or procedures to be performed for the purposes of advancing medicine, science, and/or education, provided my identity is not revealed.  If the procedure has been videotaped, the physician/surgeon will obta __________ Time: ___________    Statement of Physician  My signature below affirms that prior to the time of the procedure, I have explained to the patient and/or her legal representative, the risks and benefits involved in the proposed treatment and any r

## (undated) NOTE — LETTER
[facility], [facility], [place]  [address]  [facility], [facility]  Dept: [phone]    8/15/2018        [place] [address] Mariely Garay             [facility],    [address] [place] [address]

## (undated) NOTE — IP AVS SNAPSHOT
[facility] [place] [address] [place], [address] ~ [phone]                After Visit Summary   4/19/2017    Corinageovanny Porfirio    MRN: [id number]           Visit Information        Provider Department    4/19/2017  9:00 AM [facility] Multiple Vitamins-Minerals (EMERGEN-C VITAMIN C) Oral Powd Pack Take 1 Dose by mouth daily. SACCHAROMYCES BOULARDII OR Take 1 capsule by mouth daily.  Probiotic    Acetaminophen-Codeine (TYLENOL WITH CODEINE #3) 300-30 MG Oral Tab Take 1 tablet by mouth NITROSTAT 0.4 MG Sublingual SL Tab Place 0.4 mg under the tongue every 5 (five) minutes as needed. ONETOUCH ULTRA BLUE In Vitro Strip USE AS DIRECTED 2 TIMES EVERY DAY AND AS NEEDED.     Insulin Syringe 29G X 1/2\" 1 ML Does not apply Verdene Apley Call [phone] for help. Blogger[employer]hart is NOT to be used for urgent needs. For medical emergencies, dial 911.                  Please Note:  If you are seeing a provider outside of your own physician group, please show this after visit summary at your next

## (undated) NOTE — LETTER
November 21, 2017    Joann Rush DO  [facility] [address]     Patient:  Elvie Dougherty   YOB: 1950   Date of Visit: 11/21/2017       Dear Dr. Savannah Sherwood DO:    Thank you for referring Esteban Nailer to me for evaluat • Hypertension, essential, benign    • IBS (irritable bowel syndrome)    • IDDM (insulin dependent diabetes mellitus) ([facility].) 2011    W/early BDR   • Meibomian gland dysfunction 2006   • Menometrorrhagia    • Myocardial infarction 2015    x2   • Osteopenia Social History: Smoking status: Never Smoker                                                              Smokeless tobacco: Never Used                      Alcohol use:  No                Medications:    Current Outpatient Prescriptions:  TraZODone HCl 50 Disp:  Rfl:    Rosuvastatin Calcium 20 MG Oral Tab Take 20 mg by mouth nightly. Disp:  Rfl:    sodium Chloride 0.9 % Inhalation Nebu Soln Take 3 mL by nebulization as needed for Wheezing.  (Patient taking differently: Take 3 mL by nebulization as needed for wheezing (Patient taking differently: Inhale 2 puffs into the lungs 2 (two) times daily.  For wheezing ) Disp: 3 Inhaler Rfl: 3   Alcohol Swabs ([facility] PREP) 70 % Does not apply Pads  Disp:  Rfl:    Mometasone Furo-Formoterol Fum (DULERA) 200-5 MCG/ACT Inhalat Lids/Lashes Dermatochalasis, Meibomian gland dysfunction, 1+ Scurf Dermatochalasis, Meibomian gland dysfunction, 1+ Scurf    Conjunctiva/Sclera Normal Normal    Cornea Clear Clear    Anterior Chamber Deep and quiet Deep and quiet    Iris Normal Normal in both eyes that does not require any treatment at this time. Floater, vitreous, left  No treatment. No orders of the defined types were placed in this encounter.       Meds This Visit:    No prescriptions requested or ordered in this encounter

## (undated) NOTE — LETTER
Iram Chen [address], [place], [place]  [address]  INFORMED CONSENT FOR TRANSFUSION OF BLOOD OR BLOOD PRODUCTS  My physician has informed me of the nature, purpose, benefits and risks of transfusion for blood and blood components that ______________________________________________  (Signature of Patient)                                                            (Responsible party in case of Minor,

## (undated) NOTE — IP AVS SNAPSHOT
Patient Demographics     Address Phone E-mail Address    Randy 07 [phone] (Home) *Preferred*  [phone] [facility]) [email or web address]      Emergency Contact(s)     Name Relation Home Work Owen Downs [phone] use Milk of Magnesia, Metamucil or other supplements to relieve straining. NOTIFY YOUR PHYSICIAN IF:  · Temperature is greater than 101 degrees, have chills, sweating or fever for more than one day.   · Drainage, tenderness, redness, swelling, persisten [place] [phone]          Follow up with [facility] On 4/19/2017.     Why:  Check in ground floor [facility] registration desk, then 2nd floor clinic appt 9am, followed by echo 0930    Contact information:    Mark Whiting carvedilol 3.125 MG Tabs   Last time this was given:  3.125 mg on 3/21/2017  9:40 AM   Commonly known as:  COREG   Next dose due:  3/21/2017 6pm         Take 1 tablet (3.125 mg total) by mouth 2 (two) times daily with meals.     Cesp Last time this was given:  2.5 mg on 3/21/2017  9:40 AM   Commonly known as:  PRINIVIL,ZESTRIL   Next dose due:  3/22/2017 9am         Take 0.5 tablets (2.5 mg total) by mouth once daily.     Ace GOMEZ-SUZIE OR   Last amanda SACCHAROMYCES BOULARDII OR   Next dose due:  3/22/2017 9am        Take 1 capsule by mouth daily.  Probiotic                            TYLENOL WITH CODEINE #3 300-30 MG Tabs   Last time this was given:  2 tablets on 3/20/2017 10:35 PM   Generic drug:  A [id number] MUSCLE RUB (MUSCLE RUB) cream 03/20/17 1517 Given      [id number] MUSCLE RUB (MUSCLE RUB) cream 03/20/17 2235 Given      [id number] MUSCLE RUB (MUSCLE RUB) cream 03/21/17 0942 Given      [id number] Magnesium Oxide tab 500 mg 03/21/17 0940 Given Vitals 117/63 mmHg Filed at 03/21/2017 0825    Pulse 90 Filed at 03/21/2017 0825    Resp 18 Filed at 03/21/2017 0825    Temp 97.9 °F (36.6 °C) Filed at 03/21/2017 0825    SpO2 94 % Filed at 03/21/2017 0848      Patient's Most Recent Weight       Most Rece Ordering provider: GIN Caceres  03/20/17 5177 Resulting lab: [facility]    Specimen Information    Type Source Collected On   Blood  03/21/17 0537          Components       Value Reference Range Flag Lab   Glucose 182 70-99 mg/dL Lisa Gtz MRSA Culture Only Once [[id number]] Collected:  03/15/17 1416    Order Status:  Completed Lab Status:  Final result Updated:  03/17/17 1013    Specimen Information:  Other from Nares      Mrsa Culture No MRSA Isolated     Narrative:      Note: This culture Past Surgical/Medical History:  Past Medical History   Diagnosis Date   • Breast cancer ([facility].)      DCIS   • DM type 2 (diabetes mellitus, type 2) ([facility] 75.) 5793     W/O Complication  Pill, then Insulin added 2003   • Asthma, moderate persistent      Last hosp Comment Arthrocentesis of L shoulder acromioclaviular bursa    OTHER SURGICAL HISTORY Right     Comment Amputation of 5th toe    CATARACT EXTRACTION W/  INTRAOCULAR LENS IMPLANT Right 7/13/09    Comment RJM    CATARACT EXTRACTION W/  INTRAOCULAR LENS IMPL Telemetry: NSR      Physical Exam:  General: Alert and oriented in no apparent distress. HEENT: No focal deficits noted  Neck: No JVD  Cardiac: Regular rate and rhythm, S1, S2 normal, LIVE  Lungs: Clear without wheezes, rales, rhonchi or dullness.   Normal WBC 6.0 03/14/2017   RBC 4.53 03/14/2017   HGB 14.0 03/14/2017   HCT 42.0 03/14/2017   MCV 92.7 03/14/2017   MCH 30.9 03/14/2017   MCHC 33.3 03/14/2017   RDW 13.1 03/14/2017   .0 03/14/2017       Lab Results  Component Value Date   PT 18.7* 09/19/20 Author:  GIN Kohli Service:  (none) Author Type:  GIN    Filed:  3/14/2017  1:42 PM Note Time:  3/13/2017 10:24 AM Status:  Addendum    :  GIN Kohli (APGINA)      Related Notes: Addendum by Madelyn Rene MD (Physician) filed at • BPPV (benign paroxysmal positional vertigo)    • Cellulitis      R foot   • Breast cancer of upper-outer quadrant of left female breast ([facility].) 10/23/2010   • Cancer of overlapping sites of right female breast ([facility].) 10/23/2010   • Blepharitis, both eyes 1/ Drug Use: No            Other Topics            Concern  Caffeine Concern        No  Pt has a pacemaker      No  Pt has a defibrillator  Yes  Reaction to local anes* No         Family Hostory:  Family History   Problem Relation Age of Onset   • Hypertens LAD 20%, Cx occluded,  RCA    TTE 12/6/16:  Study Conclusions    1. Left ventricle: The cavity size was mildly to moderately dilated. Systolic     function was severely reduced. The estimated ejection fraction was 25-30%.      Doppler parameters are cons -Admit to tele to complete preop Mitraclip eval including: CXR, labs, EKG, T & C   - NPO p MN for OR   - Risks, benefits, alternatives explained previously to pt by Dr. Bledsoe Cancer - consents obtained agree to proceed   - Endocrine consult for perioperative • Osteopenia    • Polio 1950     As Infant   • Menometrorrhagia    • Cataract 2005, 2009   • Diabetic retinopathy ([facility] 2005   • Meibomian gland dysfunction 2006   • Floaters 2007   • Peripheral vascular disease ([facility].) 2008   • Stroke (cerebrum) ([facility] 75.) 2005 Comment RJM    AUTOMATIC EXTERNAL DEFIBRILLATOR, WITH INTEGRATED ELECTROCARDIOGRAM ANALYSIS, Left 3/4/16    CARDIAC DEFIBRILLATOR PLACEMENT      CATH DRUG ELUTING STENT      CATARACT      COLONOSCOPY      HYSTERECTOMY         Social History:  Social Histo excursions and effort. Abdomen: Soft, non-tender, ND, BS +  Extremities: Without clubbing, cyanosis or edema. Peripheral pulses are 2+. Neurologic: Alert and oriented, normal affect. Skin: Warm and dry.  L chest AICD    Laboratory and Data:  Diagnostics PT 18.7* 09/19/2016   PT 36.9* 05/05/2016   PT 14.9* 04/22/2016   INR 1.25* 03/14/2017   INR 2.0 03/01/2017   INR 2.4 02/16/2017       Assessment:  1. Severe, symptomatic Mitral regurgitation  2. ICM - EF 25%, Deniz Sci sub Q -ICD 3/2016  3.  CAD -  RCA, h Reason for Consultation: Impaired ADL and mobility dysfuction due to generalized weakness after cardiac surgery with history of polio   history of present illness:  Records reviewed, and patient examined. Consult Requested by: Dr. Asuncion Jiménez  This is a 79-year • Peripheral vascular disease ([facility] 75.) 2008   • Stroke (cerebrum) [facility]) 2005   • Cerebrovascular accident [facility])    • Sacroiliitis ([facility] 75.)    • Greater trochanteric bursitis    • IDDM (insulin dependent diabetes mellitus) ([facility] 75.) 2011     W/early BDR   • Femoral a AUTOMATIC EXTERNAL DEFIBRILLATOR, WITH INTEGRATED ELECTROCARDIOGRAM ANALYSIS, Left 3/4/16    CARDIAC DEFIBRILLATOR PLACEMENT      CATH DRUG ELUTING STENT      CATARACT      COLONOSCOPY      HYSTERECTOMY           [COMPLETED] Potassium Chloride ER (K-DUR M [DISCONTINUED] insulin detemir (LEVEMIR) 100 UNIT/ML flextouch 6 Units 6 Units Subcutaneous Nightly   insulin aspart (NOVOLOG) 100 UNIT/ML flexpen 1-68 Units 1-68 Units Subcutaneous TID CC   insulin aspart (NOVOLOG) 100 UNIT/ML flexpen 1-50 Units 1-50 Unit [DISCONTINUED] PEG 3350 (MIRALAX) powder packet 17 g 1 packet Oral Daily PRN   [DISCONTINUED] ondansetron HCl (ZOFRAN) injection 4 mg 4 mg Intravenous Q6H PRN   [DISCONTINUED] famoTIDine (PEPCID) tab 20 mg 20 mg Oral BID   [DISCONTINUED] famoTIDine (PEPCID [DISCONTINUED] temazepam (RESTORIL) cap 15 mg 15 mg Oral Nightly PRN   [COMPLETED] Albumin Human (ALBUMINAR) 5 % solution 250 mL 250 mL Intravenous Once PRN   [DISCONTINUED] DOBUTamine in D5W (DOBUTREX) 250 mg/250 ml infusion 2.5-10 mcg/kg/min (Dosing [facility] Intravenous Once   [DISCONTINUED] Insulin Regular Human (NOVOLIN R) 100 Units in sodium chloride 0.9 % 100 mL infusion 1-57 Units/hr Intravenous Continuous   glucose (DEX4) oral liquid 15 g 15 g Oral Q15 Min PRN   Or      Glucose-Vitamin C (DEX-4) 4-0.006 [DISCONTINUED] dextrose injection 50 mL 50 mL Intravenous Q15 Min PRN   [DISCONTINUED] glucose (DEX4) oral liquid 30 g 30 g Oral Q15 Min PRN   [DISCONTINUED] Glucose-Vitamin C (DEX-4) 4-0.006 g chewable tab 8 tablet 8 tablet Oral Q15 Min PRN   [DISCONTINUE Potassium Clavulana*    Unknown    Comment:Other reaction(s): POTASSIUM CLAVULANATE  Scopolamine             Dizziness  Sulfa Antibiotics       Rash    Comment:Other reaction(s): Rash  Sulfanilamide           Rash    Comment:Other reaction(s): Hives Right Upper Extremity:  Strength is 5. ROM WNL. Left Upper Extremity:  Strength is 5. ROM WNL. Right Lower Extremity:  Strength  is trace plantar flexion and hip flexion otherwise 0.   ROM WNL.    Left Lower Extremity: Strength  is trace plantar flexi modified independent wheelchair level of function. Advance directives reviewed and noted. Would be happy to reassess should medical and/or functional status change. Will follow.              Thank you for the consult echocardiography for ventricular function evaluation, assessment of valvular function, and assessment of pericardial effusion and hemodynamic status.  Image quality was adequate. ---------------------------------------------------------------------------- C 2.5cm^2. Indexed valve area by pressure half-time: 1.65cm^2/m^2. Valve area by continuity equation (using LVOT flow): 1.6cm^2. Indexed valve area by continuity equation (using LVOT flow): 1.05cm^2/m^2. Mean gradient (D): 5mm Hg. Aortic valve:   Well visu ---------   Ventricular septum                              Value          Reference  IVS thickness, ED, PLAX                 (H)     1.0   cm       0.6 - 0.9  IVS thickness, ED                       (H)     1.0   cm       0.6 - 0.9   LVOT ---------  Mitral valve area/bsa, LVOT continuity          1.05  cm^2/m^2 ---------   Pulmonary arteries                              Value          Reference  PA pressure, S, DP                              30    mm Hg    ---------   Tricuspid valve - History/Indications:  Status post MitraClip. Pericardial effusion. ---------------------------------------------------------------------------- Procedure information:  A transthoracic complete 2D study was performed.  Additional evaluation included M-mod thickness was normal. Systolic function was markedly reduced. The estimated ejection fraction was 25-30%. Severe diffuse hypokinesis with regional variations.  Doppler parameters are consistent with abnormal left ventricular relaxation - grade 1 diastolic - 45  LV PW thickness, ED                             0.9   cm     0.6 - 0.9  IVS/LV PW ratio, ED                             0.93         ---------  LV ejection fraction                    (L)     31    %      >=55  LV e&apos;, lateral mm Hg  ---------  Mitral E/A ratio, peak                          0.9          ---------  Mitral A-wave VTI                               48.4  cm     ---------   Pulmonary arteries                              Value        Reference  PA pressure, S, DP ---------------------------------------------------------------------------- History/Indications:  Status post MitraClip.   Pericardial effusion. ---------------------------------------------------------------------------- Procedure information:  CHARISMA galeas - * Left ventricle: The cavity size was moderately increased. Wall thickness was normal. Systolic function was markedly reduced. The estimated ejection fraction was 25-30%. Severe diffuse hypokinesis with regional variations.  Doppler parameters are consi ---------  LV fx shortening, PLAX                  (L)     15    %      27 - 45  LV PW thickness, ED                             0.9   cm     0.6 - 0.9  IVS/LV PW ratio, ED                             0.93         ---------  LV ejection fraction mm Hg  ---------  Mitral peak gradient, D                         4     mm Hg  ---------  Mitral E/A ratio, peak                          0.9          ---------  Mitral A-wave VTI                               48.4  cm     ---------   Pulmonary arteries Occupational Therapy Notes (last 72 hours) (Notes from 3/18/2017 11:01 AM through 3/21/2017 11:01 AM)     No notes of this type exist for this encounter. Video Swallow Study Notes     No notes of this type exist for this encounter.       SLP N

## (undated) NOTE — LETTER
[facility]  Jesus Andrews 62 [address] [facility], [address] [place] [address]    Consent for Operation    Date: __________________    Time: _______________    1. I authorize the performance upon Drew Samples the following operation:    Procedure(s):  Mitraclip     2.  I Dorian Rothman videotape. [facility] will not be responsible for storage or maintenance of this tape. 6. For the purpose of advancing medical education, I consent to the admittance of observers to the Operating Room.     7. I authorize the use of any specimen, organs Signature of Patient:   ___________________________    When the patient is a minor or mentally incompetent to give consent:  Signature of person authorized to consent for patient: ___________________________   Relationship to patient: _____________________ these medicines may increase my risk of anesthetic complications. · If I am allergic to anything or have had a reaction to anesthesia before. 3. I understand how the anesthesia medicine will help me (benefits).     4. I understand that with any type of patient’s representative) and answered their questions. The patient or their representative has agreed to have anesthesia services.     _____________________________________________________________________________  Witness        Date   Time  I have khoi

## (undated) NOTE — ED AVS SNAPSHOT
Shelly Wilkinson   MRN: [id number]    Department:  [facility] Emergency Department   Date of Visit:  11/22/2017           Disclosure     Insurance plans vary and the physician(s) referred by the ER may not be covered by your plan.  Please contact you within the next three months to obtain basic health screening including reassessment of your blood pressure.     IF THERE IS ANY CHANGE OR WORSENING OF YOUR CONDITION, CALL YOUR PRIMARY CARE PHYSICIAN AT ONCE OR RETURN IMMEDIATELY TO THE EMERGENCY DEPARTMEN

## (undated) NOTE — LETTER
[address]  [address], [place], [place]     AUTHORIZATION FOR SURGICAL OPERATION OR PROCEDURE    I hereby authorize Dr. Georgiana Wang , my Physician(s) and whomever may be designated as the doctor's Assistant, to perform the following oper 4. I consent to the photographing of procedure(s) to be performed for the purposes of advancing medicine, science and/or education, provided my identity is not revealed.  If the procedure has been videotaped, the physician/surgeon will obtain the original v (Witness signature)                                                                                                  (Date)                                (Time)  STATEMENT OF PHYSICIAN My signature below affirms that prior to the time of the procedure;  I

## (undated) NOTE — LETTER
[address], [place]  Authorization for Invasive Procedures  1.  I hereby authorize Dr. Elisabeth Huitron , my physician and whomever may be designated as the doctor's assistant, to perform the following operation and/or procedure:  Coronary an 5. I consent to the photographing of the operations or procedures to be performed for the purposes of advancing medicine, science, and/or education, provided my identity is not revealed.  If the procedure has been videotaped, the physician/surgeon will obta __________ Time: ___________    Statement of Physician  My signature below affirms that prior to the time of the procedure, I have explained to the patient and/or her legal representative, the risks and benefits involved in the proposed treatment and any r

## (undated) NOTE — MR AVS SNAPSHOT
Terrell Counter   2017 2:00 PM   Office Visit   MRN:  [id number]    Description:  Female : 10/13/1950   Provider:  Diana Horn   Department:  [facility] Hematology Oncology              Visit Summary      Allergies     Aminoglycos DESLORATADINE 5 MG Oral Tab TAKE 1 TABLET BY MOUTH EVERY DAY AS NEEDED    Magnesium Oxide (MAG-OX OR) Take 500 mg by mouth daily.       Albuterol Sulfate HFA (VENTOLIN HFA) 108 (90 BASE) MCG/ACT Inhalation Aero Soln Inhale 2 puffs into the lungs every 4 (f Appointment with Dori Arnett at [facility] for [address] ([phone]  [address] [address]       Wednesday June 21, 2017 9:30 AM     Appointment with [address] [place] [address] ECHO RM 2 at [facility] Echocardiography ([phone] office, you can view your past visit information in [employer] by going to Visits < Visit Summaries. [employer] questions? Call [phone] for help. [employer] is NOT to be used for urgent needs. For medical emergencies, dial 911.

## (undated) NOTE — LETTER
Tony Schulte  10/13/1950    A patient of your clinic was recently seen by the hospitalists at [facility], and will be followed by [facility]. Pt to check INR at home today and contact your office for further coumadin management.     The patient's la

## (undated) NOTE — LETTER
[address], [place]  Authorization for Invasive Procedures  1.  I hereby authorize               , my physician and whomever may be designated as the doctor's assistant, to perform the following operation and/or procedure: [place] performed for the purposes of advancing medicine, science, and/or education, provided my identity is not revealed. If the procedure has been videotaped, the physician/surgeon will obtain the original videotape.  The hospital will not be responsible for stor My signature below affirms that prior to the time of the procedure, I have explained to the patient and/or her legal representative, the risks and benefits involved in the proposed treatment and any reasonable alternative to the proposed treatment.  I have

## (undated) NOTE — LETTER
[address], [place]  Authorization for Invasive Procedures  1.  I hereby authorize Dr. Tiffany Quezada , my physician and whomever may be designated as the doctor's assistant, to perform the following operation and/or procedure:  CARDIAC CAT 5. I consent to the photographing of the operations or procedures to be performed for the purposes of advancing medicine, science, and/or education, provided my identity is not revealed.  If the procedure has been videotaped, the physician/surgeon will obta __________ Time: ___________    Statement of Physician  My signature below affirms that prior to the time of the procedure, I have explained to the patient and/or her legal representative, the risks and benefits involved in the proposed treatment and any r

## (undated) NOTE — ED AVS SNAPSHOT
Tate Hinojosa   MRN: [id number]    Department:  [facility] Emergency Department   Date of Visit:  10/17/2017           Disclosure     Insurance plans vary and the physician(s) referred by the ER may not be covered by your plan.  Please contact you CARE PHYSICIAN AT ONCE OR RETURN IMMEDIATELY TO THE EMERGENCY DEPARTMENT. If you have been prescribed any medication(s), please fill your prescription right away and begin taking the medication(s) as directed.   If you believe that any of the medications

## (undated) NOTE — LETTER
[address] [place] [address] Rd, [place], [place]     AUTHORIZATION FOR SURGICAL OPERATION OR PROCEDURE    I hereby authorize Dr. Codi Iverson, my Physician(s) and whomever may be designated as the doctor's Assistant, to perform the following opera 4. I consent to the photographing of procedure(s) to be performed for the purposes of advancing medicine, science and/or education, provided my identity is not revealed.  If the procedure has been videotaped, the physician/surgeon will obtain the original v (Witness signature)                                                                                                  (Date)                                (Time)  STATEMENT OF PHYSICIAN My signature below affirms that prior to the time of the procedure;  I

## (undated) NOTE — ED AVS SNAPSHOT
Ramón Haji   MRN: [id number]    Department:  [facility] Emergency Department   Date of Visit:  8/27/2017           Disclosure     Insurance plans vary and the physician(s) referred by the ER may not be covered by your plan.  Please contact your CARE PHYSICIAN AT ONCE OR RETURN IMMEDIATELY TO THE EMERGENCY DEPARTMENT. If you have been prescribed any medication(s), please fill your prescription right away and begin taking the medication(s) as directed.   If you believe that any of the medications

## (undated) NOTE — MR AVS SNAPSHOT
[facility]  [address] [place] [address], [address]   Minal Beck               Thank you for choosing us for your health care visit with Roxana Estrada. DO Chris.   We are glad to serve you and happy to provide you with this summary This list is accurate as of: 1/23/17  4:27 PM.  Always use your most recent med list.                * albuterol Sulfate (5 MG/ML) 0.5% Nebu   TAKE 0.5 ML BY NEBULIZATION EVERY 6 (SIX) HOURS AS NEEDED FOR WHEEZING.    What changed:  Another medication with Commonly known as:  LANTUS SOLOSTAR           Insulin Lispro 100 UNIT/ML Sopn   5-10 u as instructed tid   Commonly known as:  HUMALOG KWIKPEN           Insulin Pen Needle 31G X 8 MM Misc   Use as directed.    Commonly known as:  TRACEY PEN NEEDLES Return in about 2 weeks (around 2/6/2017). MyChart     Visit [employer]  You can access your EMBIhart to more actively manage your health care and view more details from this visit by going to [email or web address].   If you've recently had a st

## (undated) NOTE — LETTER
Iram Chen [address], [place], [place]  [address]  INFORMED CONSENT FOR TRANSFUSION OF BLOOD OR BLOOD PRODUCTS  My physician has informed me of the nature, purpose, benefits and risks of transfusion for blood and blood components that ______________________________________________  (Signature of Patient)                                                            (Responsible party in case of Minor,

## (undated) NOTE — LETTER
Indira Fofana  10/13/1950    A patient of your clinic was recently seen by the hospitalists at [facility], and next INR check on Monday 7/9/18 by [facility] RN. Please note pt was dc on PO abx.      The patient's last INR values were, as f

## (undated) NOTE — LETTER
Terrell Counter  10/13/1950    A patient of your clinic was recently seen by the hospitalists at [facility], and will be following up with you today. Pt notified and has INR machine at home. Pt states she took 5 mg last night 3/22/18.     The pat

## (undated) NOTE — LETTER
Charla Green  10/13/1950    A patient of your clinic was recently seen by the hospitalists at [facility] for  Cellulitis of foot.  She will be following up with you on 8/17/17, pt made aware on need to follow up tomorrow, per pt she has her o

## (undated) NOTE — LETTER
1/8/2018    [address]            Dear Tulio Peacock,      Our records indicate that you are due for an appointment for a Colonoscopy on or about February 2018 with John Min MD.    Please call our office

## (undated) NOTE — LETTER
ADULT VIDEOFLUOROSCOPIC SWALLOWING STUDY    Referring Physician: Dr. Lea Kendall  Diagnosis: dysphagia   Date of Service: 4/26/2018   Radiologist: Dr. Rubens Flores results and/or plan of treatment.     HISTORY   Back • Myocardial infarction [facility]) 2015    x2   • Osteopenia    • Other ill-defined conditions(799.89) 2010    Post Polio   • PE (pulmonary embolism)    • Peripheral vascular disease ([facility] [address] 2008   • Pneumonia due to organism    • Polio 1950    As Infant   • Pulm Discharge status G Code: Initial, Swallowing, CI: 1%-19% impaired, limited, or restricted  PLAN   Potential: Good    Diet Recommendations:  Solids: Regular  Liquids:  Thin    Recommended compensatory strategies:   Sit upright  Small sips  Small bites  Eat s

## (undated) NOTE — LETTER
17      Patient: Deena Officer  : 10/13/1950 Visit date: 2017    Dear Sachin Gonzales,      I examined your patient in consultation today. She has painful triggering and locking of the left index finger and right ring finger.     We wi

## (undated) NOTE — IP AVS SNAPSHOT
[facility] [place] [address] [place], [address] ~ [phone]                After Visit Summary   6/21/2017    Rebeca Thrasher    MRN: [id number]           Visit Information        Provider Department    6/21/2017  9:00 AM Carisa Reynolds every 6 (six) hours as needed. Cholecalciferol (VITAMIN D3) 2000 units Oral Tab Take 4,000 Units by mouth daily. Take 4000 mg T/W/TH/S/S. Take 2000 mg on M/F.     B Complex Vitamins (VITAMIN B COMPLEX) Oral Tab Take 1 tablet by mouth daily.     Multiple If your swelling increases, you can return back to your original dosages of both Lasix and Potassium.      Future Appointments     Jun 21, 2017  9:30 AM   ECHO 2D W DOPPLER with [facility] CARD ECHO RM [address] Echocardiography ([address]

## (undated) NOTE — IP AVS SNAPSHOT
[facility] [place]  [address] [place], [address] ~ [phone]                Discharge Summary   3/14/2017    Munira Orozco           Admission Information        Provider Department    3/14/2017 Scar Conner MD  8ne-A         Thank Morning Afternoon Evening As Needed    albuterol Sulfate (5 MG/ML) 0.5% Nebu   Commonly known as:  VENTOLIN   What changed:  Another medication with the same name was changed. Make sure you understand how and when to take each.         TAKE 0.5 ML BY NEBUL - additional instructions  - Another medication with the same name was removed. Continue taking this medication, and follow the directions you see here.    Next dose due:  3/21/2017 9pm    Notes to Patient:  Check INR tomorrow 3/22/2017         Take 1 table Commonly known as:  TRACEY PEN NEEDLES        Use as directed.     [facility]                        Insulin Syringe 29G X 1/2\" 1 ML Misc                              lisinopril 5 MG Tabs   Last time this was given:  2.5 mg on 3/21/2017  9:40 AM SACCHAROMYCES BOULARDII OR   Next dose due:  3/22/2017 9am        Take 1 capsule by mouth daily.  Probiotic                            TYLENOL WITH CODEINE #3 300-30 MG Tabs   Last time this was given:  2 tablets on 3/20/2017 10:35 PM   Generic · Do not apply lotions, ointments or creams to the incisions. · Avoid constipation or straining to have a bowel movement. If necessary use Milk of Magnesia, Metamucil or other supplements to relieve straining.      NOTIFY YOUR PHYSICIAN IF:  · Temperature Why:  2:30 post mitraclip appointment with GIN Preston. Please call the office if you need to reschedule. Contact information:    [address]          Follow up with [facility] On 4/19/2017.     Aylin WBC RBC Hemoglobin Hematocrit MCV MCH MCHC RDW Platelet MPV    (28/87/24)  4.5 (03/21/17)  3.36 (L) (03/21/17)  10.3 (L) (03/21/17)  31.3 (L) (03/21/17)  93.2 -- -- -- (03/21/17)  168.0 (02/15/17)  9.9    (03/20/17)  3.7 (L) (03/20/17)  3.22 (L) (03/20/17 Medications Sent Home None to return    Medications Returned:           Additional Information       We are concerned for your overall well being:    - If you are a smoker or have smoked in the last 12 months, we encourage you to explore options for quitti experience these side effects or respond to medications the same. Please call your provider or healthcare team if you have any questions regarding your medications while at home.          Blood Pressure and Cardiac Medications     Ranolazine ER (RANEXA) 500 Salicylates     Salicylates    aspirin (ASPIRIN CHILDRENS) 81 MG Oral Chew Tab         Use:  “Thinning” of blood to prevent clotting within blood vessels, Pain relief   Most common side effects:  Bleeding, stomach upset   What to report to your healthcare movement in 2+ days, diarrhea           Respiratory Medications     Albuterol Sulfate HFA (VENTOLIN HFA) 108 (90 BASE) MCG/ACT Inhalation Aero Soln    albuterol Sulfate (5 MG/ML) 0.5% Inhalation Nebu Soln    Mometasone Furo-Formoterol Fum (DULERA) 200-5 MC

## (undated) NOTE — MR AVS SNAPSHOT
[address], [address]  [address] [facility]  [phone]               Thank you for choosing us for your health care visit with Pam Meraz. DO Chris.   We are glad to serve you and happy to provide you with this carrion alprazolam 1 MG Tabs   TAKE 1 TABLET BY MOUTH NIGHTLY AS NEEDED FOR SLEEP   Commonly known as:  XANAX           ASPIRIN CHILDRENS 81 MG Chew   Generic drug:  aspirin   Chew  by mouth.            Atorvastatin Calcium 10 MG Tabs   Take 1 tablet (10 mg total) Take 500 mg by mouth 2 (two) times daily. Mometasone Furo-Formoterol Fum 200-5 MCG/ACT Aero   Inhale 2 puffs into the lungs 2 (two) times daily. Rinse mouth afterwards   Commonly known as:  DULERA           MULTI-VITAMINS Tabs   Take by mouth.  Ce You can access your MyChart to more actively manage your health care and view more details from this visit by going to [email or web address].   If you've recently had a stay at the Hospital you can access your discharge instructions in [address] by claudia

## (undated) NOTE — MR AVS SNAPSHOT
[facility]  [address] [place] [address], [address] [place] [address]               Thank you for choosing us for your health care visit with Khushi Espino. DO Chris.   We are glad to serve you and happy to provide you with this summary ECHO 2D W DOPPLER with [facility] CARD ECHO RM [address] Echocardiography [facility])    [place] [address] [phone]           Please wear slacks and top for your comfort - do not wear a dress. Sulfanilamide Rash    Other reaction(s): Hives                Today's Vital Signs     BP Pulse Weight             114/73 mmHg 75 136 lb (61.689 kg)            Current Medications          This list is accurate as of: 6/15/17  2:15 PM.  Always use your mos Commonly known as:  ONETOUCH ULTRA BLUE           lisinopril 5 MG Tabs   Take 0.5 tablets (2.5 mg total) by mouth daily. Commonly known as:  PRINIVIL,ZESTRIL           MAG-OX OR   Take 500 mg by mouth daily.            MetFORMIN HCl  MG Tb24   Take - MetFORMIN HCl  MG Tb24  - SITagliptin Phosphate 100 MG Tabs            MyChart     Visit MyChart  You can access your MyChart to more actively manage your health care and view more details from this visit by going to [email or web address].

## (undated) NOTE — IP AVS SNAPSHOT
Patient Demographics     Address  [place] [address] Phone  [phone] [facility]) *Preferred*  [phone] [facility]) E-mail Address  [email or web address]      Emergency Contact(s)     Name Relation Home Work Owen Downs 598-19 Take 2 tablets (650 mg total) by mouth every 6 (six) hours as needed.    GIN Boyer   [    ]   [    ]   [    ]   [    ]     Acetaminophen-Codeine #3 300-30 MG Tabs  Commonly known as:  TYLENOL #3      Take 1 tablet by mouth every 6 (six) hours Commonly known as:  CLARINEX      TAKE 1 TABLET BY MOUTH EVERY DAY AS NEEDED   Milan Causey. Cedric Perez,    [    ]   [    ]   [    ]   [    ]     docusate sodium 100 MG Caps  Commonly known as:  COLACE      Take 100 mg by mouth 2 (two) times daily.    Moises Penn, Place 0.4 mg under the tongue every 5 (five) minutes as needed.     [    ]   [    ]   [    ]   [    ]     Chely Marie      HSN-2 pills once daily    [    ]   [    ]   [    ]   [    ]     NON FORMULARY      Tumeric 500mg once daily    [    ]   [    ]   [ Inject 2 g into the vein daily.   Stop taking on:  9/7/2018   Becky King MD   [    ]   [    ]   [    ]   [    ]     spironolactone 25 MG Tabs  Commonly known as:  ALDACTONE  Start taking on:  9/1/2018      Take 0.5 tablets (12.5 mg total) by mouth sodium chloride 0.9 % SOLN 100 mL with CefTRIAXone Sodium 2 g SOLR 2 g     Information about where to get these medications is not yet available    Ask your nurse or doctor about these medications  balsam peru-castor oil Oint  bumetanide 1 MG Tabs  docusat LEFT LOWER ABDOMEN     Order ID Medication Name Action Time Action Reason Comments    [id number] Heparin Sodium (Porcine) 5000 UNIT/ML injection 5,000 Units 08/30/18 2028 Given            LEFT UPPER ARM     Order ID Medication Name Action Time Action The following orders were created for panel order CBC WITH DIFFERENTIAL WITH PLATELET.   Procedure                               Abnormality         Status                     ---------                               -----------         ------ Chloride 107 95 - 110 mmol/L — [facility]   CO2 25 22 - 32 mmol/L — [facility]   BUN 25 8 - 20 mg/dL H [facility]   Creatinine 0.72 0.50 - 1.50 mg/dL [facility]   Calcium, Total 7.9 8.5 - 10.5 mg/dL L [facility]   BUN/CREA Ratio 34.7 10.0 - 20. IV Catheter Tip Culture Once [[id number]] Collected:  08/24/18 1344    Order Status:  Completed Lab Status:  Final result Updated:  08/26/18 0648    Specimen:  Other from Cath tip picc      Catheter Tip Culture No Growth 2 Days    Blood Culture Once [[id number] Tissue Aerobic Culture [[id number]] Collected:  08/01/18 1126    Order Status:  Completed Lab Status:  Final result Updated:  08/04/18 0656    Specimen:  Tissue from Thigh, left      Tissue Culture Result No Growth 3 Days     Tissue Smear 1+ WBCs seen target lesions for intervention, the patient is a candidate for left above-knee amputation. She was evaluated by Vascular Surgery, Dr. Melissa Patrick, but surgery was on hold because her pain was improved. Discharged on p.o. tramadol for pain control at home.   Ashlyn Ramirez Father  at the age of 76, diabetes and coronary artery disease. SOCIAL HISTORY:  No tobacco, alcohol, or drug use. Wheelchair-bound. Requires some assistance in her basic activities of daily living.      REVIEW OF SYSTEMS:  Throbbing pain in her l possible left above-knee amputation. Further recommendations to follow. ADDENDUM (Also job [id number])    Also, the patient has been having intermittent diarrhea. Will send stool for C difficile since she was on antibiotic at home.   Further recommendati SK.1 Ebenezer Valencia MD on 8/28/2018  2:00 PM                     D/C Summary     No notes of this type exist for this encounter. Imaging Results (HF patients)    Chest X-Ray Results (HF patients only)    No exam resulted this encounter.       2D Echoc swallows- throat clearing observed. No trials of thin liquid appropriate at this time. Pt trialed with soft consistency for possible upgrade. Bite reflex reduced in strength. Mastication was mildly uncoordinated and required additional time and effort.  Niko Parada liquids without overt signs or symptoms of aspiration with 100 % accuracy over 4 session(s).      No overt clinical signs/symptoms of aspiration on any swallows of pureed consistency(no coughing, no throat clearing, clear vocal quality) during this  session Electronically signed by KRYSTLE Norton on 8/30/2018 11:29 AM   Attribution King    MD.1 - KRYSTLE Norton on 8/30/2018 11:10 AM  MD.2 - KRYSTLE Norton on 8/30/2018 11:12 AM                     Immunizations     Name Date

## (undated) NOTE — LETTER
[address] [place] [address]  Authorization for Invasive Procedures  1.  I hereby authorize                  , my physician and whomever may be designated as the doctor's assistant, to perform the following operation and/or procedure: L 5. I consent to the photographing of the operations or procedures to be performed for the purposes of advancing medicine, science, and/or education, provided my identity is not revealed.  If the procedure has been videotaped, the physician/surgeon will obta __________ Time: ___________    Statement of Physician  My signature below affirms that prior to the time of the procedure, I have explained to the patient and/or her legal representative, the risks and benefits involved in the proposed treatment and any r

## (undated) NOTE — LETTER
[facility]  Administration of Anesthesia  1. I, Mel Liu, or _________________________________ acting on her behalf, (Patient) (Dependent/Representative) request to receive anesthesia for my pending procedure/operation/treatment.   A rea infections, high spinal block, spinal bleeding, seizure, cardiac arrest and death. 7. AWARENESS: I understand that it is possible (but unlikely) to have explicit memory of events from the operating room while under general anesthesia.   8. ELECTROCONVULSIV unconscious pt /Relationship    My signature below affirms that prior to the time of the procedure, I have explained to the patient and/or his/her guardian, the risks and benefits of undergoing anesthesia, as well as any reasonable alternatives.     _______

## (undated) NOTE — ED AVS SNAPSHOT
Maya Sunshine   MRN: [id number]    Department:  [facility] Emergency Department   Date of Visit:  2/10/2018           Disclosure     Insurance plans vary and the physician(s) referred by the ER may not be covered by your plan.  Please contact your within the next three months to obtain basic health screening including reassessment of your blood pressure.     IF THERE IS ANY CHANGE OR WORSENING OF YOUR CONDITION, CALL YOUR PRIMARY CARE PHYSICIAN AT ONCE OR RETURN IMMEDIATELY TO THE EMERGENCY DEPARTMEN

## (undated) NOTE — LETTER
Kitty Lopes  10/13/1950    A patient of your clinic was recently seen by the hospitalists at [facility], and will be following up with [facility].     The patient's last INR values were, as follows:    Date INR Value Comments

## (undated) NOTE — ED AVS SNAPSHOT
[facility] AND [facility] Immediate Care in [address] [place] [address] Isidro Mueller    Phone:  [phone]    Fax:  [phone] Yolette Hameed   MRN: [id number]    Department:  [facility] AND [facility] Immediate Care in [address] [place] [address]   Date of Visit:  1/25/ - cephALEXin 500 MG Caps              Discharge Instructions       Prescription sent to [facility]     Discharge References/Attachments     URINARY TRACT INFECTIONS (Malcolm Face), UNDERSTANDING (ENGLISH)      Disclosure     Insurance plans vary and the physician( you may call the [place] [address] Physician Referral and Class Registration line at [phone] or find a doctor online by visiting [email or web address].    IF THERE IS ANY CHANGE OR WORSENING OF YOUR CONDITION, CALL YOUR PRIMARY CARE PHYSICIAN AT [place] [address] you to explore options for quitting.     - If you have concerns related to behavioral health issues or thoughts of harming yourself, contact [facility] at [phone].     - If you don’t have insurance, [place]

## (undated) NOTE — ED AVS SNAPSHOT
Lynnette Remy   MRN: [id number]    Department:  [facility] Emergency Department   Date of Visit:  7/14/2018           Disclosure     Insurance plans vary and the physician(s) referred by the ER may not be covered by your plan.  Please contact your within the next three months to obtain basic health screening including reassessment of your blood pressure.     IF THERE IS ANY CHANGE OR WORSENING OF YOUR CONDITION, CALL YOUR PRIMARY CARE PHYSICIAN AT ONCE OR RETURN IMMEDIATELY TO THE EMERGENCY DEPARTMEN

## (undated) NOTE — LETTER
[facility], [facility] [address]     I agree to have a Peripherally Inserted Central Catheter (PICC) placed in my arm.      1. The PICC insertion procedure, care, maintenance, risks, benefits, and complications Statement of Physician: My signature below affirms that prior to the time of the PICC line insertion, I have explained to the patient and/or his/her legal representative, the risks and benefits involved in the proposed treatment and any reasonable alternat

## (undated) NOTE — ED AVS SNAPSHOT
[facility] Emergency Department    [place] [address]     [address] [place] [address]    Phone:  [phone]    Fax:  [phone] [facility]   MRN: [id number]    Department:  [facility] Emergency Department   Date of Visit:  2/15/201 If you have difficulty scheduling your follow-up appointment as directed, please call our  at [phone]. Si tiene problemas para programar vinny samson de seguimiento según lo indicado, llame al encargado de ivana al [phone].     It i continue to take your medications as instructed by your Primary Care doctor until you can check with your doctor. Please bring the medication list to your next doctor's appointment.     Any imaging studies and labs completed today can be reviewed in your M Visit [employer]  You can access your MyChart to more actively manage your health care and view more details from this visit by going to [email or web address].   If you've recently had a stay at the Hospital you can access your discharge instructions i

## (undated) NOTE — LETTER
[address] [place] [address] Rd, [place], [place]     AUTHORIZATION FOR SURGICAL OPERATION OR PROCEDURE    I hereby authorize Dr. Mira Polanco*, my Physician(s) and whomever may be designated as the doctor's Assistant, to perform the following oper 4. I consent to the photographing of procedure(s) to be performed for the purposes of advancing medicine, science and/or education, provided my identity is not revealed.  If the procedure has been videotaped, the physician/surgeon will obtain the original v (Witness signature)                                                                                                  (Date)                                (Time)  STATEMENT OF PHYSICIAN My signature below affirms that prior to the time of the procedure;  I

## (undated) NOTE — MR AVS SNAPSHOT
[facility]  [address] [place] [address], [address] [place] [address]  [place] [address]               Thank you for choosing us for your health care visit with Christy Ness. DO Chris.   We are glad to serve you and happy to provide you with this summary Friday, 8:00 AM to 5:00 PM.              Today's Orders     Comp Metabolic Panel (14)    Complete by:   Mar 02, 2017 (Approximate)    Assoc Dx:  Type 2 diabetes mellitus with foot ulcer, with long-term current use of insulin ([facility]) [X89.850, L97.509, Z79.4] This list is accurate as of: 3/2/17  1:13 PM.  Always use your most recent med list.                * albuterol Sulfate (5 MG/ML) 0.5% Nebu   TAKE 0.5 ML BY NEBULIZATION EVERY 6 (SIX) HOURS AS NEEDED FOR WHEEZING.    What changed:  Another medication with t Generic drug:  Glucagon (rDNA)           JACKSON LONG OR   Take by mouth. Insulin Glargine 100 UNIT/ML Sopn   Inject 14 Units into the skin every evening.    Commonly known as:  LANTUS SOLOSTAR           Insulin Lispro 100 UNIT/ML Sopn   5-10 u medications prescribed for you. Read the directions carefully, and ask your doctor or other care provider to review them with you.             MyChart     Visit [email or web address]  You can access your MyChart to more actively manage your health care and view more deta

## (undated) NOTE — LETTER
[address], [place]  Authorization for Invasive Procedures  1.  I hereby authorize Dr. Catalino Peoples , my physician and whomever may be designated as the doctor's assistant, to perform the following operation and/or procedure:  CARD a directed donor transfusion, I will discuss this with my physician.      5. I consent to the photographing of the operations or procedures to be performed for the purposes of advancing medicine, science, and/or education, provided my identity is not reveal Witness Signature: ____________________________________________ Date: __________ Time: ___________    Statement of Physician  My signature below affirms that prior to the time of the procedure, I have explained to the patient and/or her legal representativ

## (undated) NOTE — ED AVS SNAPSHOT
[facility] Emergency Department    [address] [address]    Phone:  [phone]    Fax:  [phone]   MRN: [id number]    Department:  [facility] Emergency Department   Date of Visit:  2/15/201 and Class Registration line at [phone] or find a doctor online by visiting [email or web address].    IF THERE IS ANY CHANGE OR WORSENING OF YOUR CONDITION, CALL YOUR PRIMARY CARE PHYSICIAN AT ONCE OR RETURN IMMEDIATELY TO [address] [place] [address].     If

## (undated) NOTE — MR AVS SNAPSHOT
[facility]  [address] [place] [address], [address] [place] [address]  Lazarus Coby               Thank you for choosing us for your health care visit with Rocío Perez DO.   We are glad to serve you and happy to provide you with this summary ECHO 2D W DOPPLER with [facility] CARD ECHO RM [address] Echocardiography [facility])    [address] [address] [phone]           Please wear slacks and top for your comfort - do not wear a dress. insurance company's guidelines for prior authorization for this test.  You may be held responsible for payment in full if proper authorization is not acquired.   Please contact the Patient Business Office at [phone] if you have any questions related to - additional instructions   Commonly known as:  VENTOLIN HFA           ALPRAZolam 1 MG Tabs   TAKE 1 TABLET BY MOUTH NIGHTLY AS NEEDED FOR SLEEP   What changed:    - how much to take  - how to take this  - when to take this  - additional instructions   Com Take 25 mg by mouth 3 (three) times daily as needed. Commonly known as:  ANTIVERT           Mometasone Furo-Formoterol Fum 200-5 MCG/ACT Aero   Inhale 2 puffs into the lungs 2 (two) times daily.  Rinse mouth afterwards   Commonly known as:  Kiera Bloodgood [address] [phone], [address] [employer], [address] [address]     Phone:  [phone] - iron 325 (54 Ck) MG Tabs            MyChart     Visit MyChart  You can access your MyChart to julianne increments are effective and add up over the week   2 ½ hours per week – spread out over time Use a augie to keep you motivated   Don’t forget strength training with weights and resistance Set goals and track your progress   You don’t need to join a gym.